# Patient Record
Sex: FEMALE | Race: WHITE | NOT HISPANIC OR LATINO
[De-identification: names, ages, dates, MRNs, and addresses within clinical notes are randomized per-mention and may not be internally consistent; named-entity substitution may affect disease eponyms.]

---

## 2020-07-10 ENCOUNTER — APPOINTMENT (OUTPATIENT)
Dept: GYNECOLOGIC ONCOLOGY | Facility: CLINIC | Age: 75
End: 2020-07-10
Payer: MEDICARE

## 2020-07-10 PROCEDURE — 99204 OFFICE O/P NEW MOD 45 MIN: CPT

## 2020-07-10 NOTE — OB HISTORY
[Total Preg ___] : : [unfilled] [Living ___] : [unfilled] (living) [Abortions ___] : [unfilled] (abortions) [Menarche Age ____] : age at menarche was [unfilled] [ ___] : [unfilled]  section delivery(s)

## 2020-07-18 LAB
CORE LAB BIOPSY: NORMAL
CYTOLOGY CVX/VAG DOC THIN PREP: ABNORMAL

## 2020-07-26 ENCOUNTER — APPOINTMENT (OUTPATIENT)
Dept: MRI IMAGING | Facility: CLINIC | Age: 75
End: 2020-07-26

## 2020-07-27 NOTE — DISCUSSION/SUMMARY
[Reviewed Clinical Lab Test(s)] : Results of clinical tests were reviewed. [Reviewed Radiology Report(s)] : Radiology reports were reviewed. [FreeTextEntry1] : I met with the pt and her . \par -We discussed the clinical findings and differential. \par -Management options were reviewed, including my advise for imaging and given her all question (fish), we plan an MR of A/P. \par -Depending on the samples and the imaging (if they go to [facility], I asked that they have the discs forwarded), we disc poss CRS evaal and I provided contact for 2 groups. \par -Her instructions were reviewed.\par -All Q/A.\par -She will call after the study is completed.

## 2020-07-27 NOTE — HISTORY OF PRESENT ILLNESS
[FreeTextEntry1] : Recurrent IIB Cervical Adenocarcinoma \par TPE, Continent urinary diversion-4/6/99\par PCP-Dr Wilson\par Rheum-Dr Jackson\par \par She returns to the office feeling well. The are no urinary complaints. She reports both the urinary and ostomy are functioning well.   About a month ago she noticed discharge - a dripping from the anal verge and some pink spotting.  \par \par Pap: Sept 2015- neg.\par \par ROS is otherwise is noncontributory. \par \par [facility] is by her PCP; Mgm May 2018- negative per patient\par \par She reports a DEXA with osteoporosis and she is to see Rheum re the result and continued therapy. She is on Prolia.

## 2020-07-27 NOTE — PHYSICAL EXAM
[Absent] : Adnexa(ae): Absent [Abnormal] : Bimanual Exam: Abnormal [Normal] : Anus and perineum: Normal sphincter tone, no masses, no prolapse. [de-identified] : inc cdi; ostomy; diversion opening patent [de-identified] : dense scar with minimal depth [de-identified] : trace edema [de-identified] : anal verge nl [de-identified] : dense scar [de-identified] : dense scar [de-identified] : smooth induration

## 2020-08-05 ENCOUNTER — RESULT REVIEW (OUTPATIENT)
Age: 75
End: 2020-08-05

## 2020-08-05 ENCOUNTER — APPOINTMENT (OUTPATIENT)
Dept: CT IMAGING | Facility: CLINIC | Age: 75
End: 2020-08-05
Payer: MEDICARE

## 2020-08-05 ENCOUNTER — OUTPATIENT (OUTPATIENT)
Dept: OUTPATIENT SERVICES | Facility: HOSPITAL | Age: 75
LOS: 1 days | End: 2020-08-05
Payer: MEDICARE

## 2020-08-05 DIAGNOSIS — C53.9 MALIGNANT NEOPLASM OF CERVIX UTERI, UNSPECIFIED: ICD-10-CM

## 2020-08-05 DIAGNOSIS — R19.00 INTRA-ABDOMINAL AND PELVIC SWELLING, MASS AND LUMP, UNSPECIFIED SITE: ICD-10-CM

## 2020-08-05 PROCEDURE — 72193 CT PELVIS W/DYE: CPT

## 2020-08-05 PROCEDURE — 82565 ASSAY OF CREATININE: CPT

## 2020-08-05 PROCEDURE — 72193 CT PELVIS W/DYE: CPT | Mod: 26

## 2020-08-07 ENCOUNTER — APPOINTMENT (OUTPATIENT)
Dept: GYNECOLOGIC ONCOLOGY | Facility: CLINIC | Age: 75
End: 2020-08-07
Payer: MEDICARE

## 2020-08-07 VITALS
WEIGHT: 135 LBS | HEIGHT: 62 IN | HEART RATE: 74 BPM | DIASTOLIC BLOOD PRESSURE: 80 MMHG | SYSTOLIC BLOOD PRESSURE: 131 MMHG | BODY MASS INDEX: 24.84 KG/M2

## 2020-08-07 PROCEDURE — 99214 OFFICE O/P EST MOD 30 MIN: CPT

## 2020-08-20 ENCOUNTER — TRANSCRIPTION ENCOUNTER (OUTPATIENT)
Age: 75
End: 2020-08-20

## 2020-09-01 ENCOUNTER — APPOINTMENT (OUTPATIENT)
Dept: SURGERY | Facility: CLINIC | Age: 75
End: 2020-09-01
Payer: MEDICARE

## 2020-09-01 VITALS
OXYGEN SATURATION: 99 % | SYSTOLIC BLOOD PRESSURE: 148 MMHG | RESPIRATION RATE: 15 BRPM | HEIGHT: 62 IN | WEIGHT: 135 LBS | DIASTOLIC BLOOD PRESSURE: 82 MMHG | TEMPERATURE: 98.1 F | BODY MASS INDEX: 24.84 KG/M2 | HEART RATE: 77 BPM

## 2020-09-01 PROCEDURE — 99204 OFFICE O/P NEW MOD 45 MIN: CPT

## 2020-09-01 NOTE — PHYSICAL EXAM
[Abdomen Tenderness] : ~T No ~M abdominal tenderness [Wheezing] : no wheezing was heard [No Rash or Lesion] : No rash or lesion [Alert] : alert [Calm] : calm [de-identified] : Well-healed midline incision.  Colostomy is functioning normally.  [place] pouch in the right lower quadrant appears to be functioning normally. [de-identified] : Perianal inspection reveals a noninflamed small anal tag.  The anal canal is strictured and does not admit an index finger.  There is a small amount of serous discharge. [de-identified] : nl [de-identified] : nl

## 2020-09-01 NOTE — HISTORY OF PRESENT ILLNESS
[FreeTextEntry1] : Abi is a 76 y/o female here for consultation visit.  She is 20 years status post pelvic exoneration for cervical cancer.  She has a history of neoadjuvant radiation to the pelvis.  For many years she had mucus discharge from the anus however over the past year this has diminished.  She now has only a small amount of occasional mucus discharge from the anus.  She denies abdominal pain nausea or vomiting.  She denies rectal or pelvic pain.\par \par MRI from 7/18/20 demonstrated large hypovascular rectovaginal mass consistent with a mucinous adenocarcinoma involving both the remaining distal rectum and vagina. Tumor abuts the anal sphincter but does not clearly invade into it. Prominent bilateral inguinal lymph nodes are nonspecific. \par \par CT from 8/6/20 demonstrated residual chronic perineal mass, likely cystic, with gas component new since 2010. Mass likely related to prior radiation and surgery with possible superinfection. Fistulization to bowel considered unlikely due to absence of contrast within the gaseous component.

## 2020-09-01 NOTE — CONSULT LETTER
[Dear  ___] : Dear  [unfilled], [Consult Letter:] : I had the pleasure of evaluating your patient, [unfilled]. [Please see my note below.] : Please see my note below. [Consult Closing:] : Thank you very much for allowing me to participate in the care of this patient.  If you have any questions, please do not hesitate to contact me. [Sincerely,] : Sincerely, [FreeTextEntry3] : Fermin Oropeza M.D., F.A.C.S, F.A.S.C.R.S [DrSunny  ___] : Dr. AGUIRRE

## 2020-09-01 NOTE — ASSESSMENT
[FreeTextEntry1] : In summary the patient has a history of cervical cancer status post pelvic radiation and subsequent pelvic exenteration.  She has an obvious anal stricture likely secondary to radiation.  I suspect that the CT and MRI findings represent a dilated mucus filled rectum as a result of distal obstruction.  I therefore recommended an anal dilation and proctoscopy under sedation in endoscopy.  Any abnormalities will be biopsied.  Risks benefits and alternatives were explained.

## 2020-09-11 ENCOUNTER — APPOINTMENT (OUTPATIENT)
Dept: DISASTER EMERGENCY | Facility: CLINIC | Age: 75
End: 2020-09-11

## 2020-09-11 DIAGNOSIS — Z01.818 ENCOUNTER FOR OTHER PREPROCEDURAL EXAMINATION: ICD-10-CM

## 2020-09-12 LAB — SARS-COV-2 N GENE NPH QL NAA+PROBE: NOT DETECTED

## 2020-09-14 ENCOUNTER — RESULT REVIEW (OUTPATIENT)
Age: 75
End: 2020-09-14

## 2020-09-14 ENCOUNTER — OUTPATIENT (OUTPATIENT)
Dept: OUTPATIENT SERVICES | Facility: HOSPITAL | Age: 75
LOS: 1 days | End: 2020-09-14
Payer: MEDICARE

## 2020-09-14 ENCOUNTER — APPOINTMENT (OUTPATIENT)
Dept: SURGERY | Facility: HOSPITAL | Age: 75
End: 2020-09-14
Payer: MEDICARE

## 2020-09-14 VITALS
TEMPERATURE: 97 F | SYSTOLIC BLOOD PRESSURE: 124 MMHG | OXYGEN SATURATION: 100 % | HEART RATE: 77 BPM | RESPIRATION RATE: 20 BRPM | DIASTOLIC BLOOD PRESSURE: 43 MMHG | WEIGHT: 134.92 LBS | HEIGHT: 63 IN

## 2020-09-14 VITALS
RESPIRATION RATE: 16 BRPM | SYSTOLIC BLOOD PRESSURE: 113 MMHG | OXYGEN SATURATION: 93 % | HEART RATE: 81 BPM | DIASTOLIC BLOOD PRESSURE: 53 MMHG

## 2020-09-14 DIAGNOSIS — C53.9 MALIGNANT NEOPLASM OF CERVIX UTERI, UNSPECIFIED: ICD-10-CM

## 2020-09-14 PROCEDURE — 88342 IMHCHEM/IMCYTCHM 1ST ANTB: CPT | Mod: 26

## 2020-09-14 PROCEDURE — 45331 SIGMOIDOSCOPY AND BIOPSY: CPT

## 2020-09-14 PROCEDURE — 88341 IMHCHEM/IMCYTCHM EA ADD ANTB: CPT

## 2020-09-14 PROCEDURE — 88360 TUMOR IMMUNOHISTOCHEM/MANUAL: CPT

## 2020-09-14 PROCEDURE — 45380 COLONOSCOPY AND BIOPSY: CPT

## 2020-09-14 PROCEDURE — 88112 CYTOPATH CELL ENHANCE TECH: CPT | Mod: 26

## 2020-09-14 PROCEDURE — 88112 CYTOPATH CELL ENHANCE TECH: CPT

## 2020-09-14 PROCEDURE — 88305 TISSUE EXAM BY PATHOLOGIST: CPT | Mod: 26

## 2020-09-14 PROCEDURE — 88305 TISSUE EXAM BY PATHOLOGIST: CPT

## 2020-09-14 PROCEDURE — 88341 IMHCHEM/IMCYTCHM EA ADD ANTB: CPT | Mod: 26

## 2020-09-14 RX ORDER — SODIUM CHLORIDE 9 MG/ML
1000 INJECTION INTRAMUSCULAR; INTRAVENOUS; SUBCUTANEOUS
Refills: 0 | Status: DISCONTINUED | OUTPATIENT
Start: 2020-09-14 | End: 2020-09-29

## 2020-09-14 NOTE — ASU DISCHARGE PLAN (ADULT/PEDIATRIC) - CARE PROVIDER_API CALL
Fermin Oropeza  COLON/RECTAL SURGERY  [address] [facility], [facility] [address]  [place] [address]  Phone: [phone]  Fax: [phone]  Follow Up Time:

## 2020-09-14 NOTE — PRE-OP CHECKLIST - LOOSE TEETH
Brian Bernal  CARDIOLOGY  [address] [place] [address]  Phone: [phone]  Fax: [phone]  Follow Up Time: 1 week no Brian Bernal  CARDIOLOGY  [address] [place] [address], [address] [address]  Phone: [phone]  Fax: [phone]  Follow Up Time: 1 week    Zana Sher  INTERNAL MEDICINE  [address] [place] [address]  Phone: [phone]  Fax: [phone]  Follow Up Time: 1 week

## 2020-09-14 NOTE — PRE PROCEDURE NOTE - PRE PROCEDURE EVALUATION
Attending Physician:                            Procedure:colonoscopy    Indication for Procedure: ?pelvic mass  ________________________________________________________  PAST MEDICAL & SURGICAL HISTORY:    ALLERGIES:  penicillin G benzathine (Rash; Anaphylaxis; Hives; Short breath)    HOME MEDICATIONS:  hydroCHLOROthiazide 12.5 mg oral tablet: 1 tab(s) orally once a day  Prolia 60 mg/mL subcutaneous solution:   simvastatin 10 mg oral tablet: 1 tab(s) orally once a day (at bedtime)    AICD/PPM: [ ] yes   [ ] no    PERTINENT LAB DATA:                      PHYSICAL EXAMINATION:    Height (cm): 160  Weight (kg): 61.2  BMI (kg/m2): 23.9  BSA (m2): 1.64T(C): 36.3  HR: 77  BP: 124/43  RR: 20  SpO2: 100%    Constitutional: NAD  HEENT: PERRLA, EOMI,    Neck:  No JVD  Respiratory: CTAB/L  Cardiovascular: S1 and S2  Gastrointestinal: BS+, soft, NT/ND  Extremities: No peripheral edema  Neurological: A/O x 3, no focal deficits  Psychiatric: Normal mood, normal affect  Skin: No rashes        COMMENTS:    The patient is a suitable candidate for the planned procedure unless box checked [ ]  No, explain:

## 2020-09-14 NOTE — ASU DISCHARGE PLAN (ADULT/PEDIATRIC) - ASU DC SPECIAL INSTRUCTIONSFT
DIET: Return to your usual diet.  NOTIFY YOUR SURGEON IF: You have any bleeding that does not stop,  persistent nausea/vomiting, persistent diarrhea, or if your pain is not controlled on your discharge pain medications.  FOLLOW-UP:  1. Follow-up with Dr. Oropeza within 1 month of discharge.  Please call office for appointment  2. Please follow up with your primary care physician regarding your hospitalization.

## 2020-09-14 NOTE — PRE-ANESTHESIA EVALUATION ADULT - NSANTHOSAYNRD_GEN_A_CORE
No. YUDY screening performed.  STOP BANG Legend: 0-2 = LOW Risk; 3-4 = INTERMEDIATE Risk; 5-8 = HIGH Risk

## 2020-09-15 LAB
NON-GYNECOLOGICAL CYTOLOGY STUDY: SIGNIFICANT CHANGE UP
SURGICAL PATHOLOGY STUDY: SIGNIFICANT CHANGE UP

## 2020-09-17 ENCOUNTER — APPOINTMENT (OUTPATIENT)
Dept: SURGERY | Facility: CLINIC | Age: 75
End: 2020-09-17
Payer: MEDICARE

## 2020-09-17 VITALS
DIASTOLIC BLOOD PRESSURE: 83 MMHG | SYSTOLIC BLOOD PRESSURE: 146 MMHG | OXYGEN SATURATION: 98 % | HEART RATE: 75 BPM | RESPIRATION RATE: 16 BRPM | TEMPERATURE: 97.6 F

## 2020-09-17 PROCEDURE — 99213 OFFICE O/P EST LOW 20 MIN: CPT

## 2020-09-17 NOTE — HISTORY OF PRESENT ILLNESS
[FreeTextEntry1] : Abi is a 74 y/o female with a h/o cervical cancer status post pelvic radiation and subsequent pelvic exenteration. Pt is s/p proctoscopy of her short rectal stump on 9/14/20.  The findings included a malignant appearing distal rectal mass anteriorly with an obvious large rectovaginal fistula.  Pathology: moderately differentiated adenocarcinoma with mucinous features.  In the office today the patient presents for follow-up.  She denies abdominal pain nausea vomiting but has persistent mild discharge from the rectum and vagina.

## 2020-09-17 NOTE — ASSESSMENT
[FreeTextEntry1] : In summary the patient has a history of a pelvic exenteration with chemotherapy and radiation 20 years ago for cervical cancer.  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  I referred her to the [facility] for evaluation.  She will need additional imaging, a full colonoscopy, and likely eventual surgery.  The options for surgical approach would be transabdominal versus perineal.  We did not go into the details of surgery. She will be presented at an upcoming tumor board.

## 2020-09-18 ENCOUNTER — OUTPATIENT (OUTPATIENT)
Dept: OUTPATIENT SERVICES | Facility: HOSPITAL | Age: 75
LOS: 1 days | Discharge: HOME | End: 2020-09-18

## 2020-09-18 DIAGNOSIS — K50.911 CROHN'S DISEASE, UNSPECIFIED, WITH RECTAL BLEEDING: ICD-10-CM

## 2020-09-21 ENCOUNTER — APPOINTMENT (OUTPATIENT)
Age: 75
End: 2020-09-21
Payer: MEDICARE

## 2020-09-21 ENCOUNTER — RESULT REVIEW (OUTPATIENT)
Age: 75
End: 2020-09-21

## 2020-09-21 VITALS
TEMPERATURE: 97.7 F | HEIGHT: 63.39 IN | OXYGEN SATURATION: 99 % | HEART RATE: 73 BPM | RESPIRATION RATE: 16 BRPM | BODY MASS INDEX: 23.53 KG/M2 | SYSTOLIC BLOOD PRESSURE: 130 MMHG | WEIGHT: 134.48 LBS | DIASTOLIC BLOOD PRESSURE: 75 MMHG

## 2020-09-21 LAB
APTT BLD: 28.4 SEC
BASOPHILS # BLD AUTO: 0.03 K/UL — SIGNIFICANT CHANGE UP (ref 0–0.2)
BASOPHILS NFR BLD AUTO: 0.4 % — SIGNIFICANT CHANGE UP (ref 0–2)
EOSINOPHIL # BLD AUTO: 0.05 K/UL — SIGNIFICANT CHANGE UP (ref 0–0.5)
EOSINOPHIL NFR BLD AUTO: 0.7 % — SIGNIFICANT CHANGE UP (ref 0–6)
HCT VFR BLD CALC: 40.3 % — SIGNIFICANT CHANGE UP (ref 34.5–45)
HGB BLD-MCNC: 12.3 G/DL — SIGNIFICANT CHANGE UP (ref 11.5–15.5)
IMM GRANULOCYTES NFR BLD AUTO: 0.4 % — SIGNIFICANT CHANGE UP (ref 0–1.5)
INR PPP: 1.04 RATIO
LYMPHOCYTES # BLD AUTO: 1.43 K/UL — SIGNIFICANT CHANGE UP (ref 1–3.3)
LYMPHOCYTES # BLD AUTO: 20.8 % — SIGNIFICANT CHANGE UP (ref 13–44)
MCHC RBC-ENTMCNC: 27.5 PG — SIGNIFICANT CHANGE UP (ref 27–34)
MCHC RBC-ENTMCNC: 30.5 G/DL — LOW (ref 32–36)
MCV RBC AUTO: 90 FL — SIGNIFICANT CHANGE UP (ref 80–100)
MONOCYTES # BLD AUTO: 0.48 K/UL — SIGNIFICANT CHANGE UP (ref 0–0.9)
MONOCYTES NFR BLD AUTO: 7 % — SIGNIFICANT CHANGE UP (ref 2–14)
NEUTROPHILS # BLD AUTO: 4.84 K/UL — SIGNIFICANT CHANGE UP (ref 1.8–7.4)
NEUTROPHILS NFR BLD AUTO: 70.7 % — SIGNIFICANT CHANGE UP (ref 43–77)
NRBC # BLD: 0 /100 WBCS — SIGNIFICANT CHANGE UP (ref 0–0)
PLATELET # BLD AUTO: 345 K/UL — SIGNIFICANT CHANGE UP (ref 150–400)
PT BLD: 12.2 SEC
RBC # BLD: 4.48 M/UL — SIGNIFICANT CHANGE UP (ref 3.8–5.2)
RBC # FLD: 14.6 % — HIGH (ref 10.3–14.5)
WBC # BLD: 6.86 K/UL — SIGNIFICANT CHANGE UP (ref 3.8–10.5)
WBC # FLD AUTO: 6.86 K/UL — SIGNIFICANT CHANGE UP (ref 3.8–10.5)

## 2020-09-21 PROCEDURE — 99205 OFFICE O/P NEW HI 60 MIN: CPT

## 2020-09-22 LAB
ALBUMIN SERPL ELPH-MCNC: 4.7 G/DL
ALP BLD-CCNC: 72 U/L
ALT SERPL-CCNC: 12 U/L
ANION GAP SERPL CALC-SCNC: 16 MMOL/L
AST SERPL-CCNC: 16 U/L
BILIRUB SERPL-MCNC: 0.3 MG/DL
BUN SERPL-MCNC: 17 MG/DL
CALCIUM SERPL-MCNC: 10 MG/DL
CHLORIDE SERPL-SCNC: 103 MMOL/L
CO2 SERPL-SCNC: 23 MMOL/L
CREAT SERPL-MCNC: 0.84 MG/DL
GLUCOSE SERPL-MCNC: 94 MG/DL
HBV CORE IGG+IGM SER QL: NONREACTIVE
HBV CORE IGM SER QL: NONREACTIVE
HBV SURFACE AB SER QL: NONREACTIVE
HBV SURFACE AG SER QL: NONREACTIVE
HCV AB SER QL: NONREACTIVE
HCV S/CO RATIO: 0.12 S/CO
POTASSIUM SERPL-SCNC: 3.7 MMOL/L
PROT SERPL-MCNC: 7.1 G/DL
SODIUM SERPL-SCNC: 142 MMOL/L

## 2020-09-23 ENCOUNTER — APPOINTMENT (OUTPATIENT)
Dept: CT IMAGING | Facility: CLINIC | Age: 75
End: 2020-09-23
Payer: MEDICARE

## 2020-09-23 ENCOUNTER — OUTPATIENT (OUTPATIENT)
Dept: OUTPATIENT SERVICES | Facility: HOSPITAL | Age: 75
LOS: 1 days | End: 2020-09-23
Payer: MEDICARE

## 2020-09-23 DIAGNOSIS — Z00.8 ENCOUNTER FOR OTHER GENERAL EXAMINATION: ICD-10-CM

## 2020-09-23 LAB — CEA SERPL-MCNC: 8.1 NG/ML

## 2020-09-23 PROCEDURE — 71260 CT THORAX DX C+: CPT | Mod: 26

## 2020-09-23 PROCEDURE — 74177 CT ABD & PELVIS W/CONTRAST: CPT

## 2020-09-23 PROCEDURE — 71260 CT THORAX DX C+: CPT

## 2020-09-23 PROCEDURE — 74177 CT ABD & PELVIS W/CONTRAST: CPT | Mod: 26

## 2020-09-28 ENCOUNTER — APPOINTMENT (OUTPATIENT)
Dept: RADIATION ONCOLOGY | Facility: CLINIC | Age: 75
End: 2020-09-28
Payer: MEDICARE

## 2020-09-28 PROCEDURE — 99205 OFFICE O/P NEW HI 60 MIN: CPT | Mod: GC,25

## 2020-09-28 NOTE — REASON FOR VISIT
[Consideration of Curative Therapy] : consideration of curative therapy for [Rectal Cancer] : cancer

## 2020-09-30 ENCOUNTER — APPOINTMENT (OUTPATIENT)
Age: 75
End: 2020-09-30
Payer: MEDICARE

## 2020-09-30 NOTE — VITALS
[Least Pain Intensity: 0/10] : 0/10 [Maximal Pain Intensity: 3/10] : 3/10 [80: Normal activity with effort; some signs or symptoms of disease.] : 80: Normal activity with effort; some signs or symptoms of disease.

## 2020-10-01 LAB
DPYD GENOTYPE: NORMAL
DPYD PHENOTYPE: NORMAL
DPYD SPECIMEN: NORMAL

## 2020-10-03 ENCOUNTER — APPOINTMENT (OUTPATIENT)
Dept: NUCLEAR MEDICINE | Facility: CLINIC | Age: 75
End: 2020-10-03
Payer: MEDICARE

## 2020-10-03 ENCOUNTER — OUTPATIENT (OUTPATIENT)
Dept: OUTPATIENT SERVICES | Facility: HOSPITAL | Age: 75
LOS: 1 days | End: 2020-10-03
Payer: MEDICARE

## 2020-10-03 DIAGNOSIS — C20 MALIGNANT NEOPLASM OF RECTUM: ICD-10-CM

## 2020-10-03 PROCEDURE — 78815 PET IMAGE W/CT SKULL-THIGH: CPT | Mod: 26,PI

## 2020-10-03 PROCEDURE — 78815 PET IMAGE W/CT SKULL-THIGH: CPT

## 2020-10-03 PROCEDURE — A9552: CPT

## 2020-10-07 ENCOUNTER — APPOINTMENT (OUTPATIENT)
Age: 75
End: 2020-10-07
Payer: MEDICARE

## 2020-10-07 VITALS
DIASTOLIC BLOOD PRESSURE: 85 MMHG | TEMPERATURE: 98.2 F | RESPIRATION RATE: 14 BRPM | OXYGEN SATURATION: 98 % | SYSTOLIC BLOOD PRESSURE: 142 MMHG | HEART RATE: 88 BPM | WEIGHT: 132.28 LBS | BODY MASS INDEX: 23.14 KG/M2

## 2020-10-07 PROCEDURE — 99215 OFFICE O/P EST HI 40 MIN: CPT

## 2020-10-07 NOTE — CONSULT LETTER
[Dear  ___] : Dear  [unfilled], [Courtesy Letter:] : I had the pleasure of seeing your patient, [unfilled], in my office today. [Please see my note below.] : Please see my note below. [Consult Closing:] : Thank you very much for allowing me to participate in the care of this patient.  If you have any questions, please do not hesitate to contact me. [Sincerely,] : Sincerely, [FreeTextEntry3] : Nury Baker D.O. \par Attending Physician \par [facility] Medical Oncology [facility] H[facility]\par  \par [facility] \par Tel: [phone]\par Fax: [phone]\par

## 2020-10-07 NOTE — PHYSICAL EXAM
[Fully active, able to carry on all pre-disease performance without restriction] : Status 0 - Fully active, able to carry on all pre-disease performance without restriction [Normal] : affect appropriate [de-identified] : + ostomy

## 2020-10-07 NOTE — REASON FOR VISIT
[Initial Consultation] : an initial consultation [Spouse] : spouse [FreeTextEntry2] : Rectal adenocarcinoma

## 2020-10-07 NOTE — HISTORY OF PRESENT ILLNESS
[Disease: _____________________] : Disease: [unfilled] [de-identified] : 75 F w/ h/o recurrent endocervical adenocarcinoma Stage IIB s/p radiation then required pelvic exoneration presents for further evaluation of rectal cancer. \par \par In June 2020 Abi noticed a rectal discharge and burning sensation in the rectum. Was evaluated by Dr. Lamar and ultimately saw Dr. Oropeza who ordered CT Pelvis and MRI Pelvis . Found to have a mass in the rectum s/p biopsy confirmed adenocarcinoma. \par \par 7/18/20: MRI Pelvis: large hypovascular mass c/w mucinous adenoca involving remaining distal rectum and vagina, tumor abuts anal sphincter\par 8/5/20: CT pelvis: residual chronic perineal mass, likely cystic with gas component\par  [de-identified] : adenocarcinoma [de-identified] : Appetite and weight are stable. Uncomfortable when sitting but no pain. \par Ostomy and conduit working well. \par No n/v. \par

## 2020-10-08 NOTE — REASON FOR VISIT
[Follow-Up Visit] : a follow-up [Spouse] : spouse [Family Member] : family member [FreeTextEntry2] : Rectal cancer

## 2020-10-08 NOTE — HISTORY OF PRESENT ILLNESS
[Disease: _____________________] : Disease: [unfilled] [T: ___] : T[unfilled] [N: ___] : N[unfilled] [AJCC Stage: ____] : AJCC Stage: [unfilled] [de-identified] : 75 F w/ h/o recurrent endocervical adenocarcinoma Stage IIB s/p radiation then required pelvic exoneration presents for further evaluation of rectal cancer. \par \par In June 2020 Abi noticed a rectal discharge and burning sensation in the rectum. Was evaluated by Dr. Lamar and ultimately saw Dr. Oropeza who ordered CT Pelvis and MRI Pelvis . Found to have a mass in the rectum s/p biopsy confirmed adenocarcinoma. \par \par 7/18/20: MRI Pelvis: large hypovascular mass c/w mucinous adenoca involving remaining distal rectum and vagina, tumor abuts anal sphincter\par 8/5/20: CT pelvis: residual chronic perineal mass, likely cystic with gas component\par 9/23/20: CT CAP: low density, possibly necrotic mass up to 6.2 cm with involvement of the vagina, nonspecific 6 mm LLL nodule is noted\par 10/3/20: PET/CT: hypermetabolic rectal mass, non specific small inguinal LN, nonspecific hypermetabolism within the colostomy insertion site\par 10/7/20: discussed at rectal TB, plan for DILMA with re-evaluation of response and then surgery if residual disease [de-identified] : adenocarcinoma [de-identified] : Met with pt today to discuss PET/CT results and tumor board discussion and recommendation.

## 2020-10-08 NOTE — PHYSICAL EXAM
[Fully active, able to carry on all pre-disease performance without restriction] : Status 0 - Fully active, able to carry on all pre-disease performance without restriction [Normal] : affect appropriate [de-identified] : + ostomy

## 2020-10-11 ENCOUNTER — APPOINTMENT (OUTPATIENT)
Dept: MRI IMAGING | Facility: CLINIC | Age: 75
End: 2020-10-11

## 2020-10-12 ENCOUNTER — APPOINTMENT (OUTPATIENT)
Dept: DISASTER EMERGENCY | Facility: CLINIC | Age: 75
End: 2020-10-12

## 2020-10-12 ENCOUNTER — OUTPATIENT (OUTPATIENT)
Dept: OUTPATIENT SERVICES | Facility: HOSPITAL | Age: 75
LOS: 1 days | End: 2020-10-12
Payer: MEDICARE

## 2020-10-12 DIAGNOSIS — Z11.59 ENCOUNTER FOR SCREENING FOR OTHER VIRAL DISEASES: ICD-10-CM

## 2020-10-12 PROCEDURE — U0003: CPT

## 2020-10-13 LAB — SARS-COV-2 RNA SPEC QL NAA+PROBE: SIGNIFICANT CHANGE UP

## 2020-10-15 ENCOUNTER — APPOINTMENT (OUTPATIENT)
Dept: ENDOVASCULAR SURGERY | Facility: CLINIC | Age: 75
End: 2020-10-15
Payer: MEDICARE

## 2020-10-15 VITALS
HEART RATE: 83 BPM | WEIGHT: 132 LBS | BODY MASS INDEX: 23.39 KG/M2 | SYSTOLIC BLOOD PRESSURE: 133 MMHG | DIASTOLIC BLOOD PRESSURE: 71 MMHG | RESPIRATION RATE: 15 BRPM | TEMPERATURE: 97.6 F | HEIGHT: 63 IN | OXYGEN SATURATION: 97 %

## 2020-10-15 PROCEDURE — 77001 FLUOROGUIDE FOR VEIN DEVICE: CPT

## 2020-10-15 PROCEDURE — 76937 US GUIDE VASCULAR ACCESS: CPT

## 2020-10-15 PROCEDURE — 36561 INSERT TUNNELED CV CATH: CPT

## 2020-10-19 ENCOUNTER — OUTPATIENT (OUTPATIENT)
Dept: OUTPATIENT SERVICES | Facility: HOSPITAL | Age: 75
LOS: 1 days | Discharge: HOME | End: 2020-10-19

## 2020-10-19 DIAGNOSIS — K50.911 CROHN'S DISEASE, UNSPECIFIED, WITH RECTAL BLEEDING: ICD-10-CM

## 2020-10-20 ENCOUNTER — APPOINTMENT (OUTPATIENT)
Dept: MRI IMAGING | Facility: CLINIC | Age: 75
End: 2020-10-20

## 2020-10-20 NOTE — PAST MEDICAL HISTORY
[Increasing age ( >40 years old)] : Increasing age ( >40 years old) [No therapy indicated for cases scheduled for less than one hour] : No therapy indicated for cases scheduled for less than one hour. [FreeTextEntry1] : Malignant Hyperthermia Screening Tool and Risk of Bleeding Assessment \par \par Ms. JACQUELINE SPEARS denies family of unexpected death following Anesthesia or Exercise.\par Denies Family history of Malignant Hyperthermia, Muscle or Neuromuscular disorder and High Temperature  following exercise.\par \par Ms. JACQUELINE SPEARS denies history of Muscle Spasm, Dark or Chocolate- Colored and Unanticipated fever immediately following anaesthesia or serious exercise.\par Ms. JACQUELINE SPEARS also denies bleeding tendencies/Risks of Bleeding.\par

## 2020-10-20 NOTE — PROCEDURE
[D/C IV on discharge] : D/C IV on discharge [Resume diet] : resume diet [Site check for bleeding/hematoma] : Site check for bleeding/hematoma [Vital signs on admission the q 15 mins x2] : Vital signs on admission the q 15 mins x2 [FreeTextEntry1] : right mediport insertion

## 2020-10-20 NOTE — HISTORY OF PRESENT ILLNESS
[FreeTextEntry1] : alert and oriented x 3 \par accompanied by  Vladimir [phone]\par no reported falls \par covid test  [FreeTextEntry5] : yesterday at  8 pm [FreeTextEntry6] : Dr Chang

## 2020-10-26 ENCOUNTER — APPOINTMENT (OUTPATIENT)
Age: 75
End: 2020-10-26

## 2020-10-26 ENCOUNTER — RESULT REVIEW (OUTPATIENT)
Age: 75
End: 2020-10-26

## 2020-10-26 ENCOUNTER — LABORATORY RESULT (OUTPATIENT)
Age: 75
End: 2020-10-26

## 2020-10-26 LAB
BASOPHILS # BLD AUTO: 0.05 K/UL — SIGNIFICANT CHANGE UP (ref 0–0.2)
BASOPHILS NFR BLD AUTO: 0.5 % — SIGNIFICANT CHANGE UP (ref 0–2)
EOSINOPHIL # BLD AUTO: 0.08 K/UL — SIGNIFICANT CHANGE UP (ref 0–0.5)
EOSINOPHIL NFR BLD AUTO: 0.9 % — SIGNIFICANT CHANGE UP (ref 0–6)
HCT VFR BLD CALC: 39.1 % — SIGNIFICANT CHANGE UP (ref 34.5–45)
HGB BLD-MCNC: 12.5 G/DL — SIGNIFICANT CHANGE UP (ref 11.5–15.5)
IMM GRANULOCYTES NFR BLD AUTO: 0.7 % — SIGNIFICANT CHANGE UP (ref 0–1.5)
LYMPHOCYTES # BLD AUTO: 1.47 K/UL — SIGNIFICANT CHANGE UP (ref 1–3.3)
LYMPHOCYTES # BLD AUTO: 15.7 % — SIGNIFICANT CHANGE UP (ref 13–44)
MCHC RBC-ENTMCNC: 27.8 PG — SIGNIFICANT CHANGE UP (ref 27–34)
MCHC RBC-ENTMCNC: 32 G/DL — SIGNIFICANT CHANGE UP (ref 32–36)
MCV RBC AUTO: 87.1 FL — SIGNIFICANT CHANGE UP (ref 80–100)
MONOCYTES # BLD AUTO: 0.78 K/UL — SIGNIFICANT CHANGE UP (ref 0–0.9)
MONOCYTES NFR BLD AUTO: 8.3 % — SIGNIFICANT CHANGE UP (ref 2–14)
NEUTROPHILS # BLD AUTO: 6.9 K/UL — SIGNIFICANT CHANGE UP (ref 1.8–7.4)
NEUTROPHILS NFR BLD AUTO: 73.9 % — SIGNIFICANT CHANGE UP (ref 43–77)
NRBC # BLD: 0 /100 WBCS — SIGNIFICANT CHANGE UP (ref 0–0)
PLATELET # BLD AUTO: 328 K/UL — SIGNIFICANT CHANGE UP (ref 150–400)
RBC # BLD: 4.49 M/UL — SIGNIFICANT CHANGE UP (ref 3.8–5.2)
RBC # FLD: 15 % — HIGH (ref 10.3–14.5)
WBC # BLD: 9.35 K/UL — SIGNIFICANT CHANGE UP (ref 3.8–10.5)
WBC # FLD AUTO: 9.35 K/UL — SIGNIFICANT CHANGE UP (ref 3.8–10.5)

## 2020-10-27 ENCOUNTER — NON-APPOINTMENT (OUTPATIENT)
Age: 75
End: 2020-10-27

## 2020-10-27 DIAGNOSIS — C20 MALIGNANT NEOPLASM OF RECTUM: ICD-10-CM

## 2020-10-27 DIAGNOSIS — Z51.11 ENCOUNTER FOR ANTINEOPLASTIC CHEMOTHERAPY: ICD-10-CM

## 2020-10-27 DIAGNOSIS — R11.2 NAUSEA WITH VOMITING, UNSPECIFIED: ICD-10-CM

## 2020-10-28 ENCOUNTER — APPOINTMENT (OUTPATIENT)
Age: 75
End: 2020-10-28

## 2020-11-04 ENCOUNTER — APPOINTMENT (OUTPATIENT)
Age: 75
End: 2020-11-04
Payer: MEDICARE

## 2020-11-04 PROCEDURE — 99441: CPT

## 2020-11-04 NOTE — HISTORY OF PRESENT ILLNESS
[Home] : at home, [unfilled] , at the time of the visit. [Medical Office: ([facility])___] : at the medical office located in  [Spouse] : spouse [Verbal consent obtained from patient] : the patient, [unfilled] [Disease: _____________________] : Disease: [unfilled] [T: ___] : T[unfilled] [N: ___] : N[unfilled] [AJCC Stage: ____] : AJCC Stage: [unfilled] [Therapy: ___] : Therapy: [unfilled] [Cycle: ___] : Cycle: [unfilled] [Day: ___] : Day: [unfilled] [de-identified] : 75 F w/ h/o recurrent endocervical adenocarcinoma Stage IIB s/p radiation then required pelvic exoneration presents for further evaluation of rectal cancer. \par \par In June 2020 Abi noticed a rectal discharge and burning sensation in the rectum. Was evaluated by Dr. Lamar and ultimately saw Dr. Oropeza who ordered CT Pelvis and MRI Pelvis . Found to have a mass in the rectum s/p biopsy confirmed adenocarcinoma. \par \par 7/18/20: MRI Pelvis: large hypovascular mass c/w mucinous adenoca involving remaining distal rectum and vagina, tumor abuts anal sphincter\par 8/5/20: CT pelvis: residual chronic perineal mass, likely cystic with gas component\par 9/23/20: CT CAP: low density, possibly necrotic mass up to 6.2 cm with involvement of the vagina, nonspecific 6 mm LLL nodule is noted\par 10/3/20: PET/CT: hypermetabolic rectal mass, non specific small inguinal LN, nonspecific hypermetabolism within the colostomy insertion site\par 10/7/20: discussed at rectal TB, plan for DILMA with re-evaluation of response and then surgery if residual disease\par 10/26/20: C1 FOLFOX [de-identified] : adenocarcinoma [de-identified] : Tolerated first cycle

## 2020-11-05 ENCOUNTER — NON-APPOINTMENT (OUTPATIENT)
Age: 75
End: 2020-11-05

## 2020-11-09 ENCOUNTER — LABORATORY RESULT (OUTPATIENT)
Age: 75
End: 2020-11-09

## 2020-11-09 ENCOUNTER — APPOINTMENT (OUTPATIENT)
Age: 75
End: 2020-11-09
Payer: MEDICARE

## 2020-11-09 ENCOUNTER — APPOINTMENT (OUTPATIENT)
Age: 75
End: 2020-11-09

## 2020-11-09 ENCOUNTER — RESULT REVIEW (OUTPATIENT)
Age: 75
End: 2020-11-09

## 2020-11-09 VITALS
DIASTOLIC BLOOD PRESSURE: 79 MMHG | TEMPERATURE: 97.9 F | RESPIRATION RATE: 16 BRPM | SYSTOLIC BLOOD PRESSURE: 131 MMHG | HEART RATE: 80 BPM

## 2020-11-09 LAB
BASOPHILS # BLD AUTO: 0.04 K/UL — SIGNIFICANT CHANGE UP (ref 0–0.2)
BASOPHILS NFR BLD AUTO: 0.6 % — SIGNIFICANT CHANGE UP (ref 0–2)
EOSINOPHIL # BLD AUTO: 0.09 K/UL — SIGNIFICANT CHANGE UP (ref 0–0.5)
EOSINOPHIL NFR BLD AUTO: 1.3 % — SIGNIFICANT CHANGE UP (ref 0–6)
HCT VFR BLD CALC: 38.5 % — SIGNIFICANT CHANGE UP (ref 34.5–45)
HGB BLD-MCNC: 12.6 G/DL — SIGNIFICANT CHANGE UP (ref 11.5–15.5)
IMM GRANULOCYTES NFR BLD AUTO: 0.9 % — SIGNIFICANT CHANGE UP (ref 0–1.5)
LYMPHOCYTES # BLD AUTO: 1.49 K/UL — SIGNIFICANT CHANGE UP (ref 1–3.3)
LYMPHOCYTES # BLD AUTO: 21.3 % — SIGNIFICANT CHANGE UP (ref 13–44)
MCHC RBC-ENTMCNC: 27.8 PG — SIGNIFICANT CHANGE UP (ref 27–34)
MCHC RBC-ENTMCNC: 32.7 G/DL — SIGNIFICANT CHANGE UP (ref 32–36)
MCV RBC AUTO: 85 FL — SIGNIFICANT CHANGE UP (ref 80–100)
MONOCYTES # BLD AUTO: 0.8 K/UL — SIGNIFICANT CHANGE UP (ref 0–0.9)
MONOCYTES NFR BLD AUTO: 11.4 % — SIGNIFICANT CHANGE UP (ref 2–14)
NEUTROPHILS # BLD AUTO: 4.53 K/UL — SIGNIFICANT CHANGE UP (ref 1.8–7.4)
NEUTROPHILS NFR BLD AUTO: 64.5 % — SIGNIFICANT CHANGE UP (ref 43–77)
NRBC # BLD: 0 /100 WBCS — SIGNIFICANT CHANGE UP (ref 0–0)
PLATELET # BLD AUTO: 244 K/UL — SIGNIFICANT CHANGE UP (ref 150–400)
RBC # BLD: 4.53 M/UL — SIGNIFICANT CHANGE UP (ref 3.8–5.2)
RBC # FLD: 15.2 % — HIGH (ref 10.3–14.5)
WBC # BLD: 7.01 K/UL — SIGNIFICANT CHANGE UP (ref 3.8–10.5)
WBC # FLD AUTO: 7.01 K/UL — SIGNIFICANT CHANGE UP (ref 3.8–10.5)

## 2020-11-09 PROCEDURE — 99214 OFFICE O/P EST MOD 30 MIN: CPT

## 2020-11-09 RX ORDER — MIRTAZAPINE 15 MG/1
15 TABLET, FILM COATED ORAL
Qty: 15 | Refills: 0 | Status: DISCONTINUED | COMMUNITY
Start: 2020-11-02 | End: 2020-11-09

## 2020-11-11 ENCOUNTER — APPOINTMENT (OUTPATIENT)
Age: 75
End: 2020-11-11

## 2020-11-18 ENCOUNTER — OUTPATIENT (OUTPATIENT)
Dept: OUTPATIENT SERVICES | Facility: HOSPITAL | Age: 75
LOS: 1 days | Discharge: HOME | End: 2020-11-18

## 2020-11-18 DIAGNOSIS — K50.911 CROHN'S DISEASE, UNSPECIFIED, WITH RECTAL BLEEDING: ICD-10-CM

## 2020-11-23 ENCOUNTER — RESULT REVIEW (OUTPATIENT)
Age: 75
End: 2020-11-23

## 2020-11-23 ENCOUNTER — APPOINTMENT (OUTPATIENT)
Age: 75
End: 2020-11-23
Payer: MEDICARE

## 2020-11-23 ENCOUNTER — APPOINTMENT (OUTPATIENT)
Age: 75
End: 2020-11-23

## 2020-11-23 ENCOUNTER — LABORATORY RESULT (OUTPATIENT)
Age: 75
End: 2020-11-23

## 2020-11-23 LAB
BASOPHILS # BLD AUTO: 0.04 K/UL — SIGNIFICANT CHANGE UP (ref 0–0.2)
BASOPHILS NFR BLD AUTO: 0.7 % — SIGNIFICANT CHANGE UP (ref 0–2)
EOSINOPHIL # BLD AUTO: 0.04 K/UL — SIGNIFICANT CHANGE UP (ref 0–0.5)
EOSINOPHIL NFR BLD AUTO: 0.7 % — SIGNIFICANT CHANGE UP (ref 0–6)
HCT VFR BLD CALC: 36.9 % — SIGNIFICANT CHANGE UP (ref 34.5–45)
HGB BLD-MCNC: 11.8 G/DL — SIGNIFICANT CHANGE UP (ref 11.5–15.5)
IMM GRANULOCYTES NFR BLD AUTO: 0.7 % — SIGNIFICANT CHANGE UP (ref 0–1.5)
LYMPHOCYTES # BLD AUTO: 1.21 K/UL — SIGNIFICANT CHANGE UP (ref 1–3.3)
LYMPHOCYTES # BLD AUTO: 22.4 % — SIGNIFICANT CHANGE UP (ref 13–44)
MCHC RBC-ENTMCNC: 27.8 PG — SIGNIFICANT CHANGE UP (ref 27–34)
MCHC RBC-ENTMCNC: 32 G/DL — SIGNIFICANT CHANGE UP (ref 32–36)
MCV RBC AUTO: 86.8 FL — SIGNIFICANT CHANGE UP (ref 80–100)
MONOCYTES # BLD AUTO: 0.76 K/UL — SIGNIFICANT CHANGE UP (ref 0–0.9)
MONOCYTES NFR BLD AUTO: 14.1 % — HIGH (ref 2–14)
NEUTROPHILS # BLD AUTO: 3.31 K/UL — SIGNIFICANT CHANGE UP (ref 1.8–7.4)
NEUTROPHILS NFR BLD AUTO: 61.4 % — SIGNIFICANT CHANGE UP (ref 43–77)
NRBC # BLD: 0 /100 WBCS — SIGNIFICANT CHANGE UP (ref 0–0)
PLATELET # BLD AUTO: 147 K/UL — LOW (ref 150–400)
RBC # BLD: 4.25 M/UL — SIGNIFICANT CHANGE UP (ref 3.8–5.2)
RBC # FLD: 16 % — HIGH (ref 10.3–14.5)
WBC # BLD: 5.4 K/UL — SIGNIFICANT CHANGE UP (ref 3.8–10.5)
WBC # FLD AUTO: 5.4 K/UL — SIGNIFICANT CHANGE UP (ref 3.8–10.5)

## 2020-11-23 PROCEDURE — 99213 OFFICE O/P EST LOW 20 MIN: CPT

## 2020-11-24 DIAGNOSIS — Z51.11 ENCOUNTER FOR ANTINEOPLASTIC CHEMOTHERAPY: ICD-10-CM

## 2020-11-24 DIAGNOSIS — R11.2 NAUSEA WITH VOMITING, UNSPECIFIED: ICD-10-CM

## 2020-11-24 DIAGNOSIS — C20 MALIGNANT NEOPLASM OF RECTUM: ICD-10-CM

## 2020-11-24 NOTE — PHYSICAL EXAM
[Fully active, able to carry on all pre-disease performance without restriction] : Status 0 - Fully active, able to carry on all pre-disease performance without restriction [Normal] : affect appropriate [de-identified] : ecchymosis around b/l eyes

## 2020-11-24 NOTE — HISTORY OF PRESENT ILLNESS
[Disease: _____________________] : Disease: [unfilled] [T: ___] : T[unfilled] [N: ___] : N[unfilled] [AJCC Stage: ____] : AJCC Stage: [unfilled] [Therapy: ___] : Therapy: [unfilled] [Cycle: ___] : Cycle: [unfilled] [Day: ___] : Day: [unfilled] [de-identified] : 75 F w/ h/o recurrent endocervical adenocarcinoma Stage IIB s/p radiation then required pelvic exoneration presents for further evaluation of rectal cancer. \par \par In June 2020 Abi noticed a rectal discharge and burning sensation in the rectum. Was evaluated by Dr. Lamar and ultimately saw Dr. Oropeza who ordered CT Pelvis and MRI Pelvis . Found to have a mass in the rectum s/p biopsy confirmed adenocarcinoma. \par \par 7/18/20: MRI Pelvis: large hypovascular mass c/w mucinous adenoca involving remaining distal rectum and vagina, tumor abuts anal sphincter\par 8/5/20: CT pelvis: residual chronic perineal mass, likely cystic with gas component\par 9/23/20: CT CAP: low density, possibly necrotic mass up to 6.2 cm with involvement of the vagina, nonspecific 6 mm LLL nodule is noted\par 10/3/20: PET/CT: hypermetabolic rectal mass, non specific small inguinal LN, nonspecific hypermetabolism within the colostomy insertion site\par 10/7/20: discussed at rectal TB, plan for DILMA with re-evaluation of response and then surgery if residual disease\par 10/26/20: C1 FOLFOX\par 11/9/20: C2 (c/b acute oxali neurotoxicity)\par 11/23/20: C3 (Oxali over 4 hrs) [de-identified] : adenocarcinoma [de-identified] : last cycle was c/b by acute oxali neurotoxicity reaction described as SOB, neuropathy in hands. There was no change in vitals, oxygenation, rash, no back pain. Symptoms resolved after a few minutes. No recurrence. \par denies any chemo specific SE. \par sustained a mechanical fall at home a few days ago, tripped over carpet. Fell, hit face/head. Fractured nose. Went to [place] ER, CT scans neg for CNS bleed. Has an apt to f/u with plastics. Bruised and swollen but denies any pain today Denies syncopal event.

## 2020-11-25 ENCOUNTER — APPOINTMENT (OUTPATIENT)
Age: 75
End: 2020-11-25

## 2020-11-30 NOTE — HISTORY OF PRESENT ILLNESS
[Disease: _____________________] : Disease: [unfilled] [T: ___] : T[unfilled] [N: ___] : N[unfilled] [AJCC Stage: ____] : AJCC Stage: [unfilled] [Therapy: ___] : Therapy: [unfilled] [Cycle: ___] : Cycle: [unfilled] [Day: ___] : Day: [unfilled] [de-identified] : 75 F w/ h/o recurrent endocervical adenocarcinoma Stage IIB s/p radiation then required pelvic exoneration presents for further evaluation of rectal cancer. \par \par In June 2020 Abi noticed a rectal discharge and burning sensation in the rectum. Was evaluated by Dr. Lamar and ultimately saw Dr. Oropeza who ordered CT Pelvis and MRI Pelvis . Found to have a mass in the rectum s/p biopsy confirmed adenocarcinoma. \par \par 7/18/20: MRI Pelvis: large hypovascular mass c/w mucinous adenoca involving remaining distal rectum and vagina, tumor abuts anal sphincter\par 8/5/20: CT pelvis: residual chronic perineal mass, likely cystic with gas component\par 9/23/20: CT CAP: low density, possibly necrotic mass up to 6.2 cm with involvement of the vagina, nonspecific 6 mm LLL nodule is noted\par 10/3/20: PET/CT: hypermetabolic rectal mass, non specific small inguinal LN, nonspecific hypermetabolism within the colostomy insertion site\par 10/7/20: discussed at rectal TB, plan for DILMA with re-evaluation of response and then surgery if residual disease\par 10/26/20: C1 FOLFOX\par 11/9/20:   C2 FOLFOX \par \par  [de-identified] : adenocarcinoma [de-identified] : tolerated cycle 1 well except for a few days of fatigue during that week\par No nausea; + 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 At baseline her hands are cold and she wears gloves in the house on colder days. \par \par Today after completing the oxaliplatin infusion she went to the bathroom, and started to feel tingling, numbness in the fingers. She washed her hands in the bathroom under cold water and came out feeling that she could not catch her breath. No wheeze, facial or oropharyngeal edema. No rash or skin redness.  's systolic,  O2 sats % on 3 liters NC and on room air throughout the visit afterwards and at discharge.  \par Given Hydrocortisone Benadryl and Pepcid for a presumed hypersensitivity reaction. \par Symptoms improved after warm drinks and warm packs were applied. \par \par At the time of discharge she was able to speak without breathlessness with stable VS. Mild numbness in the fingers.

## 2020-11-30 NOTE — REVIEW OF SYSTEMS
[Fatigue] : fatigue [Negative] : Allergic/Immunologic [Diarrhea] : no diarrhea [FreeTextEntry4] : sensation of  [FreeTextEntry6] : see interval hx [FreeTextEntry7] : ostomy; anal discharge and  burning  [FreeTextEntry8] : urinary diversion  [de-identified] : sensitivity to cold [de-identified] : numbness and tingling in hands fingertips; see interval history  [de-identified] : h

## 2020-11-30 NOTE — PHYSICAL EXAM
[Fully active, able to carry on all pre-disease performance without restriction] : Status 0 - Fully active, able to carry on all pre-disease performance without restriction [Normal] : affect appropriate [de-identified] : + [de-identified] : + distal pulses bilaterally no skin discoloration [de-identified] : + ostomy  [de-identified] : urinary diversion  s/p pelvic exenteration

## 2020-12-01 ENCOUNTER — APPOINTMENT (OUTPATIENT)
Dept: DISASTER EMERGENCY | Facility: CLINIC | Age: 75
End: 2020-12-01

## 2020-12-01 ENCOUNTER — APPOINTMENT (OUTPATIENT)
Age: 75
End: 2020-12-01

## 2020-12-02 LAB — SARS-COV-2 N GENE NPH QL NAA+PROBE: NOT DETECTED

## 2020-12-07 ENCOUNTER — RESULT REVIEW (OUTPATIENT)
Age: 75
End: 2020-12-07

## 2020-12-07 ENCOUNTER — LABORATORY RESULT (OUTPATIENT)
Age: 75
End: 2020-12-07

## 2020-12-07 ENCOUNTER — APPOINTMENT (OUTPATIENT)
Age: 75
End: 2020-12-07
Payer: MEDICARE

## 2020-12-07 ENCOUNTER — APPOINTMENT (OUTPATIENT)
Age: 75
End: 2020-12-07

## 2020-12-07 VITALS
RESPIRATION RATE: 18 BRPM | HEART RATE: 82 BPM | DIASTOLIC BLOOD PRESSURE: 74 MMHG | TEMPERATURE: 98.8 F | SYSTOLIC BLOOD PRESSURE: 109 MMHG

## 2020-12-07 LAB
BASOPHILS # BLD AUTO: 0.03 K/UL — SIGNIFICANT CHANGE UP (ref 0–0.2)
BASOPHILS NFR BLD AUTO: 0.7 % — SIGNIFICANT CHANGE UP (ref 0–2)
EOSINOPHIL # BLD AUTO: 0.04 K/UL — SIGNIFICANT CHANGE UP (ref 0–0.5)
EOSINOPHIL NFR BLD AUTO: 0.9 % — SIGNIFICANT CHANGE UP (ref 0–6)
HCT VFR BLD CALC: 37.1 % — SIGNIFICANT CHANGE UP (ref 34.5–45)
HGB BLD-MCNC: 11.8 G/DL — SIGNIFICANT CHANGE UP (ref 11.5–15.5)
IMM GRANULOCYTES NFR BLD AUTO: 1.3 % — SIGNIFICANT CHANGE UP (ref 0–1.5)
LYMPHOCYTES # BLD AUTO: 1.06 K/UL — SIGNIFICANT CHANGE UP (ref 1–3.3)
LYMPHOCYTES # BLD AUTO: 23.6 % — SIGNIFICANT CHANGE UP (ref 13–44)
MCHC RBC-ENTMCNC: 27.8 PG — SIGNIFICANT CHANGE UP (ref 27–34)
MCHC RBC-ENTMCNC: 31.8 G/DL — LOW (ref 32–36)
MCV RBC AUTO: 87.5 FL — SIGNIFICANT CHANGE UP (ref 80–100)
MONOCYTES # BLD AUTO: 0.62 K/UL — SIGNIFICANT CHANGE UP (ref 0–0.9)
MONOCYTES NFR BLD AUTO: 13.8 % — SIGNIFICANT CHANGE UP (ref 2–14)
NEUTROPHILS # BLD AUTO: 2.68 K/UL — SIGNIFICANT CHANGE UP (ref 1.8–7.4)
NEUTROPHILS NFR BLD AUTO: 59.7 % — SIGNIFICANT CHANGE UP (ref 43–77)
NRBC # BLD: 0 /100 WBCS — SIGNIFICANT CHANGE UP (ref 0–0)
PLATELET # BLD AUTO: 90 K/UL — LOW (ref 150–400)
RBC # BLD: 4.24 M/UL — SIGNIFICANT CHANGE UP (ref 3.8–5.2)
RBC # FLD: 17.2 % — HIGH (ref 10.3–14.5)
WBC # BLD: 4.49 K/UL — SIGNIFICANT CHANGE UP (ref 3.8–10.5)
WBC # FLD AUTO: 4.49 K/UL — SIGNIFICANT CHANGE UP (ref 3.8–10.5)

## 2020-12-07 PROCEDURE — 99214 OFFICE O/P EST MOD 30 MIN: CPT

## 2020-12-07 PROCEDURE — 99072 ADDL SUPL MATRL&STAF TM PHE: CPT

## 2020-12-08 ENCOUNTER — NON-APPOINTMENT (OUTPATIENT)
Age: 75
End: 2020-12-08

## 2020-12-09 ENCOUNTER — APPOINTMENT (OUTPATIENT)
Age: 75
End: 2020-12-09

## 2020-12-17 ENCOUNTER — OUTPATIENT (OUTPATIENT)
Dept: OUTPATIENT SERVICES | Facility: HOSPITAL | Age: 75
LOS: 1 days | Discharge: HOME | End: 2020-12-17

## 2020-12-17 DIAGNOSIS — K50.911 CROHN'S DISEASE, UNSPECIFIED, WITH RECTAL BLEEDING: ICD-10-CM

## 2020-12-19 ENCOUNTER — APPOINTMENT (OUTPATIENT)
Dept: MRI IMAGING | Facility: IMAGING CENTER | Age: 75
End: 2020-12-19

## 2020-12-21 ENCOUNTER — RESULT REVIEW (OUTPATIENT)
Age: 75
End: 2020-12-21

## 2020-12-21 ENCOUNTER — APPOINTMENT (OUTPATIENT)
Age: 75
End: 2020-12-21

## 2020-12-21 ENCOUNTER — LABORATORY RESULT (OUTPATIENT)
Age: 75
End: 2020-12-21

## 2020-12-21 ENCOUNTER — APPOINTMENT (OUTPATIENT)
Age: 75
End: 2020-12-21
Payer: MEDICARE

## 2020-12-21 LAB
BASOPHILS # BLD AUTO: 0.03 K/UL — SIGNIFICANT CHANGE UP (ref 0–0.2)
BASOPHILS NFR BLD AUTO: 0.6 % — SIGNIFICANT CHANGE UP (ref 0–2)
EOSINOPHIL # BLD AUTO: 0.04 K/UL — SIGNIFICANT CHANGE UP (ref 0–0.5)
EOSINOPHIL NFR BLD AUTO: 0.8 % — SIGNIFICANT CHANGE UP (ref 0–6)
HCT VFR BLD CALC: 37 % — SIGNIFICANT CHANGE UP (ref 34.5–45)
HGB BLD-MCNC: 12 G/DL — SIGNIFICANT CHANGE UP (ref 11.5–15.5)
IMM GRANULOCYTES NFR BLD AUTO: 0.8 % — SIGNIFICANT CHANGE UP (ref 0–1.5)
LYMPHOCYTES # BLD AUTO: 1.37 K/UL — SIGNIFICANT CHANGE UP (ref 1–3.3)
LYMPHOCYTES # BLD AUTO: 27.3 % — SIGNIFICANT CHANGE UP (ref 13–44)
MCHC RBC-ENTMCNC: 28.6 PG — SIGNIFICANT CHANGE UP (ref 27–34)
MCHC RBC-ENTMCNC: 32.4 G/DL — SIGNIFICANT CHANGE UP (ref 32–36)
MCV RBC AUTO: 88.3 FL — SIGNIFICANT CHANGE UP (ref 80–100)
MONOCYTES # BLD AUTO: 0.52 K/UL — SIGNIFICANT CHANGE UP (ref 0–0.9)
MONOCYTES NFR BLD AUTO: 10.4 % — SIGNIFICANT CHANGE UP (ref 2–14)
NEUTROPHILS # BLD AUTO: 3.02 K/UL — SIGNIFICANT CHANGE UP (ref 1.8–7.4)
NEUTROPHILS NFR BLD AUTO: 60.1 % — SIGNIFICANT CHANGE UP (ref 43–77)
NRBC # BLD: 0 /100 WBCS — SIGNIFICANT CHANGE UP (ref 0–0)
PLATELET # BLD AUTO: 174 K/UL — SIGNIFICANT CHANGE UP (ref 150–400)
RBC # BLD: 4.19 M/UL — SIGNIFICANT CHANGE UP (ref 3.8–5.2)
RBC # FLD: 18.6 % — HIGH (ref 10.3–14.5)
WBC # BLD: 5.02 K/UL — SIGNIFICANT CHANGE UP (ref 3.8–10.5)
WBC # FLD AUTO: 5.02 K/UL — SIGNIFICANT CHANGE UP (ref 3.8–10.5)

## 2020-12-21 PROCEDURE — 99072 ADDL SUPL MATRL&STAF TM PHE: CPT

## 2020-12-21 PROCEDURE — 99214 OFFICE O/P EST MOD 30 MIN: CPT

## 2020-12-22 DIAGNOSIS — C20 MALIGNANT NEOPLASM OF RECTUM: ICD-10-CM

## 2020-12-22 DIAGNOSIS — R11.2 NAUSEA WITH VOMITING, UNSPECIFIED: ICD-10-CM

## 2020-12-22 DIAGNOSIS — Z51.11 ENCOUNTER FOR ANTINEOPLASTIC CHEMOTHERAPY: ICD-10-CM

## 2020-12-23 ENCOUNTER — APPOINTMENT (OUTPATIENT)
Age: 75
End: 2020-12-23

## 2020-12-31 ENCOUNTER — APPOINTMENT (OUTPATIENT)
Dept: DISASTER EMERGENCY | Facility: CLINIC | Age: 75
End: 2020-12-31

## 2021-01-02 LAB — SARS-COV-2 N GENE NPH QL NAA+PROBE: NOT DETECTED

## 2021-01-03 NOTE — HISTORY OF PRESENT ILLNESS
[de-identified] : 75 F w/ h/o recurrent endocervical adenocarcinoma Stage IIB s/p radiation then required pelvic exoneration presents for further evaluation of rectal cancer. \par \par In June 2020 Abi noticed a rectal discharge and burning sensation in the rectum. Was evaluated by Dr. Lamar and ultimately saw Dr. Oropeza who ordered CT Pelvis and MRI Pelvis . Found to have a mass in the rectum s/p biopsy confirmed adenocarcinoma. \par \par 7/18/20: MRI Pelvis: large hypovascular mass c/w mucinous adenoca involving remaining distal rectum and vagina, tumor abuts anal sphincter\par 8/5/20: CT pelvis: residual chronic perineal mass, likely cystic with gas component\par 9/23/20: CT CAP: low density, possibly necrotic mass up to 6.2 cm with involvement of the vagina, nonspecific 6 mm LLL nodule is noted\par 10/3/20: PET/CT: hypermetabolic rectal mass, non specific small inguinal LN, nonspecific hypermetabolism within the colostomy insertion site\par 10/7/20: discussed at rectal TB, plan for DILMA with re-evaluation of response and then surgery if residual disease\par 10/26/20: C1 FOLFOX\par 11/9/20: C2 (c/b acute oxali neurotoxicity)\par 11/23/20: C3 (Oxali over 4 hrs due to cold neuropathy)\par 12/7/20: C4 \par  [de-identified] : adenocarcinoma [de-identified] : Seen in the treatment room\par \par Tolerated complete infusion of C3 Oxaliplatin over 4 hour period. At the end of infusion experienced dysesthesias in hands\par Feeling well between cycles. Appetite is good,  transient fatigue post treatment, overall energy is good. \par Facial bruising is improving. No further falls\par Precautions with warm fluids taken \par +cold neuropathy\par \par Today, upon starting Oxaliplatin infusion, she c/o difficulty swallowing and sensation of difficulty breathing. She denies any contact with cold water at this time. No wheezing noted. \par VS- 133/81, HR 72, O2 100% on 3 liters NC. No wheezing . Oxaliplatin infusion stopped. Pepcid 20 mg IVPB, Solumedrol 100 mg IVP, Benadryl 50 mg IVP given.Pt noted improvement in swallowing and breathing discomfort. VSS post stable, O2 sat on room air 97%.

## 2021-01-03 NOTE — HISTORY OF PRESENT ILLNESS
[Disease: _____________________] : Disease: [unfilled] [T: ___] : T[unfilled] [N: ___] : N[unfilled] [AJCC Stage: ____] : AJCC Stage: [unfilled] [Therapy: ___] : Therapy: [unfilled] [Cycle: ___] : Cycle: [unfilled] [Day: ___] : Day: [unfilled] [de-identified] : 75 F w/ h/o recurrent endocervical adenocarcinoma Stage IIB s/p radiation then required pelvic exoneration presents for further evaluation of rectal cancer. \par \par In June 2020 Abi noticed a rectal discharge and burning sensation in the rectum. Was evaluated by Dr. Lamar and ultimately saw Dr. Oropeza who ordered CT Pelvis and MRI Pelvis . Found to have a mass in the rectum s/p biopsy confirmed adenocarcinoma. \par \par 7/18/20: MRI Pelvis: large hypovascular mass c/w mucinous adenoca involving remaining distal rectum and vagina, tumor abuts anal sphincter\par 8/5/20: CT pelvis: residual chronic perineal mass, likely cystic with gas component\par 9/23/20: CT CAP: low density, possibly necrotic mass up to 6.2 cm with involvement of the vagina, nonspecific 6 mm LLL nodule is noted\par 10/3/20: PET/CT: hypermetabolic rectal mass, non specific small inguinal LN, nonspecific hypermetabolism within the colostomy insertion site\par 10/7/20: discussed at rectal TB, plan for DILMA with re-evaluation of response and then surgery if residual disease\par 10/26/20: C1 FOLFOX\par 11/9/20: C2 (c/b acute oxali neurotoxicity)\par 11/23/20: C3 (Oxali over 4 hrs) - tolerated well \par 12/7/20: C4 5FU/LV (developed difficulty swallowing, SOB after receiving Oxali, d/c Oxali for today's tx)\par 12/21/20: C5 FOLFOX (dose reduced Oxali 65 mg/m2 over 4 hrs) [de-identified] : adenocarcinoma [de-identified] : Overall feels well today. Last cycle, developed SOB/difficulty swallowing again during Oxali infusion. Today, Oxali dose reduced. Denies any significant peripheral neuropathy. Eating well. Feels tired a couple of days after treatment, but then improves.

## 2021-01-03 NOTE — REVIEW OF SYSTEMS
[FreeTextEntry2] : transient fatigue post treatment [FreeTextEntry4] : cold dysesthesias [de-identified] : cold neuropathy continues (but less severe) into second week post treatment: see HPI

## 2021-01-03 NOTE — PHYSICAL EXAM
[de-identified] : improving  [de-identified] : ostomies [de-identified] : fading facial  ecchymoses  [de-identified] : see interval hx

## 2021-01-04 ENCOUNTER — APPOINTMENT (OUTPATIENT)
Age: 76
End: 2021-01-04
Payer: MEDICARE

## 2021-01-04 ENCOUNTER — RESULT REVIEW (OUTPATIENT)
Age: 76
End: 2021-01-04

## 2021-01-04 ENCOUNTER — OUTPATIENT (OUTPATIENT)
Dept: OUTPATIENT SERVICES | Facility: HOSPITAL | Age: 76
LOS: 1 days | Discharge: HOME | End: 2021-01-04

## 2021-01-04 ENCOUNTER — LABORATORY RESULT (OUTPATIENT)
Age: 76
End: 2021-01-04

## 2021-01-04 ENCOUNTER — APPOINTMENT (OUTPATIENT)
Age: 76
End: 2021-01-04

## 2021-01-04 VITALS
RESPIRATION RATE: 18 BRPM | SYSTOLIC BLOOD PRESSURE: 143 MMHG | HEART RATE: 77 BPM | OXYGEN SATURATION: 100 % | DIASTOLIC BLOOD PRESSURE: 86 MMHG | TEMPERATURE: 98 F

## 2021-01-04 DIAGNOSIS — K50.911 CROHN'S DISEASE, UNSPECIFIED, WITH RECTAL BLEEDING: ICD-10-CM

## 2021-01-04 LAB
BASOPHILS # BLD AUTO: 0.03 K/UL — SIGNIFICANT CHANGE UP (ref 0–0.2)
BASOPHILS NFR BLD AUTO: 0.6 % — SIGNIFICANT CHANGE UP (ref 0–2)
EOSINOPHIL # BLD AUTO: 0.03 K/UL — SIGNIFICANT CHANGE UP (ref 0–0.5)
EOSINOPHIL NFR BLD AUTO: 0.6 % — SIGNIFICANT CHANGE UP (ref 0–6)
HCT VFR BLD CALC: 37.8 % — SIGNIFICANT CHANGE UP (ref 34.5–45)
HGB BLD-MCNC: 12 G/DL — SIGNIFICANT CHANGE UP (ref 11.5–15.5)
IMM GRANULOCYTES NFR BLD AUTO: 0.8 % — SIGNIFICANT CHANGE UP (ref 0–1.5)
LYMPHOCYTES # BLD AUTO: 1.35 K/UL — SIGNIFICANT CHANGE UP (ref 1–3.3)
LYMPHOCYTES # BLD AUTO: 28.3 % — SIGNIFICANT CHANGE UP (ref 13–44)
MCHC RBC-ENTMCNC: 28.7 PG — SIGNIFICANT CHANGE UP (ref 27–34)
MCHC RBC-ENTMCNC: 31.7 G/DL — LOW (ref 32–36)
MCV RBC AUTO: 90.4 FL — SIGNIFICANT CHANGE UP (ref 80–100)
MONOCYTES # BLD AUTO: 0.67 K/UL — SIGNIFICANT CHANGE UP (ref 0–0.9)
MONOCYTES NFR BLD AUTO: 14 % — SIGNIFICANT CHANGE UP (ref 2–14)
NEUTROPHILS # BLD AUTO: 2.65 K/UL — SIGNIFICANT CHANGE UP (ref 1.8–7.4)
NEUTROPHILS NFR BLD AUTO: 55.7 % — SIGNIFICANT CHANGE UP (ref 43–77)
NRBC # BLD: 0 /100 WBCS — SIGNIFICANT CHANGE UP (ref 0–0)
PLATELET # BLD AUTO: 177 K/UL — SIGNIFICANT CHANGE UP (ref 150–400)
RBC # BLD: 4.18 M/UL — SIGNIFICANT CHANGE UP (ref 3.8–5.2)
RBC # FLD: 19.2 % — HIGH (ref 10.3–14.5)
WBC # BLD: 4.77 K/UL — SIGNIFICANT CHANGE UP (ref 3.8–10.5)
WBC # FLD AUTO: 4.77 K/UL — SIGNIFICANT CHANGE UP (ref 3.8–10.5)

## 2021-01-04 PROCEDURE — 99213 OFFICE O/P EST LOW 20 MIN: CPT

## 2021-01-05 ENCOUNTER — NON-APPOINTMENT (OUTPATIENT)
Age: 76
End: 2021-01-05

## 2021-01-05 DIAGNOSIS — R11.2 NAUSEA WITH VOMITING, UNSPECIFIED: ICD-10-CM

## 2021-01-05 DIAGNOSIS — Z51.11 ENCOUNTER FOR ANTINEOPLASTIC CHEMOTHERAPY: ICD-10-CM

## 2021-01-05 DIAGNOSIS — C20 MALIGNANT NEOPLASM OF RECTUM: ICD-10-CM

## 2021-01-06 ENCOUNTER — APPOINTMENT (OUTPATIENT)
Age: 76
End: 2021-01-06

## 2021-01-15 ENCOUNTER — APPOINTMENT (OUTPATIENT)
Dept: HEMATOLOGY ONCOLOGY | Facility: CLINIC | Age: 76
End: 2021-01-15

## 2021-01-19 ENCOUNTER — RESULT REVIEW (OUTPATIENT)
Age: 76
End: 2021-01-19

## 2021-01-19 ENCOUNTER — APPOINTMENT (OUTPATIENT)
Age: 76
End: 2021-01-19

## 2021-01-19 ENCOUNTER — APPOINTMENT (OUTPATIENT)
Dept: HEMATOLOGY ONCOLOGY | Facility: CLINIC | Age: 76
End: 2021-01-19
Payer: MEDICARE

## 2021-01-19 ENCOUNTER — LABORATORY RESULT (OUTPATIENT)
Age: 76
End: 2021-01-19

## 2021-01-19 VITALS
SYSTOLIC BLOOD PRESSURE: 145 MMHG | WEIGHT: 130.07 LBS | BODY MASS INDEX: 23.04 KG/M2 | RESPIRATION RATE: 18 BRPM | HEART RATE: 82 BPM | TEMPERATURE: 97.8 F | DIASTOLIC BLOOD PRESSURE: 84 MMHG

## 2021-01-19 DIAGNOSIS — G47.00 INSOMNIA, UNSPECIFIED: ICD-10-CM

## 2021-01-19 LAB
BASOPHILS # BLD AUTO: 0.04 K/UL — SIGNIFICANT CHANGE UP (ref 0–0.2)
BASOPHILS NFR BLD AUTO: 1 % — SIGNIFICANT CHANGE UP (ref 0–2)
EOSINOPHIL # BLD AUTO: 0.03 K/UL — SIGNIFICANT CHANGE UP (ref 0–0.5)
EOSINOPHIL NFR BLD AUTO: 0.7 % — SIGNIFICANT CHANGE UP (ref 0–6)
HCT VFR BLD CALC: 37.1 % — SIGNIFICANT CHANGE UP (ref 34.5–45)
HGB BLD-MCNC: 12 G/DL — SIGNIFICANT CHANGE UP (ref 11.5–15.5)
IMM GRANULOCYTES NFR BLD AUTO: 1 % — SIGNIFICANT CHANGE UP (ref 0–1.5)
LYMPHOCYTES # BLD AUTO: 1.34 K/UL — SIGNIFICANT CHANGE UP (ref 1–3.3)
LYMPHOCYTES # BLD AUTO: 32 % — SIGNIFICANT CHANGE UP (ref 13–44)
MCHC RBC-ENTMCNC: 29.3 PG — SIGNIFICANT CHANGE UP (ref 27–34)
MCHC RBC-ENTMCNC: 32.3 G/DL — SIGNIFICANT CHANGE UP (ref 32–36)
MCV RBC AUTO: 90.5 FL — SIGNIFICANT CHANGE UP (ref 80–100)
MONOCYTES # BLD AUTO: 0.64 K/UL — SIGNIFICANT CHANGE UP (ref 0–0.9)
MONOCYTES NFR BLD AUTO: 15.3 % — HIGH (ref 2–14)
NEUTROPHILS # BLD AUTO: 2.1 K/UL — SIGNIFICANT CHANGE UP (ref 1.8–7.4)
NEUTROPHILS NFR BLD AUTO: 50 % — SIGNIFICANT CHANGE UP (ref 43–77)
NRBC # BLD: 0 /100 WBCS — SIGNIFICANT CHANGE UP (ref 0–0)
PLATELET # BLD AUTO: 145 K/UL — LOW (ref 150–400)
RBC # BLD: 4.1 M/UL — SIGNIFICANT CHANGE UP (ref 3.8–5.2)
RBC # FLD: 19.8 % — HIGH (ref 10.3–14.5)
WBC # BLD: 4.19 K/UL — SIGNIFICANT CHANGE UP (ref 3.8–10.5)
WBC # FLD AUTO: 4.19 K/UL — SIGNIFICANT CHANGE UP (ref 3.8–10.5)

## 2021-01-19 PROCEDURE — 99214 OFFICE O/P EST MOD 30 MIN: CPT

## 2021-01-21 ENCOUNTER — APPOINTMENT (OUTPATIENT)
Age: 76
End: 2021-01-21

## 2021-01-27 ENCOUNTER — APPOINTMENT (OUTPATIENT)
Dept: DISASTER EMERGENCY | Facility: CLINIC | Age: 76
End: 2021-01-27

## 2021-01-27 ENCOUNTER — OUTPATIENT (OUTPATIENT)
Dept: OUTPATIENT SERVICES | Facility: HOSPITAL | Age: 76
LOS: 1 days | Discharge: HOME | End: 2021-01-27
Payer: MEDICARE

## 2021-01-27 PROCEDURE — 77263 THER RADIOLOGY TX PLNG CPLX: CPT

## 2021-01-28 ENCOUNTER — APPOINTMENT (OUTPATIENT)
Dept: RADIATION ONCOLOGY | Facility: CLINIC | Age: 76
End: 2021-01-28
Payer: MEDICARE

## 2021-01-28 ENCOUNTER — NON-APPOINTMENT (OUTPATIENT)
Age: 76
End: 2021-01-28

## 2021-01-28 VITALS
HEART RATE: 92 BPM | BODY MASS INDEX: 23.39 KG/M2 | WEIGHT: 132.05 LBS | TEMPERATURE: 95.9 F | OXYGEN SATURATION: 100 % | SYSTOLIC BLOOD PRESSURE: 145 MMHG | RESPIRATION RATE: 15 BRPM | DIASTOLIC BLOOD PRESSURE: 77 MMHG

## 2021-01-28 LAB — SARS-COV-2 N GENE NPH QL NAA+PROBE: NOT DETECTED

## 2021-01-28 PROCEDURE — 77470 SPECIAL RADIATION TREATMENT: CPT | Mod: 26

## 2021-01-28 PROCEDURE — 99214 OFFICE O/P EST MOD 30 MIN: CPT | Mod: 25,GC

## 2021-02-01 ENCOUNTER — APPOINTMENT (OUTPATIENT)
Age: 76
End: 2021-02-01
Payer: MEDICARE

## 2021-02-01 ENCOUNTER — APPOINTMENT (OUTPATIENT)
Age: 76
End: 2021-02-01

## 2021-02-01 PROBLEM — G47.00 INSOMNIA: Status: ACTIVE | Noted: 2020-11-02

## 2021-02-01 PROCEDURE — 99443: CPT | Mod: 95

## 2021-02-01 NOTE — HISTORY OF PRESENT ILLNESS
[Disease: _____________________] : Disease: [unfilled] [T: ___] : T[unfilled] [N: ___] : N[unfilled] [AJCC Stage: ____] : AJCC Stage: [unfilled] [Therapy: ___] : Therapy: [unfilled] [Cycle: ___] : Cycle: [unfilled] [Day: ___] : Day: [unfilled] [de-identified] : 75 F w/ h/o recurrent endocervical adenocarcinoma Stage IIB s/p radiation then required pelvic exoneration presents for further evaluation of rectal cancer. \par \par In June 2020 Abi noticed a rectal discharge and burning sensation in the rectum. Was evaluated by Dr. Lamar and ultimately saw Dr. Oropeza who ordered CT Pelvis and MRI Pelvis . Found to have a mass in the rectum s/p biopsy confirmed adenocarcinoma. \par \par 7/18/20: MRI Pelvis: large hypovascular mass c/w mucinous adenoca involving remaining distal rectum and vagina, tumor abuts anal sphincter\par 8/5/20: CT pelvis: residual chronic perineal mass, likely cystic with gas component\par 9/23/20: CT CAP: low density, possibly necrotic mass up to 6.2 cm with involvement of the vagina, nonspecific 6 mm LLL nodule is noted\par 10/3/20: PET/CT: hypermetabolic rectal mass, non specific small inguinal LN, nonspecific hypermetabolism within the colostomy insertion site\par 10/7/20: discussed at rectal TB, plan for DILMA with re-evaluation of response and then surgery if residual disease\par 10/26/20: C1 FOLFOX\par 11/9/20: C2 (c/b acute oxali neurotoxicity)\par 11/23/20: C3 (Oxali over 4 hrs) - tolerated well \par 12/7/20: C4 5FU/LV (developed difficulty swallowing, SOB after receiving Oxali, d/c Oxali for today's tx)\par 12/21/20: C5 FOLFOX (dose reduced Oxali 65 mg/m2 over 4 hrs)\par CT\par 1/4/21 C6  [de-identified] : adenocarcinoma [de-identified] : She tolerated the last cycle of Oxaliplatin much better. Drinking warm fluids through the infusion minimizes oropharyngeal dysesthesias.  \par She still has mild intermittent paresthesias in her hands only. \par no mucositis \par Ostomy output is consistent\par Appetite and energy level  are about the same\par \par still has insomnia

## 2021-02-01 NOTE — PHYSICAL EXAM
[Restricted in physically strenuous activity but ambulatory and able to carry out work of a light or sedentary nature] : Status 1- Restricted in physically strenuous activity but ambulatory and able to carry out work of a light or sedentary nature, e.g., light house work, office work [Normal] : affect appropriate [de-identified] : ostoimies [de-identified] : t

## 2021-02-01 NOTE — HISTORY OF PRESENT ILLNESS
[de-identified] : 75 F w/ h/o recurrent endocervical adenocarcinoma Stage IIB s/p radiation then required pelvic exoneration presents for further evaluation of rectal cancer. \par \par In June 2020 Abi noticed a rectal discharge and burning sensation in the rectum. Was evaluated by Dr. Lamar and ultimately saw Dr. Oropeza who ordered CT Pelvis and MRI Pelvis . Found to have a mass in the rectum s/p biopsy confirmed adenocarcinoma. \par \par 7/18/20: MRI Pelvis: large hypovascular mass c/w mucinous adenoca involving remaining distal rectum and vagina, tumor abuts anal sphincter\par 8/5/20: CT pelvis: residual chronic perineal mass, likely cystic with gas component\par 9/23/20: CT CAP: low density, possibly necrotic mass up to 6.2 cm with involvement of the vagina, nonspecific 6 mm LLL nodule is noted\par 10/3/20: PET/CT: hypermetabolic rectal mass, non specific small inguinal LN, nonspecific hypermetabolism within the colostomy insertion site\par 10/7/20: discussed at rectal TB, plan for DILMA with re-evaluation of response and then surgery if residual disease\par 10/26/20: C1 FOLFOX\par 11/9/20: C2 (c/b acute oxali neurotoxicity)\par 11/23/20: C3 (Oxali over 4 hrs) - tolerated well \par 12/7/20: C4 5FU/LV (developed difficulty swallowing, SOB after receiving Oxali, d/c Oxali for today's tx)\par 12/21/20: C5 FOLFOX (dose reduced Oxali 65 mg/m2 over 4 hrs)\par CT\par 1/4/21 C6 \par 1/15/21 C7 [de-identified] : Seen in the treatment room \par \par -Cold induced neuropathies: tolerated last cycle with warm fluids during the last hour to help milder dysesthesia symptoms; warm pack for the hands prior to startjing \par -still has mild intermittent paresthesias in hands only; not worse\par -no mucositis \par -output via ostomy is consistent\par -appetite and energy are about the same\par -anxiety about the plan,Covid (unable to get a vaccine appt yet) \par -still has insomnia- unable to nap during the day; feels "ugalde" at times but her sense of humor helps her; frustrated to tears\par She tried Remeron once in November  but the bedtime dose did not make her sleepy until the next day\par \par \par \par  [de-identified] : adenocarcinoma

## 2021-02-01 NOTE — REVIEW OF SYSTEMS
[Fatigue] : fatigue [Insomnia] : insomnia [Anxiety] : anxiety [Negative] : Allergic/Immunologic [FreeTextEntry4] : throat dysesthesias controlled [FreeTextEntry8] : ostomies [de-identified] : hair thinning [de-identified] : paresthesias hands no change

## 2021-02-02 NOTE — REASON FOR VISIT
[Follow-Up Visit] : a follow-up [Family Member] : family member [FreeTextEntry2] : Rectal cancer receving DILMA

## 2021-02-02 NOTE — HISTORY OF PRESENT ILLNESS
[Home] : at home, [unfilled] , at the time of the visit. [Medical Office: ([facility])___] : at the medical office located in  [Family Member] : family member [Verbal consent obtained from patient] : the patient, [unfilled] [Disease: _____________________] : Disease: [unfilled] [N: ___] : N[unfilled] [Therapy: ___] : Therapy: [unfilled] [Cycle: ___] : Cycle: [unfilled] [Day: ___] : Day: [unfilled] [T: ___] : T[unfilled] [M: ___] : M[unfilled] [AJCC Stage: ____] : AJCC Stage: [unfilled] [de-identified] : 75 F w/ h/o recurrent endocervical adenocarcinoma Stage IIB s/p radiation then required pelvic exoneration presents for further evaluation of rectal cancer. \par \par In June 2020 Abi noticed a rectal discharge and burning sensation in the rectum. Was evaluated by Dr. Lamar and ultimately saw Dr. Oropeza who ordered CT Pelvis and MRI Pelvis . Found to have a mass in the rectum s/p biopsy confirmed adenocarcinoma. \par \par 7/18/20: MRI Pelvis: large hypovascular mass c/w mucinous adenoca involving remaining distal rectum and vagina, tumor abuts anal sphincter\par 8/5/20: CT pelvis: residual chronic perineal mass, likely cystic with gas component\par 9/23/20: CT CAP: low density, possibly necrotic mass up to 6.2 cm with involvement of the vagina, nonspecific 6 mm LLL nodule is noted\par 10/3/20: PET/CT: hypermetabolic rectal mass, non specific small inguinal LN, nonspecific hypermetabolism within the colostomy insertion site\par 10/7/20: discussed at rectal TB, plan for DILMA with re-evaluation of response and then surgery if residual disease\par 10/26/20: C1 FOLFOX\par 11/9/20: C2 (c/b acute oxali neurotoxicity)\par 11/23/20: C3 (Oxali over 4 hrs) - tolerated well \par 12/7/20: C4 5FU/LV (developed difficulty swallowing, SOB after receiving Oxali, d/c Oxali for today's tx)\par 12/21/20: C5 FOLFOX (dose reduced Oxali 65 mg/m2 over 4 hrs)\par 1/4/21: C6\par 1/18/21: C7 [de-identified] : adenocarcinoma [de-identified] : overall doing well. Intermittent cold neuropathy sensitivity but does not last. + insomnia. Has not tried melatonin. Takes naps during the day. \par Today's C8 chemo was cancelled due to inclement weather. Energy level and appetite are stable.

## 2021-02-03 ENCOUNTER — APPOINTMENT (OUTPATIENT)
Age: 76
End: 2021-02-03

## 2021-02-04 PROCEDURE — 77301 RADIOTHERAPY DOSE PLAN IMRT: CPT | Mod: 26

## 2021-02-04 PROCEDURE — 77300 RADIATION THERAPY DOSE PLAN: CPT | Mod: 26

## 2021-02-04 PROCEDURE — 77338 DESIGN MLC DEVICE FOR IMRT: CPT | Mod: 26

## 2021-02-04 NOTE — HISTORY OF PRESENT ILLNESS
[FreeTextEntry1] : Ms. 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 \par \par She originally underwent  supracervical hysterectomy in  for postpartum hemorrhage. She then developed cervical stump adenocarcinoma, clinical stage IIB with L>R parametrial involvement, for which she received EBRT with Dr. Helton at [facility], 4140 cGy in 23 fractions 4 field pelvic box, followed by a cone down of 360 cGy in 2 fractions AP/PA with midline block (total parametrial dose of 4500 cGy). She then received 2 fractions of brachytherapy. She completed RT in 1998. \par \par She unfortunately subsequently developed a rectovaginal fistula. In 1999, biopsy showed recurrent cervical adenocarcinoma. She underwent pelvic exenteration with Dr. Lamar, including pelvic and aortic lymph node dissection, total pelvic exentoration, appendectomy, continent urinary diversion, ileotransverse colon reanastamosis, bilateral salpingo-oopthrectomy, sigmoid colostomy, and omental j-flap in 1999.\par \par She continued to follow with GYN ONC Dr. Lamar with annual PAP smears [id number].\par \par She presented to Dr. Lamar again in 2020 with dripping from the anal verge and some pink spotting, who ordered an MRI.\par \par MRI pelvis 2020 showed a large hypovascular rectovaginal mass consistent with a mucinous adenocarcinoma involving both the remaining distal rectum and vagina. Tumor abuts the anal sphincter but does not clearly invade it.\par \par CT pelvis 2020 showed residual chronic perineal mass, likely cystic, with gas component new since . Mass likely related to prior RT and surgery with possible superinfection. Fistulization to bowel considered unlikely due to absence of contrast within the gaseous component. \par \par After this imaging, the patient was referred for possible Cryoreductive surgery evaluation.\par Colorectal surgery was of opinion that she had an anal stricture secondary to radiation, and that CT and MRI findings represent a dilated mucous filled rectum as a result of distal obstruction.\par \par She underwent proctoscopy of rectal stump on 2020, as well as colonoscopy. This showed a malignant appearing distal rectal mass anteriorly, 3 cm in diameter, 0-3cm from the anal verge, with an obvious large rectovaginal fistula. Pathology revealed moderately differentiated adenocarcinoma with mucinous features. \par \par She was referred to medical oncology (Dr. Baker), who she saw on 20. \par \par CT CAP on 2020 showed low density, possibly necrotic rectal mass measuring up to 6.2 cm with involvement of the vagina. \par A nonspecific groundglass 6 mm left lower lobe pulmonary nodule is noted, for which follow-up is recommended. No definite evidence of metastatic disease. \par \par CEA on 2020 was 8.1.\par \par Today she feels well. Notes she is still having discharge from the rectum with associated burning, but denies linda pain. She is otherwise asymptomatic. She self-cath's for urination and has a colostomy for bowel movements.

## 2021-02-04 NOTE — PHYSICAL EXAM
[Sclera] : the sclera and conjunctiva were normal [Outer Ear] : the ears and nose were normal in appearance [External Hemorrhoid] : external hemorrhoids [Normal] : oriented to person, place and time, the affect was normal, the mood was normal and not anxious [Blood on examination glove] : no blood on examination glove [FreeTextEntry1] : Difficult to perform exam completely as patient was uncomfortable with manual exam, but no mass palpated on limited exam

## 2021-02-04 NOTE — REVIEW OF SYSTEMS
[Negative] : Psychiatric [Diarrhea: Grade 0] : Diarrhea: Grade 0 [Fecal Incontinence: Grade 0] : Fecal Incontinence: Grade 0 [Urinary Incontinence: Grade 0] : Urinary Incontinence: Grade 0  [Urinary Urgency: Grade 0] : Urinary Urgency: Grade 0 [Urinary Frequency: Grade 0] : Urinary Frequency: Grade 0 [FreeTextEntry7] : with rectal discharge and rectal burning [FreeTextEntry8] : patient with continent urinary diversion and colostomy

## 2021-02-04 NOTE — ADDENDUM
[FreeTextEntry1] : I saw and examined the patient with the resident/ACP.  In summary, Ms. JACQUELINE SPEARS is a 75 year female with a history of cervical cancer s/p radiation and brachytherapy approximately 20 years ago.  She now has a new rectal cancer [id number].  She is referred to discuss whether she is able to receive additional RT. We discussed the potential acute and chronic side effects of re-irradiation of the pelvis.  If she can tolerate it, we would recommend a few cycles of chemotherapy first and if there is no distant progression we would consider additional RT (see impression for details) and then surgery (if a candidate) in order to maximize the chance of durable local control.  Her case will be discussed in tumor board next week.  Thank you for referring this patient to us.  SHILPA\par

## 2021-02-04 NOTE — PHYSICAL EXAM
[Blood on examination glove] : no blood on examination glove [FreeTextEntry1] : Difficult to perform exam completely as patient was uncomfortable with manual exam, but no mass palpated on limited exam

## 2021-02-04 NOTE — ADDENDUM
[FreeTextEntry1] : I saw and examined the patient with the resident/ACP.  In summary, Ms. JACQUELINE SPEARS is a 75 year female with newly diagnosed rectal cancer, history of cervical cancer s/p RT and surgery.  I reviewed the records, images, and repeated the key components of the physical exam.  We discussed the potential acute and chronic side effects of RT to the rectal tumor. Given the previous RT, we plan to proceed with RT and capecitabine to 36 Gy followed by surgery and consider IORT if needed. The patient understands the increased risk of delayed healing, bleeding, and obstruction The patient gave informed consent to proceed.  We will proceed with simulation. SHILPA\par

## 2021-02-04 NOTE — REVIEW OF SYSTEMS
[FreeTextEntry7] : with rectal discharge and rectal burning [FreeTextEntry8] : patient with continent urinary diversion and colostomy

## 2021-02-05 ENCOUNTER — OUTPATIENT (OUTPATIENT)
Dept: OUTPATIENT SERVICES | Facility: HOSPITAL | Age: 76
LOS: 1 days | Discharge: HOME | End: 2021-02-05

## 2021-02-05 DIAGNOSIS — K50.911 CROHN'S DISEASE, UNSPECIFIED, WITH RECTAL BLEEDING: ICD-10-CM

## 2021-02-10 ENCOUNTER — RESULT REVIEW (OUTPATIENT)
Age: 76
End: 2021-02-10

## 2021-02-10 ENCOUNTER — APPOINTMENT (OUTPATIENT)
Age: 76
End: 2021-02-10

## 2021-02-10 ENCOUNTER — LABORATORY RESULT (OUTPATIENT)
Age: 76
End: 2021-02-10

## 2021-02-10 LAB
BASOPHILS # BLD AUTO: 0 K/UL — SIGNIFICANT CHANGE UP (ref 0–0.2)
BASOPHILS NFR BLD AUTO: 0 % — SIGNIFICANT CHANGE UP (ref 0–2)
EOSINOPHIL # BLD AUTO: 0 K/UL — SIGNIFICANT CHANGE UP (ref 0–0.5)
EOSINOPHIL NFR BLD AUTO: 0 % — SIGNIFICANT CHANGE UP (ref 0–6)
HCT VFR BLD CALC: 35.7 % — SIGNIFICANT CHANGE UP (ref 34.5–45)
HGB BLD-MCNC: 11.5 G/DL — SIGNIFICANT CHANGE UP (ref 11.5–15.5)
LYMPHOCYTES # BLD AUTO: 1.93 K/UL — SIGNIFICANT CHANGE UP (ref 1–3.3)
LYMPHOCYTES # BLD AUTO: 34 % — SIGNIFICANT CHANGE UP (ref 13–44)
MCHC RBC-ENTMCNC: 30 PG — SIGNIFICANT CHANGE UP (ref 27–34)
MCHC RBC-ENTMCNC: 32.2 G/DL — SIGNIFICANT CHANGE UP (ref 32–36)
MCV RBC AUTO: 93.2 FL — SIGNIFICANT CHANGE UP (ref 80–100)
MONOCYTES # BLD AUTO: 0.57 K/UL — SIGNIFICANT CHANGE UP (ref 0–0.9)
MONOCYTES NFR BLD AUTO: 10 % — SIGNIFICANT CHANGE UP (ref 2–14)
NEUTROPHILS # BLD AUTO: 3.18 K/UL — SIGNIFICANT CHANGE UP (ref 1.8–7.4)
NEUTROPHILS NFR BLD AUTO: 56 % — SIGNIFICANT CHANGE UP (ref 43–77)
NRBC # BLD: 0 /100 — SIGNIFICANT CHANGE UP (ref 0–0)
NRBC # BLD: SIGNIFICANT CHANGE UP /100 WBCS (ref 0–0)
PLAT MORPH BLD: NORMAL — SIGNIFICANT CHANGE UP
PLATELET # BLD AUTO: 262 K/UL — SIGNIFICANT CHANGE UP (ref 150–400)
RBC # BLD: 3.83 M/UL — SIGNIFICANT CHANGE UP (ref 3.8–5.2)
RBC # FLD: 18.6 % — HIGH (ref 10.3–14.5)
RBC BLD AUTO: SIGNIFICANT CHANGE UP
WBC # BLD: 5.67 K/UL — SIGNIFICANT CHANGE UP (ref 3.8–10.5)
WBC # FLD AUTO: 5.67 K/UL — SIGNIFICANT CHANGE UP (ref 3.8–10.5)

## 2021-02-11 ENCOUNTER — NON-APPOINTMENT (OUTPATIENT)
Age: 76
End: 2021-02-11

## 2021-02-11 DIAGNOSIS — C20 MALIGNANT NEOPLASM OF RECTUM: ICD-10-CM

## 2021-02-11 DIAGNOSIS — Z51.11 ENCOUNTER FOR ANTINEOPLASTIC CHEMOTHERAPY: ICD-10-CM

## 2021-02-11 DIAGNOSIS — R11.2 NAUSEA WITH VOMITING, UNSPECIFIED: ICD-10-CM

## 2021-02-12 ENCOUNTER — APPOINTMENT (OUTPATIENT)
Age: 76
End: 2021-02-12

## 2021-02-13 ENCOUNTER — APPOINTMENT (OUTPATIENT)
Dept: CT IMAGING | Facility: CLINIC | Age: 76
End: 2021-02-13
Payer: MEDICARE

## 2021-02-13 ENCOUNTER — OUTPATIENT (OUTPATIENT)
Dept: OUTPATIENT SERVICES | Facility: HOSPITAL | Age: 76
LOS: 1 days | End: 2021-02-13
Payer: MEDICARE

## 2021-02-13 DIAGNOSIS — C20 MALIGNANT NEOPLASM OF RECTUM: ICD-10-CM

## 2021-02-13 PROCEDURE — G1004: CPT

## 2021-02-13 PROCEDURE — 71260 CT THORAX DX C+: CPT | Mod: 26,MG

## 2021-02-13 PROCEDURE — 71260 CT THORAX DX C+: CPT

## 2021-02-13 PROCEDURE — 74177 CT ABD & PELVIS W/CONTRAST: CPT

## 2021-02-13 PROCEDURE — 74177 CT ABD & PELVIS W/CONTRAST: CPT | Mod: 26,MG

## 2021-02-18 ENCOUNTER — LABORATORY RESULT (OUTPATIENT)
Age: 76
End: 2021-02-18

## 2021-02-19 ENCOUNTER — APPOINTMENT (OUTPATIENT)
Age: 76
End: 2021-02-19
Payer: MEDICARE

## 2021-02-19 VITALS
RESPIRATION RATE: 17 BRPM | WEIGHT: 132.03 LBS | DIASTOLIC BLOOD PRESSURE: 78 MMHG | TEMPERATURE: 97 F | BODY MASS INDEX: 23.39 KG/M2 | SYSTOLIC BLOOD PRESSURE: 160 MMHG | OXYGEN SATURATION: 95 % | HEART RATE: 112 BPM

## 2021-02-19 DIAGNOSIS — R93.3 ABNORMAL FINDINGS ON DIAGNOSTIC IMAGING OF OTHER PARTS OF DIGESTIVE TRACT: ICD-10-CM

## 2021-02-19 LAB
ALBUMIN SERPL ELPH-MCNC: 4.4 G/DL
ALP BLD-CCNC: 109 U/L
ALT SERPL-CCNC: 24 U/L
ANION GAP SERPL CALC-SCNC: 15 MMOL/L
AST SERPL-CCNC: 34 U/L
BASOPHILS # BLD AUTO: 0.03 K/UL
BASOPHILS NFR BLD AUTO: 0.4 %
BILIRUB SERPL-MCNC: 0.4 MG/DL
BUN SERPL-MCNC: 21 MG/DL
CALCIUM SERPL-MCNC: 10.1 MG/DL
CHLORIDE SERPL-SCNC: 102 MMOL/L
CO2 SERPL-SCNC: 23 MMOL/L
CREAT SERPL-MCNC: 0.96 MG/DL
DEPRECATED D DIMER PPP IA-ACNC: 156 NG/ML DDU
EOSINOPHIL # BLD AUTO: 0.04 K/UL
EOSINOPHIL NFR BLD AUTO: 0.6 %
GLUCOSE SERPL-MCNC: 93 MG/DL
HCT VFR BLD CALC: 37.3 %
HGB BLD-MCNC: 11.3 G/DL
IMM GRANULOCYTES NFR BLD AUTO: 0.7 %
LYMPHOCYTES # BLD AUTO: 1.35 K/UL
LYMPHOCYTES NFR BLD AUTO: 20 %
MAN DIFF?: NORMAL
MCHC RBC-ENTMCNC: 29.3 PG
MCHC RBC-ENTMCNC: 30.3 GM/DL
MCV RBC AUTO: 96.6 FL
MONOCYTES # BLD AUTO: 0.31 K/UL
MONOCYTES NFR BLD AUTO: 4.6 %
NEUTROPHILS # BLD AUTO: 4.98 K/UL
NEUTROPHILS NFR BLD AUTO: 73.7 %
PLATELET # BLD AUTO: 220 K/UL
POTASSIUM SERPL-SCNC: 3.9 MMOL/L
PROT SERPL-MCNC: 7 G/DL
RBC # BLD: 3.86 M/UL
RBC # FLD: 17.7 %
SODIUM SERPL-SCNC: 140 MMOL/L
WBC # FLD AUTO: 6.76 K/UL

## 2021-02-19 PROCEDURE — 99214 OFFICE O/P EST MOD 30 MIN: CPT

## 2021-02-19 RX ORDER — CAPECITABINE 150 MG/1
150 TABLET, FILM COATED ORAL
Qty: 100 | Refills: 0 | Status: DISCONTINUED | COMMUNITY
Start: 2021-02-01 | End: 2021-02-19

## 2021-02-26 PROCEDURE — 77387B: CUSTOM | Mod: 26

## 2021-03-01 ENCOUNTER — NON-APPOINTMENT (OUTPATIENT)
Age: 76
End: 2021-03-01

## 2021-03-01 VITALS
TEMPERATURE: 96 F | BODY MASS INDEX: 23.78 KG/M2 | RESPIRATION RATE: 17 BRPM | DIASTOLIC BLOOD PRESSURE: 83 MMHG | SYSTOLIC BLOOD PRESSURE: 135 MMHG | HEART RATE: 89 BPM | OXYGEN SATURATION: 100 % | WEIGHT: 134.26 LBS

## 2021-03-01 PROCEDURE — 77387B: CUSTOM | Mod: 26

## 2021-03-01 PROCEDURE — 77427 RADIATION TX MANAGEMENT X5: CPT

## 2021-03-01 NOTE — REVIEW OF SYSTEMS
[Constipation: Grade 0] : Constipation: Grade 0 [Diarrhea: Grade 0] : Diarrhea: Grade 0 [Rectal Pain: Grade 0] : Rectal Pain: Grade 0 [Dermatitis Radiation: Grade 0] : Dermatitis Radiation: Grade 0

## 2021-03-02 PROCEDURE — 77387B: CUSTOM | Mod: 26

## 2021-03-03 PROCEDURE — 77387B: CUSTOM | Mod: 26

## 2021-03-04 PROCEDURE — 77387B: CUSTOM | Mod: 26

## 2021-03-05 PROCEDURE — 77427 RADIATION TX MANAGEMENT X5: CPT

## 2021-03-05 PROCEDURE — 77387B: CUSTOM | Mod: 26

## 2021-03-08 ENCOUNTER — NON-APPOINTMENT (OUTPATIENT)
Age: 76
End: 2021-03-08

## 2021-03-08 VITALS
TEMPERATURE: 97.2 F | DIASTOLIC BLOOD PRESSURE: 69 MMHG | OXYGEN SATURATION: 100 % | RESPIRATION RATE: 16 BRPM | SYSTOLIC BLOOD PRESSURE: 137 MMHG | HEART RATE: 86 BPM | WEIGHT: 134.7 LBS | BODY MASS INDEX: 23.86 KG/M2

## 2021-03-08 PROCEDURE — 77387B: CUSTOM | Mod: 26

## 2021-03-08 NOTE — HISTORY OF PRESENT ILLNESS
[FreeTextEntry1] : Ms. 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 Her CEA is 8.1.  She is s/p systemic therapy with FOLFOX and currently on capecitabine with Dr. Baker. Has colostomy and urostomy.\par \par 3/1/21- 2/18 fx- tolerating RT. No complaints

## 2021-03-08 NOTE — DISEASE MANAGEMENT
[Clinical] : TNM Stage: c [IIB] : IIB [TTNM] : 4a [NTNM] : 0 [MTNM] : 0 [de-identified] : Pelvis/rectum

## 2021-03-08 NOTE — HISTORY OF PRESENT ILLNESS
[FreeTextEntry1] : Ms. 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 Her CEA is 8.1.  She is s/p systemic therapy with FOLFOX and currently on capecitabine with Dr. Baker. Has colostomy and urostomy.\par \par 3/8/21-7/18 fx. Feeling well. Appetite good. Weight stable\par \par 3/1/21- 2/18 fx- tolerating RT. No complaints

## 2021-03-09 PROCEDURE — 77387B: CUSTOM | Mod: 26

## 2021-03-10 ENCOUNTER — NON-APPOINTMENT (OUTPATIENT)
Age: 76
End: 2021-03-10

## 2021-03-11 PROCEDURE — 77387B: CUSTOM | Mod: 26

## 2021-03-12 PROCEDURE — 77387B: CUSTOM | Mod: 26

## 2021-03-15 ENCOUNTER — RESULT REVIEW (OUTPATIENT)
Age: 76
End: 2021-03-15

## 2021-03-15 ENCOUNTER — OUTPATIENT (OUTPATIENT)
Dept: OUTPATIENT SERVICES | Facility: HOSPITAL | Age: 76
LOS: 1 days | Discharge: HOME | End: 2021-03-15

## 2021-03-15 ENCOUNTER — NON-APPOINTMENT (OUTPATIENT)
Age: 76
End: 2021-03-15

## 2021-03-15 ENCOUNTER — APPOINTMENT (OUTPATIENT)
Age: 76
End: 2021-03-15
Payer: MEDICARE

## 2021-03-15 VITALS
WEIGHT: 133 LBS | HEART RATE: 72 BPM | TEMPERATURE: 97 F | DIASTOLIC BLOOD PRESSURE: 85 MMHG | RESPIRATION RATE: 17 BRPM | OXYGEN SATURATION: 100 % | SYSTOLIC BLOOD PRESSURE: 120 MMHG | BODY MASS INDEX: 23.56 KG/M2

## 2021-03-15 VITALS
OXYGEN SATURATION: 100 % | HEART RATE: 72 BPM | WEIGHT: 132.28 LBS | RESPIRATION RATE: 17 BRPM | DIASTOLIC BLOOD PRESSURE: 85 MMHG | SYSTOLIC BLOOD PRESSURE: 120 MMHG | BODY MASS INDEX: 23.43 KG/M2 | TEMPERATURE: 98 F

## 2021-03-15 DIAGNOSIS — K50.911 CROHN'S DISEASE, UNSPECIFIED, WITH RECTAL BLEEDING: ICD-10-CM

## 2021-03-15 PROBLEM — R93.3 GASTROINTESTINAL TRACT IMAGING ABNORMALITY: Status: ACTIVE | Noted: 2021-03-15

## 2021-03-15 LAB
BASOPHILS # BLD AUTO: 0.03 K/UL — SIGNIFICANT CHANGE UP (ref 0–0.2)
BASOPHILS NFR BLD AUTO: 0.4 % — SIGNIFICANT CHANGE UP (ref 0–2)
EOSINOPHIL # BLD AUTO: 0.05 K/UL — SIGNIFICANT CHANGE UP (ref 0–0.5)
EOSINOPHIL NFR BLD AUTO: 0.7 % — SIGNIFICANT CHANGE UP (ref 0–6)
HCT VFR BLD CALC: 36.6 % — SIGNIFICANT CHANGE UP (ref 34.5–45)
HGB BLD-MCNC: 11.6 G/DL — SIGNIFICANT CHANGE UP (ref 11.5–15.5)
IMM GRANULOCYTES NFR BLD AUTO: 0.4 % — SIGNIFICANT CHANGE UP (ref 0–1.5)
LYMPHOCYTES # BLD AUTO: 1.48 K/UL — SIGNIFICANT CHANGE UP (ref 1–3.3)
LYMPHOCYTES # BLD AUTO: 22 % — SIGNIFICANT CHANGE UP (ref 13–44)
MCHC RBC-ENTMCNC: 30.8 PG — SIGNIFICANT CHANGE UP (ref 27–34)
MCHC RBC-ENTMCNC: 31.7 G/DL — LOW (ref 32–36)
MCV RBC AUTO: 97.1 FL — SIGNIFICANT CHANGE UP (ref 80–100)
MONOCYTES # BLD AUTO: 0.59 K/UL — SIGNIFICANT CHANGE UP (ref 0–0.9)
MONOCYTES NFR BLD AUTO: 8.8 % — SIGNIFICANT CHANGE UP (ref 2–14)
NEUTROPHILS # BLD AUTO: 4.56 K/UL — SIGNIFICANT CHANGE UP (ref 1.8–7.4)
NEUTROPHILS NFR BLD AUTO: 67.7 % — SIGNIFICANT CHANGE UP (ref 43–77)
NRBC # BLD: 0 /100 WBCS — SIGNIFICANT CHANGE UP (ref 0–0)
PLATELET # BLD AUTO: 276 K/UL — SIGNIFICANT CHANGE UP (ref 150–400)
RBC # BLD: 3.77 M/UL — LOW (ref 3.8–5.2)
RBC # FLD: 17.3 % — HIGH (ref 10.3–14.5)
WBC # BLD: 6.74 K/UL — SIGNIFICANT CHANGE UP (ref 3.8–10.5)
WBC # FLD AUTO: 6.74 K/UL — SIGNIFICANT CHANGE UP (ref 3.8–10.5)

## 2021-03-15 PROCEDURE — 99213 OFFICE O/P EST LOW 20 MIN: CPT

## 2021-03-15 PROCEDURE — 77427 RADIATION TX MANAGEMENT X5: CPT

## 2021-03-15 PROCEDURE — 77387B: CUSTOM | Mod: 26

## 2021-03-15 NOTE — REVIEW OF SYSTEMS
[Constipation: Grade 0] : Constipation: Grade 0 [Diarrhea: Grade 0] : Diarrhea: Grade 0 [Rectal Pain: Grade 0] : Rectal Pain: Grade 0 [Dermatitis Radiation: Grade 0] : Dermatitis Radiation: Grade 0 [Fatigue: Grade 0] : Fatigue: Grade 0

## 2021-03-16 LAB
ALBUMIN SERPL ELPH-MCNC: 4.4 G/DL
ALP BLD-CCNC: 96 U/L
ALT SERPL-CCNC: 10 U/L
ANION GAP SERPL CALC-SCNC: 13 MMOL/L
AST SERPL-CCNC: 23 U/L
BILIRUB SERPL-MCNC: 0.4 MG/DL
BUN SERPL-MCNC: 18 MG/DL
CALCIUM SERPL-MCNC: 9.8 MG/DL
CEA SERPL-MCNC: 4 NG/ML
CHLORIDE SERPL-SCNC: 103 MMOL/L
CO2 SERPL-SCNC: 26 MMOL/L
CREAT SERPL-MCNC: 0.89 MG/DL
GLUCOSE SERPL-MCNC: 91 MG/DL
POTASSIUM SERPL-SCNC: 3.3 MMOL/L
PROT SERPL-MCNC: 6.9 G/DL
SODIUM SERPL-SCNC: 141 MMOL/L

## 2021-03-16 PROCEDURE — 77387B: CUSTOM | Mod: 26

## 2021-03-17 ENCOUNTER — NON-APPOINTMENT (OUTPATIENT)
Age: 76
End: 2021-03-17

## 2021-03-17 PROCEDURE — 77387B: CUSTOM | Mod: 26

## 2021-03-18 PROCEDURE — 77387B: CUSTOM | Mod: 26

## 2021-03-19 PROCEDURE — 77387B: CUSTOM | Mod: 26

## 2021-03-20 NOTE — REASON FOR VISIT
[Follow-Up Visit] : a follow-up [Family Member] : family member [FreeTextEntry2] : Rectal cancer receiving DILMA

## 2021-03-20 NOTE — REVIEW OF SYSTEMS
[Vaginal Discharge] : vaginal discharge [Negative] : Cardiovascular [Fatigue] : no fatigue [de-identified] : cold neuropathy none since treatment has stopped

## 2021-03-20 NOTE — PHYSICAL EXAM
[Restricted in physically strenuous activity but ambulatory and able to carry out work of a light or sedentary nature] : Status 1- Restricted in physically strenuous activity but ambulatory and able to carry out work of a light or sedentary nature, e.g., light house work, office work [Normal] : no JVD, no calf tenderness, venous stasis changes, varices [de-identified] : non distended, BS + x4 non tender, no rebound; soft;  ostomy patent   +conduit

## 2021-03-20 NOTE — HISTORY OF PRESENT ILLNESS
[Disease: _____________________] : Disease: [unfilled] [T: ___] : T[unfilled] [N: ___] : N[unfilled] [M: ___] : M[unfilled] [AJCC Stage: ____] : AJCC Stage: [unfilled] [Therapy: ___] : Therapy: [unfilled] [Day: ___] : Day: [unfilled] [Cycle: ___] : Cycle: [unfilled] [de-identified] : 75 F w/ h/o recurrent endocervical adenocarcinoma Stage IIB s/p radiation then required pelvic exoneration presents for further evaluation of rectal cancer. \par \par In June 2020 Abi noticed a rectal discharge and burning sensation in the rectum. Was evaluated by Dr. Lamar and ultimately saw Dr. Oropeza who ordered CT Pelvis and MRI Pelvis . Found to have a mass in the rectum s/p biopsy confirmed adenocarcinoma. \par \par 7/18/20: MRI Pelvis: large hypovascular mass c/w mucinous adenoca involving remaining distal rectum and vagina, tumor abuts anal sphincter\par 8/5/20: CT pelvis: residual chronic perineal mass, likely cystic with gas component\par 9/23/20: CT CAP: low density, possibly necrotic mass up to 6.2 cm with involvement of the vagina, nonspecific 6 mm LLL nodule is noted\par 10/3/20: PET/CT: hypermetabolic rectal mass, non specific small inguinal LN, nonspecific hypermetabolism within the colostomy insertion site\par 10/7/20: discussed at rectal TB, plan for DILMA with re-evaluation of response and then surgery if residual disease\par 10/26/20: C1 FOLFOX\par 11/9/20: C2 (c/b acute oxali neurotoxicity)\par 11/23/20: C3 (Oxali over 4 hrs) - tolerated well \par 12/7/20: C4 5FU/LV (developed difficulty swallowing, SOB after receiving Oxali, d/c Oxali for today's tx)\par 12/21/20: C5 FOLFOX (dose reduced Oxali 65 mg/m2 over 4 hrs)\par 1/4/21: C6\par 1/18/21: C7\par 2/10/21: C8 \par 2/17/21: CT abdomen and pelvis:  Pneumatosis is noted involving several small bowel loops in the pelvis which are mildly thickened. Correlation with the physical exam and labs is recommended to assess for possible ischemia.\par \par  [de-identified] : adenocarcinoma [de-identified] : Completed cycle 8 FOLFOX 1 week ago\par \par Called by radiologist with findings on restaging CT that indicate pneumatosis. Dr. Baker reviewed these with the radiologist, Dr. Oropeza and Dr. Rosa. She had labs done at a [facility] yesterday in the event she could not come for an exam due to inclement weather. WBC is 6.7 with normal differential. \par \par She is here with her daughter in law  and has had no chest/back or  abdominal pain or fever since the scan was done.\par Has had intermittent small amt vaginal discharge since prior to chemotherapy.\par Output from ostomy has not changed. Appetite is good, energy is starting to improve. \par She is starting to get more sleep

## 2021-03-22 ENCOUNTER — NON-APPOINTMENT (OUTPATIENT)
Age: 76
End: 2021-03-22

## 2021-03-22 VITALS
TEMPERATURE: 96.5 F | BODY MASS INDEX: 22.9 KG/M2 | OXYGEN SATURATION: 100 % | DIASTOLIC BLOOD PRESSURE: 84 MMHG | SYSTOLIC BLOOD PRESSURE: 141 MMHG | RESPIRATION RATE: 16 BRPM | HEART RATE: 98 BPM | WEIGHT: 129.3 LBS

## 2021-03-22 PROCEDURE — 77427 RADIATION TX MANAGEMENT X5: CPT

## 2021-03-22 PROCEDURE — 77387B: CUSTOM | Mod: 26

## 2021-03-22 NOTE — HISTORY OF PRESENT ILLNESS
[FreeTextEntry1] : Ms. 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 Her CEA is 8.1.  She is s/p systemic therapy with FOLFOX and currently on capecitabine with Dr. Baker. Has colostomy and urostomy.\par \par 3/22/21-16/18 fx-  Fatigue. Diarrhea over weekend and 3 x today. Lost approx. 3 lbs. Did not take Imodium. \par \par 3/15/21-11/18 fx- stable brownish discharge per rectum. No new complaints. Skin care reviewed\par \par 3/8/21-7/18 fx. Feeling well. Appetite good. Weight stable\par \par 3/1/21- 2/18 fx- tolerating RT. No complaints

## 2021-03-22 NOTE — DISEASE MANAGEMENT
[Clinical] : TNM Stage: c [IIB] : IIB [TTNM] : 4a [NTNM] : 0 [MTNM] : 0 [de-identified] : Pelvis/rectum

## 2021-03-23 PROCEDURE — 77387B: CUSTOM | Mod: 26

## 2021-03-24 ENCOUNTER — NON-APPOINTMENT (OUTPATIENT)
Age: 76
End: 2021-03-24

## 2021-03-24 PROCEDURE — 77387B: CUSTOM | Mod: 26

## 2021-03-24 NOTE — ADDENDUM
[FreeTextEntry1] : I reviewed above and agree.  Patient doing well.  Continue with RT.\par ADELA\par

## 2021-03-24 NOTE — DISEASE MANAGEMENT
[Clinical] : TNM Stage: c [IIB] : IIB [TTNM] : 4a [NTNM] : 0 [MTNM] : 0 [de-identified] : Pelvis/rectum

## 2021-03-24 NOTE — HISTORY OF PRESENT ILLNESS
[FreeTextEntry1] : Ms. 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 Her CEA is 8.1.  She is s/p systemic therapy with FOLFOX and currently on capecitabine with Dr. Baker. Has colostomy and urostomy.\par \par 3/15/21-11/18 fx- stable brownish discharge per rectum. No new complaints. Skin care reviewed\par \par 3/8/21-7/18 fx. Feeling well. Appetite good. Weight stable\par \par 3/1/21- 2/18 fx- tolerating RT. No complaints

## 2021-04-04 NOTE — HISTORY OF PRESENT ILLNESS
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 \par \par In June 2020 Abi noticed a rectal discharge and burning sensation in the rectum. Was evaluated by Dr. Lamar and ultimately saw Dr. Oropeza who ordered CT Pelvis and MRI Pelvis . Found to have a mass in the rectum s/p biopsy confirmed adenocarcinoma. \par \par 7/18/20: MRI Pelvis: large hypovascular mass c/w mucinous adenoca involving remaining distal rectum and vagina, tumor abuts anal sphincter\par 8/5/20: CT pelvis: residual chronic perineal mass, likely cystic with gas component\par 9/23/20: CT CAP: low density, possibly necrotic mass up to 6.2 cm with involvement of the vagina, nonspecific 6 mm LLL nodule is noted\par 10/3/20: PET/CT: hypermetabolic rectal mass, non specific small inguinal LN, nonspecific hypermetabolism within the colostomy insertion site\par 10/7/20: discussed at rectal TB, plan for DILMA with re-evaluation of response and then surgery if residual disease\par 10/26/20: C1 FOLFOX\par 11/9/20: C2 (c/b acute oxali neurotoxicity)\par 11/23/20: C3 (Oxali over 4 hrs) - tolerated well \par 12/7/20: C4 5FU/LV (developed difficulty swallowing, SOB after receiving Oxali, d/c Oxali for today's tx)\par 12/21-2/10/21: C5-8 FOLFOX (dose reduced Oxali 65 mg/m2 over 4 hrs)\par 2/13/21: CT CAP: 0.6 cm LLL nodule, rectal mass slightly decreased in size, small b/l inguinal LN minimally decr in size, pneumatosis is noted involving SB possible fistula \par 2/25-3/23/21: Xeloda/RT  [de-identified] : adenocarcinoma [de-identified] : tolerating RT well. Denies any c/o. Denies any pain. Ostomy working well. No diarrhea. Energy level is stable. Good appetite. Weight is stable. No pain.

## 2021-04-09 ENCOUNTER — NON-APPOINTMENT (OUTPATIENT)
Age: 76
End: 2021-04-09

## 2021-04-14 ENCOUNTER — APPOINTMENT (OUTPATIENT)
Dept: HEMATOLOGY ONCOLOGY | Facility: CLINIC | Age: 76
End: 2021-04-14
Payer: MEDICARE

## 2021-04-14 VITALS
BODY MASS INDEX: 24.21 KG/M2 | HEART RATE: 62 BPM | OXYGEN SATURATION: 100 % | WEIGHT: 136.69 LBS | SYSTOLIC BLOOD PRESSURE: 115 MMHG | RESPIRATION RATE: 14 BRPM | DIASTOLIC BLOOD PRESSURE: 68 MMHG | TEMPERATURE: 98 F

## 2021-04-14 PROCEDURE — 99213 OFFICE O/P EST LOW 20 MIN: CPT

## 2021-04-15 ENCOUNTER — OUTPATIENT (OUTPATIENT)
Dept: OUTPATIENT SERVICES | Facility: HOSPITAL | Age: 76
LOS: 1 days | Discharge: HOME | End: 2021-04-15

## 2021-04-15 DIAGNOSIS — K50.911 CROHN'S DISEASE, UNSPECIFIED, WITH RECTAL BLEEDING: ICD-10-CM

## 2021-04-17 ENCOUNTER — RESULT REVIEW (OUTPATIENT)
Age: 76
End: 2021-04-17

## 2021-04-19 ENCOUNTER — RESULT REVIEW (OUTPATIENT)
Age: 76
End: 2021-04-19

## 2021-04-19 ENCOUNTER — APPOINTMENT (OUTPATIENT)
Age: 76
End: 2021-04-19

## 2021-04-19 ENCOUNTER — LABORATORY RESULT (OUTPATIENT)
Age: 76
End: 2021-04-19

## 2021-04-19 LAB
BASOPHILS # BLD AUTO: 0.01 K/UL — SIGNIFICANT CHANGE UP (ref 0–0.2)
BASOPHILS NFR BLD AUTO: 0.2 % — SIGNIFICANT CHANGE UP (ref 0–2)
EOSINOPHIL # BLD AUTO: 0.05 K/UL — SIGNIFICANT CHANGE UP (ref 0–0.5)
EOSINOPHIL NFR BLD AUTO: 1.1 % — SIGNIFICANT CHANGE UP (ref 0–6)
HCT VFR BLD CALC: 35.5 % — SIGNIFICANT CHANGE UP (ref 34.5–45)
HGB BLD-MCNC: 11.4 G/DL — LOW (ref 11.5–15.5)
IMM GRANULOCYTES NFR BLD AUTO: 0.4 % — SIGNIFICANT CHANGE UP (ref 0–1.5)
LYMPHOCYTES # BLD AUTO: 1.05 K/UL — SIGNIFICANT CHANGE UP (ref 1–3.3)
LYMPHOCYTES # BLD AUTO: 23.4 % — SIGNIFICANT CHANGE UP (ref 13–44)
MCHC RBC-ENTMCNC: 31.4 PG — SIGNIFICANT CHANGE UP (ref 27–34)
MCHC RBC-ENTMCNC: 32.1 G/DL — SIGNIFICANT CHANGE UP (ref 32–36)
MCV RBC AUTO: 97.8 FL — SIGNIFICANT CHANGE UP (ref 80–100)
MONOCYTES # BLD AUTO: 0.51 K/UL — SIGNIFICANT CHANGE UP (ref 0–0.9)
MONOCYTES NFR BLD AUTO: 11.4 % — SIGNIFICANT CHANGE UP (ref 2–14)
NEUTROPHILS # BLD AUTO: 2.85 K/UL — SIGNIFICANT CHANGE UP (ref 1.8–7.4)
NEUTROPHILS NFR BLD AUTO: 63.5 % — SIGNIFICANT CHANGE UP (ref 43–77)
NRBC # BLD: 0 /100 WBCS — SIGNIFICANT CHANGE UP (ref 0–0)
PLATELET # BLD AUTO: 216 K/UL — SIGNIFICANT CHANGE UP (ref 150–400)
RBC # BLD: 3.63 M/UL — LOW (ref 3.8–5.2)
RBC # FLD: 16.1 % — HIGH (ref 10.3–14.5)
WBC # BLD: 4.49 K/UL — SIGNIFICANT CHANGE UP (ref 3.8–10.5)
WBC # FLD AUTO: 4.49 K/UL — SIGNIFICANT CHANGE UP (ref 3.8–10.5)

## 2021-04-20 DIAGNOSIS — C20 MALIGNANT NEOPLASM OF RECTUM: ICD-10-CM

## 2021-04-21 ENCOUNTER — APPOINTMENT (OUTPATIENT)
Dept: CT IMAGING | Facility: CLINIC | Age: 76
End: 2021-04-21
Payer: MEDICARE

## 2021-04-21 ENCOUNTER — OUTPATIENT (OUTPATIENT)
Dept: OUTPATIENT SERVICES | Facility: HOSPITAL | Age: 76
LOS: 1 days | End: 2021-04-21
Payer: MEDICARE

## 2021-04-21 DIAGNOSIS — C20 MALIGNANT NEOPLASM OF RECTUM: ICD-10-CM

## 2021-04-21 PROCEDURE — 74177 CT ABD & PELVIS W/CONTRAST: CPT

## 2021-04-21 PROCEDURE — 71260 CT THORAX DX C+: CPT | Mod: 26,MG

## 2021-04-21 PROCEDURE — 71260 CT THORAX DX C+: CPT

## 2021-04-21 PROCEDURE — 74177 CT ABD & PELVIS W/CONTRAST: CPT | Mod: 26,MG

## 2021-04-21 PROCEDURE — G1004: CPT

## 2021-04-27 ENCOUNTER — APPOINTMENT (OUTPATIENT)
Dept: SURGERY | Facility: CLINIC | Age: 76
End: 2021-04-27
Payer: MEDICARE

## 2021-04-27 VITALS
BODY MASS INDEX: 23.21 KG/M2 | TEMPERATURE: 97.8 F | DIASTOLIC BLOOD PRESSURE: 65 MMHG | HEART RATE: 85 BPM | OXYGEN SATURATION: 98 % | RESPIRATION RATE: 18 BRPM | WEIGHT: 131 LBS | SYSTOLIC BLOOD PRESSURE: 133 MMHG | HEIGHT: 63 IN

## 2021-04-27 DIAGNOSIS — Z12.11 ENCOUNTER FOR SCREENING FOR MALIGNANT NEOPLASM OF COLON: ICD-10-CM

## 2021-04-27 PROCEDURE — 99214 OFFICE O/P EST MOD 30 MIN: CPT

## 2021-04-27 RX ORDER — METOCLOPRAMIDE 10 MG/1
10 TABLET ORAL EVERY 6 HOURS
Qty: 60 | Refills: 3 | Status: DISCONTINUED | COMMUNITY
Start: 2020-10-26 | End: 2021-04-27

## 2021-04-27 RX ORDER — CAPECITABINE 500 MG/1
500 TABLET ORAL
Qty: 120 | Refills: 0 | Status: DISCONTINUED | COMMUNITY
Start: 2021-02-01 | End: 2021-04-27

## 2021-04-27 RX ORDER — ONDANSETRON 4 MG/1
4 TABLET, ORALLY DISINTEGRATING ORAL
Qty: 20 | Refills: 1 | Status: DISCONTINUED | COMMUNITY
Start: 2021-02-19 | End: 2021-04-27

## 2021-04-27 RX ORDER — GLUCOSAMINE HCL/CHONDROITIN SU 500-400 MG
3 CAPSULE ORAL
Refills: 0 | Status: DISCONTINUED | COMMUNITY
Start: 2020-11-09 | End: 2021-04-27

## 2021-04-27 NOTE — PHYSICAL EXAM
[Wheezing] : no wheezing was heard [Normal Rate and Rhythm] : normal rate and rhythm [No Rash or Lesion] : No rash or lesion [Alert] : alert [Calm] : calm [de-identified] : Soft, nontender, normal functioning left lower quadrant colostomy.  Normal functioning right lower quadrant urostomy.  No incisional hernias. [de-identified] : Perianal inspection is normal.  There is mucus discharge from the vagina.  Digital rectal examination was precluded due to the  patient's level of discomfort [de-identified] : nad [de-identified] : nl

## 2021-04-27 NOTE — PHYSICAL EXAM
[Wheezing] : no wheezing was heard [Normal Rate and Rhythm] : normal rate and rhythm [No Rash or Lesion] : No rash or lesion [Alert] : alert [Calm] : calm [de-identified] : Soft, nontender, normal functioning left lower quadrant colostomy.  Normal functioning right lower quadrant urostomy.  No incisional hernias. [de-identified] : Perianal inspection is normal.  There is mucus discharge from the vagina.  Digital rectal examination was precluded due to the  patient's level of discomfort [de-identified] : nad [de-identified] : nl

## 2021-04-27 NOTE — HISTORY OF PRESENT ILLNESS
[FreeTextEntry1] : Abi is a 76 y/o female with a h/o cervical cancer status post pelvic radiation and subsequent pelvic exenteration. Pt is s/p proctoscopy of her short rectal stump on 9/14/20. The findings included a malignant appearing distal rectal mass anteriorly with an obvious large rectovaginal fistula. Pathology: moderately differentiated adenocarcinoma with mucinous features.  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 \par Colonoscopy from 09/14/20 Rectal mass 0 to 3 cm from the anal verge.Likely malignant tumor in the distal rectum.\par CT abd/pelvis from 04/21/21 Mild interval decrease in the size of a rectal mass.No developing adenopathy nor hepatic mass.Stable pulmonary nodules.\par She is not currently on chemo.  Has colostomy and urostomy. Today she reports slight mucus like discharge from the rectum. No bleeding, nausea, vomiting, abdominal cramping  or pain .\par

## 2021-04-27 NOTE — CONSULT LETTER
[Dear  ___] : Dear  [unfilled], [Consult Letter:] : I had the pleasure of evaluating your patient, [unfilled]. [Please see my note below.] : Please see my note below. [Consult Closing:] : Thank you very much for allowing me to participate in the care of this patient.  If you have any questions, please do not hesitate to contact me. [Sincerely,] : Sincerely, [FreeTextEntry3] : Fermin Oropeza M.D., F.A.C.S, F.A.S.C.R.S [DrSunny  ___] : Dr. AGUIRRE [DrSunny ___] : Dr. AGUIRRE

## 2021-04-27 NOTE — ASSESSMENT
[FreeTextEntry1] : In summary the patient has locally advanced distal rectal carcinoma of her rectal stump in the setting of prior pelvic radiation and anterior pelvic exenteration.  She has completed a course of chemoradiation and CT suggests no sign of progression of disease.  I will be performing a colonoscopy and proctoscopy on her later this week to evaluate for tumor response.  She will also be presented at the next upcoming rectal cancer tumor board.  \par \par I explained to the patient and her  as well as her daughter that from my standpoint the options will include major abdominal surgery/proctectomy.  This would carry the risk of dealing with dense adhesions in her pelvis and radiated bowel.  There would be a risk for the need for a bowel resection as well as wound healing problems especially with a large perineal wound.  She would likely require plastic surgery involvement for perineal wound closure.  The other option would be a perineal approach however her small bowel drapes across her rectal stump and there would be a high risk of small bowel injury during a local procedure such as this.  There would also be less likelihood of an oncologically curative operation with a local approach.  The final option would be palliative chemotherapy.  We will be discussing this further next week after her proctoscopy and tumor board presentation

## 2021-04-27 NOTE — PHYSICAL EXAM
[Wheezing] : no wheezing was heard [Normal Rate and Rhythm] : normal rate and rhythm [No Rash or Lesion] : No rash or lesion [Alert] : alert [Calm] : calm [de-identified] : Soft, nontender, normal functioning left lower quadrant colostomy.  Normal functioning right lower quadrant urostomy.  No incisional hernias. [de-identified] : Perianal inspection is normal.  There is mucus discharge from the vagina.  Digital rectal examination was precluded due to the  patient's level of discomfort [de-identified] : nad [de-identified] : nl

## 2021-04-27 NOTE — HISTORY OF PRESENT ILLNESS
[FreeTextEntry1] : Abi is a 74 y/o female with a h/o cervical cancer status post pelvic radiation and subsequent pelvic exenteration. Pt is s/p proctoscopy of her short rectal stump on 9/14/20. The findings included a malignant appearing distal rectal mass anteriorly with an obvious large rectovaginal fistula. Pathology: moderately differentiated adenocarcinoma with mucinous features.  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 \par Colonoscopy from 09/14/20 Rectal mass 0 to 3 cm from the anal verge.Likely malignant tumor in the distal rectum.\par CT abd/pelvis from 04/21/21 Mild interval decrease in the size of a rectal mass.No developing adenopathy nor hepatic mass.Stable pulmonary nodules.\par She is not currently on chemo.  Has colostomy and urostomy. Today she reports slight mucus like discharge from the rectum. No bleeding, nausea, vomiting, abdominal cramping  or pain .\par

## 2021-04-27 NOTE — HISTORY OF PRESENT ILLNESS
[FreeTextEntry1] : Abi is a 76 y/o female with a h/o cervical cancer status post pelvic radiation and subsequent pelvic exenteration. Pt is s/p proctoscopy of her short rectal stump on 9/14/20. The findings included a malignant appearing distal rectal mass anteriorly with an obvious large rectovaginal fistula. Pathology: moderately differentiated adenocarcinoma with mucinous features.  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 \par Colonoscopy from 09/14/20 Rectal mass 0 to 3 cm from the anal verge.Likely malignant tumor in the distal rectum.\par CT abd/pelvis from 04/21/21 Mild interval decrease in the size of a rectal mass.No developing adenopathy nor hepatic mass.Stable pulmonary nodules.\par She is not currently on chemo.  Has colostomy and urostomy. Today she reports slight mucus like discharge from the rectum. No bleeding, nausea, vomiting, abdominal cramping  or pain .\par

## 2021-04-28 ENCOUNTER — APPOINTMENT (OUTPATIENT)
Dept: SURGERY | Facility: HOSPITAL | Age: 76
End: 2021-04-28
Payer: MEDICARE

## 2021-04-28 ENCOUNTER — RESULT REVIEW (OUTPATIENT)
Age: 76
End: 2021-04-28

## 2021-04-28 ENCOUNTER — OUTPATIENT (OUTPATIENT)
Dept: OUTPATIENT SERVICES | Facility: HOSPITAL | Age: 76
LOS: 1 days | End: 2021-04-28
Payer: MEDICARE

## 2021-04-28 VITALS
RESPIRATION RATE: 22 BRPM | OXYGEN SATURATION: 100 % | DIASTOLIC BLOOD PRESSURE: 52 MMHG | SYSTOLIC BLOOD PRESSURE: 114 MMHG | HEART RATE: 78 BPM

## 2021-04-28 VITALS
RESPIRATION RATE: 20 BRPM | DIASTOLIC BLOOD PRESSURE: 55 MMHG | OXYGEN SATURATION: 99 % | SYSTOLIC BLOOD PRESSURE: 123 MMHG | HEART RATE: 85 BPM | TEMPERATURE: 97 F

## 2021-04-28 DIAGNOSIS — Z93.2 ILEOSTOMY STATUS: Chronic | ICD-10-CM

## 2021-04-28 DIAGNOSIS — C53.9 MALIGNANT NEOPLASM OF CERVIX UTERI, UNSPECIFIED: ICD-10-CM

## 2021-04-28 DIAGNOSIS — R10.9 UNSPECIFIED ABDOMINAL PAIN: ICD-10-CM

## 2021-04-28 DIAGNOSIS — Z90.710 ACQUIRED ABSENCE OF BOTH CERVIX AND UTERUS: Chronic | ICD-10-CM

## 2021-04-28 PROCEDURE — 45385 COLONOSCOPY W/LESION REMOVAL: CPT | Mod: PT

## 2021-04-28 PROCEDURE — 45385 COLONOSCOPY W/LESION REMOVAL: CPT

## 2021-04-28 PROCEDURE — 45380 COLONOSCOPY AND BIOPSY: CPT | Mod: XS,PT

## 2021-04-28 PROCEDURE — 88305 TISSUE EXAM BY PATHOLOGIST: CPT | Mod: 26

## 2021-04-28 PROCEDURE — 88305 TISSUE EXAM BY PATHOLOGIST: CPT

## 2021-04-28 NOTE — PRE PROCEDURE NOTE - PRE PROCEDURE EVALUATION
Attending Physician:                        Johnna    Procedure: Colonoscopy    Indication for Procedure: Mass  ________________________________________________________  PAST MEDICAL & SURGICAL HISTORY:  HTN (hypertension)    HLD (hyperlipidemia)    Hypothyroid    Rectal cancer    H/O total hysterectomy with bilateral salpingo-oophorectomy (BSO)    Ileostomy present      ALLERGIES:  penicillin G benzathine (Rash; Anaphylaxis; Hives; Short breath)    HOME MEDICATIONS:  hydroCHLOROthiazide 12.5 mg oral tablet: 1 tab(s) orally once a day  Prolia 60 mg/mL subcutaneous solution:   simvastatin 10 mg oral tablet: 1 tab(s) orally once a day (at bedtime)  Synthroid: orally once a day    AICD/PPM: [ ] yes   [ ] no    PERTINENT LAB DATA:                      PHYSICAL EXAMINATION:    T(C): --  HR: --  BP: --  RR: --  SpO2: --    Constitutional: NAD  HEENT: PERRLA, EOMI,    Neck:  No JVD  Respiratory: CTAB/L  Cardiovascular: S1 and S2  Gastrointestinal: BS+, soft, NT/ND  Extremities: No peripheral edema  Neurological: A/O x 3, no focal deficits  Psychiatric: Normal mood, normal affect  Skin: No rashes    ASA Class: I [ ]  II [ ]  III [ ]  IV [ ]    COMMENTS:    The patient is a suitable candidate for the planned procedure unless box checked [ ]  No, explain:

## 2021-04-28 NOTE — ASU DISCHARGE PLAN (ADULT/PEDIATRIC) - CARE PROVIDER_API CALL
Fermin Oropeza)  ColonRectal Surgery; Surgery  [address], [address]  [place] [address]  Phone: [phone]  Fax: [phone]  Follow Up Time:

## 2021-04-29 LAB — SARS-COV-2 N GENE NPH QL NAA+PROBE: NOT DETECTED

## 2021-05-03 ENCOUNTER — APPOINTMENT (OUTPATIENT)
Dept: RADIATION ONCOLOGY | Facility: CLINIC | Age: 76
End: 2021-05-03
Payer: MEDICARE

## 2021-05-03 VITALS
DIASTOLIC BLOOD PRESSURE: 81 MMHG | TEMPERATURE: 96.8 F | OXYGEN SATURATION: 100 % | BODY MASS INDEX: 23.71 KG/M2 | HEART RATE: 79 BPM | RESPIRATION RATE: 16 BRPM | WEIGHT: 133.82 LBS | SYSTOLIC BLOOD PRESSURE: 137 MMHG

## 2021-05-03 LAB — SURGICAL PATHOLOGY STUDY: SIGNIFICANT CHANGE UP

## 2021-05-03 PROCEDURE — 99024 POSTOP FOLLOW-UP VISIT: CPT | Mod: GC

## 2021-05-03 RX ORDER — SODIUM PICOSULFATE, MAGNESIUM OXIDE, AND ANHYDROUS CITRIC ACID 10; 3.5; 12 MG/160ML; G/160ML; G/160ML
10-3.5-12 MG-GM LIQUID ORAL
Qty: 1 | Refills: 0 | Status: DISCONTINUED | COMMUNITY
Start: 2021-04-27 | End: 2021-05-03

## 2021-05-03 NOTE — REVIEW OF SYSTEMS
[Constipation: Grade 0] : Constipation: Grade 0 [Diarrhea: Grade 0] : Diarrhea: Grade 0 [Rectal Pain: Grade 0] : Rectal Pain: Grade 0 [Fatigue: Grade 0] : Fatigue: Grade 0 [Dermatitis Radiation: Grade 0] : Dermatitis Radiation: Grade 0 [Vaginal Discharge] : vaginal discharge [Negative] : Psychiatric

## 2021-05-03 NOTE — PHYSICAL EXAM
[General Appearance - Alert] : alert [General Appearance - In No Acute Distress] : in no acute distress [Nondistended] : nondistended [Abdomen Soft] : soft [Abdomen Tenderness] : non-tender [] : no hepato-splenomegaly [Normal] : oriented to person, place and time, the affect was normal, the mood was normal and not anxious

## 2021-05-07 ENCOUNTER — APPOINTMENT (OUTPATIENT)
Dept: SURGERY | Facility: CLINIC | Age: 76
End: 2021-05-07
Payer: MEDICARE

## 2021-05-07 VITALS
HEIGHT: 63 IN | TEMPERATURE: 97.4 F | WEIGHT: 130 LBS | OXYGEN SATURATION: 94 % | BODY MASS INDEX: 23.04 KG/M2 | RESPIRATION RATE: 16 BRPM | SYSTOLIC BLOOD PRESSURE: 146 MMHG | HEART RATE: 90 BPM | DIASTOLIC BLOOD PRESSURE: 86 MMHG

## 2021-05-07 DIAGNOSIS — Z87.891 PERSONAL HISTORY OF NICOTINE DEPENDENCE: ICD-10-CM

## 2021-05-07 DIAGNOSIS — E78.00 PURE HYPERCHOLESTEROLEMIA, UNSPECIFIED: ICD-10-CM

## 2021-05-07 DIAGNOSIS — I10 ESSENTIAL (PRIMARY) HYPERTENSION: ICD-10-CM

## 2021-05-07 DIAGNOSIS — Z84.89 FAMILY HISTORY OF OTHER SPECIFIED CONDITIONS: ICD-10-CM

## 2021-05-07 DIAGNOSIS — E03.9 HYPOTHYROIDISM, UNSPECIFIED: ICD-10-CM

## 2021-05-07 DIAGNOSIS — M85.80 OTHER SPECIFIED DISORDERS OF BONE DENSITY AND STRUCTURE, UNSPECIFIED SITE: ICD-10-CM

## 2021-05-07 PROCEDURE — 99213 OFFICE O/P EST LOW 20 MIN: CPT

## 2021-05-07 RX ORDER — MULTIVIT-MIN/FOLIC/VIT K/LYCOP 400-300MCG
25 MCG TABLET ORAL
Refills: 0 | Status: ACTIVE | COMMUNITY

## 2021-05-07 NOTE — ASSESSMENT
[FreeTextEntry1] : In summary the patient has locally advanced distal rectal carcinoma with a rectovaginal fistula.  There is no evidence of distant metastases.  She has undergone chemoradiation.  She has a history of a pelvic exenteration for cervical cancer with subsequent external beam radiation 20 years ago.  She has a colostomy and ileal conduit.  On most recent colonoscopy her tumor has become smaller and now is a large ulcer in the anterior rectum with an obvious fistula to the vagina.\par \par Her case was presented at this week's tumor board.  The consensus was that an attempt at curative surgery would involve a major operation with a high risk of morbidity and potential mortality.  She would be left with a large perineal defect and would likely require a major bowel resection as her radiated small bowel is draped into the pelvis and overlies her rectal stump and vaginal cuff.  She would undoubtedly be left with a large perineal defect with limited options for closure as rectus flaps could not be used to close the defect.  Despite the fact that surgery offers her the best chance for cure the consensus was that the risks of surgery outweigh the benefits.  At the present time her quality of life is excellent and I feel that she would be best served with continuing chemotherapy without the plan for surgical resection.  The above discussion was had with the patient as well as her  and daughter who are in agreement.  She will follow-up with Dr. Baker as well as Dr. Rosa to help guide further treatment.\par

## 2021-05-07 NOTE — HISTORY OF PRESENT ILLNESS
[FreeTextEntry1] : Abi is a 74 y/o female with a h/o cervical cancer status post pelvic radiation and subsequent pelvic exenteration. Pt is s/p proctoscopy of her short rectal stump on 9/14/20. The findings included a malignant appearing distal rectal mass anteriorly with an obvious large rectovaginal fistula. Pathology: moderately differentiated adenocarcinoma with mucinous features. 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 Colonoscopy from 04/28/21 demonstrated malignant tumor in the distal rectum. One 2 mm polyp at the 45 cm proximal to the anus, removed with a cold . Patent side to side ileo colonic anastomosis.  \par \par Colonoscopy from 09/14/20 Rectal mass 0 to 3 cm from the anal verge.Likely malignant tumor in the distal rectum.\par CT abd/pelvis from 04/21/21 Mild interval decrease in the size of a rectal mass.No developing adenopathy nor hepatic mass.Stable pulmonary nodules.\par She is not currently on chemo. Has colostomy and urostomy. Today she reports slight mucus like discharge from the rectum. No bleeding, nausea, vomiting, abdominal cramping or pain .\par Last seen on 04/27/21\par

## 2021-05-08 NOTE — HISTORY OF PRESENT ILLNESS
[FreeTextEntry1] : Ms. 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 Her CEA was 8.1. She is s/p systemic therapy with FOLFOX and currently on capecitabine with Dr. Baker. Has colostomy and urostomy.\par \par She completed a course of external beam radiation therapy to the pelvis and rectum to 36Gy/18Fx on 3/24/21. She presents for PTE.\par \par CT c/a/p 4/21/21 showed a decreased rectal mass and no disease elsewhere.\par \par Biopsy from colonoscopy on 4/28/21 was negative for malignancy.\par \par \par Today: feels well. Appetite good. Denies abdominal pain, constipation, diarrhea. Stable vaginal discharge.\par

## 2021-05-08 NOTE — ADDENDUM
[FreeTextEntry1] : I saw and examined the patient with the resident/ACP.  In summary, Ms. JACQUELINE SPEARS is a 76 year female with a h/o cervical cancer s/p EBRT and brachy followed by surgery. Developed rectal cancer s/p chemotherapy and CRT here for follow-up.  Patient discussed in tumor board.  Plan is to follow in a watchful waiting approach possibly with capecitabine maintenance. Additional RT could be considered if there is residual or recurrent disease but there is an increased risk of toxicity given her history of prior radiation.  We will follow along.\parris MASSEY

## 2021-05-13 NOTE — HISTORY OF PRESENT ILLNESS
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 \par \par In June 2020 Abi noticed a rectal discharge and burning sensation in the rectum. Was evaluated by Dr. Lamar and ultimately saw Dr. Oropeza who ordered CT Pelvis and MRI Pelvis . Found to have a mass in the rectum s/p biopsy confirmed adenocarcinoma. \par \par 7/18/20: MRI Pelvis: large hypovascular mass c/w mucinous adenoca involving remaining distal rectum and vagina, tumor abuts anal sphincter\par 8/5/20: CT pelvis: residual chronic perineal mass, likely cystic with gas component\par 9/23/20: CT CAP: low density, possibly necrotic mass up to 6.2 cm with involvement of the vagina, nonspecific 6 mm LLL nodule is noted\par 10/3/20: PET/CT: hypermetabolic rectal mass, non specific small inguinal LN, nonspecific hypermetabolism within the colostomy insertion site\par 10/7/20: discussed at rectal TB, plan for DILMA with re-evaluation of response and then surgery if residual disease\par 10/26/20: C1 FOLFOX\par 11/9/20: C2 (c/b acute oxali neurotoxicity)\par 11/23/20: C3 (Oxali over 4 hrs) - tolerated well \par 12/7/20: C4 5FU/LV (developed difficulty swallowing, SOB after receiving Oxali, d/c Oxali for today's tx)\par 12/21-2/10/21: C5-8 FOLFOX (dose reduced Oxali 65 mg/m2 over 4 hrs)\par 2/13/21: CT CAP: 0.6 cm LLL nodule, rectal mass slightly decreased in size, small b/l inguinal LN minimally decr in size, pneumatosis is noted involving SB possible fistula \par 2/25-3/23/21: Xeloda/RT  EBRT to the rectum and pelvis to 36Gy/18Fx.  [de-identified] : adenocarcinoma [FreeTextEntry1] : surveillance [de-identified] : Completed RT 3 weeks ago \par Feeling well No diarrhea.  Some rectal discharge-getting less.\par Lost 3 lbs during chemoRT. Gained back 4 kg since completion.\par \par No paresthesias \par Appetite is good\par Endurance is slowly improving and getting back to her usual activities -cooked for Easter (not usual volume) \par No nausea or abdominal pain.

## 2021-05-13 NOTE — PHYSICAL EXAM
[Fully active, able to carry on all pre-disease performance without restriction] : Status 0 - Fully active, able to carry on all pre-disease performance without restriction [Normal] : affect appropriate [de-identified] : LLQ colostomy

## 2021-05-13 NOTE — END OF VISIT
[Time Spent: ___ minutes] : I have spent [unfilled] minutes of time on the encounter. [>50% of the face to face encounter time was spent on counseling and/or coordination of care for ___] : Greater than 50% of the face to face encounter time was spent on counseling and/or coordination of care for [unfilled] [FreeTextEntry3] : Patient being seen as per physician's primary plan of care.\par

## 2021-05-17 ENCOUNTER — OUTPATIENT (OUTPATIENT)
Dept: OUTPATIENT SERVICES | Facility: HOSPITAL | Age: 76
LOS: 1 days | Discharge: HOME | End: 2021-05-17

## 2021-05-17 DIAGNOSIS — Z90.710 ACQUIRED ABSENCE OF BOTH CERVIX AND UTERUS: Chronic | ICD-10-CM

## 2021-05-17 DIAGNOSIS — K50.911 CROHN'S DISEASE, UNSPECIFIED, WITH RECTAL BLEEDING: ICD-10-CM

## 2021-05-17 DIAGNOSIS — Z93.2 ILEOSTOMY STATUS: Chronic | ICD-10-CM

## 2021-05-17 PROBLEM — E78.5 HYPERLIPIDEMIA, UNSPECIFIED: Chronic | Status: ACTIVE | Noted: 2021-04-28

## 2021-05-17 PROBLEM — I10 ESSENTIAL (PRIMARY) HYPERTENSION: Chronic | Status: ACTIVE | Noted: 2021-04-28

## 2021-05-17 PROBLEM — E03.9 HYPOTHYROIDISM, UNSPECIFIED: Chronic | Status: ACTIVE | Noted: 2021-04-28

## 2021-05-17 PROBLEM — C20 MALIGNANT NEOPLASM OF RECTUM: Chronic | Status: ACTIVE | Noted: 2021-04-28

## 2021-05-19 ENCOUNTER — APPOINTMENT (OUTPATIENT)
Dept: HEMATOLOGY ONCOLOGY | Facility: CLINIC | Age: 76
End: 2021-05-19
Payer: MEDICARE

## 2021-05-19 ENCOUNTER — RESULT REVIEW (OUTPATIENT)
Age: 76
End: 2021-05-19

## 2021-05-19 VITALS
SYSTOLIC BLOOD PRESSURE: 119 MMHG | DIASTOLIC BLOOD PRESSURE: 73 MMHG | WEIGHT: 132.28 LBS | TEMPERATURE: 97.2 F | BODY MASS INDEX: 23.44 KG/M2 | RESPIRATION RATE: 17 BRPM | HEIGHT: 63.03 IN | HEART RATE: 82 BPM | OXYGEN SATURATION: 98 %

## 2021-05-19 LAB
ALBUMIN SERPL ELPH-MCNC: 4.6 G/DL
ALP BLD-CCNC: 83 U/L
ALT SERPL-CCNC: 14 U/L
ANION GAP SERPL CALC-SCNC: 14 MMOL/L
AST SERPL-CCNC: 22 U/L
BASOPHILS # BLD AUTO: 0.03 K/UL — SIGNIFICANT CHANGE UP (ref 0–0.2)
BASOPHILS NFR BLD AUTO: 0.5 % — SIGNIFICANT CHANGE UP (ref 0–2)
BILIRUB SERPL-MCNC: 0.4 MG/DL
BUN SERPL-MCNC: 16 MG/DL
CALCIUM SERPL-MCNC: 9.9 MG/DL
CEA SERPL-MCNC: 1.5 NG/ML
CHLORIDE SERPL-SCNC: 104 MMOL/L
CO2 SERPL-SCNC: 22 MMOL/L
CREAT SERPL-MCNC: 0.82 MG/DL
EOSINOPHIL # BLD AUTO: 0.1 K/UL — SIGNIFICANT CHANGE UP (ref 0–0.5)
EOSINOPHIL NFR BLD AUTO: 1.7 % — SIGNIFICANT CHANGE UP (ref 0–6)
GLUCOSE SERPL-MCNC: 90 MG/DL
HCT VFR BLD CALC: 40.4 % — SIGNIFICANT CHANGE UP (ref 34.5–45)
HGB BLD-MCNC: 12.5 G/DL — SIGNIFICANT CHANGE UP (ref 11.5–15.5)
IMM GRANULOCYTES NFR BLD AUTO: 0.5 % — SIGNIFICANT CHANGE UP (ref 0–1.5)
LYMPHOCYTES # BLD AUTO: 1.1 K/UL — SIGNIFICANT CHANGE UP (ref 1–3.3)
LYMPHOCYTES # BLD AUTO: 18.7 % — SIGNIFICANT CHANGE UP (ref 13–44)
MCHC RBC-ENTMCNC: 29.6 PG — SIGNIFICANT CHANGE UP (ref 27–34)
MCHC RBC-ENTMCNC: 30.9 G/DL — LOW (ref 32–36)
MCV RBC AUTO: 95.5 FL — SIGNIFICANT CHANGE UP (ref 80–100)
MONOCYTES # BLD AUTO: 0.47 K/UL — SIGNIFICANT CHANGE UP (ref 0–0.9)
MONOCYTES NFR BLD AUTO: 8 % — SIGNIFICANT CHANGE UP (ref 2–14)
NEUTROPHILS # BLD AUTO: 4.14 K/UL — SIGNIFICANT CHANGE UP (ref 1.8–7.4)
NEUTROPHILS NFR BLD AUTO: 70.6 % — SIGNIFICANT CHANGE UP (ref 43–77)
NRBC # BLD: 0 /100 WBCS — SIGNIFICANT CHANGE UP (ref 0–0)
PLATELET # BLD AUTO: 266 K/UL — SIGNIFICANT CHANGE UP (ref 150–400)
POTASSIUM SERPL-SCNC: 4 MMOL/L
PROT SERPL-MCNC: 6.7 G/DL
RBC # BLD: 4.23 M/UL — SIGNIFICANT CHANGE UP (ref 3.8–5.2)
RBC # FLD: 14.7 % — HIGH (ref 10.3–14.5)
SODIUM SERPL-SCNC: 140 MMOL/L
WBC # BLD: 5.87 K/UL — SIGNIFICANT CHANGE UP (ref 3.8–10.5)
WBC # FLD AUTO: 5.87 K/UL — SIGNIFICANT CHANGE UP (ref 3.8–10.5)

## 2021-05-19 PROCEDURE — 99214 OFFICE O/P EST MOD 30 MIN: CPT

## 2021-05-19 NOTE — HISTORY OF PRESENT ILLNESS
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 \par \par In June 2020 Abi noticed a rectal discharge and burning sensation in the rectum. Was evaluated by Dr. Lamar and ultimately saw Dr. Oropeza who ordered CT Pelvis and MRI Pelvis . Found to have a mass in the rectum s/p biopsy confirmed adenocarcinoma. \par \par 7/18/20: MRI Pelvis: large hypovascular mass c/w mucinous adenoca involving remaining distal rectum and vagina, tumor abuts anal sphincter\par 8/5/20: CT pelvis: residual chronic perineal mass, likely cystic with gas component\par 9/23/20: CT CAP: low density, possibly necrotic mass up to 6.2 cm with involvement of the vagina, nonspecific 6 mm LLL nodule is noted\par 10/3/20: PET/CT: hypermetabolic rectal mass, non specific small inguinal LN, nonspecific hypermetabolism within the colostomy insertion site\par 10/7/20: discussed at rectal TB, plan for DILMA with re-evaluation of response and then surgery if residual disease\par 10/26/20: C1 FOLFOX\par 11/9/20: C2 (c/b acute oxali neurotoxicity)\par 11/23/20: C3 (Oxali over 4 hrs) - tolerated well \par 12/7/20: C4 5FU/LV (developed difficulty swallowing, SOB after receiving Oxali, d/c Oxali for today's tx)\par 12/21-2/10/21: C5-8 FOLFOX (dose reduced Oxali 65 mg/m2 over 4 hrs)\par 2/13/21: CT CAP: 0.6 cm LLL nodule, rectal mass slightly decreased in size, small b/l inguinal LN minimally decr in size, pneumatosis is noted involving SB possible fistula \par 2/25-3/23/21: Xeloda/RT \par 4/21/21: CT CAP: mild interval decrease in size of rectal mass, stable pulm nodule  [de-identified] : adenocarcinoma [FreeTextEntry1] : s/p DILMA [de-identified] : overall feels well. denies any c/o. saw Dr. Oropeza and after review at rectal TB and discussion, consensus is not to operate due to the extent of surgery and concern for inability to obtain R0 resection.

## 2021-05-26 ENCOUNTER — APPOINTMENT (OUTPATIENT)
Dept: INFUSION THERAPY | Facility: HOSPITAL | Age: 76
End: 2021-05-26

## 2021-05-26 ENCOUNTER — RESULT REVIEW (OUTPATIENT)
Age: 76
End: 2021-05-26

## 2021-05-26 ENCOUNTER — LABORATORY RESULT (OUTPATIENT)
Age: 76
End: 2021-05-26

## 2021-05-26 ENCOUNTER — APPOINTMENT (OUTPATIENT)
Dept: HEMATOLOGY ONCOLOGY | Facility: CLINIC | Age: 76
End: 2021-05-26
Payer: MEDICARE

## 2021-05-26 LAB
BASOPHILS # BLD AUTO: 0.03 K/UL — SIGNIFICANT CHANGE UP (ref 0–0.2)
BASOPHILS NFR BLD AUTO: 0.5 % — SIGNIFICANT CHANGE UP (ref 0–2)
EOSINOPHIL # BLD AUTO: 0.13 K/UL — SIGNIFICANT CHANGE UP (ref 0–0.5)
EOSINOPHIL NFR BLD AUTO: 2.1 % — SIGNIFICANT CHANGE UP (ref 0–6)
HCT VFR BLD CALC: 36.6 % — SIGNIFICANT CHANGE UP (ref 34.5–45)
HGB BLD-MCNC: 11.9 G/DL — SIGNIFICANT CHANGE UP (ref 11.5–15.5)
IMM GRANULOCYTES NFR BLD AUTO: 0.8 % — SIGNIFICANT CHANGE UP (ref 0–1.5)
LYMPHOCYTES # BLD AUTO: 1.34 K/UL — SIGNIFICANT CHANGE UP (ref 1–3.3)
LYMPHOCYTES # BLD AUTO: 21.5 % — SIGNIFICANT CHANGE UP (ref 13–44)
MCHC RBC-ENTMCNC: 29.8 PG — SIGNIFICANT CHANGE UP (ref 27–34)
MCHC RBC-ENTMCNC: 32.5 G/DL — SIGNIFICANT CHANGE UP (ref 32–36)
MCV RBC AUTO: 91.5 FL — SIGNIFICANT CHANGE UP (ref 80–100)
MONOCYTES # BLD AUTO: 0.7 K/UL — SIGNIFICANT CHANGE UP (ref 0–0.9)
MONOCYTES NFR BLD AUTO: 11.2 % — SIGNIFICANT CHANGE UP (ref 2–14)
NEUTROPHILS # BLD AUTO: 3.98 K/UL — SIGNIFICANT CHANGE UP (ref 1.8–7.4)
NEUTROPHILS NFR BLD AUTO: 63.9 % — SIGNIFICANT CHANGE UP (ref 43–77)
NRBC # BLD: 0 /100 WBCS — SIGNIFICANT CHANGE UP (ref 0–0)
PLATELET # BLD AUTO: 285 K/UL — SIGNIFICANT CHANGE UP (ref 150–400)
RBC # BLD: 4 M/UL — SIGNIFICANT CHANGE UP (ref 3.8–5.2)
RBC # FLD: 14.9 % — HIGH (ref 10.3–14.5)
WBC # BLD: 6.23 K/UL — SIGNIFICANT CHANGE UP (ref 3.8–10.5)
WBC # FLD AUTO: 6.23 K/UL — SIGNIFICANT CHANGE UP (ref 3.8–10.5)

## 2021-05-26 PROCEDURE — 99213 OFFICE O/P EST LOW 20 MIN: CPT

## 2021-05-27 ENCOUNTER — NON-APPOINTMENT (OUTPATIENT)
Age: 76
End: 2021-05-27

## 2021-05-27 DIAGNOSIS — Z51.11 ENCOUNTER FOR ANTINEOPLASTIC CHEMOTHERAPY: ICD-10-CM

## 2021-05-27 DIAGNOSIS — R11.2 NAUSEA WITH VOMITING, UNSPECIFIED: ICD-10-CM

## 2021-05-27 DIAGNOSIS — C53.9 MALIGNANT NEOPLASM OF CERVIX UTERI, UNSPECIFIED: ICD-10-CM

## 2021-05-27 NOTE — HISTORY OF PRESENT ILLNESS
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 \par \par In June 2020 Abi noticed a rectal discharge and burning sensation in the rectum. Was evaluated by Dr. Lamar and ultimately saw Dr. Oropeza who ordered CT Pelvis and MRI Pelvis . Found to have a mass in the rectum s/p biopsy confirmed adenocarcinoma. \par \par 7/18/20: MRI Pelvis: large hypovascular mass c/w mucinous adenoca involving remaining distal rectum and vagina, tumor abuts anal sphincter\par 8/5/20: CT pelvis: residual chronic perineal mass, likely cystic with gas component\par 9/23/20: CT CAP: low density, possibly necrotic mass up to 6.2 cm with involvement of the vagina, nonspecific 6 mm LLL nodule is noted\par 10/3/20: PET/CT: hypermetabolic rectal mass, non specific small inguinal LN, nonspecific hypermetabolism within the colostomy insertion site\par 10/7/20: discussed at rectal TB, plan for DILMA with re-evaluation of response and then surgery if residual disease\par 10/26/20: C1 FOLFOX\par 11/9/20: C2 (c/b acute oxali neurotoxicity)\par 11/23/20: C3 (Oxali over 4 hrs) - tolerated well \par 12/7/20: C4 5FU/LV (developed difficulty swallowing, SOB after receiving Oxali, d/c Oxali for today's tx)\par 12/21-2/10/21: C5-8 FOLFOX (dose reduced Oxali 65 mg/m2 over 4 hrs)\par 2/13/21: CT CAP: 0.6 cm LLL nodule, rectal mass slightly decreased in size, small b/l inguinal LN minimally decr in size, pneumatosis is noted involving SB possible fistula \par 2/25-3/23/21: Xeloda/RT \par 4/21/21: CT CAP: mild interval decrease in size of rectal mass, stable pulm nodule \par [id number]: 5FU/LV maintenance C1 [de-identified] : adenocarcinoma [de-identified] : Patient was seen earlier today at the infusion room while receiving treatment. She reported itchiness behind her neck but no visible skin changes noted; temporarily relieved with OTC topical hydrocortisone. She admitted to stable weight and appetite; no issues with colostomy reported.

## 2021-05-27 NOTE — PHYSICAL EXAM
[Fully active, able to carry on all pre-disease performance without restriction] : Status 0 - Fully active, able to carry on all pre-disease performance without restriction [Normal] : affect appropriate [de-identified] : left lower colostomy

## 2021-05-28 ENCOUNTER — APPOINTMENT (OUTPATIENT)
Dept: INFUSION THERAPY | Facility: HOSPITAL | Age: 76
End: 2021-05-28

## 2021-05-28 PROCEDURE — 88360 TUMOR IMMUNOHISTOCHEM/MANUAL: CPT | Mod: 26

## 2021-06-09 ENCOUNTER — APPOINTMENT (OUTPATIENT)
Dept: HEMATOLOGY ONCOLOGY | Facility: CLINIC | Age: 76
End: 2021-06-09
Payer: MEDICARE

## 2021-06-09 ENCOUNTER — RESULT REVIEW (OUTPATIENT)
Age: 76
End: 2021-06-09

## 2021-06-09 ENCOUNTER — APPOINTMENT (OUTPATIENT)
Dept: INFUSION THERAPY | Facility: HOSPITAL | Age: 76
End: 2021-06-09

## 2021-06-09 ENCOUNTER — LABORATORY RESULT (OUTPATIENT)
Age: 76
End: 2021-06-09

## 2021-06-09 VITALS
OXYGEN SATURATION: 99 % | WEIGHT: 130.07 LBS | BODY MASS INDEX: 23.02 KG/M2 | HEART RATE: 79 BPM | DIASTOLIC BLOOD PRESSURE: 75 MMHG | RESPIRATION RATE: 16 BRPM | TEMPERATURE: 98 F | SYSTOLIC BLOOD PRESSURE: 136 MMHG

## 2021-06-09 LAB
BASOPHILS # BLD AUTO: 0.02 K/UL — SIGNIFICANT CHANGE UP (ref 0–0.2)
BASOPHILS NFR BLD AUTO: 0.3 % — SIGNIFICANT CHANGE UP (ref 0–2)
EOSINOPHIL # BLD AUTO: 0.11 K/UL — SIGNIFICANT CHANGE UP (ref 0–0.5)
EOSINOPHIL NFR BLD AUTO: 1.7 % — SIGNIFICANT CHANGE UP (ref 0–6)
HCT VFR BLD CALC: 38.7 % — SIGNIFICANT CHANGE UP (ref 34.5–45)
HGB BLD-MCNC: 12.5 G/DL — SIGNIFICANT CHANGE UP (ref 11.5–15.5)
IMM GRANULOCYTES NFR BLD AUTO: 1.4 % — SIGNIFICANT CHANGE UP (ref 0–1.5)
LYMPHOCYTES # BLD AUTO: 1.98 K/UL — SIGNIFICANT CHANGE UP (ref 1–3.3)
LYMPHOCYTES # BLD AUTO: 30.9 % — SIGNIFICANT CHANGE UP (ref 13–44)
MCHC RBC-ENTMCNC: 29.1 PG — SIGNIFICANT CHANGE UP (ref 27–34)
MCHC RBC-ENTMCNC: 32.3 G/DL — SIGNIFICANT CHANGE UP (ref 32–36)
MCV RBC AUTO: 90 FL — SIGNIFICANT CHANGE UP (ref 80–100)
MONOCYTES # BLD AUTO: 0.1 K/UL — SIGNIFICANT CHANGE UP (ref 0–0.9)
MONOCYTES NFR BLD AUTO: 1.6 % — LOW (ref 2–14)
NEUTROPHILS # BLD AUTO: 4.1 K/UL — SIGNIFICANT CHANGE UP (ref 1.8–7.4)
NEUTROPHILS NFR BLD AUTO: 64.1 % — SIGNIFICANT CHANGE UP (ref 43–77)
NRBC # BLD: 0 /100 WBCS — SIGNIFICANT CHANGE UP (ref 0–0)
PLATELET # BLD AUTO: 234 K/UL — SIGNIFICANT CHANGE UP (ref 150–400)
RBC # BLD: 4.3 M/UL — SIGNIFICANT CHANGE UP (ref 3.8–5.2)
RBC # FLD: 15.3 % — HIGH (ref 10.3–14.5)
WBC # BLD: 6.4 K/UL — SIGNIFICANT CHANGE UP (ref 3.8–10.5)
WBC # FLD AUTO: 6.4 K/UL — SIGNIFICANT CHANGE UP (ref 3.8–10.5)

## 2021-06-09 PROCEDURE — 99213 OFFICE O/P EST LOW 20 MIN: CPT

## 2021-06-09 RX ORDER — LEVOTHYROXINE SODIUM 75 UG/1
75 TABLET ORAL
Refills: 0 | Status: ACTIVE | COMMUNITY
Start: 2021-06-09

## 2021-06-11 ENCOUNTER — APPOINTMENT (OUTPATIENT)
Dept: INFUSION THERAPY | Facility: HOSPITAL | Age: 76
End: 2021-06-11

## 2021-06-18 ENCOUNTER — OUTPATIENT (OUTPATIENT)
Dept: OUTPATIENT SERVICES | Facility: HOSPITAL | Age: 76
LOS: 1 days | Discharge: HOME | End: 2021-06-18

## 2021-06-18 DIAGNOSIS — Z90.710 ACQUIRED ABSENCE OF BOTH CERVIX AND UTERUS: Chronic | ICD-10-CM

## 2021-06-18 DIAGNOSIS — Z93.2 ILEOSTOMY STATUS: Chronic | ICD-10-CM

## 2021-06-23 ENCOUNTER — APPOINTMENT (OUTPATIENT)
Dept: INFUSION THERAPY | Facility: HOSPITAL | Age: 76
End: 2021-06-23

## 2021-06-23 ENCOUNTER — APPOINTMENT (OUTPATIENT)
Dept: HEMATOLOGY ONCOLOGY | Facility: CLINIC | Age: 76
End: 2021-06-23
Payer: MEDICARE

## 2021-06-23 ENCOUNTER — RESULT REVIEW (OUTPATIENT)
Age: 76
End: 2021-06-23

## 2021-06-23 ENCOUNTER — LABORATORY RESULT (OUTPATIENT)
Age: 76
End: 2021-06-23

## 2021-06-23 LAB
BASOPHILS # BLD AUTO: 0.03 K/UL — SIGNIFICANT CHANGE UP (ref 0–0.2)
BASOPHILS NFR BLD AUTO: 0.6 % — SIGNIFICANT CHANGE UP (ref 0–2)
EOSINOPHIL # BLD AUTO: 0.1 K/UL — SIGNIFICANT CHANGE UP (ref 0–0.5)
EOSINOPHIL NFR BLD AUTO: 1.9 % — SIGNIFICANT CHANGE UP (ref 0–6)
HCT VFR BLD CALC: 37.4 % — SIGNIFICANT CHANGE UP (ref 34.5–45)
HGB BLD-MCNC: 12.2 G/DL — SIGNIFICANT CHANGE UP (ref 11.5–15.5)
IMM GRANULOCYTES NFR BLD AUTO: 1.1 % — SIGNIFICANT CHANGE UP (ref 0–1.5)
LYMPHOCYTES # BLD AUTO: 1.23 K/UL — SIGNIFICANT CHANGE UP (ref 1–3.3)
LYMPHOCYTES # BLD AUTO: 23.1 % — SIGNIFICANT CHANGE UP (ref 13–44)
MCHC RBC-ENTMCNC: 29.3 PG — SIGNIFICANT CHANGE UP (ref 27–34)
MCHC RBC-ENTMCNC: 32.6 G/DL — SIGNIFICANT CHANGE UP (ref 32–36)
MCV RBC AUTO: 89.9 FL — SIGNIFICANT CHANGE UP (ref 80–100)
MONOCYTES # BLD AUTO: 0.64 K/UL — SIGNIFICANT CHANGE UP (ref 0–0.9)
MONOCYTES NFR BLD AUTO: 12 % — SIGNIFICANT CHANGE UP (ref 2–14)
NEUTROPHILS # BLD AUTO: 3.27 K/UL — SIGNIFICANT CHANGE UP (ref 1.8–7.4)
NEUTROPHILS NFR BLD AUTO: 61.3 % — SIGNIFICANT CHANGE UP (ref 43–77)
NRBC # BLD: 0 /100 WBCS — SIGNIFICANT CHANGE UP (ref 0–0)
PLATELET # BLD AUTO: 213 K/UL — SIGNIFICANT CHANGE UP (ref 150–400)
RBC # BLD: 4.16 M/UL — SIGNIFICANT CHANGE UP (ref 3.8–5.2)
RBC # FLD: 15.7 % — HIGH (ref 10.3–14.5)
WBC # BLD: 5.33 K/UL — SIGNIFICANT CHANGE UP (ref 3.8–10.5)
WBC # FLD AUTO: 5.33 K/UL — SIGNIFICANT CHANGE UP (ref 3.8–10.5)

## 2021-06-23 PROCEDURE — 99213 OFFICE O/P EST LOW 20 MIN: CPT

## 2021-06-23 NOTE — HISTORY OF PRESENT ILLNESS
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 \par \par In June 2020 Abi noticed a rectal discharge and burning sensation in the rectum. Was evaluated by Dr. Lamar and ultimately saw Dr. Oropeza who ordered CT Pelvis and MRI Pelvis . Found to have a mass in the rectum s/p biopsy confirmed adenocarcinoma. \par \par 7/18/20: MRI Pelvis: large hypovascular mass c/w mucinous adenoca involving remaining distal rectum and vagina, tumor abuts anal sphincter\par 8/5/20: CT pelvis: residual chronic perineal mass, likely cystic with gas component\par 9/23/20: CT CAP: low density, possibly necrotic mass up to 6.2 cm with involvement of the vagina, nonspecific 6 mm LLL nodule is noted\par 10/3/20: PET/CT: hypermetabolic rectal mass, non specific small inguinal LN, nonspecific hypermetabolism within the colostomy insertion site\par 10/7/20: discussed at rectal TB, plan for DILMA with re-evaluation of response and then surgery if residual disease\par 10/26/20: C1 FOLFOX\par 11/9/20: C2 (c/b acute oxali neurotoxicity)\par 11/23/20: C3 (Oxali over 4 hrs) - tolerated well \par 12/7/20: C4 5FU/LV (developed difficulty swallowing, SOB after receiving Oxali, d/c Oxali for today's tx)\par 12/21-2/10/21: C5-8 FOLFOX (dose reduced Oxali 65 mg/m2 over 4 hrs)\par 2/13/21: CT CAP: 0.6 cm LLL nodule, rectal mass slightly decreased in size, small b/l inguinal LN minimally decr in size, pneumatosis is noted involving SB possible fistula \par 2/25-3/23/21: Xeloda/RT \par 4/21/21: CT CAP: mild interval decrease in size of rectal mass, stable pulm nodule \par 5/26/21: 5FU/LV maintenance C1\par 6/9/21: 5FU/LV C2\par 6/23/21: C3 [de-identified] : adenocarcinoma [de-identified] : Patient was seen earlier today at the infusion room while receiving treatment. She continued to report good appetite, stable weight and normal bowel movements (no issues with ostomy). The intermittent numbness/tingling within the fingertips of both and toes of both feet remains unchanged.

## 2021-06-23 NOTE — PHYSICAL EXAM
[Fully active, able to carry on all pre-disease performance without restriction] : Status 0 - Fully active, able to carry on all pre-disease performance without restriction [Normal] : affect appropriate [de-identified] : left lower colostomy

## 2021-06-24 DIAGNOSIS — R11.2 NAUSEA WITH VOMITING, UNSPECIFIED: ICD-10-CM

## 2021-06-24 DIAGNOSIS — Z51.11 ENCOUNTER FOR ANTINEOPLASTIC CHEMOTHERAPY: ICD-10-CM

## 2021-06-24 DIAGNOSIS — C20 MALIGNANT NEOPLASM OF RECTUM: ICD-10-CM

## 2021-06-25 ENCOUNTER — APPOINTMENT (OUTPATIENT)
Dept: INFUSION THERAPY | Facility: HOSPITAL | Age: 76
End: 2021-06-25

## 2021-06-26 NOTE — PHYSICAL EXAM
[Fully active, able to carry on all pre-disease performance without restriction] : Status 0 - Fully active, able to carry on all pre-disease performance without restriction [Normal] : affect appropriate [de-identified] : left lower colostomy

## 2021-06-26 NOTE — HISTORY OF PRESENT ILLNESS
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 \par \par In June 2020 Abi noticed a rectal discharge and burning sensation in the rectum. Was evaluated by Dr. Lamar and ultimately saw Dr. Oropeza who ordered CT Pelvis and MRI Pelvis . Found to have a mass in the rectum s/p biopsy confirmed adenocarcinoma. \par \par 7/18/20: MRI Pelvis: large hypovascular mass c/w mucinous adenoca involving remaining distal rectum and vagina, tumor abuts anal sphincter\par 8/5/20: CT pelvis: residual chronic perineal mass, likely cystic with gas component\par 9/23/20: CT CAP: low density, possibly necrotic mass up to 6.2 cm with involvement of the vagina, nonspecific 6 mm LLL nodule is noted\par 10/3/20: PET/CT: hypermetabolic rectal mass, non specific small inguinal LN, nonspecific hypermetabolism within the colostomy insertion site\par 10/7/20: discussed at rectal TB, plan for DILMA with re-evaluation of response and then surgery if residual disease\par 10/26/20: C1 FOLFOX\par 11/9/20: C2 (c/b acute oxali neurotoxicity)\par 11/23/20: C3 (Oxali over 4 hrs) - tolerated well \par 12/7/20: C4 5FU/LV (developed difficulty swallowing, SOB after receiving Oxali, d/c Oxali for today's tx)\par 12/21-2/10/21: C5-8 FOLFOX (dose reduced Oxali 65 mg/m2 over 4 hrs)\par 2/13/21: CT CAP: 0.6 cm LLL nodule, rectal mass slightly decreased in size, small b/l inguinal LN minimally decr in size, pneumatosis is noted involving SB possible fistula \par 2/25-3/23/21: Xeloda/RT \par 4/21/21: CT CAP: mild interval decrease in size of rectal mass, stable pulm nodule \par 5/26/21: 5FU/LV maintenance C1\par 6/9/21: 5FU/LV C2 [de-identified] : adenocarcinoma [de-identified] : She admitted to stable weight and appetite; no issues with colostomy reported.

## 2021-06-26 NOTE — REASON FOR VISIT
[Follow-Up Visit] : a follow-up [Spouse] : spouse [FreeTextEntry2] : Stage III rectal cancer on maintenance therapy

## 2021-07-07 ENCOUNTER — APPOINTMENT (OUTPATIENT)
Dept: INFUSION THERAPY | Facility: HOSPITAL | Age: 76
End: 2021-07-07

## 2021-07-07 ENCOUNTER — RESULT REVIEW (OUTPATIENT)
Age: 76
End: 2021-07-07

## 2021-07-07 ENCOUNTER — LABORATORY RESULT (OUTPATIENT)
Age: 76
End: 2021-07-07

## 2021-07-07 ENCOUNTER — APPOINTMENT (OUTPATIENT)
Dept: HEMATOLOGY ONCOLOGY | Facility: CLINIC | Age: 76
End: 2021-07-07
Payer: MEDICARE

## 2021-07-07 LAB
BASOPHILS # BLD AUTO: 0.04 K/UL — SIGNIFICANT CHANGE UP (ref 0–0.2)
BASOPHILS NFR BLD AUTO: 0.7 % — SIGNIFICANT CHANGE UP (ref 0–2)
EOSINOPHIL # BLD AUTO: 0.08 K/UL — SIGNIFICANT CHANGE UP (ref 0–0.5)
EOSINOPHIL NFR BLD AUTO: 1.4 % — SIGNIFICANT CHANGE UP (ref 0–6)
HCT VFR BLD CALC: 37.9 % — SIGNIFICANT CHANGE UP (ref 34.5–45)
HGB BLD-MCNC: 12.1 G/DL — SIGNIFICANT CHANGE UP (ref 11.5–15.5)
IMM GRANULOCYTES NFR BLD AUTO: 0.9 % — SIGNIFICANT CHANGE UP (ref 0–1.5)
LYMPHOCYTES # BLD AUTO: 1.27 K/UL — SIGNIFICANT CHANGE UP (ref 1–3.3)
LYMPHOCYTES # BLD AUTO: 22.2 % — SIGNIFICANT CHANGE UP (ref 13–44)
MCHC RBC-ENTMCNC: 28.9 PG — SIGNIFICANT CHANGE UP (ref 27–34)
MCHC RBC-ENTMCNC: 31.9 G/DL — LOW (ref 32–36)
MCV RBC AUTO: 90.5 FL — SIGNIFICANT CHANGE UP (ref 80–100)
MONOCYTES # BLD AUTO: 0.7 K/UL — SIGNIFICANT CHANGE UP (ref 0–0.9)
MONOCYTES NFR BLD AUTO: 12.2 % — SIGNIFICANT CHANGE UP (ref 2–14)
NEUTROPHILS # BLD AUTO: 3.58 K/UL — SIGNIFICANT CHANGE UP (ref 1.8–7.4)
NEUTROPHILS NFR BLD AUTO: 62.6 % — SIGNIFICANT CHANGE UP (ref 43–77)
NRBC # BLD: 0 /100 WBCS — SIGNIFICANT CHANGE UP (ref 0–0)
PLATELET # BLD AUTO: 209 K/UL — SIGNIFICANT CHANGE UP (ref 150–400)
RBC # BLD: 4.19 M/UL — SIGNIFICANT CHANGE UP (ref 3.8–5.2)
RBC # FLD: 16.2 % — HIGH (ref 10.3–14.5)
WBC # BLD: 5.72 K/UL — SIGNIFICANT CHANGE UP (ref 3.8–10.5)
WBC # FLD AUTO: 5.72 K/UL — SIGNIFICANT CHANGE UP (ref 3.8–10.5)

## 2021-07-07 PROCEDURE — 99213 OFFICE O/P EST LOW 20 MIN: CPT

## 2021-07-09 ENCOUNTER — APPOINTMENT (OUTPATIENT)
Dept: INFUSION THERAPY | Facility: HOSPITAL | Age: 76
End: 2021-07-09

## 2021-07-11 NOTE — REASON FOR VISIT
[Follow-Up Visit] : a follow-up [Spouse] : spouse [FreeTextEntry2] : Unresectable locally advanced rectal cancer

## 2021-07-11 NOTE — HISTORY OF PRESENT ILLNESS
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 \par \par In June 2020 Abi noticed a rectal discharge and burning sensation in the rectum. Was evaluated by Dr. Lamar and ultimately saw Dr. Oropeza who ordered CT Pelvis and MRI Pelvis . Found to have a mass in the rectum s/p biopsy confirmed adenocarcinoma. \par \par 7/18/20: MRI Pelvis: large hypovascular mass c/w mucinous adenoca involving remaining distal rectum and vagina, tumor abuts anal sphincter\par 8/5/20: CT pelvis: residual chronic perineal mass, likely cystic with gas component\par 9/23/20: CT CAP: low density, possibly necrotic mass up to 6.2 cm with involvement of the vagina, nonspecific 6 mm LLL nodule is noted\par 10/3/20: PET/CT: hypermetabolic rectal mass, non specific small inguinal LN, nonspecific hypermetabolism within the colostomy insertion site\par 10/7/20: discussed at rectal TB, plan for DILMA with re-evaluation of response and then surgery if residual disease\par 10/26/20: C1 FOLFOX\par 11/9/20: C2 (c/b acute oxali neurotoxicity)\par 11/23/20: C3 (Oxali over 4 hrs) - tolerated well \par 12/7/20: C4 5FU/LV (developed difficulty swallowing, SOB after receiving Oxali, d/c Oxali for today's tx)\par 12/21-2/10/21: C5-8 FOLFOX (dose reduced Oxali 65 mg/m2 over 4 hrs)\par 2/13/21: CT CAP: 0.6 cm LLL nodule, rectal mass slightly decreased in size, small b/l inguinal LN minimally decr in size, pneumatosis is noted involving SB possible fistula \par 2/25-3/23/21: Xeloda/RT \par 4/21/21: CT CAP: mild interval decrease in size of rectal mass, stable pulm nodule \par 5/26/21: maintenance 5FU/LV [de-identified] : adenocarcinoma [de-identified] : overall feels well. denies any c/o. good appetite. weight is stable. no pain. ostomy output is stable.

## 2021-07-13 ENCOUNTER — RESULT REVIEW (OUTPATIENT)
Age: 76
End: 2021-07-13

## 2021-07-14 ENCOUNTER — OUTPATIENT (OUTPATIENT)
Dept: OUTPATIENT SERVICES | Facility: HOSPITAL | Age: 76
LOS: 1 days | End: 2021-07-14
Payer: MEDICARE

## 2021-07-14 ENCOUNTER — APPOINTMENT (OUTPATIENT)
Dept: CT IMAGING | Facility: CLINIC | Age: 76
End: 2021-07-14
Payer: MEDICARE

## 2021-07-14 DIAGNOSIS — Z93.2 ILEOSTOMY STATUS: Chronic | ICD-10-CM

## 2021-07-14 DIAGNOSIS — C20 MALIGNANT NEOPLASM OF RECTUM: ICD-10-CM

## 2021-07-14 DIAGNOSIS — Z90.710 ACQUIRED ABSENCE OF BOTH CERVIX AND UTERUS: Chronic | ICD-10-CM

## 2021-07-14 PROCEDURE — 71260 CT THORAX DX C+: CPT

## 2021-07-14 PROCEDURE — 74177 CT ABD & PELVIS W/CONTRAST: CPT | Mod: 26,MG

## 2021-07-14 PROCEDURE — 82565 ASSAY OF CREATININE: CPT

## 2021-07-14 PROCEDURE — 74177 CT ABD & PELVIS W/CONTRAST: CPT

## 2021-07-14 PROCEDURE — G1004: CPT

## 2021-07-14 PROCEDURE — 71260 CT THORAX DX C+: CPT | Mod: 26,MG

## 2021-07-19 ENCOUNTER — OUTPATIENT (OUTPATIENT)
Dept: OUTPATIENT SERVICES | Facility: HOSPITAL | Age: 76
LOS: 1 days | Discharge: HOME | End: 2021-07-19

## 2021-07-19 DIAGNOSIS — Z93.2 ILEOSTOMY STATUS: Chronic | ICD-10-CM

## 2021-07-19 DIAGNOSIS — Z90.710 ACQUIRED ABSENCE OF BOTH CERVIX AND UTERUS: Chronic | ICD-10-CM

## 2021-07-19 DIAGNOSIS — F43.20 ADJUSTMENT DISORDER, UNSPECIFIED: ICD-10-CM

## 2021-07-21 ENCOUNTER — RESULT REVIEW (OUTPATIENT)
Age: 76
End: 2021-07-21

## 2021-07-21 ENCOUNTER — APPOINTMENT (OUTPATIENT)
Dept: HEMATOLOGY ONCOLOGY | Facility: CLINIC | Age: 76
End: 2021-07-21
Payer: MEDICARE

## 2021-07-21 ENCOUNTER — APPOINTMENT (OUTPATIENT)
Dept: INFUSION THERAPY | Facility: HOSPITAL | Age: 76
End: 2021-07-21

## 2021-07-21 ENCOUNTER — LABORATORY RESULT (OUTPATIENT)
Age: 76
End: 2021-07-21

## 2021-07-21 DIAGNOSIS — Z51.11 ENCOUNTER FOR ANTINEOPLASTIC CHEMOTHERAPY: ICD-10-CM

## 2021-07-21 DIAGNOSIS — R11.2 NAUSEA WITH VOMITING, UNSPECIFIED: ICD-10-CM

## 2021-07-21 DIAGNOSIS — C20 MALIGNANT NEOPLASM OF RECTUM: ICD-10-CM

## 2021-07-21 LAB
BASOPHILS # BLD AUTO: 0.04 K/UL — SIGNIFICANT CHANGE UP (ref 0–0.2)
BASOPHILS NFR BLD AUTO: 0.7 % — SIGNIFICANT CHANGE UP (ref 0–2)
EOSINOPHIL # BLD AUTO: 0.09 K/UL — SIGNIFICANT CHANGE UP (ref 0–0.5)
EOSINOPHIL NFR BLD AUTO: 1.5 % — SIGNIFICANT CHANGE UP (ref 0–6)
HCT VFR BLD CALC: 36.9 % — SIGNIFICANT CHANGE UP (ref 34.5–45)
HGB BLD-MCNC: 12.1 G/DL — SIGNIFICANT CHANGE UP (ref 11.5–15.5)
IMM GRANULOCYTES NFR BLD AUTO: 0.5 % — SIGNIFICANT CHANGE UP (ref 0–1.5)
LYMPHOCYTES # BLD AUTO: 1.45 K/UL — SIGNIFICANT CHANGE UP (ref 1–3.3)
LYMPHOCYTES # BLD AUTO: 24.5 % — SIGNIFICANT CHANGE UP (ref 13–44)
MCHC RBC-ENTMCNC: 29.7 PG — SIGNIFICANT CHANGE UP (ref 27–34)
MCHC RBC-ENTMCNC: 32.8 G/DL — SIGNIFICANT CHANGE UP (ref 32–36)
MCV RBC AUTO: 90.7 FL — SIGNIFICANT CHANGE UP (ref 80–100)
MONOCYTES # BLD AUTO: 0.75 K/UL — SIGNIFICANT CHANGE UP (ref 0–0.9)
MONOCYTES NFR BLD AUTO: 12.6 % — SIGNIFICANT CHANGE UP (ref 2–14)
NEUTROPHILS # BLD AUTO: 3.57 K/UL — SIGNIFICANT CHANGE UP (ref 1.8–7.4)
NEUTROPHILS NFR BLD AUTO: 60.2 % — SIGNIFICANT CHANGE UP (ref 43–77)
NRBC # BLD: 0 /100 WBCS — SIGNIFICANT CHANGE UP (ref 0–0)
PLATELET # BLD AUTO: 203 K/UL — SIGNIFICANT CHANGE UP (ref 150–400)
RBC # BLD: 4.07 M/UL — SIGNIFICANT CHANGE UP (ref 3.8–5.2)
RBC # FLD: 16.8 % — HIGH (ref 10.3–14.5)
WBC # BLD: 5.93 K/UL — SIGNIFICANT CHANGE UP (ref 3.8–10.5)
WBC # FLD AUTO: 5.93 K/UL — SIGNIFICANT CHANGE UP (ref 3.8–10.5)

## 2021-07-21 PROCEDURE — 99214 OFFICE O/P EST MOD 30 MIN: CPT

## 2021-07-23 ENCOUNTER — APPOINTMENT (OUTPATIENT)
Dept: INFUSION THERAPY | Facility: HOSPITAL | Age: 76
End: 2021-07-23

## 2021-07-23 RX ORDER — POTASSIUM CHLORIDE 750 MG/1
10 TABLET, EXTENDED RELEASE ORAL
Qty: 30 | Refills: 0 | Status: COMPLETED | COMMUNITY
Start: 2021-06-23

## 2021-07-23 NOTE — HISTORY OF PRESENT ILLNESS
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 \par \par In June 2020 Abi noticed a rectal discharge and burning sensation in the rectum. Was evaluated by Dr. Lamar and ultimately saw Dr. Oropeza who ordered CT Pelvis and MRI Pelvis . Found to have a mass in the rectum s/p biopsy confirmed adenocarcinoma. \par \par 7/18/20: MRI Pelvis: large hypovascular mass c/w mucinous adenoca involving remaining distal rectum and vagina, tumor abuts anal sphincter\par 8/5/20: CT pelvis: residual chronic perineal mass, likely cystic with gas component\par 9/23/20: CT CAP: low density, possibly necrotic mass up to 6.2 cm with involvement of the vagina, nonspecific 6 mm LLL nodule is noted\par 10/3/20: PET/CT: hypermetabolic rectal mass, non specific small inguinal LN, nonspecific hypermetabolism within the colostomy insertion site\par 10/7/20: discussed at rectal TB, plan for DILMA with re-evaluation of response and then surgery if residual disease\par 10/26/20: C1 FOLFOX\par 11/9/20: C2 (c/b acute oxali neurotoxicity)\par 11/23/20: C3 (Oxali over 4 hrs) - tolerated well \par 12/7/20: C4 5FU/LV (developed difficulty swallowing, SOB after receiving Oxali, d/c Oxali for today's tx)\par 12/21-2/10/21: C5-8 FOLFOX (dose reduced Oxali 65 mg/m2 over 4 hrs)\par 2/13/21: CT CAP: 0.6 cm LLL nodule, rectal mass slightly decreased in size, small b/l inguinal LN minimally decr in size, pneumatosis is noted involving SB possible fistula \par 2/25-3/23/21: Xeloda/RT \par 4/21/21: CT CAP: mild interval decrease in size of rectal mass, stable pulm nodule \par 5/26/21: 5FU/LV maintenance C1\par 6/9/21: 5FU/LV C2\par 6/23/21: C3\par 7/7/21: C4\par 7/14/21: CTCAP: stable lung nodules + further decrease in size of rectal mass/fluid collection\par 7/21/21: C5 [de-identified] : adenocarcinoma [Therapy: ___] : Therapy: [unfilled] [Cycle: ___] : Cycle: [unfilled] [de-identified] : Patient was seen earlier today at the infusion room while receiving treatment. She continued to report good appetite, stable weight and normal bowel movements (no issues with ostomy). The intermittent numbness/tingling within the fingertips of both and toes of both feet remains unchanged, she is able to complete her ADLs independently with minimal to no restrictions.

## 2021-07-23 NOTE — PHYSICAL EXAM
[Fully active, able to carry on all pre-disease performance without restriction] : Status 0 - Fully active, able to carry on all pre-disease performance without restriction [Normal] : affect appropriate [de-identified] : left lower colostomy

## 2021-08-04 ENCOUNTER — APPOINTMENT (OUTPATIENT)
Dept: INFUSION THERAPY | Facility: HOSPITAL | Age: 76
End: 2021-08-04

## 2021-08-04 ENCOUNTER — RESULT REVIEW (OUTPATIENT)
Age: 76
End: 2021-08-04

## 2021-08-04 ENCOUNTER — LABORATORY RESULT (OUTPATIENT)
Age: 76
End: 2021-08-04

## 2021-08-04 ENCOUNTER — APPOINTMENT (OUTPATIENT)
Dept: HEMATOLOGY ONCOLOGY | Facility: CLINIC | Age: 76
End: 2021-08-04
Payer: MEDICARE

## 2021-08-04 LAB
BASOPHILS # BLD AUTO: 0.04 K/UL — SIGNIFICANT CHANGE UP (ref 0–0.2)
BASOPHILS NFR BLD AUTO: 0.7 % — SIGNIFICANT CHANGE UP (ref 0–2)
EOSINOPHIL # BLD AUTO: 0.09 K/UL — SIGNIFICANT CHANGE UP (ref 0–0.5)
EOSINOPHIL NFR BLD AUTO: 1.5 % — SIGNIFICANT CHANGE UP (ref 0–6)
HCT VFR BLD CALC: 36.9 % — SIGNIFICANT CHANGE UP (ref 34.5–45)
HGB BLD-MCNC: 11.9 G/DL — SIGNIFICANT CHANGE UP (ref 11.5–15.5)
IMM GRANULOCYTES NFR BLD AUTO: 0.8 % — SIGNIFICANT CHANGE UP (ref 0–1.5)
LYMPHOCYTES # BLD AUTO: 1.24 K/UL — SIGNIFICANT CHANGE UP (ref 1–3.3)
LYMPHOCYTES # BLD AUTO: 20.4 % — SIGNIFICANT CHANGE UP (ref 13–44)
MCHC RBC-ENTMCNC: 29.1 PG — SIGNIFICANT CHANGE UP (ref 27–34)
MCHC RBC-ENTMCNC: 32.2 G/DL — SIGNIFICANT CHANGE UP (ref 32–36)
MCV RBC AUTO: 90.2 FL — SIGNIFICANT CHANGE UP (ref 80–100)
MONOCYTES # BLD AUTO: 0.67 K/UL — SIGNIFICANT CHANGE UP (ref 0–0.9)
MONOCYTES NFR BLD AUTO: 11 % — SIGNIFICANT CHANGE UP (ref 2–14)
NEUTROPHILS # BLD AUTO: 3.98 K/UL — SIGNIFICANT CHANGE UP (ref 1.8–7.4)
NEUTROPHILS NFR BLD AUTO: 65.6 % — SIGNIFICANT CHANGE UP (ref 43–77)
NRBC # BLD: 0 /100 WBCS — SIGNIFICANT CHANGE UP (ref 0–0)
PLATELET # BLD AUTO: 199 K/UL — SIGNIFICANT CHANGE UP (ref 150–400)
RBC # BLD: 4.09 M/UL — SIGNIFICANT CHANGE UP (ref 3.8–5.2)
RBC # FLD: 17.2 % — HIGH (ref 10.3–14.5)
WBC # BLD: 6.07 K/UL — SIGNIFICANT CHANGE UP (ref 3.8–10.5)
WBC # FLD AUTO: 6.07 K/UL — SIGNIFICANT CHANGE UP (ref 3.8–10.5)

## 2021-08-04 PROCEDURE — 99213 OFFICE O/P EST LOW 20 MIN: CPT

## 2021-08-06 ENCOUNTER — APPOINTMENT (OUTPATIENT)
Dept: INFUSION THERAPY | Facility: HOSPITAL | Age: 76
End: 2021-08-06

## 2021-08-12 ENCOUNTER — NON-APPOINTMENT (OUTPATIENT)
Age: 76
End: 2021-08-12

## 2021-08-18 ENCOUNTER — LABORATORY RESULT (OUTPATIENT)
Age: 76
End: 2021-08-18

## 2021-08-18 ENCOUNTER — APPOINTMENT (OUTPATIENT)
Dept: INFUSION THERAPY | Facility: HOSPITAL | Age: 76
End: 2021-08-18

## 2021-08-18 ENCOUNTER — RESULT REVIEW (OUTPATIENT)
Age: 76
End: 2021-08-18

## 2021-08-18 ENCOUNTER — APPOINTMENT (OUTPATIENT)
Dept: HEMATOLOGY ONCOLOGY | Facility: CLINIC | Age: 76
End: 2021-08-18
Payer: MEDICARE

## 2021-08-18 LAB
BASOPHILS # BLD AUTO: 0.03 K/UL — SIGNIFICANT CHANGE UP (ref 0–0.2)
BASOPHILS NFR BLD AUTO: 0.5 % — SIGNIFICANT CHANGE UP (ref 0–2)
EOSINOPHIL # BLD AUTO: 0.07 K/UL — SIGNIFICANT CHANGE UP (ref 0–0.5)
EOSINOPHIL NFR BLD AUTO: 1.2 % — SIGNIFICANT CHANGE UP (ref 0–6)
HCT VFR BLD CALC: 38.3 % — SIGNIFICANT CHANGE UP (ref 34.5–45)
HGB BLD-MCNC: 12.3 G/DL — SIGNIFICANT CHANGE UP (ref 11.5–15.5)
IMM GRANULOCYTES NFR BLD AUTO: 0.5 % — SIGNIFICANT CHANGE UP (ref 0–1.5)
LYMPHOCYTES # BLD AUTO: 1.06 K/UL — SIGNIFICANT CHANGE UP (ref 1–3.3)
LYMPHOCYTES # BLD AUTO: 17.9 % — SIGNIFICANT CHANGE UP (ref 13–44)
MCHC RBC-ENTMCNC: 28.9 PG — SIGNIFICANT CHANGE UP (ref 27–34)
MCHC RBC-ENTMCNC: 32.1 G/DL — SIGNIFICANT CHANGE UP (ref 32–36)
MCV RBC AUTO: 90.1 FL — SIGNIFICANT CHANGE UP (ref 80–100)
MONOCYTES # BLD AUTO: 0.65 K/UL — SIGNIFICANT CHANGE UP (ref 0–0.9)
MONOCYTES NFR BLD AUTO: 11 % — SIGNIFICANT CHANGE UP (ref 2–14)
NEUTROPHILS # BLD AUTO: 4.08 K/UL — SIGNIFICANT CHANGE UP (ref 1.8–7.4)
NEUTROPHILS NFR BLD AUTO: 68.9 % — SIGNIFICANT CHANGE UP (ref 43–77)
NRBC # BLD: 0 /100 WBCS — SIGNIFICANT CHANGE UP (ref 0–0)
PLATELET # BLD AUTO: 216 K/UL — SIGNIFICANT CHANGE UP (ref 150–400)
RBC # BLD: 4.25 M/UL — SIGNIFICANT CHANGE UP (ref 3.8–5.2)
RBC # FLD: 17.6 % — HIGH (ref 10.3–14.5)
WBC # BLD: 5.92 K/UL — SIGNIFICANT CHANGE UP (ref 3.8–10.5)
WBC # FLD AUTO: 5.92 K/UL — SIGNIFICANT CHANGE UP (ref 3.8–10.5)

## 2021-08-18 PROCEDURE — 99214 OFFICE O/P EST MOD 30 MIN: CPT

## 2021-08-18 NOTE — HISTORY OF PRESENT ILLNESS
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 \par \par In June 2020 Abi noticed a rectal discharge and burning sensation in the rectum. Was evaluated by Dr. Lamar and ultimately saw Dr. Oropeza who ordered CT Pelvis and MRI Pelvis . Found to have a mass in the rectum s/p biopsy confirmed adenocarcinoma. \par \par 7/18/20: MRI Pelvis: large hypovascular mass c/w mucinous adenoca involving remaining distal rectum and vagina, tumor abuts anal sphincter\par 8/5/20: CT pelvis: residual chronic perineal mass, likely cystic with gas component\par 9/23/20: CT CAP: low density, possibly necrotic mass up to 6.2 cm with involvement of the vagina, nonspecific 6 mm LLL nodule is noted\par 10/3/20: PET/CT: hypermetabolic rectal mass, non specific small inguinal LN, nonspecific hypermetabolism within the colostomy insertion site\par 10/7/20: discussed at rectal TB, plan for DILMA with re-evaluation of response and then surgery if residual disease\par 10/26/20: C1 FOLFOX\par 11/9/20: C2 (c/b acute oxali neurotoxicity)\par 11/23/20: C3 (Oxali over 4 hrs) - tolerated well \par 12/7/20: C4 5FU/LV (developed difficulty swallowing, SOB after receiving Oxali, d/c Oxali for today's tx)\par 12/21-2/10/21: C5-8 FOLFOX (dose reduced Oxali 65 mg/m2 over 4 hrs)\par 2/13/21: CT CAP: 0.6 cm LLL nodule, rectal mass slightly decreased in size, small b/l inguinal LN minimally decr in size, pneumatosis is noted involving SB possible fistula \par 2/25-3/23/21: Xeloda/RT \par 4/21/21: CT CAP: mild interval decrease in size of rectal mass, stable pulm nodule \par 5/26/21: 5FU/LV maintenance C1\par 6/9/21: 5FU/LV C2\par 6/23/21: C3\par 7/7/21: C4\par 7/14/21: CTCAP: stable lung nodules + further decrease in size of rectal mass/fluid collection\par 7/21/21: C5\par 8/4/21: C6\par 8/18/21: C7 [de-identified] : adenocarcinoma [de-identified] : Patient was seen earlier today at the infusion room while receiving treatment. She continued to report good appetite, stable weight and normal bowel movements (no issues with ostomy). The intermittent numbness/tingling within the fingertips of both and toes of both feet remains unchanged, she is able to complete her ADLs independently with minimal to no restrictions. She reported the recent development of mild seepage along the side of her ostomy, began approximately 2 weeks ago; she changes the dressing more frequently as a result.

## 2021-08-18 NOTE — PHYSICAL EXAM
[Fully active, able to carry on all pre-disease performance without restriction] : Status 0 - Fully active, able to carry on all pre-disease performance without restriction [Normal] : affect appropriate [de-identified] : left lower colostomy

## 2021-08-18 NOTE — END OF VISIT
Patient informed. CYDNEY [Time Spent: ___ minutes] : I have spent [unfilled] minutes of time on the encounter.

## 2021-08-19 ENCOUNTER — OUTPATIENT (OUTPATIENT)
Dept: OUTPATIENT SERVICES | Facility: HOSPITAL | Age: 76
LOS: 1 days | Discharge: HOME | End: 2021-08-19

## 2021-08-19 DIAGNOSIS — Z93.2 ILEOSTOMY STATUS: Chronic | ICD-10-CM

## 2021-08-19 DIAGNOSIS — F43.20 ADJUSTMENT DISORDER, UNSPECIFIED: ICD-10-CM

## 2021-08-19 DIAGNOSIS — Z90.710 ACQUIRED ABSENCE OF BOTH CERVIX AND UTERUS: Chronic | ICD-10-CM

## 2021-08-20 ENCOUNTER — APPOINTMENT (OUTPATIENT)
Dept: INFUSION THERAPY | Facility: HOSPITAL | Age: 76
End: 2021-08-20

## 2021-08-23 DIAGNOSIS — D64.9 ANEMIA, UNSPECIFIED: ICD-10-CM

## 2021-09-01 ENCOUNTER — RESULT REVIEW (OUTPATIENT)
Age: 76
End: 2021-09-01

## 2021-09-01 ENCOUNTER — LABORATORY RESULT (OUTPATIENT)
Age: 76
End: 2021-09-01

## 2021-09-01 ENCOUNTER — APPOINTMENT (OUTPATIENT)
Dept: INFUSION THERAPY | Facility: HOSPITAL | Age: 76
End: 2021-09-01

## 2021-09-01 LAB
BASOPHILS # BLD AUTO: 0.03 K/UL — SIGNIFICANT CHANGE UP (ref 0–0.2)
BASOPHILS NFR BLD AUTO: 0.5 % — SIGNIFICANT CHANGE UP (ref 0–2)
EOSINOPHIL # BLD AUTO: 0.08 K/UL — SIGNIFICANT CHANGE UP (ref 0–0.5)
EOSINOPHIL NFR BLD AUTO: 1.3 % — SIGNIFICANT CHANGE UP (ref 0–6)
HCT VFR BLD CALC: 38 % — SIGNIFICANT CHANGE UP (ref 34.5–45)
HGB BLD-MCNC: 12.4 G/DL — SIGNIFICANT CHANGE UP (ref 11.5–15.5)
IMM GRANULOCYTES NFR BLD AUTO: 0.8 % — SIGNIFICANT CHANGE UP (ref 0–1.5)
LYMPHOCYTES # BLD AUTO: 1.43 K/UL — SIGNIFICANT CHANGE UP (ref 1–3.3)
LYMPHOCYTES # BLD AUTO: 23.8 % — SIGNIFICANT CHANGE UP (ref 13–44)
MCHC RBC-ENTMCNC: 29.5 PG — SIGNIFICANT CHANGE UP (ref 27–34)
MCHC RBC-ENTMCNC: 32.6 G/DL — SIGNIFICANT CHANGE UP (ref 32–36)
MCV RBC AUTO: 90.3 FL — SIGNIFICANT CHANGE UP (ref 80–100)
MONOCYTES # BLD AUTO: 0.69 K/UL — SIGNIFICANT CHANGE UP (ref 0–0.9)
MONOCYTES NFR BLD AUTO: 11.5 % — SIGNIFICANT CHANGE UP (ref 2–14)
NEUTROPHILS # BLD AUTO: 3.72 K/UL — SIGNIFICANT CHANGE UP (ref 1.8–7.4)
NEUTROPHILS NFR BLD AUTO: 62.1 % — SIGNIFICANT CHANGE UP (ref 43–77)
NRBC # BLD: 0 /100 WBCS — SIGNIFICANT CHANGE UP (ref 0–0)
PLATELET # BLD AUTO: 209 K/UL — SIGNIFICANT CHANGE UP (ref 150–400)
RBC # BLD: 4.21 M/UL — SIGNIFICANT CHANGE UP (ref 3.8–5.2)
RBC # FLD: 17.2 % — HIGH (ref 10.3–14.5)
WBC # BLD: 6 K/UL — SIGNIFICANT CHANGE UP (ref 3.8–10.5)
WBC # FLD AUTO: 6 K/UL — SIGNIFICANT CHANGE UP (ref 3.8–10.5)

## 2021-09-02 ENCOUNTER — NON-APPOINTMENT (OUTPATIENT)
Age: 76
End: 2021-09-02

## 2021-09-02 DIAGNOSIS — Z51.11 ENCOUNTER FOR ANTINEOPLASTIC CHEMOTHERAPY: ICD-10-CM

## 2021-09-02 DIAGNOSIS — R11.2 NAUSEA WITH VOMITING, UNSPECIFIED: ICD-10-CM

## 2021-09-02 DIAGNOSIS — C20 MALIGNANT NEOPLASM OF RECTUM: ICD-10-CM

## 2021-09-03 ENCOUNTER — APPOINTMENT (OUTPATIENT)
Dept: INFUSION THERAPY | Facility: HOSPITAL | Age: 76
End: 2021-09-03

## 2021-09-03 NOTE — HISTORY OF PRESENT ILLNESS
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 \par \par In June 2020 Abi noticed a rectal discharge and burning sensation in the rectum. Was evaluated by Dr. Lamar and ultimately saw Dr. Oropeza who ordered CT Pelvis and MRI Pelvis . Found to have a mass in the rectum s/p biopsy confirmed adenocarcinoma. \par \par 7/18/20: MRI Pelvis: large hypovascular mass c/w mucinous adenoca involving remaining distal rectum and vagina, tumor abuts anal sphincter\par 8/5/20: CT pelvis: residual chronic perineal mass, likely cystic with gas component\par 9/23/20: CT CAP: low density, possibly necrotic mass up to 6.2 cm with involvement of the vagina, nonspecific 6 mm LLL nodule is noted\par 10/3/20: PET/CT: hypermetabolic rectal mass, non specific small inguinal LN, nonspecific hypermetabolism within the colostomy insertion site\par 10/7/20: discussed at rectal TB, plan for DILMA with re-evaluation of response and then surgery if residual disease\par 10/26/20: C1 FOLFOX\par 11/9/20: C2 (c/b acute oxali neurotoxicity)\par 11/23/20: C3 (Oxali over 4 hrs) - tolerated well \par 12/7/20: C4 5FU/LV (developed difficulty swallowing, SOB after receiving Oxali, d/c Oxali for today's tx)\par 12/21-2/10/21: C5-8 FOLFOX (dose reduced Oxali 65 mg/m2 over 4 hrs)\par 2/13/21: CT CAP: 0.6 cm LLL nodule, rectal mass slightly decreased in size, small b/l inguinal LN minimally decr in size, pneumatosis is noted involving SB possible fistula \par 2/25-3/23/21: Xeloda/RT \par 4/21/21: CT CAP: mild interval decrease in size of rectal mass, stable pulm nodule \par 5/1/21: case reviewed at TB, due to disease involving SB and need for skin flap to repear large defect, surgery is not feasible. Recommendation to cont with palliative chemo. \par 5/26/21-: maintenance 5FU/LV\par 7/14/21: CT CAP: improved/stable disease [de-identified] : adenocarcinoma [de-identified] : overall feels well. denies any c/o. good appetite. weight is stable. no pain. ostomy output is stable. \par PN seemed to have improved in hands, stable in feet.

## 2021-09-15 ENCOUNTER — LABORATORY RESULT (OUTPATIENT)
Age: 76
End: 2021-09-15

## 2021-09-15 ENCOUNTER — RESULT REVIEW (OUTPATIENT)
Age: 76
End: 2021-09-15

## 2021-09-15 ENCOUNTER — APPOINTMENT (OUTPATIENT)
Dept: INFUSION THERAPY | Facility: HOSPITAL | Age: 76
End: 2021-09-15

## 2021-09-15 ENCOUNTER — APPOINTMENT (OUTPATIENT)
Dept: HEMATOLOGY ONCOLOGY | Facility: CLINIC | Age: 76
End: 2021-09-15
Payer: MEDICARE

## 2021-09-15 LAB
BASOPHILS # BLD AUTO: 0.03 K/UL — SIGNIFICANT CHANGE UP (ref 0–0.2)
BASOPHILS NFR BLD AUTO: 0.5 % — SIGNIFICANT CHANGE UP (ref 0–2)
EOSINOPHIL # BLD AUTO: 0.07 K/UL — SIGNIFICANT CHANGE UP (ref 0–0.5)
EOSINOPHIL NFR BLD AUTO: 1.1 % — SIGNIFICANT CHANGE UP (ref 0–6)
HCT VFR BLD CALC: 38.9 % — SIGNIFICANT CHANGE UP (ref 34.5–45)
HGB BLD-MCNC: 12.5 G/DL — SIGNIFICANT CHANGE UP (ref 11.5–15.5)
IMM GRANULOCYTES NFR BLD AUTO: 0.6 % — SIGNIFICANT CHANGE UP (ref 0–1.5)
LYMPHOCYTES # BLD AUTO: 1.11 K/UL — SIGNIFICANT CHANGE UP (ref 1–3.3)
LYMPHOCYTES # BLD AUTO: 18 % — SIGNIFICANT CHANGE UP (ref 13–44)
MCHC RBC-ENTMCNC: 29.6 PG — SIGNIFICANT CHANGE UP (ref 27–34)
MCHC RBC-ENTMCNC: 32.1 G/DL — SIGNIFICANT CHANGE UP (ref 32–36)
MCV RBC AUTO: 92 FL — SIGNIFICANT CHANGE UP (ref 80–100)
MONOCYTES # BLD AUTO: 0.66 K/UL — SIGNIFICANT CHANGE UP (ref 0–0.9)
MONOCYTES NFR BLD AUTO: 10.7 % — SIGNIFICANT CHANGE UP (ref 2–14)
NEUTROPHILS # BLD AUTO: 4.26 K/UL — SIGNIFICANT CHANGE UP (ref 1.8–7.4)
NEUTROPHILS NFR BLD AUTO: 69.1 % — SIGNIFICANT CHANGE UP (ref 43–77)
NRBC # BLD: 0 /100 WBCS — SIGNIFICANT CHANGE UP (ref 0–0)
PLATELET # BLD AUTO: 201 K/UL — SIGNIFICANT CHANGE UP (ref 150–400)
RBC # BLD: 4.23 M/UL — SIGNIFICANT CHANGE UP (ref 3.8–5.2)
RBC # FLD: 17.2 % — HIGH (ref 10.3–14.5)
WBC # BLD: 6.17 K/UL — SIGNIFICANT CHANGE UP (ref 3.8–10.5)
WBC # FLD AUTO: 6.17 K/UL — SIGNIFICANT CHANGE UP (ref 3.8–10.5)

## 2021-09-15 PROCEDURE — 99213 OFFICE O/P EST LOW 20 MIN: CPT

## 2021-09-15 NOTE — PHYSICAL EXAM
[Fully active, able to carry on all pre-disease performance without restriction] : Status 0 - Fully active, able to carry on all pre-disease performance without restriction [Normal] : affect appropriate [de-identified] : left lower colostomy

## 2021-09-15 NOTE — HISTORY OF PRESENT ILLNESS
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 \par \par In June 2020 Abi noticed a rectal discharge and burning sensation in the rectum. Was evaluated by Dr. Lamar and ultimately saw Dr. Oropeza who ordered CT Pelvis and MRI Pelvis . Found to have a mass in the rectum s/p biopsy confirmed adenocarcinoma. \par \par 7/18/20: MRI Pelvis: large hypovascular mass c/w mucinous adenoca involving remaining distal rectum and vagina, tumor abuts anal sphincter\par 8/5/20: CT pelvis: residual chronic perineal mass, likely cystic with gas component\par 9/23/20: CT CAP: low density, possibly necrotic mass up to 6.2 cm with involvement of the vagina, nonspecific 6 mm LLL nodule is noted\par 10/3/20: PET/CT: hypermetabolic rectal mass, non specific small inguinal LN, nonspecific hypermetabolism within the colostomy insertion site\par 10/7/20: discussed at rectal TB, plan for DILMA with re-evaluation of response and then surgery if residual disease\par 10/26/20: C1 FOLFOX\par 11/9/20: C2 (c/b acute oxali neurotoxicity)\par 11/23/20: C3 (Oxali over 4 hrs) - tolerated well \par 12/7/20: C4 5FU/LV (developed difficulty swallowing, SOB after receiving Oxali, d/c Oxali for today's tx)\par 12/21-2/10/21: C5-8 FOLFOX (dose reduced Oxali 65 mg/m2 over 4 hrs)\par 2/13/21: CT CAP: 0.6 cm LLL nodule, rectal mass slightly decreased in size, small b/l inguinal LN minimally decr in size, pneumatosis is noted involving SB possible fistula \par 2/25-3/23/21: Xeloda/RT \par 4/21/21: CT CAP: mild interval decrease in size of rectal mass, stable pulm nodule \par 5/26-7/7/21:C1-C4 5FU/LV maintenance \par 7/14/21: CTCAP: stable lung nodules + further decrease in size of rectal mass/fluid collection\par 7/21-9/15/21: C5-C9 [de-identified] : adenocarcinoma [de-identified] : Patient was seen earlier today at the infusion room while receiving treatment. She continued to report good appetite, stable weight and normal bowel movements (no issues with ostomy). The intermittent numbness/tingling within the fingertips of both and toes of both feet remains unchanged, she is able to complete her ADLs independently with minimal to no restrictions. The mild seepage she reported along the side of her ostomy last month improved spontaneously, will continue to monitor.

## 2021-09-17 ENCOUNTER — APPOINTMENT (OUTPATIENT)
Dept: INFUSION THERAPY | Facility: HOSPITAL | Age: 76
End: 2021-09-17

## 2021-09-24 ENCOUNTER — OUTPATIENT (OUTPATIENT)
Dept: OUTPATIENT SERVICES | Facility: HOSPITAL | Age: 76
LOS: 1 days | Discharge: HOME | End: 2021-09-24

## 2021-09-24 DIAGNOSIS — C20 MALIGNANT NEOPLASM OF RECTUM: ICD-10-CM

## 2021-09-24 DIAGNOSIS — Z90.710 ACQUIRED ABSENCE OF BOTH CERVIX AND UTERUS: Chronic | ICD-10-CM

## 2021-09-24 DIAGNOSIS — Z93.2 ILEOSTOMY STATUS: Chronic | ICD-10-CM

## 2021-09-29 ENCOUNTER — RESULT REVIEW (OUTPATIENT)
Age: 76
End: 2021-09-29

## 2021-09-29 ENCOUNTER — APPOINTMENT (OUTPATIENT)
Dept: HEMATOLOGY ONCOLOGY | Facility: CLINIC | Age: 76
End: 2021-09-29
Payer: MEDICARE

## 2021-09-29 ENCOUNTER — APPOINTMENT (OUTPATIENT)
Dept: INFUSION THERAPY | Facility: HOSPITAL | Age: 76
End: 2021-09-29

## 2021-09-29 ENCOUNTER — LABORATORY RESULT (OUTPATIENT)
Age: 76
End: 2021-09-29

## 2021-09-29 LAB
BASOPHILS # BLD AUTO: 0.04 K/UL — SIGNIFICANT CHANGE UP (ref 0–0.2)
BASOPHILS NFR BLD AUTO: 0.6 % — SIGNIFICANT CHANGE UP (ref 0–2)
EOSINOPHIL # BLD AUTO: 0.09 K/UL — SIGNIFICANT CHANGE UP (ref 0–0.5)
EOSINOPHIL NFR BLD AUTO: 1.3 % — SIGNIFICANT CHANGE UP (ref 0–6)
HCT VFR BLD CALC: 38.2 % — SIGNIFICANT CHANGE UP (ref 34.5–45)
HGB BLD-MCNC: 12.6 G/DL — SIGNIFICANT CHANGE UP (ref 11.5–15.5)
IMM GRANULOCYTES NFR BLD AUTO: 0.6 % — SIGNIFICANT CHANGE UP (ref 0–1.5)
LYMPHOCYTES # BLD AUTO: 1.32 K/UL — SIGNIFICANT CHANGE UP (ref 1–3.3)
LYMPHOCYTES # BLD AUTO: 19.4 % — SIGNIFICANT CHANGE UP (ref 13–44)
MCHC RBC-ENTMCNC: 29.9 PG — SIGNIFICANT CHANGE UP (ref 27–34)
MCHC RBC-ENTMCNC: 33 G/DL — SIGNIFICANT CHANGE UP (ref 32–36)
MCV RBC AUTO: 90.7 FL — SIGNIFICANT CHANGE UP (ref 80–100)
MONOCYTES # BLD AUTO: 0.8 K/UL — SIGNIFICANT CHANGE UP (ref 0–0.9)
MONOCYTES NFR BLD AUTO: 11.8 % — SIGNIFICANT CHANGE UP (ref 2–14)
NEUTROPHILS # BLD AUTO: 4.51 K/UL — SIGNIFICANT CHANGE UP (ref 1.8–7.4)
NEUTROPHILS NFR BLD AUTO: 66.3 % — SIGNIFICANT CHANGE UP (ref 43–77)
NRBC # BLD: 0 /100 WBCS — SIGNIFICANT CHANGE UP (ref 0–0)
PLATELET # BLD AUTO: 206 K/UL — SIGNIFICANT CHANGE UP (ref 150–400)
RBC # BLD: 4.21 M/UL — SIGNIFICANT CHANGE UP (ref 3.8–5.2)
RBC # FLD: 16.4 % — HIGH (ref 10.3–14.5)
WBC # BLD: 6.8 K/UL — SIGNIFICANT CHANGE UP (ref 3.8–10.5)
WBC # FLD AUTO: 6.8 K/UL — SIGNIFICANT CHANGE UP (ref 3.8–10.5)

## 2021-09-29 PROCEDURE — 99213 OFFICE O/P EST LOW 20 MIN: CPT

## 2021-09-29 NOTE — HISTORY OF PRESENT ILLNESS
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 \par \par In June 2020 Abi noticed a rectal discharge and burning sensation in the rectum. Was evaluated by Dr. Lamar and ultimately saw Dr. Oropeza who ordered CT Pelvis and MRI Pelvis . Found to have a mass in the rectum s/p biopsy confirmed adenocarcinoma. \par \par 7/18/20: MRI Pelvis: large hypovascular mass c/w mucinous adenoca involving remaining distal rectum and vagina, tumor abuts anal sphincter\par 8/5/20: CT pelvis: residual chronic perineal mass, likely cystic with gas component\par 9/23/20: CT CAP: low density, possibly necrotic mass up to 6.2 cm with involvement of the vagina, nonspecific 6 mm LLL nodule is noted\par 10/3/20: PET/CT: hypermetabolic rectal mass, non specific small inguinal LN, nonspecific hypermetabolism within the colostomy insertion site\par 10/7/20: discussed at rectal TB, plan for DILMA with re-evaluation of response and then surgery if residual disease\par 10/26/20: C1 FOLFOX\par 11/9/20: C2 (c/b acute oxali neurotoxicity)\par 11/23/20: C3 (Oxali over 4 hrs) - tolerated well \par 12/7/20: C4 5FU/LV (developed difficulty swallowing, SOB after receiving Oxali, d/c Oxali for today's tx)\par 12/21-2/10/21: C5-8 FOLFOX (dose reduced Oxali 65 mg/m2 over 4 hrs)\par 2/13/21: CT CAP: 0.6 cm LLL nodule, rectal mass slightly decreased in size, small b/l inguinal LN minimally decr in size, pneumatosis is noted involving SB possible fistula \par 2/25-3/23/21: Xeloda/RT \par 4/21/21: CT CAP: mild interval decrease in size of rectal mass, stable pulm nodule \par 5/1/21: case reviewed at TB, due to disease involving SB and need for skin flap to repear large defect, surgery is not feasible. Recommendation to cont with palliative chemo. \par 5/26/21-: maintenance 5FU/LV\par 7/14/21: CT CAP: improved/stable disease [de-identified] : adenocarcinoma [de-identified] : overall feeling well. + persistent mild neuropathy in feet. does not interfere with walking. ostomy leaks occasionally. appetite is good. weight is stable. active.

## 2021-09-29 NOTE — REASON FOR VISIT
[Follow-Up Visit] : a follow-up [Spouse] : spouse [FreeTextEntry2] : Locally advanced rectal cancer

## 2021-09-30 ENCOUNTER — RESULT REVIEW (OUTPATIENT)
Age: 76
End: 2021-09-30

## 2021-10-01 ENCOUNTER — APPOINTMENT (OUTPATIENT)
Dept: INFUSION THERAPY | Facility: HOSPITAL | Age: 76
End: 2021-10-01

## 2021-10-07 ENCOUNTER — OUTPATIENT (OUTPATIENT)
Dept: OUTPATIENT SERVICES | Facility: HOSPITAL | Age: 76
LOS: 1 days | End: 2021-10-07
Payer: MEDICARE

## 2021-10-07 ENCOUNTER — APPOINTMENT (OUTPATIENT)
Dept: CT IMAGING | Facility: CLINIC | Age: 76
End: 2021-10-07
Payer: MEDICARE

## 2021-10-07 DIAGNOSIS — Z93.2 ILEOSTOMY STATUS: Chronic | ICD-10-CM

## 2021-10-07 DIAGNOSIS — C20 MALIGNANT NEOPLASM OF RECTUM: ICD-10-CM

## 2021-10-07 DIAGNOSIS — Z90.710 ACQUIRED ABSENCE OF BOTH CERVIX AND UTERUS: Chronic | ICD-10-CM

## 2021-10-07 PROCEDURE — 71260 CT THORAX DX C+: CPT | Mod: 26,MH

## 2021-10-07 PROCEDURE — 71260 CT THORAX DX C+: CPT

## 2021-10-07 PROCEDURE — 74177 CT ABD & PELVIS W/CONTRAST: CPT

## 2021-10-07 PROCEDURE — 74177 CT ABD & PELVIS W/CONTRAST: CPT | Mod: 26,MH

## 2021-10-13 ENCOUNTER — LABORATORY RESULT (OUTPATIENT)
Age: 76
End: 2021-10-13

## 2021-10-13 ENCOUNTER — RESULT REVIEW (OUTPATIENT)
Age: 76
End: 2021-10-13

## 2021-10-13 ENCOUNTER — APPOINTMENT (OUTPATIENT)
Dept: INFUSION THERAPY | Facility: HOSPITAL | Age: 76
End: 2021-10-13

## 2021-10-13 ENCOUNTER — APPOINTMENT (OUTPATIENT)
Dept: HEMATOLOGY ONCOLOGY | Facility: CLINIC | Age: 76
End: 2021-10-13
Payer: MEDICARE

## 2021-10-13 LAB
BASOPHILS # BLD AUTO: 0.04 K/UL — SIGNIFICANT CHANGE UP (ref 0–0.2)
BASOPHILS NFR BLD AUTO: 0.7 % — SIGNIFICANT CHANGE UP (ref 0–2)
EOSINOPHIL # BLD AUTO: 0.07 K/UL — SIGNIFICANT CHANGE UP (ref 0–0.5)
EOSINOPHIL NFR BLD AUTO: 1.2 % — SIGNIFICANT CHANGE UP (ref 0–6)
HCT VFR BLD CALC: 37 % — SIGNIFICANT CHANGE UP (ref 34.5–45)
HGB BLD-MCNC: 12.2 G/DL — SIGNIFICANT CHANGE UP (ref 11.5–15.5)
IMM GRANULOCYTES NFR BLD AUTO: 0.7 % — SIGNIFICANT CHANGE UP (ref 0–1.5)
LYMPHOCYTES # BLD AUTO: 1.16 K/UL — SIGNIFICANT CHANGE UP (ref 1–3.3)
LYMPHOCYTES # BLD AUTO: 19.1 % — SIGNIFICANT CHANGE UP (ref 13–44)
MCHC RBC-ENTMCNC: 30.3 PG — SIGNIFICANT CHANGE UP (ref 27–34)
MCHC RBC-ENTMCNC: 33 G/DL — SIGNIFICANT CHANGE UP (ref 32–36)
MCV RBC AUTO: 92 FL — SIGNIFICANT CHANGE UP (ref 80–100)
MONOCYTES # BLD AUTO: 0.6 K/UL — SIGNIFICANT CHANGE UP (ref 0–0.9)
MONOCYTES NFR BLD AUTO: 9.9 % — SIGNIFICANT CHANGE UP (ref 2–14)
NEUTROPHILS # BLD AUTO: 4.15 K/UL — SIGNIFICANT CHANGE UP (ref 1.8–7.4)
NEUTROPHILS NFR BLD AUTO: 68.4 % — SIGNIFICANT CHANGE UP (ref 43–77)
NRBC # BLD: 0 /100 WBCS — SIGNIFICANT CHANGE UP (ref 0–0)
PLATELET # BLD AUTO: 205 K/UL — SIGNIFICANT CHANGE UP (ref 150–400)
RBC # BLD: 4.02 M/UL — SIGNIFICANT CHANGE UP (ref 3.8–5.2)
RBC # FLD: 15.9 % — HIGH (ref 10.3–14.5)
WBC # BLD: 6.06 K/UL — SIGNIFICANT CHANGE UP (ref 3.8–10.5)
WBC # FLD AUTO: 6.06 K/UL — SIGNIFICANT CHANGE UP (ref 3.8–10.5)

## 2021-10-13 PROCEDURE — 99214 OFFICE O/P EST MOD 30 MIN: CPT

## 2021-10-13 NOTE — HISTORY OF PRESENT ILLNESS
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 \par \par In June 2020 Abi noticed a rectal discharge and burning sensation in the rectum. Was evaluated by Dr. Lamar and ultimately saw Dr. Oropeza who ordered CT Pelvis and MRI Pelvis . Found to have a mass in the rectum s/p biopsy confirmed adenocarcinoma. \par \par 7/18/20: MRI Pelvis: large hypovascular mass c/w mucinous adenoca involving remaining distal rectum and vagina, tumor abuts anal sphincter\par 8/5/20: CT pelvis: residual chronic perineal mass, likely cystic with gas component\par 9/23/20: CT CAP: low density, possibly necrotic mass up to 6.2 cm with involvement of the vagina, nonspecific 6 mm LLL nodule is noted\par 10/3/20: PET/CT: hypermetabolic rectal mass, non specific small inguinal LN, nonspecific hypermetabolism within the colostomy insertion site\par 10/7/20: discussed at rectal TB, plan for DILMA with re-evaluation of response and then surgery if residual disease\par 10/26/20: C1 FOLFOX\par 11/9/20: C2 (c/b acute oxali neurotoxicity)\par 11/23/20: C3 (Oxali over 4 hrs) - tolerated well \par 12/7/20: C4 5FU/LV (developed difficulty swallowing, SOB after receiving Oxali, d/c Oxali for today's tx)\par 12/21-2/10/21: C5-8 FOLFOX (dose reduced Oxali 65 mg/m2 over 4 hrs)\par 2/13/21: CT CAP: 0.6 cm LLL nodule, rectal mass slightly decreased in size, small b/l inguinal LN minimally decr in size, pneumatosis is noted involving SB possible fistula \par 2/25-3/23/21: Xeloda/RT \par 4/21/21: CT CAP: mild interval decrease in size of rectal mass, stable pulm nodule \par 5/26-7/7/21:C1-C4 5FU/LV maintenance \par 7/14/21: CTCAP: stable lung nodules + further decrease in size of rectal mass/fluid collection\par 7/21-10/13/21: C5-C11\par 10/7/21: CT CAP revealed stable disease.  [de-identified] : adenocarcinoma [de-identified] : Patient was seen earlier today at the infusion room while receiving treatment. She continued to report good appetite, stable weight and normal bowel movements (no issues with colostomy). The intermittent numbness/tingling within the fingertips of both and toes of both feet remains unchanged, she is able to complete her ADLs independently with minimal to no restrictions.

## 2021-10-13 NOTE — PHYSICAL EXAM
[Fully active, able to carry on all pre-disease performance without restriction] : Status 0 - Fully active, able to carry on all pre-disease performance without restriction [Normal] : affect appropriate [de-identified] : left lower colostomy

## 2021-10-14 DIAGNOSIS — R11.2 NAUSEA WITH VOMITING, UNSPECIFIED: ICD-10-CM

## 2021-10-14 DIAGNOSIS — Z51.11 ENCOUNTER FOR ANTINEOPLASTIC CHEMOTHERAPY: ICD-10-CM

## 2021-10-15 ENCOUNTER — APPOINTMENT (OUTPATIENT)
Dept: INFUSION THERAPY | Facility: HOSPITAL | Age: 76
End: 2021-10-15

## 2021-10-25 ENCOUNTER — OUTPATIENT (OUTPATIENT)
Dept: OUTPATIENT SERVICES | Facility: HOSPITAL | Age: 76
LOS: 1 days | Discharge: HOME | End: 2021-10-25

## 2021-10-25 DIAGNOSIS — Z93.2 ILEOSTOMY STATUS: Chronic | ICD-10-CM

## 2021-10-25 DIAGNOSIS — Z90.710 ACQUIRED ABSENCE OF BOTH CERVIX AND UTERUS: Chronic | ICD-10-CM

## 2021-10-25 DIAGNOSIS — C20 MALIGNANT NEOPLASM OF RECTUM: ICD-10-CM

## 2021-10-27 ENCOUNTER — RESULT REVIEW (OUTPATIENT)
Age: 76
End: 2021-10-27

## 2021-10-27 ENCOUNTER — LABORATORY RESULT (OUTPATIENT)
Age: 76
End: 2021-10-27

## 2021-10-27 ENCOUNTER — APPOINTMENT (OUTPATIENT)
Dept: INFUSION THERAPY | Facility: HOSPITAL | Age: 76
End: 2021-10-27

## 2021-10-27 ENCOUNTER — APPOINTMENT (OUTPATIENT)
Dept: HEMATOLOGY ONCOLOGY | Facility: CLINIC | Age: 76
End: 2021-10-27
Payer: MEDICARE

## 2021-10-27 LAB
BASOPHILS # BLD AUTO: 0.02 K/UL — SIGNIFICANT CHANGE UP (ref 0–0.2)
BASOPHILS NFR BLD AUTO: 0.3 % — SIGNIFICANT CHANGE UP (ref 0–2)
EOSINOPHIL # BLD AUTO: 0.08 K/UL — SIGNIFICANT CHANGE UP (ref 0–0.5)
EOSINOPHIL NFR BLD AUTO: 1.3 % — SIGNIFICANT CHANGE UP (ref 0–6)
HCT VFR BLD CALC: 37.4 % — SIGNIFICANT CHANGE UP (ref 34.5–45)
HGB BLD-MCNC: 12.1 G/DL — SIGNIFICANT CHANGE UP (ref 11.5–15.5)
IMM GRANULOCYTES NFR BLD AUTO: 0.8 % — SIGNIFICANT CHANGE UP (ref 0–1.5)
LYMPHOCYTES # BLD AUTO: 1.09 K/UL — SIGNIFICANT CHANGE UP (ref 1–3.3)
LYMPHOCYTES # BLD AUTO: 18 % — SIGNIFICANT CHANGE UP (ref 13–44)
MCHC RBC-ENTMCNC: 30.3 PG — SIGNIFICANT CHANGE UP (ref 27–34)
MCHC RBC-ENTMCNC: 32.4 G/DL — SIGNIFICANT CHANGE UP (ref 32–36)
MCV RBC AUTO: 93.7 FL — SIGNIFICANT CHANGE UP (ref 80–100)
MONOCYTES # BLD AUTO: 0.95 K/UL — HIGH (ref 0–0.9)
MONOCYTES NFR BLD AUTO: 15.7 % — HIGH (ref 2–14)
NEUTROPHILS # BLD AUTO: 3.86 K/UL — SIGNIFICANT CHANGE UP (ref 1.8–7.4)
NEUTROPHILS NFR BLD AUTO: 63.9 % — SIGNIFICANT CHANGE UP (ref 43–77)
NRBC # BLD: 0 /100 WBCS — SIGNIFICANT CHANGE UP (ref 0–0)
PLATELET # BLD AUTO: 188 K/UL — SIGNIFICANT CHANGE UP (ref 150–400)
RBC # BLD: 3.99 M/UL — SIGNIFICANT CHANGE UP (ref 3.8–5.2)
RBC # FLD: 15.9 % — HIGH (ref 10.3–14.5)
WBC # BLD: 6.05 K/UL — SIGNIFICANT CHANGE UP (ref 3.8–10.5)
WBC # FLD AUTO: 6.05 K/UL — SIGNIFICANT CHANGE UP (ref 3.8–10.5)

## 2021-10-27 PROCEDURE — 99213 OFFICE O/P EST LOW 20 MIN: CPT

## 2021-10-27 NOTE — PHYSICAL EXAM
[Fully active, able to carry on all pre-disease performance without restriction] : Status 0 - Fully active, able to carry on all pre-disease performance without restriction [Normal] : affect appropriate [de-identified] : left lower colostomy

## 2021-10-27 NOTE — HISTORY OF PRESENT ILLNESS
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 \par \par In June 2020 Abi noticed a rectal discharge and burning sensation in the rectum. Was evaluated by Dr. Lamar and ultimately saw Dr. Oropeza who ordered CT Pelvis and MRI Pelvis . Found to have a mass in the rectum s/p biopsy confirmed adenocarcinoma. \par \par 7/18/20: MRI Pelvis: large hypovascular mass c/w mucinous adenoca involving remaining distal rectum and vagina, tumor abuts anal sphincter\par 8/5/20: CT pelvis: residual chronic perineal mass, likely cystic with gas component\par 9/23/20: CT CAP: low density, possibly necrotic mass up to 6.2 cm with involvement of the vagina, nonspecific 6 mm LLL nodule is noted\par 10/3/20: PET/CT: hypermetabolic rectal mass, non specific small inguinal LN, nonspecific hypermetabolism within the colostomy insertion site\par 10/7/20: discussed at rectal TB, plan for DILMA with re-evaluation of response and then surgery if residual disease\par 10/26/20: C1 FOLFOX\par 11/9/20: C2 (c/b acute oxali neurotoxicity)\par 11/23/20: C3 (Oxali over 4 hrs) - tolerated well \par 12/7/20: C4 5FU/LV (developed difficulty swallowing, SOB after receiving Oxali, d/c Oxali for today's tx)\par 12/21-2/10/21: C5-8 FOLFOX (dose reduced Oxali 65 mg/m2 over 4 hrs)\par 2/13/21: CT CAP: 0.6 cm LLL nodule, rectal mass slightly decreased in size, small b/l inguinal LN minimally decr in size, pneumatosis is noted involving SB possible fistula \par 2/25-3/23/21: Xeloda/RT \par 4/21/21: CT CAP: mild interval decrease in size of rectal mass, stable pulm nodule \par 5/26-7/7/21:C1-C4 5FU/LV maintenance \par 7/14/21: CTCAP: stable lung nodules + further decrease in size of rectal mass/fluid collection\par 7/21-10/13/21: C5-C11\par 10/7/21: CT CAP revealed stable disease. \par 10/27/21: C12  [de-identified] : adenocarcinoma [Therapy: ___] : Therapy: [unfilled] [Cycle: ___] : Cycle: [unfilled] [de-identified] : Patient was seen at the infusion room while receiving treatment. She continued to report good appetite, stable weight and normal bowel movements (no issues with colostomy). The intermittent numbness/tingling within the fingertips of both and toes of both feet remains unchanged, she is able to complete her ADLs independently with minimal to no restrictions.

## 2021-10-28 DIAGNOSIS — Z51.11 ENCOUNTER FOR ANTINEOPLASTIC CHEMOTHERAPY: ICD-10-CM

## 2021-10-28 DIAGNOSIS — R11.2 NAUSEA WITH VOMITING, UNSPECIFIED: ICD-10-CM

## 2021-10-29 ENCOUNTER — APPOINTMENT (OUTPATIENT)
Dept: INFUSION THERAPY | Facility: HOSPITAL | Age: 76
End: 2021-10-29

## 2021-11-10 ENCOUNTER — APPOINTMENT (OUTPATIENT)
Dept: HEMATOLOGY ONCOLOGY | Facility: CLINIC | Age: 76
End: 2021-11-10
Payer: MEDICARE

## 2021-11-10 ENCOUNTER — RESULT REVIEW (OUTPATIENT)
Age: 76
End: 2021-11-10

## 2021-11-10 ENCOUNTER — LABORATORY RESULT (OUTPATIENT)
Age: 76
End: 2021-11-10

## 2021-11-10 ENCOUNTER — APPOINTMENT (OUTPATIENT)
Dept: INFUSION THERAPY | Facility: HOSPITAL | Age: 76
End: 2021-11-10

## 2021-11-10 LAB
BASOPHILS # BLD AUTO: 0.04 K/UL — SIGNIFICANT CHANGE UP (ref 0–0.2)
BASOPHILS NFR BLD AUTO: 0.7 % — SIGNIFICANT CHANGE UP (ref 0–2)
EOSINOPHIL # BLD AUTO: 0.05 K/UL — SIGNIFICANT CHANGE UP (ref 0–0.5)
EOSINOPHIL NFR BLD AUTO: 0.8 % — SIGNIFICANT CHANGE UP (ref 0–6)
HCT VFR BLD CALC: 36.1 % — SIGNIFICANT CHANGE UP (ref 34.5–45)
HGB BLD-MCNC: 11.6 G/DL — SIGNIFICANT CHANGE UP (ref 11.5–15.5)
IMM GRANULOCYTES NFR BLD AUTO: 1 % — SIGNIFICANT CHANGE UP (ref 0–1.5)
LYMPHOCYTES # BLD AUTO: 1.08 K/UL — SIGNIFICANT CHANGE UP (ref 1–3.3)
LYMPHOCYTES # BLD AUTO: 17.6 % — SIGNIFICANT CHANGE UP (ref 13–44)
MCHC RBC-ENTMCNC: 30 PG — SIGNIFICANT CHANGE UP (ref 27–34)
MCHC RBC-ENTMCNC: 32.1 G/DL — SIGNIFICANT CHANGE UP (ref 32–36)
MCV RBC AUTO: 93.3 FL — SIGNIFICANT CHANGE UP (ref 80–100)
MONOCYTES # BLD AUTO: 0.76 K/UL — SIGNIFICANT CHANGE UP (ref 0–0.9)
MONOCYTES NFR BLD AUTO: 12.4 % — SIGNIFICANT CHANGE UP (ref 2–14)
NEUTROPHILS # BLD AUTO: 4.14 K/UL — SIGNIFICANT CHANGE UP (ref 1.8–7.4)
NEUTROPHILS NFR BLD AUTO: 67.5 % — SIGNIFICANT CHANGE UP (ref 43–77)
NRBC # BLD: 0 /100 WBCS — SIGNIFICANT CHANGE UP (ref 0–0)
PLATELET # BLD AUTO: 195 K/UL — SIGNIFICANT CHANGE UP (ref 150–400)
RBC # BLD: 3.87 M/UL — SIGNIFICANT CHANGE UP (ref 3.8–5.2)
RBC # FLD: 15.9 % — HIGH (ref 10.3–14.5)
WBC # BLD: 6.13 K/UL — SIGNIFICANT CHANGE UP (ref 3.8–10.5)
WBC # FLD AUTO: 6.13 K/UL — SIGNIFICANT CHANGE UP (ref 3.8–10.5)

## 2021-11-10 PROCEDURE — 99213 OFFICE O/P EST LOW 20 MIN: CPT

## 2021-11-11 ENCOUNTER — NON-APPOINTMENT (OUTPATIENT)
Age: 76
End: 2021-11-11

## 2021-11-11 NOTE — PHYSICAL EXAM
[Fully active, able to carry on all pre-disease performance without restriction] : Status 0 - Fully active, able to carry on all pre-disease performance without restriction [Normal] : affect appropriate [de-identified] : left lower colostomy

## 2021-11-11 NOTE — HISTORY OF PRESENT ILLNESS
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 \par \par In June 2020 Abi noticed a rectal discharge and burning sensation in the rectum. Was evaluated by Dr. Lamar and ultimately saw Dr. Oropeza who ordered CT Pelvis and MRI Pelvis . Found to have a mass in the rectum s/p biopsy confirmed adenocarcinoma. \par \par 7/18/20: MRI Pelvis: large hypovascular mass c/w mucinous adenoca involving remaining distal rectum and vagina, tumor abuts anal sphincter\par 8/5/20: CT pelvis: residual chronic perineal mass, likely cystic with gas component\par 9/23/20: CT CAP: low density, possibly necrotic mass up to 6.2 cm with involvement of the vagina, nonspecific 6 mm LLL nodule is noted\par 10/3/20: PET/CT: hypermetabolic rectal mass, non specific small inguinal LN, nonspecific hypermetabolism within the colostomy insertion site\par 10/7/20: discussed at rectal TB, plan for DILMA with re-evaluation of response and then surgery if residual disease\par 10/26/20: C1 FOLFOX\par 11/9/20: C2 (c/b acute oxali neurotoxicity)\par 11/23/20: C3 (Oxali over 4 hrs) - tolerated well \par 12/7/20: C4 5FU/LV (developed difficulty swallowing, SOB after receiving Oxali, d/c Oxali for today's tx)\par 12/21-2/10/21: C5-8 FOLFOX (dose reduced Oxali 65 mg/m2 over 4 hrs)\par 2/13/21: CT CAP: 0.6 cm LLL nodule, rectal mass slightly decreased in size, small b/l inguinal LN minimally decr in size, pneumatosis is noted involving SB possible fistula \par 2/25-3/23/21: Xeloda/RT \par 4/21/21: CT CAP: mild interval decrease in size of rectal mass, stable pulm nodule \par 5/26-7/7/21:C1-C4 5FU/LV maintenance \par 7/14/21: CTCAP: stable lung nodules + further decrease in size of rectal mass/fluid collection\par 7/21-10/13/21: C5-C11\par 10/7/21: CT CAP revealed stable disease. \par 10/27-11/10/21: C12-C13   [de-identified] : adenocarcinoma [de-identified] : Patient was seen at the infusion room while receiving treatment. She continued to report adequate appetite, stable weight and normal bowel movements (no issues with colostomy). The intermittent numbness/tingling within the fingertips of both and toes of both feet remains unchanged, she is able to complete her ADLs independently with minimal to no restrictions.

## 2021-11-12 ENCOUNTER — APPOINTMENT (OUTPATIENT)
Dept: INFUSION THERAPY | Facility: HOSPITAL | Age: 76
End: 2021-11-12

## 2021-11-24 ENCOUNTER — APPOINTMENT (OUTPATIENT)
Dept: INFUSION THERAPY | Facility: HOSPITAL | Age: 76
End: 2021-11-24

## 2021-11-24 ENCOUNTER — RESULT REVIEW (OUTPATIENT)
Age: 76
End: 2021-11-24

## 2021-11-24 ENCOUNTER — LABORATORY RESULT (OUTPATIENT)
Age: 76
End: 2021-11-24

## 2021-11-24 ENCOUNTER — OUTPATIENT (OUTPATIENT)
Dept: OUTPATIENT SERVICES | Facility: HOSPITAL | Age: 76
LOS: 1 days | Discharge: HOME | End: 2021-11-24

## 2021-11-24 DIAGNOSIS — R11.2 NAUSEA WITH VOMITING, UNSPECIFIED: ICD-10-CM

## 2021-11-24 DIAGNOSIS — C20 MALIGNANT NEOPLASM OF RECTUM: ICD-10-CM

## 2021-11-24 DIAGNOSIS — Z93.2 ILEOSTOMY STATUS: Chronic | ICD-10-CM

## 2021-11-24 DIAGNOSIS — Z51.11 ENCOUNTER FOR ANTINEOPLASTIC CHEMOTHERAPY: ICD-10-CM

## 2021-11-24 DIAGNOSIS — Z90.710 ACQUIRED ABSENCE OF BOTH CERVIX AND UTERUS: Chronic | ICD-10-CM

## 2021-11-24 LAB
BASOPHILS # BLD AUTO: 0.05 K/UL — SIGNIFICANT CHANGE UP (ref 0–0.2)
BASOPHILS NFR BLD AUTO: 0.7 % — SIGNIFICANT CHANGE UP (ref 0–2)
EOSINOPHIL # BLD AUTO: 0.08 K/UL — SIGNIFICANT CHANGE UP (ref 0–0.5)
EOSINOPHIL NFR BLD AUTO: 1.2 % — SIGNIFICANT CHANGE UP (ref 0–6)
HCT VFR BLD CALC: 37 % — SIGNIFICANT CHANGE UP (ref 34.5–45)
HGB BLD-MCNC: 11.9 G/DL — SIGNIFICANT CHANGE UP (ref 11.5–15.5)
IMM GRANULOCYTES NFR BLD AUTO: 0.6 % — SIGNIFICANT CHANGE UP (ref 0–1.5)
LYMPHOCYTES # BLD AUTO: 1.41 K/UL — SIGNIFICANT CHANGE UP (ref 1–3.3)
LYMPHOCYTES # BLD AUTO: 20.9 % — SIGNIFICANT CHANGE UP (ref 13–44)
MCHC RBC-ENTMCNC: 30.4 PG — SIGNIFICANT CHANGE UP (ref 27–34)
MCHC RBC-ENTMCNC: 32.2 G/DL — SIGNIFICANT CHANGE UP (ref 32–36)
MCV RBC AUTO: 94.6 FL — SIGNIFICANT CHANGE UP (ref 80–100)
MONOCYTES # BLD AUTO: 0.95 K/UL — HIGH (ref 0–0.9)
MONOCYTES NFR BLD AUTO: 14.1 % — HIGH (ref 2–14)
NEUTROPHILS # BLD AUTO: 4.21 K/UL — SIGNIFICANT CHANGE UP (ref 1.8–7.4)
NEUTROPHILS NFR BLD AUTO: 62.5 % — SIGNIFICANT CHANGE UP (ref 43–77)
NRBC # BLD: 0 /100 WBCS — SIGNIFICANT CHANGE UP (ref 0–0)
PLATELET # BLD AUTO: 218 K/UL — SIGNIFICANT CHANGE UP (ref 150–400)
RBC # BLD: 3.91 M/UL — SIGNIFICANT CHANGE UP (ref 3.8–5.2)
RBC # FLD: 16.2 % — HIGH (ref 10.3–14.5)
WBC # BLD: 6.74 K/UL — SIGNIFICANT CHANGE UP (ref 3.8–10.5)
WBC # FLD AUTO: 6.74 K/UL — SIGNIFICANT CHANGE UP (ref 3.8–10.5)

## 2021-11-26 ENCOUNTER — APPOINTMENT (OUTPATIENT)
Dept: INFUSION THERAPY | Facility: HOSPITAL | Age: 76
End: 2021-11-26

## 2021-12-08 ENCOUNTER — LABORATORY RESULT (OUTPATIENT)
Age: 76
End: 2021-12-08

## 2021-12-08 ENCOUNTER — RESULT REVIEW (OUTPATIENT)
Age: 76
End: 2021-12-08

## 2021-12-08 ENCOUNTER — APPOINTMENT (OUTPATIENT)
Dept: INFUSION THERAPY | Facility: HOSPITAL | Age: 76
End: 2021-12-08

## 2021-12-08 LAB
BASOPHILS # BLD AUTO: 0.03 K/UL — SIGNIFICANT CHANGE UP (ref 0–0.2)
BASOPHILS NFR BLD AUTO: 0.5 % — SIGNIFICANT CHANGE UP (ref 0–2)
EOSINOPHIL # BLD AUTO: 0.06 K/UL — SIGNIFICANT CHANGE UP (ref 0–0.5)
EOSINOPHIL NFR BLD AUTO: 1 % — SIGNIFICANT CHANGE UP (ref 0–6)
HCT VFR BLD CALC: 34.2 % — LOW (ref 34.5–45)
HGB BLD-MCNC: 11 G/DL — LOW (ref 11.5–15.5)
IMM GRANULOCYTES NFR BLD AUTO: 0.5 % — SIGNIFICANT CHANGE UP (ref 0–1.5)
LYMPHOCYTES # BLD AUTO: 1.03 K/UL — SIGNIFICANT CHANGE UP (ref 1–3.3)
LYMPHOCYTES # BLD AUTO: 17.9 % — SIGNIFICANT CHANGE UP (ref 13–44)
MCHC RBC-ENTMCNC: 30.1 PG — SIGNIFICANT CHANGE UP (ref 27–34)
MCHC RBC-ENTMCNC: 32.2 G/DL — SIGNIFICANT CHANGE UP (ref 32–36)
MCV RBC AUTO: 93.7 FL — SIGNIFICANT CHANGE UP (ref 80–100)
MONOCYTES # BLD AUTO: 0.67 K/UL — SIGNIFICANT CHANGE UP (ref 0–0.9)
MONOCYTES NFR BLD AUTO: 11.6 % — SIGNIFICANT CHANGE UP (ref 2–14)
NEUTROPHILS # BLD AUTO: 3.95 K/UL — SIGNIFICANT CHANGE UP (ref 1.8–7.4)
NEUTROPHILS NFR BLD AUTO: 68.5 % — SIGNIFICANT CHANGE UP (ref 43–77)
NRBC # BLD: 0 /100 WBCS — SIGNIFICANT CHANGE UP (ref 0–0)
PLATELET # BLD AUTO: 201 K/UL — SIGNIFICANT CHANGE UP (ref 150–400)
RBC # BLD: 3.65 M/UL — LOW (ref 3.8–5.2)
RBC # FLD: 16 % — HIGH (ref 10.3–14.5)
WBC # BLD: 5.77 K/UL — SIGNIFICANT CHANGE UP (ref 3.8–10.5)
WBC # FLD AUTO: 5.77 K/UL — SIGNIFICANT CHANGE UP (ref 3.8–10.5)

## 2021-12-10 ENCOUNTER — APPOINTMENT (OUTPATIENT)
Dept: INFUSION THERAPY | Facility: HOSPITAL | Age: 76
End: 2021-12-10

## 2021-12-13 ENCOUNTER — APPOINTMENT (OUTPATIENT)
Dept: HEMATOLOGY ONCOLOGY | Facility: CLINIC | Age: 76
End: 2021-12-13
Payer: MEDICARE

## 2021-12-13 VITALS
OXYGEN SATURATION: 99 % | DIASTOLIC BLOOD PRESSURE: 78 MMHG | TEMPERATURE: 96.6 F | WEIGHT: 130.51 LBS | BODY MASS INDEX: 23.1 KG/M2 | SYSTOLIC BLOOD PRESSURE: 122 MMHG | RESPIRATION RATE: 17 BRPM | HEART RATE: 63 BPM

## 2021-12-13 PROCEDURE — 99213 OFFICE O/P EST LOW 20 MIN: CPT

## 2021-12-14 ENCOUNTER — RESULT REVIEW (OUTPATIENT)
Age: 76
End: 2021-12-14

## 2021-12-21 ENCOUNTER — RESULT REVIEW (OUTPATIENT)
Age: 76
End: 2021-12-21

## 2021-12-21 ENCOUNTER — APPOINTMENT (OUTPATIENT)
Dept: INFUSION THERAPY | Facility: HOSPITAL | Age: 76
End: 2021-12-21

## 2021-12-21 LAB
ALBUMIN SERPL ELPH-MCNC: 4.2 G/DL — SIGNIFICANT CHANGE UP (ref 3.3–5)
ALP SERPL-CCNC: 59 U/L — SIGNIFICANT CHANGE UP (ref 40–120)
ALT FLD-CCNC: 10 U/L — SIGNIFICANT CHANGE UP (ref 10–45)
ANION GAP SERPL CALC-SCNC: 11 MMOL/L — SIGNIFICANT CHANGE UP (ref 5–17)
AST SERPL-CCNC: 14 U/L — SIGNIFICANT CHANGE UP (ref 10–40)
BASOPHILS # BLD AUTO: 0.03 K/UL — SIGNIFICANT CHANGE UP (ref 0–0.2)
BASOPHILS NFR BLD AUTO: 0.5 % — SIGNIFICANT CHANGE UP (ref 0–2)
BILIRUB SERPL-MCNC: 0.4 MG/DL — SIGNIFICANT CHANGE UP (ref 0.2–1.2)
BUN SERPL-MCNC: 18 MG/DL — SIGNIFICANT CHANGE UP (ref 7–23)
CALCIUM SERPL-MCNC: 9.3 MG/DL — SIGNIFICANT CHANGE UP (ref 8.4–10.5)
CHLORIDE SERPL-SCNC: 106 MMOL/L — SIGNIFICANT CHANGE UP (ref 96–108)
CO2 SERPL-SCNC: 24 MMOL/L — SIGNIFICANT CHANGE UP (ref 22–31)
CREAT SERPL-MCNC: 0.87 MG/DL — SIGNIFICANT CHANGE UP (ref 0.5–1.3)
EOSINOPHIL # BLD AUTO: 0.08 K/UL — SIGNIFICANT CHANGE UP (ref 0–0.5)
EOSINOPHIL NFR BLD AUTO: 1.4 % — SIGNIFICANT CHANGE UP (ref 0–6)
GLUCOSE SERPL-MCNC: 88 MG/DL — SIGNIFICANT CHANGE UP (ref 70–99)
HCT VFR BLD CALC: 35.4 % — SIGNIFICANT CHANGE UP (ref 34.5–45)
HGB BLD-MCNC: 11.3 G/DL — LOW (ref 11.5–15.5)
IMM GRANULOCYTES NFR BLD AUTO: 0.5 % — SIGNIFICANT CHANGE UP (ref 0–1.5)
LYMPHOCYTES # BLD AUTO: 1.02 K/UL — SIGNIFICANT CHANGE UP (ref 1–3.3)
LYMPHOCYTES # BLD AUTO: 18.1 % — SIGNIFICANT CHANGE UP (ref 13–44)
MCHC RBC-ENTMCNC: 30.1 PG — SIGNIFICANT CHANGE UP (ref 27–34)
MCHC RBC-ENTMCNC: 31.9 G/DL — LOW (ref 32–36)
MCV RBC AUTO: 94.4 FL — SIGNIFICANT CHANGE UP (ref 80–100)
MONOCYTES # BLD AUTO: 0.65 K/UL — SIGNIFICANT CHANGE UP (ref 0–0.9)
MONOCYTES NFR BLD AUTO: 11.5 % — SIGNIFICANT CHANGE UP (ref 2–14)
NEUTROPHILS # BLD AUTO: 3.84 K/UL — SIGNIFICANT CHANGE UP (ref 1.8–7.4)
NEUTROPHILS NFR BLD AUTO: 68 % — SIGNIFICANT CHANGE UP (ref 43–77)
NRBC # BLD: 0 /100 WBCS — SIGNIFICANT CHANGE UP (ref 0–0)
PLATELET # BLD AUTO: 219 K/UL — SIGNIFICANT CHANGE UP (ref 150–400)
POTASSIUM SERPL-MCNC: 4 MMOL/L — SIGNIFICANT CHANGE UP (ref 3.5–5.3)
POTASSIUM SERPL-SCNC: 4 MMOL/L — SIGNIFICANT CHANGE UP (ref 3.5–5.3)
PROT SERPL-MCNC: 5.9 G/DL — LOW (ref 6–8.3)
RBC # BLD: 3.75 M/UL — LOW (ref 3.8–5.2)
RBC # FLD: 15.9 % — HIGH (ref 10.3–14.5)
SODIUM SERPL-SCNC: 140 MMOL/L — SIGNIFICANT CHANGE UP (ref 135–145)
WBC # BLD: 5.65 K/UL — SIGNIFICANT CHANGE UP (ref 3.8–10.5)
WBC # FLD AUTO: 5.65 K/UL — SIGNIFICANT CHANGE UP (ref 3.8–10.5)

## 2021-12-22 ENCOUNTER — NON-APPOINTMENT (OUTPATIENT)
Age: 76
End: 2021-12-22

## 2021-12-23 ENCOUNTER — APPOINTMENT (OUTPATIENT)
Dept: INFUSION THERAPY | Facility: HOSPITAL | Age: 76
End: 2021-12-23

## 2021-12-24 NOTE — REASON FOR VISIT
[Follow-Up Visit] : a follow-up [Spouse] : spouse [FreeTextEntry2] : Locally advanced rectal cancer, unresectable

## 2021-12-24 NOTE — HISTORY OF PRESENT ILLNESS
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 \par \par In June 2020 Abi noticed a rectal discharge and burning sensation in the rectum. Was evaluated by Dr. Lamar and ultimately saw Dr. Oropeza who ordered CT Pelvis and MRI Pelvis . Found to have a mass in the rectum s/p biopsy confirmed adenocarcinoma. \par \par 7/18/20: MRI Pelvis: large hypovascular mass c/w mucinous adenoca involving remaining distal rectum and vagina, tumor abuts anal sphincter\par 8/5/20: CT pelvis: residual chronic perineal mass, likely cystic with gas component\par 9/23/20: CT CAP: low density, possibly necrotic mass up to 6.2 cm with involvement of the vagina, nonspecific 6 mm LLL nodule is noted\par 10/3/20: PET/CT: hypermetabolic rectal mass, non specific small inguinal LN, nonspecific hypermetabolism within the colostomy insertion site\par 10/7/20: discussed at rectal TB, plan for DILMA with re-evaluation of response and then surgery if residual disease\par 10/26/20: C1 FOLFOX\par 11/9/20: C2 (c/b acute oxali neurotoxicity)\par 11/23/20: C3 (Oxali over 4 hrs) - tolerated well \par 12/7/20: C4 5FU/LV (developed difficulty swallowing, SOB after receiving Oxali, d/c Oxali for today's tx)\par 12/21-2/10/21: C5-8 FOLFOX (dose reduced Oxali 65 mg/m2 over 4 hrs)\par 2/13/21: CT CAP: 0.6 cm LLL nodule, rectal mass slightly decreased in size, small b/l inguinal LN minimally decr in size, pneumatosis is noted involving SB possible fistula \par 2/25-3/23/21: Xeloda/RT \par 4/21/21: CT CAP: mild interval decrease in size of rectal mass, stable pulm nodule \par 5/1/21: case reviewed at TB, due to disease involving SB and need for skin flap to repear large defect, surgery is not feasible. Recommendation to cont with palliative chemo. \par 5/26/21-: maintenance 5FU/LV\par 7/14/21: CT CAP: improved/stable disease [de-identified] : adenocarcinoma [de-identified] : overall feels well. denies any c/o. good appetite. weight is stable. denies any diarrhea/constipation.

## 2022-01-01 ENCOUNTER — APPOINTMENT (OUTPATIENT)
Dept: INFUSION THERAPY | Facility: HOSPITAL | Age: 77
End: 2022-01-01

## 2022-01-01 ENCOUNTER — RESULT REVIEW (OUTPATIENT)
Age: 77
End: 2022-01-01

## 2022-01-01 ENCOUNTER — APPOINTMENT (OUTPATIENT)
Dept: HEMATOLOGY ONCOLOGY | Facility: CLINIC | Age: 77
End: 2022-01-01

## 2022-01-01 ENCOUNTER — APPOINTMENT (OUTPATIENT)
Dept: HEMATOLOGY ONCOLOGY | Facility: CLINIC | Age: 77
End: 2022-01-01
Payer: MEDICARE

## 2022-01-01 ENCOUNTER — OUTPATIENT (OUTPATIENT)
Dept: OUTPATIENT SERVICES | Facility: HOSPITAL | Age: 77
LOS: 1 days | Discharge: HOME | End: 2022-01-01

## 2022-01-01 ENCOUNTER — NON-APPOINTMENT (OUTPATIENT)
Age: 77
End: 2022-01-01

## 2022-01-01 ENCOUNTER — APPOINTMENT (OUTPATIENT)
Dept: CARDIOLOGY | Facility: CLINIC | Age: 77
End: 2022-01-01
Payer: MEDICARE

## 2022-01-01 ENCOUNTER — APPOINTMENT (OUTPATIENT)
Dept: CT IMAGING | Facility: CLINIC | Age: 77
End: 2022-01-01

## 2022-01-01 ENCOUNTER — APPOINTMENT (OUTPATIENT)
Dept: UROLOGY | Facility: CLINIC | Age: 77
End: 2022-01-01

## 2022-01-01 VITALS
OXYGEN SATURATION: 100 % | HEART RATE: 82 BPM | SYSTOLIC BLOOD PRESSURE: 112 MMHG | TEMPERATURE: 97.1 F | WEIGHT: 129.41 LBS | DIASTOLIC BLOOD PRESSURE: 66 MMHG | BODY MASS INDEX: 22.93 KG/M2 | RESPIRATION RATE: 16 BRPM | HEIGHT: 63 IN

## 2022-01-01 VITALS
RESPIRATION RATE: 16 BRPM | DIASTOLIC BLOOD PRESSURE: 66 MMHG | OXYGEN SATURATION: 99 % | HEIGHT: 63.03 IN | BODY MASS INDEX: 22.73 KG/M2 | HEART RATE: 75 BPM | WEIGHT: 128.31 LBS | TEMPERATURE: 97.2 F | SYSTOLIC BLOOD PRESSURE: 113 MMHG

## 2022-01-01 VITALS
RESPIRATION RATE: 16 BRPM | SYSTOLIC BLOOD PRESSURE: 109 MMHG | BODY MASS INDEX: 21.09 KG/M2 | WEIGHT: 119.05 LBS | HEIGHT: 63 IN | TEMPERATURE: 97 F | OXYGEN SATURATION: 100 % | HEART RATE: 89 BPM | DIASTOLIC BLOOD PRESSURE: 63 MMHG

## 2022-01-01 DIAGNOSIS — Z93.2 ILEOSTOMY STATUS: Chronic | ICD-10-CM

## 2022-01-01 DIAGNOSIS — Z90.710 ACQUIRED ABSENCE OF BOTH CERVIX AND UTERUS: Chronic | ICD-10-CM

## 2022-01-01 DIAGNOSIS — R11.2 NAUSEA WITH VOMITING, UNSPECIFIED: ICD-10-CM

## 2022-01-01 DIAGNOSIS — Z51.11 ENCOUNTER FOR ANTINEOPLASTIC CHEMOTHERAPY: ICD-10-CM

## 2022-01-01 DIAGNOSIS — C20 MALIGNANT NEOPLASM OF RECTUM: ICD-10-CM

## 2022-01-01 DIAGNOSIS — E86.0 DEHYDRATION: ICD-10-CM

## 2022-01-01 DIAGNOSIS — G60.8 OTHER HEREDITARY AND IDIOPATHIC NEUROPATHIES: ICD-10-CM

## 2022-01-01 DIAGNOSIS — E83.42 HYPOMAGNESEMIA: ICD-10-CM

## 2022-01-01 DIAGNOSIS — E53.8 DEFICIENCY OF OTHER SPECIFIED B GROUP VITAMINS: ICD-10-CM

## 2022-01-01 LAB
24R-OH-CALCIDIOL SERPL-MCNC: 49.4 NG/ML — SIGNIFICANT CHANGE UP (ref 30–80)
A1C WITH ESTIMATED AVERAGE GLUCOSE RESULT: 5.5 % — SIGNIFICANT CHANGE UP (ref 4–5.6)
ALBUMIN SERPL ELPH-MCNC: 3.6 G/DL — SIGNIFICANT CHANGE UP (ref 3.3–5)
ALBUMIN SERPL ELPH-MCNC: 3.7 G/DL — SIGNIFICANT CHANGE UP (ref 3.3–5)
ALBUMIN SERPL ELPH-MCNC: 3.7 G/DL — SIGNIFICANT CHANGE UP (ref 3.3–5)
ALBUMIN SERPL ELPH-MCNC: 3.8 G/DL — SIGNIFICANT CHANGE UP (ref 3.3–5)
ALBUMIN SERPL ELPH-MCNC: 3.9 G/DL — SIGNIFICANT CHANGE UP (ref 3.3–5)
ALBUMIN SERPL ELPH-MCNC: 4 G/DL — SIGNIFICANT CHANGE UP (ref 3.3–5)
ALBUMIN SERPL ELPH-MCNC: 4.1 G/DL — SIGNIFICANT CHANGE UP (ref 3.3–5)
ALBUMIN SERPL ELPH-MCNC: 4.1 G/DL — SIGNIFICANT CHANGE UP (ref 3.3–5)
ALBUMIN SERPL ELPH-MCNC: 4.2 G/DL — SIGNIFICANT CHANGE UP (ref 3.3–5)
ALBUMIN SERPL ELPH-MCNC: 4.3 G/DL — SIGNIFICANT CHANGE UP (ref 3.3–5)
ALBUMIN SERPL ELPH-MCNC: 4.5 G/DL — SIGNIFICANT CHANGE UP (ref 3.3–5)
ALP SERPL-CCNC: 55 U/L — SIGNIFICANT CHANGE UP (ref 40–120)
ALP SERPL-CCNC: 55 U/L — SIGNIFICANT CHANGE UP (ref 40–120)
ALP SERPL-CCNC: 57 U/L — SIGNIFICANT CHANGE UP (ref 40–120)
ALP SERPL-CCNC: 58 U/L — SIGNIFICANT CHANGE UP (ref 40–120)
ALP SERPL-CCNC: 60 U/L — SIGNIFICANT CHANGE UP (ref 40–120)
ALP SERPL-CCNC: 61 U/L — SIGNIFICANT CHANGE UP (ref 40–120)
ALP SERPL-CCNC: 62 U/L — SIGNIFICANT CHANGE UP (ref 40–120)
ALP SERPL-CCNC: 66 U/L — SIGNIFICANT CHANGE UP (ref 40–120)
ALP SERPL-CCNC: 66 U/L — SIGNIFICANT CHANGE UP (ref 40–120)
ALP SERPL-CCNC: 67 U/L — SIGNIFICANT CHANGE UP (ref 40–120)
ALP SERPL-CCNC: 68 U/L — SIGNIFICANT CHANGE UP (ref 40–120)
ALT FLD-CCNC: 10 U/L — SIGNIFICANT CHANGE UP (ref 10–45)
ALT FLD-CCNC: 5 U/L — LOW (ref 10–45)
ALT FLD-CCNC: 5 U/L — LOW (ref 10–45)
ALT FLD-CCNC: 6 U/L — LOW (ref 10–45)
ALT FLD-CCNC: 8 U/L — LOW (ref 10–45)
ALT FLD-CCNC: 9 U/L — LOW (ref 10–45)
ALT FLD-CCNC: <5 U/L — LOW (ref 10–45)
ANION GAP SERPL CALC-SCNC: 10 MMOL/L — SIGNIFICANT CHANGE UP (ref 5–17)
ANION GAP SERPL CALC-SCNC: 11 MMOL/L — SIGNIFICANT CHANGE UP (ref 5–17)
ANION GAP SERPL CALC-SCNC: 12 MMOL/L — SIGNIFICANT CHANGE UP (ref 5–17)
ANION GAP SERPL CALC-SCNC: 13 MMOL/L — SIGNIFICANT CHANGE UP (ref 5–17)
ANION GAP SERPL CALC-SCNC: 14 MMOL/L
ANION GAP SERPL CALC-SCNC: 14 MMOL/L — SIGNIFICANT CHANGE UP (ref 5–17)
ANION GAP SERPL CALC-SCNC: 15 MMOL/L — SIGNIFICANT CHANGE UP (ref 5–17)
ANION GAP SERPL CALC-SCNC: 15 MMOL/L — SIGNIFICANT CHANGE UP (ref 5–17)
APPEARANCE UR: CLEAR — SIGNIFICANT CHANGE UP
AST SERPL-CCNC: 11 U/L — SIGNIFICANT CHANGE UP (ref 10–40)
AST SERPL-CCNC: 12 U/L — SIGNIFICANT CHANGE UP (ref 10–40)
AST SERPL-CCNC: 13 U/L — SIGNIFICANT CHANGE UP (ref 10–40)
AST SERPL-CCNC: 14 U/L — SIGNIFICANT CHANGE UP (ref 10–40)
AST SERPL-CCNC: 15 U/L — SIGNIFICANT CHANGE UP (ref 10–40)
AST SERPL-CCNC: 16 U/L — SIGNIFICANT CHANGE UP (ref 10–40)
AST SERPL-CCNC: 16 U/L — SIGNIFICANT CHANGE UP (ref 10–40)
AST SERPL-CCNC: 9 U/L — LOW (ref 10–40)
BACTERIA # UR AUTO: ABNORMAL
BASOPHILS # BLD AUTO: 0.02 K/UL — SIGNIFICANT CHANGE UP (ref 0–0.2)
BASOPHILS # BLD AUTO: 0.03 K/UL — SIGNIFICANT CHANGE UP (ref 0–0.2)
BASOPHILS # BLD AUTO: 0.04 K/UL — SIGNIFICANT CHANGE UP (ref 0–0.2)
BASOPHILS # BLD AUTO: 0.05 K/UL — SIGNIFICANT CHANGE UP (ref 0–0.2)
BASOPHILS # BLD AUTO: 0.06 K/UL — SIGNIFICANT CHANGE UP (ref 0–0.2)
BASOPHILS NFR BLD AUTO: 0.3 % — SIGNIFICANT CHANGE UP (ref 0–2)
BASOPHILS NFR BLD AUTO: 0.4 % — SIGNIFICANT CHANGE UP (ref 0–2)
BASOPHILS NFR BLD AUTO: 0.5 % — SIGNIFICANT CHANGE UP (ref 0–2)
BASOPHILS NFR BLD AUTO: 0.6 % — SIGNIFICANT CHANGE UP (ref 0–2)
BASOPHILS NFR BLD AUTO: 0.6 % — SIGNIFICANT CHANGE UP (ref 0–2)
BASOPHILS NFR BLD AUTO: 0.7 % — SIGNIFICANT CHANGE UP (ref 0–2)
BASOPHILS NFR BLD AUTO: 0.7 % — SIGNIFICANT CHANGE UP (ref 0–2)
BASOPHILS NFR BLD AUTO: 0.9 % — SIGNIFICANT CHANGE UP (ref 0–2)
BILIRUB SERPL-MCNC: 0.2 MG/DL — SIGNIFICANT CHANGE UP (ref 0.2–1.2)
BILIRUB SERPL-MCNC: 0.3 MG/DL — SIGNIFICANT CHANGE UP (ref 0.2–1.2)
BILIRUB SERPL-MCNC: 0.4 MG/DL — SIGNIFICANT CHANGE UP (ref 0.2–1.2)
BILIRUB UR-MCNC: NEGATIVE — SIGNIFICANT CHANGE UP
BUN SERPL-MCNC: 15 MG/DL — SIGNIFICANT CHANGE UP (ref 7–23)
BUN SERPL-MCNC: 16 MG/DL — SIGNIFICANT CHANGE UP (ref 7–23)
BUN SERPL-MCNC: 17 MG/DL — SIGNIFICANT CHANGE UP (ref 7–23)
BUN SERPL-MCNC: 17 MG/DL — SIGNIFICANT CHANGE UP (ref 7–23)
BUN SERPL-MCNC: 18 MG/DL — SIGNIFICANT CHANGE UP (ref 7–23)
BUN SERPL-MCNC: 19 MG/DL — SIGNIFICANT CHANGE UP (ref 7–23)
BUN SERPL-MCNC: 19 MG/DL — SIGNIFICANT CHANGE UP (ref 7–23)
BUN SERPL-MCNC: 20 MG/DL — SIGNIFICANT CHANGE UP (ref 7–23)
BUN SERPL-MCNC: 21 MG/DL
BUN SERPL-MCNC: 21 MG/DL — SIGNIFICANT CHANGE UP (ref 7–23)
BUN SERPL-MCNC: 21 MG/DL — SIGNIFICANT CHANGE UP (ref 7–23)
BUN SERPL-MCNC: 26 MG/DL — HIGH (ref 7–23)
BUN SERPL-MCNC: 26 MG/DL — HIGH (ref 7–23)
CALCIUM SERPL-MCNC: 9 MG/DL — SIGNIFICANT CHANGE UP (ref 8.4–10.5)
CALCIUM SERPL-MCNC: 9.1 MG/DL — SIGNIFICANT CHANGE UP (ref 8.4–10.5)
CALCIUM SERPL-MCNC: 9.2 MG/DL — SIGNIFICANT CHANGE UP (ref 8.4–10.5)
CALCIUM SERPL-MCNC: 9.3 MG/DL — SIGNIFICANT CHANGE UP (ref 8.4–10.5)
CALCIUM SERPL-MCNC: 9.4 MG/DL — SIGNIFICANT CHANGE UP (ref 8.4–10.5)
CALCIUM SERPL-MCNC: 9.5 MG/DL — SIGNIFICANT CHANGE UP (ref 8.4–10.5)
CALCIUM SERPL-MCNC: 9.6 MG/DL
CALCIUM SERPL-MCNC: 9.6 MG/DL — SIGNIFICANT CHANGE UP (ref 8.4–10.5)
CALCIUM SERPL-MCNC: 9.7 MG/DL — SIGNIFICANT CHANGE UP (ref 8.4–10.5)
CALCIUM SERPL-MCNC: 9.8 MG/DL — SIGNIFICANT CHANGE UP (ref 8.4–10.5)
CEA SERPL-MCNC: 11.8 NG/ML
CEA SERPL-MCNC: 15.1 NG/ML — HIGH (ref 0–3.8)
CEA SERPL-MCNC: 9.2 NG/ML — HIGH (ref 0–3.8)
CHLORIDE SERPL-SCNC: 101 MMOL/L — SIGNIFICANT CHANGE UP (ref 96–108)
CHLORIDE SERPL-SCNC: 102 MMOL/L — SIGNIFICANT CHANGE UP (ref 96–108)
CHLORIDE SERPL-SCNC: 103 MMOL/L — SIGNIFICANT CHANGE UP (ref 96–108)
CHLORIDE SERPL-SCNC: 104 MMOL/L
CHLORIDE SERPL-SCNC: 104 MMOL/L — SIGNIFICANT CHANGE UP (ref 96–108)
CHLORIDE SERPL-SCNC: 105 MMOL/L — SIGNIFICANT CHANGE UP (ref 96–108)
CHLORIDE SERPL-SCNC: 106 MMOL/L — SIGNIFICANT CHANGE UP (ref 96–108)
CHLORIDE SERPL-SCNC: 107 MMOL/L — SIGNIFICANT CHANGE UP (ref 96–108)
CO2 SERPL-SCNC: 21 MMOL/L
CO2 SERPL-SCNC: 21 MMOL/L — LOW (ref 22–31)
CO2 SERPL-SCNC: 21 MMOL/L — LOW (ref 22–31)
CO2 SERPL-SCNC: 22 MMOL/L — SIGNIFICANT CHANGE UP (ref 22–31)
CO2 SERPL-SCNC: 23 MMOL/L — SIGNIFICANT CHANGE UP (ref 22–31)
CO2 SERPL-SCNC: 24 MMOL/L — SIGNIFICANT CHANGE UP (ref 22–31)
CO2 SERPL-SCNC: 25 MMOL/L — SIGNIFICANT CHANGE UP (ref 22–31)
CO2 SERPL-SCNC: 26 MMOL/L — SIGNIFICANT CHANGE UP (ref 22–31)
COLOR SPEC: YELLOW — SIGNIFICANT CHANGE UP
CREAT SERPL-MCNC: 0.81 MG/DL — SIGNIFICANT CHANGE UP (ref 0.5–1.3)
CREAT SERPL-MCNC: 0.84 MG/DL — SIGNIFICANT CHANGE UP (ref 0.5–1.3)
CREAT SERPL-MCNC: 0.85 MG/DL — SIGNIFICANT CHANGE UP (ref 0.5–1.3)
CREAT SERPL-MCNC: 0.86 MG/DL — SIGNIFICANT CHANGE UP (ref 0.5–1.3)
CREAT SERPL-MCNC: 0.86 MG/DL — SIGNIFICANT CHANGE UP (ref 0.5–1.3)
CREAT SERPL-MCNC: 0.87 MG/DL — SIGNIFICANT CHANGE UP (ref 0.5–1.3)
CREAT SERPL-MCNC: 0.88 MG/DL — SIGNIFICANT CHANGE UP (ref 0.5–1.3)
CREAT SERPL-MCNC: 0.89 MG/DL — SIGNIFICANT CHANGE UP (ref 0.5–1.3)
CREAT SERPL-MCNC: 0.89 MG/DL — SIGNIFICANT CHANGE UP (ref 0.5–1.3)
CREAT SERPL-MCNC: 0.9 MG/DL — SIGNIFICANT CHANGE UP (ref 0.5–1.3)
CREAT SERPL-MCNC: 0.92 MG/DL — SIGNIFICANT CHANGE UP (ref 0.5–1.3)
CREAT SERPL-MCNC: 0.92 MG/DL — SIGNIFICANT CHANGE UP (ref 0.5–1.3)
CREAT SERPL-MCNC: 0.93 MG/DL — SIGNIFICANT CHANGE UP (ref 0.5–1.3)
CREAT SERPL-MCNC: 0.93 MG/DL — SIGNIFICANT CHANGE UP (ref 0.5–1.3)
CREAT SERPL-MCNC: 0.94 MG/DL — SIGNIFICANT CHANGE UP (ref 0.5–1.3)
CREAT SERPL-MCNC: 0.94 MG/DL — SIGNIFICANT CHANGE UP (ref 0.5–1.3)
CREAT SERPL-MCNC: 0.96 MG/DL — SIGNIFICANT CHANGE UP (ref 0.5–1.3)
CREAT SERPL-MCNC: 0.99 MG/DL — SIGNIFICANT CHANGE UP (ref 0.5–1.3)
CREAT SERPL-MCNC: 1.01 MG/DL
DIFF PNL FLD: NEGATIVE — SIGNIFICANT CHANGE UP
EGFR: 57 ML/MIN/1.73M2
EGFR: 58 ML/MIN/1.73M2 — LOW
EGFR: 60 ML/MIN/1.73M2 — SIGNIFICANT CHANGE UP
EGFR: 62 ML/MIN/1.73M2 — SIGNIFICANT CHANGE UP
EGFR: 62 ML/MIN/1.73M2 — SIGNIFICANT CHANGE UP
EGFR: 63 ML/MIN/1.73M2 — SIGNIFICANT CHANGE UP
EGFR: 65 ML/MIN/1.73M2 — SIGNIFICANT CHANGE UP
EGFR: 66 ML/MIN/1.73M2 — SIGNIFICANT CHANGE UP
EGFR: 66 ML/MIN/1.73M2 — SIGNIFICANT CHANGE UP
EGFR: 67 ML/MIN/1.73M2 — SIGNIFICANT CHANGE UP
EGFR: 68 ML/MIN/1.73M2 — SIGNIFICANT CHANGE UP
EGFR: 69 ML/MIN/1.73M2 — SIGNIFICANT CHANGE UP
EGFR: 69 ML/MIN/1.73M2 — SIGNIFICANT CHANGE UP
EGFR: 70 ML/MIN/1.73M2 — SIGNIFICANT CHANGE UP
EGFR: 71 ML/MIN/1.73M2 — SIGNIFICANT CHANGE UP
EGFR: 74 ML/MIN/1.73M2 — SIGNIFICANT CHANGE UP
EOSINOPHIL # BLD AUTO: 0.03 K/UL — SIGNIFICANT CHANGE UP (ref 0–0.5)
EOSINOPHIL # BLD AUTO: 0.04 K/UL — SIGNIFICANT CHANGE UP (ref 0–0.5)
EOSINOPHIL # BLD AUTO: 0.04 K/UL — SIGNIFICANT CHANGE UP (ref 0–0.5)
EOSINOPHIL # BLD AUTO: 0.05 K/UL — SIGNIFICANT CHANGE UP (ref 0–0.5)
EOSINOPHIL # BLD AUTO: 0.06 K/UL — SIGNIFICANT CHANGE UP (ref 0–0.5)
EOSINOPHIL # BLD AUTO: 0.08 K/UL — SIGNIFICANT CHANGE UP (ref 0–0.5)
EOSINOPHIL # BLD AUTO: 0.08 K/UL — SIGNIFICANT CHANGE UP (ref 0–0.5)
EOSINOPHIL # BLD AUTO: 0.09 K/UL — SIGNIFICANT CHANGE UP (ref 0–0.5)
EOSINOPHIL # BLD AUTO: 0.11 K/UL — SIGNIFICANT CHANGE UP (ref 0–0.5)
EOSINOPHIL # BLD AUTO: 0.12 K/UL — SIGNIFICANT CHANGE UP (ref 0–0.5)
EOSINOPHIL # BLD AUTO: 0.15 K/UL — SIGNIFICANT CHANGE UP (ref 0–0.5)
EOSINOPHIL NFR BLD AUTO: 0.3 % — SIGNIFICANT CHANGE UP (ref 0–6)
EOSINOPHIL NFR BLD AUTO: 0.3 % — SIGNIFICANT CHANGE UP (ref 0–6)
EOSINOPHIL NFR BLD AUTO: 0.4 % — SIGNIFICANT CHANGE UP (ref 0–6)
EOSINOPHIL NFR BLD AUTO: 0.5 % — SIGNIFICANT CHANGE UP (ref 0–6)
EOSINOPHIL NFR BLD AUTO: 0.7 % — SIGNIFICANT CHANGE UP (ref 0–6)
EOSINOPHIL NFR BLD AUTO: 0.7 % — SIGNIFICANT CHANGE UP (ref 0–6)
EOSINOPHIL NFR BLD AUTO: 0.8 % — SIGNIFICANT CHANGE UP (ref 0–6)
EOSINOPHIL NFR BLD AUTO: 0.9 % — SIGNIFICANT CHANGE UP (ref 0–6)
EOSINOPHIL NFR BLD AUTO: 0.9 % — SIGNIFICANT CHANGE UP (ref 0–6)
EOSINOPHIL NFR BLD AUTO: 1 % — SIGNIFICANT CHANGE UP (ref 0–6)
EOSINOPHIL NFR BLD AUTO: 1.1 % — SIGNIFICANT CHANGE UP (ref 0–6)
EOSINOPHIL NFR BLD AUTO: 1.6 % — SIGNIFICANT CHANGE UP (ref 0–6)
EOSINOPHIL NFR BLD AUTO: 1.8 % — SIGNIFICANT CHANGE UP (ref 0–6)
EOSINOPHIL NFR BLD AUTO: 2.2 % — SIGNIFICANT CHANGE UP (ref 0–6)
EPI CELLS # UR: 0 /HPF — SIGNIFICANT CHANGE UP (ref 0–5)
ESTIMATED AVERAGE GLUCOSE: 111 MG/DL — SIGNIFICANT CHANGE UP (ref 68–114)
FERRITIN SERPL-MCNC: 48 NG/ML — SIGNIFICANT CHANGE UP (ref 15–150)
FULL GENE SEQUENCE RESULT: SIGNIFICANT CHANGE UP
GLUCOSE SERPL-MCNC: 112 MG/DL
GLUCOSE SERPL-MCNC: 115 MG/DL — HIGH (ref 70–99)
GLUCOSE SERPL-MCNC: 85 MG/DL — SIGNIFICANT CHANGE UP (ref 70–99)
GLUCOSE SERPL-MCNC: 87 MG/DL — SIGNIFICANT CHANGE UP (ref 70–99)
GLUCOSE SERPL-MCNC: 88 MG/DL — SIGNIFICANT CHANGE UP (ref 70–99)
GLUCOSE SERPL-MCNC: 88 MG/DL — SIGNIFICANT CHANGE UP (ref 70–99)
GLUCOSE SERPL-MCNC: 90 MG/DL — SIGNIFICANT CHANGE UP (ref 70–99)
GLUCOSE SERPL-MCNC: 90 MG/DL — SIGNIFICANT CHANGE UP (ref 70–99)
GLUCOSE SERPL-MCNC: 91 MG/DL — SIGNIFICANT CHANGE UP (ref 70–99)
GLUCOSE SERPL-MCNC: 91 MG/DL — SIGNIFICANT CHANGE UP (ref 70–99)
GLUCOSE SERPL-MCNC: 93 MG/DL — SIGNIFICANT CHANGE UP (ref 70–99)
GLUCOSE SERPL-MCNC: 94 MG/DL — SIGNIFICANT CHANGE UP (ref 70–99)
GLUCOSE SERPL-MCNC: 94 MG/DL — SIGNIFICANT CHANGE UP (ref 70–99)
GLUCOSE SERPL-MCNC: 95 MG/DL — SIGNIFICANT CHANGE UP (ref 70–99)
GLUCOSE SERPL-MCNC: 96 MG/DL — SIGNIFICANT CHANGE UP (ref 70–99)
GLUCOSE SERPL-MCNC: 96 MG/DL — SIGNIFICANT CHANGE UP (ref 70–99)
GLUCOSE SERPL-MCNC: 98 MG/DL — SIGNIFICANT CHANGE UP (ref 70–99)
GLUCOSE UR QL: NEGATIVE — SIGNIFICANT CHANGE UP
HCT VFR BLD CALC: 30.9 % — LOW (ref 34.5–45)
HCT VFR BLD CALC: 31.2 % — LOW (ref 34.5–45)
HCT VFR BLD CALC: 31.3 % — LOW (ref 34.5–45)
HCT VFR BLD CALC: 31.3 % — LOW (ref 34.5–45)
HCT VFR BLD CALC: 31.4 % — LOW (ref 34.5–45)
HCT VFR BLD CALC: 31.5 % — LOW (ref 34.5–45)
HCT VFR BLD CALC: 31.9 % — LOW (ref 34.5–45)
HCT VFR BLD CALC: 32.5 % — LOW (ref 34.5–45)
HCT VFR BLD CALC: 32.9 % — LOW (ref 34.5–45)
HCT VFR BLD CALC: 33.6 % — LOW (ref 34.5–45)
HCT VFR BLD CALC: 34 % — LOW (ref 34.5–45)
HCT VFR BLD CALC: 34.1 % — LOW (ref 34.5–45)
HCT VFR BLD CALC: 34.2 % — LOW (ref 34.5–45)
HCT VFR BLD CALC: 34.7 % — SIGNIFICANT CHANGE UP (ref 34.5–45)
HCT VFR BLD CALC: 34.9 % — SIGNIFICANT CHANGE UP (ref 34.5–45)
HCT VFR BLD CALC: 35 % — SIGNIFICANT CHANGE UP (ref 34.5–45)
HCT VFR BLD CALC: 37.2 % — SIGNIFICANT CHANGE UP (ref 34.5–45)
HCT VFR BLD CALC: 37.6 % — SIGNIFICANT CHANGE UP (ref 34.5–45)
HGB BLD-MCNC: 10 G/DL — LOW (ref 11.5–15.5)
HGB BLD-MCNC: 10.1 G/DL — LOW (ref 11.5–15.5)
HGB BLD-MCNC: 10.1 G/DL — LOW (ref 11.5–15.5)
HGB BLD-MCNC: 10.3 G/DL — LOW (ref 11.5–15.5)
HGB BLD-MCNC: 10.7 G/DL — LOW (ref 11.5–15.5)
HGB BLD-MCNC: 10.8 G/DL — LOW (ref 11.5–15.5)
HGB BLD-MCNC: 10.8 G/DL — LOW (ref 11.5–15.5)
HGB BLD-MCNC: 10.9 G/DL — LOW (ref 11.5–15.5)
HGB BLD-MCNC: 10.9 G/DL — LOW (ref 11.5–15.5)
HGB BLD-MCNC: 11 G/DL — LOW (ref 11.5–15.5)
HGB BLD-MCNC: 11.3 G/DL — LOW (ref 11.5–15.5)
HGB BLD-MCNC: 11.9 G/DL — SIGNIFICANT CHANGE UP (ref 11.5–15.5)
HGB BLD-MCNC: 12.1 G/DL — SIGNIFICANT CHANGE UP (ref 11.5–15.5)
HGB BLD-MCNC: 9.3 G/DL — LOW (ref 11.5–15.5)
HGB BLD-MCNC: 9.5 G/DL — LOW (ref 11.5–15.5)
HGB BLD-MCNC: 9.8 G/DL — LOW (ref 11.5–15.5)
HGB BLD-MCNC: 9.8 G/DL — LOW (ref 11.5–15.5)
HGB BLD-MCNC: 9.9 G/DL — LOW (ref 11.5–15.5)
HOMOCYSTEINE LEVEL: 23.6 UMOL/L
HYALINE CASTS # UR AUTO: 6 /LPF — SIGNIFICANT CHANGE UP (ref 0–7)
IMM GRANULOCYTES NFR BLD AUTO: 0.4 % — SIGNIFICANT CHANGE UP (ref 0–0.9)
IMM GRANULOCYTES NFR BLD AUTO: 0.4 % — SIGNIFICANT CHANGE UP (ref 0–1.5)
IMM GRANULOCYTES NFR BLD AUTO: 0.4 % — SIGNIFICANT CHANGE UP (ref 0–1.5)
IMM GRANULOCYTES NFR BLD AUTO: 0.5 % — SIGNIFICANT CHANGE UP (ref 0–1.5)
IMM GRANULOCYTES NFR BLD AUTO: 0.6 % — SIGNIFICANT CHANGE UP (ref 0–0.9)
IMM GRANULOCYTES NFR BLD AUTO: 0.6 % — SIGNIFICANT CHANGE UP (ref 0–1.5)
IMM GRANULOCYTES NFR BLD AUTO: 0.6 % — SIGNIFICANT CHANGE UP (ref 0–1.5)
IMM GRANULOCYTES NFR BLD AUTO: 0.7 % — SIGNIFICANT CHANGE UP (ref 0–0.9)
IMM GRANULOCYTES NFR BLD AUTO: 0.7 % — SIGNIFICANT CHANGE UP (ref 0–1.5)
IMM GRANULOCYTES NFR BLD AUTO: 0.8 % — SIGNIFICANT CHANGE UP (ref 0–1.5)
IMM GRANULOCYTES NFR BLD AUTO: 0.9 % — SIGNIFICANT CHANGE UP (ref 0–0.9)
IMM GRANULOCYTES NFR BLD AUTO: 1 % — HIGH (ref 0–0.9)
IRON SATN MFR SERPL: 11 % — LOW (ref 14–50)
IRON SATN MFR SERPL: 36 UG/DL — SIGNIFICANT CHANGE UP (ref 30–160)
KETONES UR-MCNC: NEGATIVE — SIGNIFICANT CHANGE UP
LDH SERPL-CCNC: 162 U/L
LEUKOCYTE ESTERASE UR-ACNC: ABNORMAL
LYMPHOCYTES # BLD AUTO: 0.87 K/UL — LOW (ref 1–3.3)
LYMPHOCYTES # BLD AUTO: 0.88 K/UL — LOW (ref 1–3.3)
LYMPHOCYTES # BLD AUTO: 0.93 K/UL — LOW (ref 1–3.3)
LYMPHOCYTES # BLD AUTO: 0.95 K/UL — LOW (ref 1–3.3)
LYMPHOCYTES # BLD AUTO: 0.97 K/UL — LOW (ref 1–3.3)
LYMPHOCYTES # BLD AUTO: 0.98 K/UL — LOW (ref 1–3.3)
LYMPHOCYTES # BLD AUTO: 0.99 K/UL — LOW (ref 1–3.3)
LYMPHOCYTES # BLD AUTO: 1.03 K/UL — SIGNIFICANT CHANGE UP (ref 1–3.3)
LYMPHOCYTES # BLD AUTO: 1.04 K/UL — SIGNIFICANT CHANGE UP (ref 1–3.3)
LYMPHOCYTES # BLD AUTO: 1.1 K/UL — SIGNIFICANT CHANGE UP (ref 1–3.3)
LYMPHOCYTES # BLD AUTO: 1.1 K/UL — SIGNIFICANT CHANGE UP (ref 1–3.3)
LYMPHOCYTES # BLD AUTO: 1.15 K/UL — SIGNIFICANT CHANGE UP (ref 1–3.3)
LYMPHOCYTES # BLD AUTO: 1.16 K/UL — SIGNIFICANT CHANGE UP (ref 1–3.3)
LYMPHOCYTES # BLD AUTO: 1.2 K/UL — SIGNIFICANT CHANGE UP (ref 1–3.3)
LYMPHOCYTES # BLD AUTO: 1.3 K/UL — SIGNIFICANT CHANGE UP (ref 1–3.3)
LYMPHOCYTES # BLD AUTO: 1.31 K/UL — SIGNIFICANT CHANGE UP (ref 1–3.3)
LYMPHOCYTES # BLD AUTO: 1.32 K/UL — SIGNIFICANT CHANGE UP (ref 1–3.3)
LYMPHOCYTES # BLD AUTO: 1.32 K/UL — SIGNIFICANT CHANGE UP (ref 1–3.3)
LYMPHOCYTES # BLD AUTO: 10.9 % — LOW (ref 13–44)
LYMPHOCYTES # BLD AUTO: 11.1 % — LOW (ref 13–44)
LYMPHOCYTES # BLD AUTO: 11.5 % — LOW (ref 13–44)
LYMPHOCYTES # BLD AUTO: 11.8 % — LOW (ref 13–44)
LYMPHOCYTES # BLD AUTO: 11.9 % — LOW (ref 13–44)
LYMPHOCYTES # BLD AUTO: 12 % — LOW (ref 13–44)
LYMPHOCYTES # BLD AUTO: 12 % — LOW (ref 13–44)
LYMPHOCYTES # BLD AUTO: 12.2 % — LOW (ref 13–44)
LYMPHOCYTES # BLD AUTO: 12.6 % — LOW (ref 13–44)
LYMPHOCYTES # BLD AUTO: 12.7 % — LOW (ref 13–44)
LYMPHOCYTES # BLD AUTO: 13.4 % — SIGNIFICANT CHANGE UP (ref 13–44)
LYMPHOCYTES # BLD AUTO: 14.1 % — SIGNIFICANT CHANGE UP (ref 13–44)
LYMPHOCYTES # BLD AUTO: 15.1 % — SIGNIFICANT CHANGE UP (ref 13–44)
LYMPHOCYTES # BLD AUTO: 16 % — SIGNIFICANT CHANGE UP (ref 13–44)
LYMPHOCYTES # BLD AUTO: 16.3 % — SIGNIFICANT CHANGE UP (ref 13–44)
LYMPHOCYTES # BLD AUTO: 17.8 % — SIGNIFICANT CHANGE UP (ref 13–44)
LYMPHOCYTES # BLD AUTO: 23 % — SIGNIFICANT CHANGE UP (ref 13–44)
LYMPHOCYTES # BLD AUTO: 8.4 % — LOW (ref 13–44)
MAGNESIUM SERPL-MCNC: 1.2 MG/DL
MAGNESIUM SERPL-MCNC: 1.4 MG/DL — LOW (ref 1.6–2.6)
MCHC RBC-ENTMCNC: 26.7 PG — LOW (ref 27–34)
MCHC RBC-ENTMCNC: 27.2 PG — SIGNIFICANT CHANGE UP (ref 27–34)
MCHC RBC-ENTMCNC: 27.2 PG — SIGNIFICANT CHANGE UP (ref 27–34)
MCHC RBC-ENTMCNC: 27.5 PG — SIGNIFICANT CHANGE UP (ref 27–34)
MCHC RBC-ENTMCNC: 27.7 PG — SIGNIFICANT CHANGE UP (ref 27–34)
MCHC RBC-ENTMCNC: 28.2 PG — SIGNIFICANT CHANGE UP (ref 27–34)
MCHC RBC-ENTMCNC: 28.4 PG — SIGNIFICANT CHANGE UP (ref 27–34)
MCHC RBC-ENTMCNC: 28.5 PG — SIGNIFICANT CHANGE UP (ref 27–34)
MCHC RBC-ENTMCNC: 28.9 PG — SIGNIFICANT CHANGE UP (ref 27–34)
MCHC RBC-ENTMCNC: 29 PG — SIGNIFICANT CHANGE UP (ref 27–34)
MCHC RBC-ENTMCNC: 29 PG — SIGNIFICANT CHANGE UP (ref 27–34)
MCHC RBC-ENTMCNC: 29.1 PG — SIGNIFICANT CHANGE UP (ref 27–34)
MCHC RBC-ENTMCNC: 29.2 GM/DL — LOW (ref 32–36)
MCHC RBC-ENTMCNC: 29.5 PG — SIGNIFICANT CHANGE UP (ref 27–34)
MCHC RBC-ENTMCNC: 29.9 PG — SIGNIFICANT CHANGE UP (ref 27–34)
MCHC RBC-ENTMCNC: 30.4 G/DL — LOW (ref 32–36)
MCHC RBC-ENTMCNC: 31.1 G/DL — LOW (ref 32–36)
MCHC RBC-ENTMCNC: 31.1 G/DL — LOW (ref 32–36)
MCHC RBC-ENTMCNC: 31.3 G/DL — LOW (ref 32–36)
MCHC RBC-ENTMCNC: 31.5 G/DL — LOW (ref 32–36)
MCHC RBC-ENTMCNC: 31.6 G/DL — LOW (ref 32–36)
MCHC RBC-ENTMCNC: 31.6 G/DL — LOW (ref 32–36)
MCHC RBC-ENTMCNC: 31.7 G/DL — LOW (ref 32–36)
MCHC RBC-ENTMCNC: 31.7 G/DL — LOW (ref 32–36)
MCHC RBC-ENTMCNC: 31.8 G/DL — LOW (ref 32–36)
MCHC RBC-ENTMCNC: 32 G/DL — SIGNIFICANT CHANGE UP (ref 32–36)
MCHC RBC-ENTMCNC: 32.1 G/DL — SIGNIFICANT CHANGE UP (ref 32–36)
MCHC RBC-ENTMCNC: 32.2 G/DL — SIGNIFICANT CHANGE UP (ref 32–36)
MCHC RBC-ENTMCNC: 32.5 G/DL — SIGNIFICANT CHANGE UP (ref 32–36)
MCHC RBC-ENTMCNC: 32.6 G/DL — SIGNIFICANT CHANGE UP (ref 32–36)
MCV RBC AUTO: 86.8 FL — SIGNIFICANT CHANGE UP (ref 80–100)
MCV RBC AUTO: 86.8 FL — SIGNIFICANT CHANGE UP (ref 80–100)
MCV RBC AUTO: 86.9 FL — SIGNIFICANT CHANGE UP (ref 80–100)
MCV RBC AUTO: 87.6 FL — SIGNIFICANT CHANGE UP (ref 80–100)
MCV RBC AUTO: 87.7 FL — SIGNIFICANT CHANGE UP (ref 80–100)
MCV RBC AUTO: 88 FL — SIGNIFICANT CHANGE UP (ref 80–100)
MCV RBC AUTO: 88.5 FL — SIGNIFICANT CHANGE UP (ref 80–100)
MCV RBC AUTO: 89.2 FL — SIGNIFICANT CHANGE UP (ref 80–100)
MCV RBC AUTO: 89.7 FL — SIGNIFICANT CHANGE UP (ref 80–100)
MCV RBC AUTO: 89.8 FL — SIGNIFICANT CHANGE UP (ref 80–100)
MCV RBC AUTO: 90.7 FL — SIGNIFICANT CHANGE UP (ref 80–100)
MCV RBC AUTO: 90.8 FL — SIGNIFICANT CHANGE UP (ref 80–100)
MCV RBC AUTO: 91.2 FL — SIGNIFICANT CHANGE UP (ref 80–100)
MCV RBC AUTO: 91.6 FL — SIGNIFICANT CHANGE UP (ref 80–100)
MCV RBC AUTO: 91.9 FL — SIGNIFICANT CHANGE UP (ref 80–100)
MCV RBC AUTO: 92.1 FL — SIGNIFICANT CHANGE UP (ref 80–100)
MCV RBC AUTO: 93.3 FL — SIGNIFICANT CHANGE UP (ref 80–100)
MCV RBC AUTO: 93.3 FL — SIGNIFICANT CHANGE UP (ref 80–100)
METHOD:: SIGNIFICANT CHANGE UP
METHYLMALONIC ACID LEVEL: 511 NMOL/L
MOL DX INTERP BLD/T QL: SIGNIFICANT CHANGE UP
MONOCYTES # BLD AUTO: 0.64 K/UL — SIGNIFICANT CHANGE UP (ref 0–0.9)
MONOCYTES # BLD AUTO: 0.69 K/UL — SIGNIFICANT CHANGE UP (ref 0–0.9)
MONOCYTES # BLD AUTO: 0.75 K/UL — SIGNIFICANT CHANGE UP (ref 0–0.9)
MONOCYTES # BLD AUTO: 0.76 K/UL — SIGNIFICANT CHANGE UP (ref 0–0.9)
MONOCYTES # BLD AUTO: 0.77 K/UL — SIGNIFICANT CHANGE UP (ref 0–0.9)
MONOCYTES # BLD AUTO: 0.78 K/UL — SIGNIFICANT CHANGE UP (ref 0–0.9)
MONOCYTES # BLD AUTO: 0.79 K/UL — SIGNIFICANT CHANGE UP (ref 0–0.9)
MONOCYTES # BLD AUTO: 0.82 K/UL — SIGNIFICANT CHANGE UP (ref 0–0.9)
MONOCYTES # BLD AUTO: 0.82 K/UL — SIGNIFICANT CHANGE UP (ref 0–0.9)
MONOCYTES # BLD AUTO: 0.83 K/UL — SIGNIFICANT CHANGE UP (ref 0–0.9)
MONOCYTES # BLD AUTO: 0.88 K/UL — SIGNIFICANT CHANGE UP (ref 0–0.9)
MONOCYTES # BLD AUTO: 0.9 K/UL — SIGNIFICANT CHANGE UP (ref 0–0.9)
MONOCYTES # BLD AUTO: 0.92 K/UL — HIGH (ref 0–0.9)
MONOCYTES # BLD AUTO: 0.95 K/UL — HIGH (ref 0–0.9)
MONOCYTES # BLD AUTO: 0.97 K/UL — HIGH (ref 0–0.9)
MONOCYTES # BLD AUTO: 0.98 K/UL — HIGH (ref 0–0.9)
MONOCYTES # BLD AUTO: 1.1 K/UL — HIGH (ref 0–0.9)
MONOCYTES # BLD AUTO: 1.11 K/UL — HIGH (ref 0–0.9)
MONOCYTES NFR BLD AUTO: 10.7 % — SIGNIFICANT CHANGE UP (ref 2–14)
MONOCYTES NFR BLD AUTO: 11.2 % — SIGNIFICANT CHANGE UP (ref 2–14)
MONOCYTES NFR BLD AUTO: 11.2 % — SIGNIFICANT CHANGE UP (ref 2–14)
MONOCYTES NFR BLD AUTO: 11.8 % — SIGNIFICANT CHANGE UP (ref 2–14)
MONOCYTES NFR BLD AUTO: 12.2 % — SIGNIFICANT CHANGE UP (ref 2–14)
MONOCYTES NFR BLD AUTO: 14.5 % — HIGH (ref 2–14)
MONOCYTES NFR BLD AUTO: 8.3 % — SIGNIFICANT CHANGE UP (ref 2–14)
MONOCYTES NFR BLD AUTO: 8.6 % — SIGNIFICANT CHANGE UP (ref 2–14)
MONOCYTES NFR BLD AUTO: 9 % — SIGNIFICANT CHANGE UP (ref 2–14)
MONOCYTES NFR BLD AUTO: 9 % — SIGNIFICANT CHANGE UP (ref 2–14)
MONOCYTES NFR BLD AUTO: 9.2 % — SIGNIFICANT CHANGE UP (ref 2–14)
MONOCYTES NFR BLD AUTO: 9.4 % — SIGNIFICANT CHANGE UP (ref 2–14)
MONOCYTES NFR BLD AUTO: 9.7 % — SIGNIFICANT CHANGE UP (ref 2–14)
MONOCYTES NFR BLD AUTO: 9.7 % — SIGNIFICANT CHANGE UP (ref 2–14)
MONOCYTES NFR BLD AUTO: 9.8 % — SIGNIFICANT CHANGE UP (ref 2–14)
MONOCYTES NFR BLD AUTO: 9.9 % — SIGNIFICANT CHANGE UP (ref 2–14)
NEUTROPHILS # BLD AUTO: 3.52 K/UL — SIGNIFICANT CHANGE UP (ref 1.8–7.4)
NEUTROPHILS # BLD AUTO: 3.59 K/UL — SIGNIFICANT CHANGE UP (ref 1.8–7.4)
NEUTROPHILS # BLD AUTO: 4.46 K/UL — SIGNIFICANT CHANGE UP (ref 1.8–7.4)
NEUTROPHILS # BLD AUTO: 5.09 K/UL — SIGNIFICANT CHANGE UP (ref 1.8–7.4)
NEUTROPHILS # BLD AUTO: 5.33 K/UL — SIGNIFICANT CHANGE UP (ref 1.8–7.4)
NEUTROPHILS # BLD AUTO: 5.55 K/UL — SIGNIFICANT CHANGE UP (ref 1.8–7.4)
NEUTROPHILS # BLD AUTO: 5.67 K/UL — SIGNIFICANT CHANGE UP (ref 1.8–7.4)
NEUTROPHILS # BLD AUTO: 5.89 K/UL — SIGNIFICANT CHANGE UP (ref 1.8–7.4)
NEUTROPHILS # BLD AUTO: 6.09 K/UL — SIGNIFICANT CHANGE UP (ref 1.8–7.4)
NEUTROPHILS # BLD AUTO: 6.19 K/UL — SIGNIFICANT CHANGE UP (ref 1.8–7.4)
NEUTROPHILS # BLD AUTO: 6.95 K/UL — SIGNIFICANT CHANGE UP (ref 1.8–7.4)
NEUTROPHILS # BLD AUTO: 7.07 K/UL — SIGNIFICANT CHANGE UP (ref 1.8–7.4)
NEUTROPHILS # BLD AUTO: 7.14 K/UL — SIGNIFICANT CHANGE UP (ref 1.8–7.4)
NEUTROPHILS # BLD AUTO: 7.19 K/UL — SIGNIFICANT CHANGE UP (ref 1.8–7.4)
NEUTROPHILS # BLD AUTO: 7.2 K/UL — SIGNIFICANT CHANGE UP (ref 1.8–7.4)
NEUTROPHILS # BLD AUTO: 7.52 K/UL — HIGH (ref 1.8–7.4)
NEUTROPHILS # BLD AUTO: 8.85 K/UL — HIGH (ref 1.8–7.4)
NEUTROPHILS # BLD AUTO: 9.42 K/UL — HIGH (ref 1.8–7.4)
NEUTROPHILS NFR BLD AUTO: 62.3 % — SIGNIFICANT CHANGE UP (ref 43–77)
NEUTROPHILS NFR BLD AUTO: 66.1 % — SIGNIFICANT CHANGE UP (ref 43–77)
NEUTROPHILS NFR BLD AUTO: 69.3 % — SIGNIFICANT CHANGE UP (ref 43–77)
NEUTROPHILS NFR BLD AUTO: 70.8 % — SIGNIFICANT CHANGE UP (ref 43–77)
NEUTROPHILS NFR BLD AUTO: 71.4 % — SIGNIFICANT CHANGE UP (ref 43–77)
NEUTROPHILS NFR BLD AUTO: 71.8 % — SIGNIFICANT CHANGE UP (ref 43–77)
NEUTROPHILS NFR BLD AUTO: 72.8 % — SIGNIFICANT CHANGE UP (ref 43–77)
NEUTROPHILS NFR BLD AUTO: 73.4 % — SIGNIFICANT CHANGE UP (ref 43–77)
NEUTROPHILS NFR BLD AUTO: 75.2 % — SIGNIFICANT CHANGE UP (ref 43–77)
NEUTROPHILS NFR BLD AUTO: 76.9 % — SIGNIFICANT CHANGE UP (ref 43–77)
NEUTROPHILS NFR BLD AUTO: 77.1 % — HIGH (ref 43–77)
NEUTROPHILS NFR BLD AUTO: 77.7 % — HIGH (ref 43–77)
NEUTROPHILS NFR BLD AUTO: 77.8 % — HIGH (ref 43–77)
NEUTROPHILS NFR BLD AUTO: 78 % — HIGH (ref 43–77)
NEUTROPHILS NFR BLD AUTO: 78.4 % — HIGH (ref 43–77)
NEUTROPHILS NFR BLD AUTO: 81 % — HIGH (ref 43–77)
NITRITE UR-MCNC: POSITIVE
NRBC # BLD: 0 /100 WBCS — SIGNIFICANT CHANGE UP (ref 0–0)
PH UR: 6.5 — SIGNIFICANT CHANGE UP (ref 5–8)
PLATELET # BLD AUTO: 200 K/UL — SIGNIFICANT CHANGE UP (ref 150–400)
PLATELET # BLD AUTO: 232 K/UL — SIGNIFICANT CHANGE UP (ref 150–400)
PLATELET # BLD AUTO: 241 K/UL — SIGNIFICANT CHANGE UP (ref 150–400)
PLATELET # BLD AUTO: 242 K/UL — SIGNIFICANT CHANGE UP (ref 150–400)
PLATELET # BLD AUTO: 242 K/UL — SIGNIFICANT CHANGE UP (ref 150–400)
PLATELET # BLD AUTO: 243 K/UL — SIGNIFICANT CHANGE UP (ref 150–400)
PLATELET # BLD AUTO: 261 K/UL — SIGNIFICANT CHANGE UP (ref 150–400)
PLATELET # BLD AUTO: 261 K/UL — SIGNIFICANT CHANGE UP (ref 150–400)
PLATELET # BLD AUTO: 269 K/UL — SIGNIFICANT CHANGE UP (ref 150–400)
PLATELET # BLD AUTO: 275 K/UL — SIGNIFICANT CHANGE UP (ref 150–400)
PLATELET # BLD AUTO: 281 K/UL — SIGNIFICANT CHANGE UP (ref 150–400)
PLATELET # BLD AUTO: 284 K/UL — SIGNIFICANT CHANGE UP (ref 150–400)
PLATELET # BLD AUTO: 293 K/UL — SIGNIFICANT CHANGE UP (ref 150–400)
PLATELET # BLD AUTO: 309 K/UL — SIGNIFICANT CHANGE UP (ref 150–400)
PLATELET # BLD AUTO: 324 K/UL — SIGNIFICANT CHANGE UP (ref 150–400)
PLATELET # BLD AUTO: 373 K/UL — SIGNIFICANT CHANGE UP (ref 150–400)
PLATELET # BLD AUTO: 375 K/UL — SIGNIFICANT CHANGE UP (ref 150–400)
PLATELET # BLD AUTO: 402 K/UL — HIGH (ref 150–400)
POTASSIUM SERPL-MCNC: 2.9 MMOL/L — CRITICAL LOW (ref 3.5–5.3)
POTASSIUM SERPL-MCNC: 2.9 MMOL/L — CRITICAL LOW (ref 3.5–5.3)
POTASSIUM SERPL-MCNC: 3.1 MMOL/L — LOW (ref 3.5–5.3)
POTASSIUM SERPL-MCNC: 3.1 MMOL/L — LOW (ref 3.5–5.3)
POTASSIUM SERPL-MCNC: 3.3 MMOL/L — LOW (ref 3.5–5.3)
POTASSIUM SERPL-MCNC: 3.4 MMOL/L — LOW (ref 3.5–5.3)
POTASSIUM SERPL-MCNC: 3.5 MMOL/L — SIGNIFICANT CHANGE UP (ref 3.5–5.3)
POTASSIUM SERPL-MCNC: 3.5 MMOL/L — SIGNIFICANT CHANGE UP (ref 3.5–5.3)
POTASSIUM SERPL-MCNC: 3.6 MMOL/L — SIGNIFICANT CHANGE UP (ref 3.5–5.3)
POTASSIUM SERPL-MCNC: 3.7 MMOL/L — SIGNIFICANT CHANGE UP (ref 3.5–5.3)
POTASSIUM SERPL-MCNC: 3.8 MMOL/L — SIGNIFICANT CHANGE UP (ref 3.5–5.3)
POTASSIUM SERPL-MCNC: 3.8 MMOL/L — SIGNIFICANT CHANGE UP (ref 3.5–5.3)
POTASSIUM SERPL-MCNC: 5.4 MMOL/L — HIGH (ref 3.5–5.3)
POTASSIUM SERPL-SCNC: 2.9 MMOL/L — CRITICAL LOW (ref 3.5–5.3)
POTASSIUM SERPL-SCNC: 2.9 MMOL/L — CRITICAL LOW (ref 3.5–5.3)
POTASSIUM SERPL-SCNC: 3.1 MMOL/L — LOW (ref 3.5–5.3)
POTASSIUM SERPL-SCNC: 3.1 MMOL/L — LOW (ref 3.5–5.3)
POTASSIUM SERPL-SCNC: 3.3 MMOL/L — LOW (ref 3.5–5.3)
POTASSIUM SERPL-SCNC: 3.4 MMOL/L — LOW (ref 3.5–5.3)
POTASSIUM SERPL-SCNC: 3.5 MMOL/L — SIGNIFICANT CHANGE UP (ref 3.5–5.3)
POTASSIUM SERPL-SCNC: 3.5 MMOL/L — SIGNIFICANT CHANGE UP (ref 3.5–5.3)
POTASSIUM SERPL-SCNC: 3.6 MMOL/L — SIGNIFICANT CHANGE UP (ref 3.5–5.3)
POTASSIUM SERPL-SCNC: 3.7 MMOL/L — SIGNIFICANT CHANGE UP (ref 3.5–5.3)
POTASSIUM SERPL-SCNC: 3.8 MMOL/L — SIGNIFICANT CHANGE UP (ref 3.5–5.3)
POTASSIUM SERPL-SCNC: 3.8 MMOL/L — SIGNIFICANT CHANGE UP (ref 3.5–5.3)
POTASSIUM SERPL-SCNC: 5.2 MMOL/L
POTASSIUM SERPL-SCNC: 5.4 MMOL/L — HIGH (ref 3.5–5.3)
PROT SERPL-MCNC: 6 G/DL — SIGNIFICANT CHANGE UP (ref 6–8.3)
PROT SERPL-MCNC: 6.1 G/DL — SIGNIFICANT CHANGE UP (ref 6–8.3)
PROT SERPL-MCNC: 6.2 G/DL — SIGNIFICANT CHANGE UP (ref 6–8.3)
PROT SERPL-MCNC: 6.3 G/DL — SIGNIFICANT CHANGE UP (ref 6–8.3)
PROT SERPL-MCNC: 6.4 G/DL — SIGNIFICANT CHANGE UP (ref 6–8.3)
PROT SERPL-MCNC: 6.5 G/DL — SIGNIFICANT CHANGE UP (ref 6–8.3)
PROT UR-MCNC: ABNORMAL
RBC # BLD: 3.42 M/UL — LOW (ref 3.8–5.2)
RBC # BLD: 3.55 M/UL — LOW (ref 3.8–5.2)
RBC # BLD: 3.56 M/UL — LOW (ref 3.8–5.2)
RBC # BLD: 3.56 M/UL — LOW (ref 3.8–5.2)
RBC # BLD: 3.57 M/UL — LOW (ref 3.8–5.2)
RBC # BLD: 3.58 M/UL — LOW (ref 3.8–5.2)
RBC # BLD: 3.6 M/UL — LOW (ref 3.8–5.2)
RBC # BLD: 3.63 M/UL — LOW (ref 3.8–5.2)
RBC # BLD: 3.74 M/UL — LOW (ref 3.8–5.2)
RBC # BLD: 3.77 M/UL — LOW (ref 3.8–5.2)
RBC # BLD: 3.79 M/UL — LOW (ref 3.8–5.2)
RBC # BLD: 3.79 M/UL — LOW (ref 3.8–5.2)
RBC # BLD: 3.82 M/UL — SIGNIFICANT CHANGE UP (ref 3.8–5.2)
RBC # BLD: 3.89 M/UL — SIGNIFICANT CHANGE UP (ref 3.8–5.2)
RBC # BLD: 4.03 M/UL — SIGNIFICANT CHANGE UP (ref 3.8–5.2)
RBC # BLD: 4.05 M/UL — SIGNIFICANT CHANGE UP (ref 3.8–5.2)
RBC # FLD: 15.9 % — HIGH (ref 10.3–14.5)
RBC # FLD: 16.1 % — HIGH (ref 10.3–14.5)
RBC # FLD: 16.2 % — HIGH (ref 10.3–14.5)
RBC # FLD: 16.3 % — HIGH (ref 10.3–14.5)
RBC # FLD: 16.4 % — HIGH (ref 10.3–14.5)
RBC # FLD: 16.5 % — HIGH (ref 10.3–14.5)
RBC # FLD: 16.5 % — HIGH (ref 10.3–14.5)
RBC # FLD: 16.6 % — HIGH (ref 10.3–14.5)
RBC # FLD: 16.7 % — HIGH (ref 10.3–14.5)
RBC # FLD: 16.8 % — HIGH (ref 10.3–14.5)
RBC # FLD: 17.2 % — HIGH (ref 10.3–14.5)
RBC # FLD: 17.2 % — HIGH (ref 10.3–14.5)
RBC # FLD: 17.3 % — HIGH (ref 10.3–14.5)
RBC # FLD: 18 % — HIGH (ref 10.3–14.5)
RBC CASTS # UR COMP ASSIST: 1 /HPF — SIGNIFICANT CHANGE UP (ref 0–4)
REVIEWED BY: SIGNIFICANT CHANGE UP
SODIUM SERPL-SCNC: 136 MMOL/L — SIGNIFICANT CHANGE UP (ref 135–145)
SODIUM SERPL-SCNC: 138 MMOL/L — SIGNIFICANT CHANGE UP (ref 135–145)
SODIUM SERPL-SCNC: 138 MMOL/L — SIGNIFICANT CHANGE UP (ref 135–145)
SODIUM SERPL-SCNC: 139 MMOL/L
SODIUM SERPL-SCNC: 139 MMOL/L — SIGNIFICANT CHANGE UP (ref 135–145)
SODIUM SERPL-SCNC: 140 MMOL/L — SIGNIFICANT CHANGE UP (ref 135–145)
SODIUM SERPL-SCNC: 140 MMOL/L — SIGNIFICANT CHANGE UP (ref 135–145)
SODIUM SERPL-SCNC: 141 MMOL/L — SIGNIFICANT CHANGE UP (ref 135–145)
SODIUM SERPL-SCNC: 142 MMOL/L — SIGNIFICANT CHANGE UP (ref 135–145)
SODIUM SERPL-SCNC: 142 MMOL/L — SIGNIFICANT CHANGE UP (ref 135–145)
SP GR SPEC: 1.02 — SIGNIFICANT CHANGE UP (ref 1.01–1.02)
T3 SERPL-MCNC: 121 NG/DL — SIGNIFICANT CHANGE UP (ref 80–200)
T4 FREE+ TSH PNL SERPL: 0.49 UIU/ML — SIGNIFICANT CHANGE UP (ref 0.27–4.2)
TA REPEAT RESULT: SIGNIFICANT CHANGE UP
TEST PERFORMANCE INFO SPEC: SIGNIFICANT CHANGE UP
TIBC SERPL-MCNC: 312 UG/DL — SIGNIFICANT CHANGE UP (ref 220–430)
UGT1A1 GENE MUT ANL BLD/T: SIGNIFICANT CHANGE UP
UIBC SERPL-MCNC: 277 UG/DL — SIGNIFICANT CHANGE UP (ref 110–370)
UROBILINOGEN FLD QL: SIGNIFICANT CHANGE UP
VIT B12 SERPL-MCNC: 1008 PG/ML — SIGNIFICANT CHANGE UP (ref 232–1245)
VIT B12 SERPL-MCNC: <150 PG/ML — LOW (ref 232–1245)
WBC # BLD: 11.5 K/UL — HIGH (ref 3.8–10.5)
WBC # BLD: 11.65 K/UL — HIGH (ref 3.8–10.5)
WBC # BLD: 5.43 K/UL — SIGNIFICANT CHANGE UP (ref 3.8–10.5)
WBC # BLD: 5.65 K/UL — SIGNIFICANT CHANGE UP (ref 3.8–10.5)
WBC # BLD: 6.3 K/UL — SIGNIFICANT CHANGE UP (ref 3.8–10.5)
WBC # BLD: 6.94 K/UL — SIGNIFICANT CHANGE UP (ref 3.8–10.5)
WBC # BLD: 7.42 K/UL — SIGNIFICANT CHANGE UP (ref 3.8–10.5)
WBC # BLD: 7.65 K/UL — SIGNIFICANT CHANGE UP (ref 3.8–10.5)
WBC # BLD: 7.79 K/UL — SIGNIFICANT CHANGE UP (ref 3.8–10.5)
WBC # BLD: 8.02 K/UL — SIGNIFICANT CHANGE UP (ref 3.8–10.5)
WBC # BLD: 8.23 K/UL — SIGNIFICANT CHANGE UP (ref 3.8–10.5)
WBC # BLD: 8.52 K/UL — SIGNIFICANT CHANGE UP (ref 3.8–10.5)
WBC # BLD: 8.87 K/UL — SIGNIFICANT CHANGE UP (ref 3.8–10.5)
WBC # BLD: 9.16 K/UL — SIGNIFICANT CHANGE UP (ref 3.8–10.5)
WBC # BLD: 9.16 K/UL — SIGNIFICANT CHANGE UP (ref 3.8–10.5)
WBC # BLD: 9.26 K/UL — SIGNIFICANT CHANGE UP (ref 3.8–10.5)
WBC # BLD: 9.27 K/UL — SIGNIFICANT CHANGE UP (ref 3.8–10.5)
WBC # BLD: 9.79 K/UL — SIGNIFICANT CHANGE UP (ref 3.8–10.5)
WBC # FLD AUTO: 11.5 K/UL — HIGH (ref 3.8–10.5)
WBC # FLD AUTO: 11.65 K/UL — HIGH (ref 3.8–10.5)
WBC # FLD AUTO: 5.43 K/UL — SIGNIFICANT CHANGE UP (ref 3.8–10.5)
WBC # FLD AUTO: 5.65 K/UL — SIGNIFICANT CHANGE UP (ref 3.8–10.5)
WBC # FLD AUTO: 6.3 K/UL — SIGNIFICANT CHANGE UP (ref 3.8–10.5)
WBC # FLD AUTO: 6.94 K/UL — SIGNIFICANT CHANGE UP (ref 3.8–10.5)
WBC # FLD AUTO: 7.42 K/UL — SIGNIFICANT CHANGE UP (ref 3.8–10.5)
WBC # FLD AUTO: 7.65 K/UL — SIGNIFICANT CHANGE UP (ref 3.8–10.5)
WBC # FLD AUTO: 7.79 K/UL — SIGNIFICANT CHANGE UP (ref 3.8–10.5)
WBC # FLD AUTO: 8.02 K/UL — SIGNIFICANT CHANGE UP (ref 3.8–10.5)
WBC # FLD AUTO: 8.23 K/UL — SIGNIFICANT CHANGE UP (ref 3.8–10.5)
WBC # FLD AUTO: 8.52 K/UL — SIGNIFICANT CHANGE UP (ref 3.8–10.5)
WBC # FLD AUTO: 8.87 K/UL — SIGNIFICANT CHANGE UP (ref 3.8–10.5)
WBC # FLD AUTO: 9.16 K/UL — SIGNIFICANT CHANGE UP (ref 3.8–10.5)
WBC # FLD AUTO: 9.16 K/UL — SIGNIFICANT CHANGE UP (ref 3.8–10.5)
WBC # FLD AUTO: 9.26 K/UL — SIGNIFICANT CHANGE UP (ref 3.8–10.5)
WBC # FLD AUTO: 9.27 K/UL — SIGNIFICANT CHANGE UP (ref 3.8–10.5)
WBC # FLD AUTO: 9.79 K/UL — SIGNIFICANT CHANGE UP (ref 3.8–10.5)
WBC UR QL: 42 /HPF — HIGH (ref 0–5)

## 2022-01-01 PROCEDURE — 99213 OFFICE O/P EST LOW 20 MIN: CPT

## 2022-01-01 PROCEDURE — 71260 CT THORAX DX C+: CPT

## 2022-01-01 PROCEDURE — 93356 MYOCRD STRAIN IMG SPCKL TRCK: CPT

## 2022-01-01 PROCEDURE — 99214 OFFICE O/P EST MOD 30 MIN: CPT

## 2022-01-01 PROCEDURE — 74177 CT ABD & PELVIS W/CONTRAST: CPT

## 2022-01-01 PROCEDURE — 93306 TTE W/DOPPLER COMPLETE: CPT

## 2022-01-01 PROCEDURE — 71275 CT ANGIOGRAPHY CHEST: CPT

## 2022-01-01 RX ORDER — MAGNESIUM OXIDE 241.3 MG/1000MG
400 TABLET ORAL TWICE DAILY
Qty: 6 | Refills: 0 | Status: ACTIVE | COMMUNITY
Start: 2022-01-01 | End: 1900-01-01

## 2022-01-01 RX ORDER — CYANOCOBALAMIN 1000 UG/ML
1000 INJECTION INTRAMUSCULAR; SUBCUTANEOUS DAILY
Qty: 1 | Refills: 0 | Status: COMPLETED | COMMUNITY
Start: 2022-01-01 | End: 2022-01-01

## 2022-01-01 RX ORDER — KETOCONAZOLE 20 MG/G
2 CREAM TOPICAL
Qty: 30 | Refills: 0 | Status: COMPLETED | COMMUNITY
Start: 2022-01-01

## 2022-01-01 RX ORDER — POTASSIUM CHLORIDE 1500 MG/1
20 TABLET, EXTENDED RELEASE ORAL TWICE DAILY
Qty: 4 | Refills: 0 | Status: DISCONTINUED | COMMUNITY
Start: 2022-01-01 | End: 2022-01-01

## 2022-01-01 RX ORDER — DENOSUMAB 60 MG/ML
0 INJECTION SUBCUTANEOUS
Qty: 0 | Refills: 0 | DISCHARGE

## 2022-01-01 RX ORDER — POTASSIUM CHLORIDE 1500 MG/1
20 TABLET, FILM COATED, EXTENDED RELEASE ORAL
Qty: 40 | Refills: 0 | Status: DISCONTINUED | COMMUNITY
Start: 2022-05-11 | End: 2022-01-01

## 2022-01-01 RX ORDER — POTASSIUM CHLORIDE 1500 MG/1
20 TABLET, EXTENDED RELEASE ORAL
Qty: 3 | Refills: 0 | Status: COMPLETED | COMMUNITY
Start: 2022-05-12

## 2022-01-03 ENCOUNTER — APPOINTMENT (OUTPATIENT)
Dept: CT IMAGING | Facility: CLINIC | Age: 77
End: 2022-01-03
Payer: MEDICARE

## 2022-01-03 ENCOUNTER — OUTPATIENT (OUTPATIENT)
Dept: OUTPATIENT SERVICES | Facility: HOSPITAL | Age: 77
LOS: 1 days | Discharge: HOME | End: 2022-01-03

## 2022-01-03 DIAGNOSIS — Z90.710 ACQUIRED ABSENCE OF BOTH CERVIX AND UTERUS: Chronic | ICD-10-CM

## 2022-01-03 DIAGNOSIS — Z93.2 ILEOSTOMY STATUS: Chronic | ICD-10-CM

## 2022-01-03 DIAGNOSIS — C20 MALIGNANT NEOPLASM OF RECTUM: ICD-10-CM

## 2022-01-03 PROCEDURE — 71260 CT THORAX DX C+: CPT | Mod: MG

## 2022-01-03 PROCEDURE — G1004: CPT

## 2022-01-03 PROCEDURE — 74177 CT ABD & PELVIS W/CONTRAST: CPT | Mod: MG

## 2022-01-05 ENCOUNTER — APPOINTMENT (OUTPATIENT)
Dept: INFUSION THERAPY | Facility: HOSPITAL | Age: 77
End: 2022-01-05

## 2022-01-05 ENCOUNTER — APPOINTMENT (OUTPATIENT)
Dept: HEMATOLOGY ONCOLOGY | Facility: CLINIC | Age: 77
End: 2022-01-05
Payer: MEDICARE

## 2022-01-05 ENCOUNTER — RESULT REVIEW (OUTPATIENT)
Age: 77
End: 2022-01-05

## 2022-01-05 VITALS
RESPIRATION RATE: 16 BRPM | HEART RATE: 70 BPM | OXYGEN SATURATION: 99 % | TEMPERATURE: 98 F | BODY MASS INDEX: 23.41 KG/M2 | SYSTOLIC BLOOD PRESSURE: 139 MMHG | WEIGHT: 132.28 LBS | DIASTOLIC BLOOD PRESSURE: 72 MMHG

## 2022-01-05 LAB
ALBUMIN SERPL ELPH-MCNC: 4.4 G/DL — SIGNIFICANT CHANGE UP (ref 3.3–5)
ALP SERPL-CCNC: 60 U/L — SIGNIFICANT CHANGE UP (ref 40–120)
ALT FLD-CCNC: 10 U/L — SIGNIFICANT CHANGE UP (ref 10–45)
ANION GAP SERPL CALC-SCNC: 15 MMOL/L — SIGNIFICANT CHANGE UP (ref 5–17)
AST SERPL-CCNC: 18 U/L — SIGNIFICANT CHANGE UP (ref 10–40)
BASOPHILS # BLD AUTO: 0.04 K/UL — SIGNIFICANT CHANGE UP (ref 0–0.2)
BASOPHILS NFR BLD AUTO: 0.7 % — SIGNIFICANT CHANGE UP (ref 0–2)
BILIRUB SERPL-MCNC: 0.4 MG/DL — SIGNIFICANT CHANGE UP (ref 0.2–1.2)
BUN SERPL-MCNC: 16 MG/DL — SIGNIFICANT CHANGE UP (ref 7–23)
CALCIUM SERPL-MCNC: 9.5 MG/DL — SIGNIFICANT CHANGE UP (ref 8.4–10.5)
CHLORIDE SERPL-SCNC: 106 MMOL/L — SIGNIFICANT CHANGE UP (ref 96–108)
CO2 SERPL-SCNC: 20 MMOL/L — LOW (ref 22–31)
CREAT SERPL-MCNC: 0.96 MG/DL — SIGNIFICANT CHANGE UP (ref 0.5–1.3)
EOSINOPHIL # BLD AUTO: 0.05 K/UL — SIGNIFICANT CHANGE UP (ref 0–0.5)
EOSINOPHIL NFR BLD AUTO: 0.9 % — SIGNIFICANT CHANGE UP (ref 0–6)
GLUCOSE SERPL-MCNC: 88 MG/DL — SIGNIFICANT CHANGE UP (ref 70–99)
HCT VFR BLD CALC: 37.5 % — SIGNIFICANT CHANGE UP (ref 34.5–45)
HGB BLD-MCNC: 12 G/DL — SIGNIFICANT CHANGE UP (ref 11.5–15.5)
IMM GRANULOCYTES NFR BLD AUTO: 0.5 % — SIGNIFICANT CHANGE UP (ref 0–1.5)
LYMPHOCYTES # BLD AUTO: 0.94 K/UL — LOW (ref 1–3.3)
LYMPHOCYTES # BLD AUTO: 16.5 % — SIGNIFICANT CHANGE UP (ref 13–44)
MCHC RBC-ENTMCNC: 30.1 PG — SIGNIFICANT CHANGE UP (ref 27–34)
MCHC RBC-ENTMCNC: 32 G/DL — SIGNIFICANT CHANGE UP (ref 32–36)
MCV RBC AUTO: 94 FL — SIGNIFICANT CHANGE UP (ref 80–100)
MONOCYTES # BLD AUTO: 0.67 K/UL — SIGNIFICANT CHANGE UP (ref 0–0.9)
MONOCYTES NFR BLD AUTO: 11.8 % — SIGNIFICANT CHANGE UP (ref 2–14)
NEUTROPHILS # BLD AUTO: 3.96 K/UL — SIGNIFICANT CHANGE UP (ref 1.8–7.4)
NEUTROPHILS NFR BLD AUTO: 69.6 % — SIGNIFICANT CHANGE UP (ref 43–77)
NRBC # BLD: 0 /100 WBCS — SIGNIFICANT CHANGE UP (ref 0–0)
PLATELET # BLD AUTO: 220 K/UL — SIGNIFICANT CHANGE UP (ref 150–400)
POTASSIUM SERPL-MCNC: 4.1 MMOL/L — SIGNIFICANT CHANGE UP (ref 3.5–5.3)
POTASSIUM SERPL-SCNC: 4.1 MMOL/L — SIGNIFICANT CHANGE UP (ref 3.5–5.3)
PROT SERPL-MCNC: 6.2 G/DL — SIGNIFICANT CHANGE UP (ref 6–8.3)
RBC # BLD: 3.99 M/UL — SIGNIFICANT CHANGE UP (ref 3.8–5.2)
RBC # FLD: 15.9 % — HIGH (ref 10.3–14.5)
SODIUM SERPL-SCNC: 140 MMOL/L — SIGNIFICANT CHANGE UP (ref 135–145)
WBC # BLD: 5.69 K/UL — SIGNIFICANT CHANGE UP (ref 3.8–10.5)
WBC # FLD AUTO: 5.69 K/UL — SIGNIFICANT CHANGE UP (ref 3.8–10.5)

## 2022-01-05 PROCEDURE — 99214 OFFICE O/P EST MOD 30 MIN: CPT

## 2022-01-05 NOTE — HISTORY OF PRESENT ILLNESS
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 \par \par In June 2020 Abi noticed a rectal discharge and burning sensation in the rectum. Was evaluated by Dr. Lamar and ultimately saw Dr. Oropeza who ordered CT Pelvis and MRI Pelvis . Found to have a mass in the rectum s/p biopsy confirmed adenocarcinoma. \par \par 7/18/20: MRI Pelvis: large hypovascular mass c/w mucinous adenoca involving remaining distal rectum and vagina, tumor abuts anal sphincter\par 8/5/20: CT pelvis: residual chronic perineal mass, likely cystic with gas component\par 9/23/20: CT CAP: low density, possibly necrotic mass up to 6.2 cm with involvement of the vagina, nonspecific 6 mm LLL nodule is noted\par 10/3/20: PET/CT: hypermetabolic rectal mass, non specific small inguinal LN, nonspecific hypermetabolism within the colostomy insertion site\par 10/7/20: discussed at rectal TB, plan for DILMA with re-evaluation of response and then surgery if residual disease\par 10/26/20: C1 FOLFOX\par 11/9/20: C2 (c/b acute oxali neurotoxicity)\par 11/23/20: C3 (Oxali over 4 hrs) - tolerated well \par 12/7/20: C4 5FU/LV (developed difficulty swallowing, SOB after receiving Oxali, d/c Oxali for today's tx)\par 12/21-2/10/21: C5-8 FOLFOX (dose reduced Oxali 65 mg/m2 over 4 hrs)\par 2/13/21: CT CAP: 0.6 cm LLL nodule, rectal mass slightly decreased in size, small b/l inguinal LN minimally decr in size, pneumatosis is noted involving SB possible fistula \par 2/25-3/23/21: Xeloda/RT \par 4/21/21: CT CAP: mild interval decrease in size of rectal mass, stable pulm nodule \par 5/1/21: case reviewed at TB, due to disease involving SB and need for skin flap to repear large defect, surgery is not feasible. Recommendation to cont with palliative chemo. \par 5/26/21-: maintenance 5FU/LV\par 7/14/21: CT CAP: improved/stable disease\par 1/3/22: CT CAP: stable disease [de-identified] : adenocarcinoma [de-identified] : overall feels well. we reviewed results of latest scan. discussed addition of Zirabev. denies any c/o.

## 2022-01-06 DIAGNOSIS — R11.2 NAUSEA WITH VOMITING, UNSPECIFIED: ICD-10-CM

## 2022-01-06 DIAGNOSIS — Z51.11 ENCOUNTER FOR ANTINEOPLASTIC CHEMOTHERAPY: ICD-10-CM

## 2022-01-07 ENCOUNTER — APPOINTMENT (OUTPATIENT)
Dept: INFUSION THERAPY | Facility: HOSPITAL | Age: 77
End: 2022-01-07

## 2022-01-19 ENCOUNTER — RESULT REVIEW (OUTPATIENT)
Age: 77
End: 2022-01-19

## 2022-01-19 ENCOUNTER — APPOINTMENT (OUTPATIENT)
Dept: INFUSION THERAPY | Facility: HOSPITAL | Age: 77
End: 2022-01-19

## 2022-01-19 LAB
ALBUMIN SERPL ELPH-MCNC: 4.2 G/DL — SIGNIFICANT CHANGE UP (ref 3.3–5)
ALP SERPL-CCNC: 64 U/L — SIGNIFICANT CHANGE UP (ref 40–120)
ALT FLD-CCNC: 10 U/L — SIGNIFICANT CHANGE UP (ref 10–45)
ANION GAP SERPL CALC-SCNC: 14 MMOL/L — SIGNIFICANT CHANGE UP (ref 5–17)
AST SERPL-CCNC: 16 U/L — SIGNIFICANT CHANGE UP (ref 10–40)
BASOPHILS # BLD AUTO: 0.04 K/UL — SIGNIFICANT CHANGE UP (ref 0–0.2)
BASOPHILS NFR BLD AUTO: 0.6 % — SIGNIFICANT CHANGE UP (ref 0–2)
BILIRUB SERPL-MCNC: 0.4 MG/DL — SIGNIFICANT CHANGE UP (ref 0.2–1.2)
BUN SERPL-MCNC: 21 MG/DL — SIGNIFICANT CHANGE UP (ref 7–23)
CALCIUM SERPL-MCNC: 9.5 MG/DL — SIGNIFICANT CHANGE UP (ref 8.4–10.5)
CHLORIDE SERPL-SCNC: 105 MMOL/L — SIGNIFICANT CHANGE UP (ref 96–108)
CO2 SERPL-SCNC: 21 MMOL/L — LOW (ref 22–31)
CREAT SERPL-MCNC: 0.93 MG/DL — SIGNIFICANT CHANGE UP (ref 0.5–1.3)
EOSINOPHIL # BLD AUTO: 0.07 K/UL — SIGNIFICANT CHANGE UP (ref 0–0.5)
EOSINOPHIL NFR BLD AUTO: 1.1 % — SIGNIFICANT CHANGE UP (ref 0–6)
GLUCOSE SERPL-MCNC: 92 MG/DL — SIGNIFICANT CHANGE UP (ref 70–99)
HCT VFR BLD CALC: 38.5 % — SIGNIFICANT CHANGE UP (ref 34.5–45)
HGB BLD-MCNC: 12.3 G/DL — SIGNIFICANT CHANGE UP (ref 11.5–15.5)
IMM GRANULOCYTES NFR BLD AUTO: 0.5 % — SIGNIFICANT CHANGE UP (ref 0–1.5)
LYMPHOCYTES # BLD AUTO: 1.25 K/UL — SIGNIFICANT CHANGE UP (ref 1–3.3)
LYMPHOCYTES # BLD AUTO: 19.8 % — SIGNIFICANT CHANGE UP (ref 13–44)
MCHC RBC-ENTMCNC: 29.6 PG — SIGNIFICANT CHANGE UP (ref 27–34)
MCHC RBC-ENTMCNC: 31.9 G/DL — LOW (ref 32–36)
MCV RBC AUTO: 92.5 FL — SIGNIFICANT CHANGE UP (ref 80–100)
MONOCYTES # BLD AUTO: 0.73 K/UL — SIGNIFICANT CHANGE UP (ref 0–0.9)
MONOCYTES NFR BLD AUTO: 11.6 % — SIGNIFICANT CHANGE UP (ref 2–14)
NEUTROPHILS # BLD AUTO: 4.18 K/UL — SIGNIFICANT CHANGE UP (ref 1.8–7.4)
NEUTROPHILS NFR BLD AUTO: 66.4 % — SIGNIFICANT CHANGE UP (ref 43–77)
NRBC # BLD: 0 /100 WBCS — SIGNIFICANT CHANGE UP (ref 0–0)
PLATELET # BLD AUTO: 235 K/UL — SIGNIFICANT CHANGE UP (ref 150–400)
POTASSIUM SERPL-MCNC: 4.3 MMOL/L — SIGNIFICANT CHANGE UP (ref 3.5–5.3)
POTASSIUM SERPL-SCNC: 4.3 MMOL/L — SIGNIFICANT CHANGE UP (ref 3.5–5.3)
PROT SERPL-MCNC: 6.4 G/DL — SIGNIFICANT CHANGE UP (ref 6–8.3)
RBC # BLD: 4.16 M/UL — SIGNIFICANT CHANGE UP (ref 3.8–5.2)
RBC # FLD: 16 % — HIGH (ref 10.3–14.5)
SODIUM SERPL-SCNC: 140 MMOL/L — SIGNIFICANT CHANGE UP (ref 135–145)
WBC # BLD: 6.3 K/UL — SIGNIFICANT CHANGE UP (ref 3.8–10.5)
WBC # FLD AUTO: 6.3 K/UL — SIGNIFICANT CHANGE UP (ref 3.8–10.5)

## 2022-01-21 ENCOUNTER — APPOINTMENT (OUTPATIENT)
Dept: INFUSION THERAPY | Facility: HOSPITAL | Age: 77
End: 2022-01-21

## 2022-02-02 ENCOUNTER — RESULT REVIEW (OUTPATIENT)
Age: 77
End: 2022-02-02

## 2022-02-02 ENCOUNTER — APPOINTMENT (OUTPATIENT)
Dept: INFUSION THERAPY | Facility: HOSPITAL | Age: 77
End: 2022-02-02

## 2022-02-02 ENCOUNTER — APPOINTMENT (OUTPATIENT)
Dept: HEMATOLOGY ONCOLOGY | Facility: CLINIC | Age: 77
End: 2022-02-02
Payer: MEDICARE

## 2022-02-02 VITALS
TEMPERATURE: 97.1 F | WEIGHT: 134.48 LBS | SYSTOLIC BLOOD PRESSURE: 122 MMHG | DIASTOLIC BLOOD PRESSURE: 66 MMHG | BODY MASS INDEX: 23.8 KG/M2 | OXYGEN SATURATION: 100 % | RESPIRATION RATE: 16 BRPM | HEART RATE: 80 BPM

## 2022-02-02 LAB
ALBUMIN SERPL ELPH-MCNC: 4.4 G/DL — SIGNIFICANT CHANGE UP (ref 3.3–5)
ALP SERPL-CCNC: 65 U/L — SIGNIFICANT CHANGE UP (ref 40–120)
ALT FLD-CCNC: 11 U/L — SIGNIFICANT CHANGE UP (ref 10–45)
ANION GAP SERPL CALC-SCNC: 12 MMOL/L — SIGNIFICANT CHANGE UP (ref 5–17)
AST SERPL-CCNC: 20 U/L — SIGNIFICANT CHANGE UP (ref 10–40)
BASOPHILS # BLD AUTO: 0.04 K/UL — SIGNIFICANT CHANGE UP (ref 0–0.2)
BASOPHILS NFR BLD AUTO: 0.6 % — SIGNIFICANT CHANGE UP (ref 0–2)
BILIRUB SERPL-MCNC: 0.4 MG/DL — SIGNIFICANT CHANGE UP (ref 0.2–1.2)
BUN SERPL-MCNC: 20 MG/DL — SIGNIFICANT CHANGE UP (ref 7–23)
CALCIUM SERPL-MCNC: 9.7 MG/DL — SIGNIFICANT CHANGE UP (ref 8.4–10.5)
CEA SERPL-MCNC: 2.6 NG/ML — SIGNIFICANT CHANGE UP (ref 0–3.8)
CHLORIDE SERPL-SCNC: 107 MMOL/L — SIGNIFICANT CHANGE UP (ref 96–108)
CO2 SERPL-SCNC: 23 MMOL/L — SIGNIFICANT CHANGE UP (ref 22–31)
CREAT SERPL-MCNC: 0.9 MG/DL — SIGNIFICANT CHANGE UP (ref 0.5–1.3)
EOSINOPHIL # BLD AUTO: 0.1 K/UL — SIGNIFICANT CHANGE UP (ref 0–0.5)
EOSINOPHIL NFR BLD AUTO: 1.6 % — SIGNIFICANT CHANGE UP (ref 0–6)
GLUCOSE SERPL-MCNC: 89 MG/DL — SIGNIFICANT CHANGE UP (ref 70–99)
HCT VFR BLD CALC: 39.4 % — SIGNIFICANT CHANGE UP (ref 34.5–45)
HGB BLD-MCNC: 12.4 G/DL — SIGNIFICANT CHANGE UP (ref 11.5–15.5)
IMM GRANULOCYTES NFR BLD AUTO: 0.5 % — SIGNIFICANT CHANGE UP (ref 0–1.5)
LYMPHOCYTES # BLD AUTO: 1.23 K/UL — SIGNIFICANT CHANGE UP (ref 1–3.3)
LYMPHOCYTES # BLD AUTO: 19.2 % — SIGNIFICANT CHANGE UP (ref 13–44)
MCHC RBC-ENTMCNC: 29.5 PG — SIGNIFICANT CHANGE UP (ref 27–34)
MCHC RBC-ENTMCNC: 31.5 G/DL — LOW (ref 32–36)
MCV RBC AUTO: 93.8 FL — SIGNIFICANT CHANGE UP (ref 80–100)
MONOCYTES # BLD AUTO: 0.77 K/UL — SIGNIFICANT CHANGE UP (ref 0–0.9)
MONOCYTES NFR BLD AUTO: 12.1 % — SIGNIFICANT CHANGE UP (ref 2–14)
NEUTROPHILS # BLD AUTO: 4.22 K/UL — SIGNIFICANT CHANGE UP (ref 1.8–7.4)
NEUTROPHILS NFR BLD AUTO: 66 % — SIGNIFICANT CHANGE UP (ref 43–77)
NRBC # BLD: 0 /100 WBCS — SIGNIFICANT CHANGE UP (ref 0–0)
PLATELET # BLD AUTO: 224 K/UL — SIGNIFICANT CHANGE UP (ref 150–400)
POTASSIUM SERPL-MCNC: 4.3 MMOL/L — SIGNIFICANT CHANGE UP (ref 3.5–5.3)
POTASSIUM SERPL-SCNC: 4.3 MMOL/L — SIGNIFICANT CHANGE UP (ref 3.5–5.3)
PROT SERPL-MCNC: 6.4 G/DL — SIGNIFICANT CHANGE UP (ref 6–8.3)
RBC # BLD: 4.2 M/UL — SIGNIFICANT CHANGE UP (ref 3.8–5.2)
RBC # FLD: 16.2 % — HIGH (ref 10.3–14.5)
SODIUM SERPL-SCNC: 142 MMOL/L — SIGNIFICANT CHANGE UP (ref 135–145)
WBC # BLD: 6.39 K/UL — SIGNIFICANT CHANGE UP (ref 3.8–10.5)
WBC # FLD AUTO: 6.39 K/UL — SIGNIFICANT CHANGE UP (ref 3.8–10.5)

## 2022-02-02 PROCEDURE — 99214 OFFICE O/P EST MOD 30 MIN: CPT

## 2022-02-02 NOTE — HISTORY OF PRESENT ILLNESS
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 \par \par In June 2020 Abi noticed a rectal discharge and burning sensation in the rectum. Was evaluated by Dr. Lamar and ultimately saw Dr. Oropeza who ordered CT Pelvis and MRI Pelvis . Found to have a mass in the rectum s/p biopsy confirmed adenocarcinoma. \par \par 7/18/20: MRI Pelvis: large hypovascular mass c/w mucinous adenoca involving remaining distal rectum and vagina, tumor abuts anal sphincter\par 8/5/20: CT pelvis: residual chronic perineal mass, likely cystic with gas component\par 9/23/20: CT CAP: low density, possibly necrotic mass up to 6.2 cm with involvement of the vagina, nonspecific 6 mm LLL nodule is noted\par 10/3/20: PET/CT: hypermetabolic rectal mass, non specific small inguinal LN, nonspecific hypermetabolism within the colostomy insertion site\par 10/7/20: discussed at rectal TB, plan for DILMA with re-evaluation of response and then surgery if residual disease\par 10/26/20: C1 FOLFOX\par 11/9/20: C2 (c/b acute oxali neurotoxicity)\par 11/23/20: C3 (Oxali over 4 hrs) - tolerated well \par 12/7/20: C4 5FU/LV (developed difficulty swallowing, SOB after receiving Oxali, d/c Oxali for today's tx)\par 12/21-2/10/21: C5-8 FOLFOX (dose reduced Oxali 65 mg/m2 over 4 hrs)\par 2/13/21: CT CAP: 0.6 cm LLL nodule, rectal mass slightly decreased in size, small b/l inguinal LN minimally decr in size, pneumatosis is noted involving SB possible fistula \par 2/25-3/23/21: Xeloda/RT \par 4/21/21: CT CAP: mild interval decrease in size of rectal mass, stable pulm nodule \par 5/26-7/7/21:C1-C4 5FU/LV maintenance \par 7/14/21: CTCAP: stable lung nodules + further decrease in size of rectal mass/fluid collection\par 7/21-10/13/21: C5-C11\par 10/7/21: CT CAP revealed stable disease. \par 10/27-12/21/21: C12-C16  \par 1/3/22: CT CAP: stable disease\par 1/5-2/2/22: C17-C19 [de-identified] : adenocarcinoma [Therapy: ___] : Therapy: [unfilled] [Cycle: ___] : Cycle: [unfilled] [de-identified] : Patient was seen at the practice side prior to her scheduled treatment apt for later today. She currently feels fine; continued to report adequate appetite, stable weight and normal bowel movements (no issues with colostomy). The intermittent numbness/tingling within the fingertips of both and toes of both feet remains unchanged, she is able to complete her ADLs independently with minimal to no restrictions.

## 2022-02-03 ENCOUNTER — OUTPATIENT (OUTPATIENT)
Dept: OUTPATIENT SERVICES | Facility: HOSPITAL | Age: 77
LOS: 1 days | Discharge: HOME | End: 2022-02-03

## 2022-02-03 DIAGNOSIS — Z90.710 ACQUIRED ABSENCE OF BOTH CERVIX AND UTERUS: Chronic | ICD-10-CM

## 2022-02-03 DIAGNOSIS — C20 MALIGNANT NEOPLASM OF RECTUM: ICD-10-CM

## 2022-02-03 DIAGNOSIS — Z93.2 ILEOSTOMY STATUS: Chronic | ICD-10-CM

## 2022-02-04 ENCOUNTER — APPOINTMENT (OUTPATIENT)
Dept: INFUSION THERAPY | Facility: HOSPITAL | Age: 77
End: 2022-02-04

## 2022-02-07 ENCOUNTER — NON-APPOINTMENT (OUTPATIENT)
Age: 77
End: 2022-02-07

## 2022-02-16 ENCOUNTER — RESULT REVIEW (OUTPATIENT)
Age: 77
End: 2022-02-16

## 2022-02-16 ENCOUNTER — APPOINTMENT (OUTPATIENT)
Dept: INFUSION THERAPY | Facility: HOSPITAL | Age: 77
End: 2022-02-16

## 2022-02-16 LAB
ALBUMIN SERPL ELPH-MCNC: 4.2 G/DL — SIGNIFICANT CHANGE UP (ref 3.3–5)
ALP SERPL-CCNC: 58 U/L — SIGNIFICANT CHANGE UP (ref 40–120)
ALT FLD-CCNC: 10 U/L — SIGNIFICANT CHANGE UP (ref 10–45)
ANION GAP SERPL CALC-SCNC: 15 MMOL/L — SIGNIFICANT CHANGE UP (ref 5–17)
AST SERPL-CCNC: 19 U/L — SIGNIFICANT CHANGE UP (ref 10–40)
BASOPHILS # BLD AUTO: 0.04 K/UL — SIGNIFICANT CHANGE UP (ref 0–0.2)
BASOPHILS NFR BLD AUTO: 0.8 % — SIGNIFICANT CHANGE UP (ref 0–2)
BILIRUB SERPL-MCNC: 0.3 MG/DL — SIGNIFICANT CHANGE UP (ref 0.2–1.2)
BUN SERPL-MCNC: 18 MG/DL — SIGNIFICANT CHANGE UP (ref 7–23)
CALCIUM SERPL-MCNC: 9.4 MG/DL — SIGNIFICANT CHANGE UP (ref 8.4–10.5)
CHLORIDE SERPL-SCNC: 106 MMOL/L — SIGNIFICANT CHANGE UP (ref 96–108)
CO2 SERPL-SCNC: 20 MMOL/L — LOW (ref 22–31)
CREAT SERPL-MCNC: 0.94 MG/DL — SIGNIFICANT CHANGE UP (ref 0.5–1.3)
EOSINOPHIL # BLD AUTO: 0.08 K/UL — SIGNIFICANT CHANGE UP (ref 0–0.5)
EOSINOPHIL NFR BLD AUTO: 1.6 % — SIGNIFICANT CHANGE UP (ref 0–6)
GLUCOSE SERPL-MCNC: 92 MG/DL — SIGNIFICANT CHANGE UP (ref 70–99)
HCT VFR BLD CALC: 36.1 % — SIGNIFICANT CHANGE UP (ref 34.5–45)
HGB BLD-MCNC: 11.7 G/DL — SIGNIFICANT CHANGE UP (ref 11.5–15.5)
IMM GRANULOCYTES NFR BLD AUTO: 1.6 % — HIGH (ref 0–1.5)
LYMPHOCYTES # BLD AUTO: 0.85 K/UL — LOW (ref 1–3.3)
LYMPHOCYTES # BLD AUTO: 16.6 % — SIGNIFICANT CHANGE UP (ref 13–44)
MCHC RBC-ENTMCNC: 30.2 PG — SIGNIFICANT CHANGE UP (ref 27–34)
MCHC RBC-ENTMCNC: 32.4 G/DL — SIGNIFICANT CHANGE UP (ref 32–36)
MCV RBC AUTO: 93 FL — SIGNIFICANT CHANGE UP (ref 80–100)
MONOCYTES # BLD AUTO: 0.77 K/UL — SIGNIFICANT CHANGE UP (ref 0–0.9)
MONOCYTES NFR BLD AUTO: 15 % — HIGH (ref 2–14)
NEUTROPHILS # BLD AUTO: 3.3 K/UL — SIGNIFICANT CHANGE UP (ref 1.8–7.4)
NEUTROPHILS NFR BLD AUTO: 64.4 % — SIGNIFICANT CHANGE UP (ref 43–77)
NRBC # BLD: 0 /100 WBCS — SIGNIFICANT CHANGE UP (ref 0–0)
PLATELET # BLD AUTO: 184 K/UL — SIGNIFICANT CHANGE UP (ref 150–400)
POTASSIUM SERPL-MCNC: 3.8 MMOL/L — SIGNIFICANT CHANGE UP (ref 3.5–5.3)
POTASSIUM SERPL-SCNC: 3.8 MMOL/L — SIGNIFICANT CHANGE UP (ref 3.5–5.3)
PROT SERPL-MCNC: 6.1 G/DL — SIGNIFICANT CHANGE UP (ref 6–8.3)
RBC # BLD: 3.88 M/UL — SIGNIFICANT CHANGE UP (ref 3.8–5.2)
RBC # FLD: 16.3 % — HIGH (ref 10.3–14.5)
SODIUM SERPL-SCNC: 141 MMOL/L — SIGNIFICANT CHANGE UP (ref 135–145)
WBC # BLD: 5.12 K/UL — SIGNIFICANT CHANGE UP (ref 3.8–10.5)
WBC # FLD AUTO: 5.12 K/UL — SIGNIFICANT CHANGE UP (ref 3.8–10.5)

## 2022-02-17 DIAGNOSIS — R11.2 NAUSEA WITH VOMITING, UNSPECIFIED: ICD-10-CM

## 2022-02-17 DIAGNOSIS — Z51.11 ENCOUNTER FOR ANTINEOPLASTIC CHEMOTHERAPY: ICD-10-CM

## 2022-02-18 ENCOUNTER — APPOINTMENT (OUTPATIENT)
Dept: HEMATOLOGY ONCOLOGY | Facility: CLINIC | Age: 77
End: 2022-02-18

## 2022-02-18 ENCOUNTER — APPOINTMENT (OUTPATIENT)
Dept: INFUSION THERAPY | Facility: HOSPITAL | Age: 77
End: 2022-02-18

## 2022-02-18 ENCOUNTER — NON-APPOINTMENT (OUTPATIENT)
Age: 77
End: 2022-02-18

## 2022-02-25 ENCOUNTER — APPOINTMENT (OUTPATIENT)
Dept: DERMATOLOGY | Facility: CLINIC | Age: 77
End: 2022-02-25

## 2022-03-01 ENCOUNTER — TRANSCRIPTION ENCOUNTER (OUTPATIENT)
Age: 77
End: 2022-03-01

## 2022-03-02 ENCOUNTER — RESULT REVIEW (OUTPATIENT)
Age: 77
End: 2022-03-02

## 2022-03-02 ENCOUNTER — APPOINTMENT (OUTPATIENT)
Dept: INFUSION THERAPY | Facility: HOSPITAL | Age: 77
End: 2022-03-02

## 2022-03-02 ENCOUNTER — APPOINTMENT (OUTPATIENT)
Dept: HEMATOLOGY ONCOLOGY | Facility: CLINIC | Age: 77
End: 2022-03-02
Payer: MEDICARE

## 2022-03-02 LAB
ALBUMIN SERPL ELPH-MCNC: 4.2 G/DL — SIGNIFICANT CHANGE UP (ref 3.3–5)
ALP SERPL-CCNC: 61 U/L — SIGNIFICANT CHANGE UP (ref 40–120)
ALT FLD-CCNC: 9 U/L — LOW (ref 10–45)
ANION GAP SERPL CALC-SCNC: 12 MMOL/L — SIGNIFICANT CHANGE UP (ref 5–17)
AST SERPL-CCNC: 16 U/L — SIGNIFICANT CHANGE UP (ref 10–40)
BASOPHILS # BLD AUTO: 0.04 K/UL — SIGNIFICANT CHANGE UP (ref 0–0.2)
BASOPHILS NFR BLD AUTO: 0.7 % — SIGNIFICANT CHANGE UP (ref 0–2)
BILIRUB SERPL-MCNC: 0.3 MG/DL — SIGNIFICANT CHANGE UP (ref 0.2–1.2)
BUN SERPL-MCNC: 20 MG/DL — SIGNIFICANT CHANGE UP (ref 7–23)
CALCIUM SERPL-MCNC: 9.3 MG/DL — SIGNIFICANT CHANGE UP (ref 8.4–10.5)
CHLORIDE SERPL-SCNC: 107 MMOL/L — SIGNIFICANT CHANGE UP (ref 96–108)
CO2 SERPL-SCNC: 21 MMOL/L — LOW (ref 22–31)
CREAT SERPL-MCNC: 0.85 MG/DL — SIGNIFICANT CHANGE UP (ref 0.5–1.3)
EGFR: 70 ML/MIN/1.73M2 — SIGNIFICANT CHANGE UP
EOSINOPHIL # BLD AUTO: 0.08 K/UL — SIGNIFICANT CHANGE UP (ref 0–0.5)
EOSINOPHIL NFR BLD AUTO: 1.4 % — SIGNIFICANT CHANGE UP (ref 0–6)
GLUCOSE SERPL-MCNC: 86 MG/DL — SIGNIFICANT CHANGE UP (ref 70–99)
HCT VFR BLD CALC: 35.9 % — SIGNIFICANT CHANGE UP (ref 34.5–45)
HGB BLD-MCNC: 11.7 G/DL — SIGNIFICANT CHANGE UP (ref 11.5–15.5)
IMM GRANULOCYTES NFR BLD AUTO: 0.9 % — SIGNIFICANT CHANGE UP (ref 0–1.5)
LYMPHOCYTES # BLD AUTO: 1.13 K/UL — SIGNIFICANT CHANGE UP (ref 1–3.3)
LYMPHOCYTES # BLD AUTO: 19.9 % — SIGNIFICANT CHANGE UP (ref 13–44)
MCHC RBC-ENTMCNC: 30.4 PG — SIGNIFICANT CHANGE UP (ref 27–34)
MCHC RBC-ENTMCNC: 32.6 G/DL — SIGNIFICANT CHANGE UP (ref 32–36)
MCV RBC AUTO: 93.2 FL — SIGNIFICANT CHANGE UP (ref 80–100)
MONOCYTES # BLD AUTO: 0.68 K/UL — SIGNIFICANT CHANGE UP (ref 0–0.9)
MONOCYTES NFR BLD AUTO: 12 % — SIGNIFICANT CHANGE UP (ref 2–14)
NEUTROPHILS # BLD AUTO: 3.69 K/UL — SIGNIFICANT CHANGE UP (ref 1.8–7.4)
NEUTROPHILS NFR BLD AUTO: 65.1 % — SIGNIFICANT CHANGE UP (ref 43–77)
NRBC # BLD: 0 /100 WBCS — SIGNIFICANT CHANGE UP (ref 0–0)
PLATELET # BLD AUTO: 211 K/UL — SIGNIFICANT CHANGE UP (ref 150–400)
POTASSIUM SERPL-MCNC: 3.9 MMOL/L — SIGNIFICANT CHANGE UP (ref 3.5–5.3)
POTASSIUM SERPL-SCNC: 3.9 MMOL/L — SIGNIFICANT CHANGE UP (ref 3.5–5.3)
PROT SERPL-MCNC: 5.9 G/DL — LOW (ref 6–8.3)
RBC # BLD: 3.85 M/UL — SIGNIFICANT CHANGE UP (ref 3.8–5.2)
RBC # FLD: 16.4 % — HIGH (ref 10.3–14.5)
SODIUM SERPL-SCNC: 140 MMOL/L — SIGNIFICANT CHANGE UP (ref 135–145)
WBC # BLD: 5.67 K/UL — SIGNIFICANT CHANGE UP (ref 3.8–10.5)
WBC # FLD AUTO: 5.67 K/UL — SIGNIFICANT CHANGE UP (ref 3.8–10.5)

## 2022-03-02 PROCEDURE — 99214 OFFICE O/P EST MOD 30 MIN: CPT

## 2022-03-02 NOTE — HISTORY OF PRESENT ILLNESS
[Disease: _____________________] : Disease: [unfilled] [T: ___] : T[unfilled] [N: ___] : N[unfilled] [M: ___] : M[unfilled] [AJCC Stage: ____] : AJCC Stage: [unfilled] [Therapy: ___] : Therapy: [unfilled] [de-identified] : 76 F w/ h/o recurrent endocervical adenocarcinoma Stage IIB s/p radiation then required pelvic exoneration presents for further evaluation of rectal cancer. \par \par In June 2020 Abi noticed a rectal discharge and burning sensation in the rectum. Was evaluated by Dr. Lamar and ultimately saw Dr. Oropeza who ordered CT Pelvis and MRI Pelvis . Found to have a mass in the rectum s/p biopsy confirmed adenocarcinoma. \par \par 7/18/20: MRI Pelvis: large hypovascular mass c/w mucinous adenoca involving remaining distal rectum and vagina, tumor abuts anal sphincter\par 8/5/20: CT pelvis: residual chronic perineal mass, likely cystic with gas component\par 9/23/20: CT CAP: low density, possibly necrotic mass up to 6.2 cm with involvement of the vagina, nonspecific 6 mm LLL nodule is noted\par 10/3/20: PET/CT: hypermetabolic rectal mass, non specific small inguinal LN, nonspecific hypermetabolism within the colostomy insertion site\par 10/7/20: discussed at rectal TB, plan for DILMA with re-evaluation of response and then surgery if residual disease\par 10/26/20: C1 FOLFOX\par 11/9/20: C2 (c/b acute oxali neurotoxicity)\par 11/23/20: C3 (Oxali over 4 hrs) - tolerated well \par 12/7/20: C4 5FU/LV (developed difficulty swallowing, SOB after receiving Oxali, d/c Oxali for today's tx)\par 12/21-2/10/21: C5-8 FOLFOX (dose reduced Oxali 65 mg/m2 over 4 hrs)\par 2/13/21: CT CAP: 0.6 cm LLL nodule, rectal mass slightly decreased in size, small b/l inguinal LN minimally decr in size, pneumatosis is noted involving SB possible fistula \par 2/25-3/23/21: Xeloda/RT \par 4/21/21: CT CAP: mild interval decrease in size of rectal mass, stable pulm nodule \par 5/26-7/7/21:C1-C4 5FU/LV maintenance \par 7/14/21: CTCAP: stable lung nodules + further decrease in size of rectal mass/fluid collection\par 7/21-10/13/21: C5-C11\par 10/7/21: CT CAP revealed stable disease. \par 10/27-12/21/21: C12-C16  \par 1/3/22: CT CAP: stable disease\par 1/5-3/2/22: C17-C21 [de-identified] : adenocarcinoma [de-identified] : Patient was seen at the infusion room while receiving treatment. She currently feels fine; continued to report adequate appetite, stable weight and normal bowel movements (no issues with colostomy). The intermittent numbness/tingling within the fingertips of both and toes of both feet remains unchanged, she is able to complete her ADLs independently with minimal to no restrictions. She also noted recent development of an itchy red circular shaped skin rash along the lower back part of her left leg for the past 1-2 weeks.

## 2022-03-02 NOTE — PHYSICAL EXAM
[Fully active, able to carry on all pre-disease performance without restriction] : Status 0 - Fully active, able to carry on all pre-disease performance without restriction [Normal] : affect appropriate [de-identified] : faint erythematous ringworm skin rash appearance noted along posterior aspect of left calf/achilles region

## 2022-03-04 ENCOUNTER — APPOINTMENT (OUTPATIENT)
Dept: INFUSION THERAPY | Facility: HOSPITAL | Age: 77
End: 2022-03-04

## 2022-03-13 ENCOUNTER — OUTPATIENT (OUTPATIENT)
Dept: OUTPATIENT SERVICES | Facility: HOSPITAL | Age: 77
LOS: 1 days | Discharge: HOME | End: 2022-03-13

## 2022-03-13 DIAGNOSIS — Z93.2 ILEOSTOMY STATUS: Chronic | ICD-10-CM

## 2022-03-13 DIAGNOSIS — Z90.710 ACQUIRED ABSENCE OF BOTH CERVIX AND UTERUS: Chronic | ICD-10-CM

## 2022-03-13 DIAGNOSIS — C20 MALIGNANT NEOPLASM OF RECTUM: ICD-10-CM

## 2022-03-16 ENCOUNTER — RESULT REVIEW (OUTPATIENT)
Age: 77
End: 2022-03-16

## 2022-03-16 ENCOUNTER — APPOINTMENT (OUTPATIENT)
Dept: INFUSION THERAPY | Facility: HOSPITAL | Age: 77
End: 2022-03-16

## 2022-03-16 ENCOUNTER — APPOINTMENT (OUTPATIENT)
Dept: HEMATOLOGY ONCOLOGY | Facility: CLINIC | Age: 77
End: 2022-03-16
Payer: MEDICARE

## 2022-03-16 DIAGNOSIS — R11.2 NAUSEA WITH VOMITING, UNSPECIFIED: ICD-10-CM

## 2022-03-16 DIAGNOSIS — Z51.11 ENCOUNTER FOR ANTINEOPLASTIC CHEMOTHERAPY: ICD-10-CM

## 2022-03-16 LAB
ALBUMIN SERPL ELPH-MCNC: 4.4 G/DL — SIGNIFICANT CHANGE UP (ref 3.3–5)
ALP SERPL-CCNC: 63 U/L — SIGNIFICANT CHANGE UP (ref 40–120)
ALT FLD-CCNC: 9 U/L — LOW (ref 10–45)
ANION GAP SERPL CALC-SCNC: 12 MMOL/L — SIGNIFICANT CHANGE UP (ref 5–17)
AST SERPL-CCNC: 15 U/L — SIGNIFICANT CHANGE UP (ref 10–40)
BASOPHILS # BLD AUTO: 0.03 K/UL — SIGNIFICANT CHANGE UP (ref 0–0.2)
BASOPHILS NFR BLD AUTO: 0.5 % — SIGNIFICANT CHANGE UP (ref 0–2)
BILIRUB SERPL-MCNC: 0.4 MG/DL — SIGNIFICANT CHANGE UP (ref 0.2–1.2)
BUN SERPL-MCNC: 19 MG/DL — SIGNIFICANT CHANGE UP (ref 7–23)
CALCIUM SERPL-MCNC: 9.4 MG/DL — SIGNIFICANT CHANGE UP (ref 8.4–10.5)
CEA SERPL-MCNC: 3.2 NG/ML — SIGNIFICANT CHANGE UP (ref 0–3.8)
CHLORIDE SERPL-SCNC: 107 MMOL/L — SIGNIFICANT CHANGE UP (ref 96–108)
CO2 SERPL-SCNC: 22 MMOL/L — SIGNIFICANT CHANGE UP (ref 22–31)
CREAT SERPL-MCNC: 0.89 MG/DL — SIGNIFICANT CHANGE UP (ref 0.5–1.3)
EGFR: 66 ML/MIN/1.73M2 — SIGNIFICANT CHANGE UP
EOSINOPHIL # BLD AUTO: 0.08 K/UL — SIGNIFICANT CHANGE UP (ref 0–0.5)
EOSINOPHIL NFR BLD AUTO: 1.3 % — SIGNIFICANT CHANGE UP (ref 0–6)
GLUCOSE SERPL-MCNC: 89 MG/DL — SIGNIFICANT CHANGE UP (ref 70–99)
HCT VFR BLD CALC: 37 % — SIGNIFICANT CHANGE UP (ref 34.5–45)
HGB BLD-MCNC: 12 G/DL — SIGNIFICANT CHANGE UP (ref 11.5–15.5)
IMM GRANULOCYTES NFR BLD AUTO: 1.1 % — SIGNIFICANT CHANGE UP (ref 0–1.5)
LYMPHOCYTES # BLD AUTO: 1.06 K/UL — SIGNIFICANT CHANGE UP (ref 1–3.3)
LYMPHOCYTES # BLD AUTO: 16.8 % — SIGNIFICANT CHANGE UP (ref 13–44)
MCHC RBC-ENTMCNC: 30.3 PG — SIGNIFICANT CHANGE UP (ref 27–34)
MCHC RBC-ENTMCNC: 32.4 G/DL — SIGNIFICANT CHANGE UP (ref 32–36)
MCV RBC AUTO: 93.4 FL — SIGNIFICANT CHANGE UP (ref 80–100)
MONOCYTES # BLD AUTO: 0.73 K/UL — SIGNIFICANT CHANGE UP (ref 0–0.9)
MONOCYTES NFR BLD AUTO: 11.6 % — SIGNIFICANT CHANGE UP (ref 2–14)
NEUTROPHILS # BLD AUTO: 4.33 K/UL — SIGNIFICANT CHANGE UP (ref 1.8–7.4)
NEUTROPHILS NFR BLD AUTO: 68.7 % — SIGNIFICANT CHANGE UP (ref 43–77)
NRBC # BLD: 0 /100 WBCS — SIGNIFICANT CHANGE UP (ref 0–0)
PLATELET # BLD AUTO: 195 K/UL — SIGNIFICANT CHANGE UP (ref 150–400)
POTASSIUM SERPL-MCNC: 4 MMOL/L — SIGNIFICANT CHANGE UP (ref 3.5–5.3)
POTASSIUM SERPL-SCNC: 4 MMOL/L — SIGNIFICANT CHANGE UP (ref 3.5–5.3)
PROT SERPL-MCNC: 6.3 G/DL — SIGNIFICANT CHANGE UP (ref 6–8.3)
RBC # BLD: 3.96 M/UL — SIGNIFICANT CHANGE UP (ref 3.8–5.2)
RBC # FLD: 16.3 % — HIGH (ref 10.3–14.5)
SODIUM SERPL-SCNC: 141 MMOL/L — SIGNIFICANT CHANGE UP (ref 135–145)
WBC # BLD: 6.3 K/UL — SIGNIFICANT CHANGE UP (ref 3.8–10.5)
WBC # FLD AUTO: 6.3 K/UL — SIGNIFICANT CHANGE UP (ref 3.8–10.5)

## 2022-03-16 PROCEDURE — 99214 OFFICE O/P EST MOD 30 MIN: CPT

## 2022-03-16 NOTE — HISTORY OF PRESENT ILLNESS
[Disease: _____________________] : Disease: [unfilled] [T: ___] : T[unfilled] [N: ___] : N[unfilled] [M: ___] : M[unfilled] [AJCC Stage: ____] : AJCC Stage: [unfilled] [Therapy: ___] : Therapy: [unfilled] [de-identified] : 76 F w/ h/o recurrent endocervical adenocarcinoma Stage IIB s/p radiation then required pelvic exoneration presents for further evaluation of rectal cancer. \par \par In June 2020 Abi noticed a rectal discharge and burning sensation in the rectum. Was evaluated by Dr. Lamar and ultimately saw Dr. Oropeza who ordered CT Pelvis and MRI Pelvis . Found to have a mass in the rectum s/p biopsy confirmed adenocarcinoma. \par \par 7/18/20: MRI Pelvis: large hypovascular mass c/w mucinous adenoca involving remaining distal rectum and vagina, tumor abuts anal sphincter\par 8/5/20: CT pelvis: residual chronic perineal mass, likely cystic with gas component\par 9/23/20: CT CAP: low density, possibly necrotic mass up to 6.2 cm with involvement of the vagina, nonspecific 6 mm LLL nodule is noted\par 10/3/20: PET/CT: hypermetabolic rectal mass, non specific small inguinal LN, nonspecific hypermetabolism within the colostomy insertion site\par 10/7/20: discussed at rectal TB, plan for DILMA with re-evaluation of response and then surgery if residual disease\par 10/26/20: C1 FOLFOX\par 11/9/20: C2 (c/b acute oxali neurotoxicity)\par 11/23/20: C3 (Oxali over 4 hrs) - tolerated well \par 12/7/20: C4 5FU/LV (developed difficulty swallowing, SOB after receiving Oxali, d/c Oxali for today's tx)\par 12/21-2/10/21: C5-8 FOLFOX (dose reduced Oxali 65 mg/m2 over 4 hrs)\par 2/13/21: CT CAP: 0.6 cm LLL nodule, rectal mass slightly decreased in size, small b/l inguinal LN minimally decr in size, pneumatosis is noted involving SB possible fistula \par 2/25-3/23/21: Xeloda/RT \par 4/21/21: CT CAP: mild interval decrease in size of rectal mass, stable pulm nodule \par 5/26-7/7/21:C1-C4 5FU/LV maintenance \par 7/14/21: CTCAP: stable lung nodules + further decrease in size of rectal mass/fluid collection\par 7/21-10/13/21: C5-C11\par 10/7/21: CT CAP revealed stable disease. \par 10/27-12/21/21: C12-C16  \par 1/3/22: CT CAP: stable disease\par 1/5-3/16/22: C17-C22 [de-identified] : adenocarcinoma [de-identified] : Patient was seen at the infusion room while receiving treatment. She currently feels fine; continued to report adequate appetite, stable weight and normal bowel movements (no issues with colostomy). The intermittent numbness/tingling within the fingertips of both and toes of both feet remains unchanged, she is able to complete her ADLs independently with minimal to no restrictions. There is mild improvement of the itchy red circular shaped skin rash along the lower back part of her left leg since she used OTC antifungal cream but it remains present.

## 2022-03-16 NOTE — PHYSICAL EXAM
[Fully active, able to carry on all pre-disease performance without restriction] : Status 0 - Fully active, able to carry on all pre-disease performance without restriction [Normal] : affect appropriate [de-identified] : faint erythematous skin rash appearance noted along posterior aspect of left calf/Achilles region

## 2022-03-17 ENCOUNTER — RESULT REVIEW (OUTPATIENT)
Age: 77
End: 2022-03-17

## 2022-03-18 ENCOUNTER — APPOINTMENT (OUTPATIENT)
Dept: INFUSION THERAPY | Facility: HOSPITAL | Age: 77
End: 2022-03-18

## 2022-03-21 NOTE — HISTORY OF PRESENT ILLNESS
[FreeTextEntry1] : Ms. 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 Her CEA is 8.1.\par \par It has been over 20 years since her previous course of radiation. She presents to sign consent for CT simulation.  \par \par Today she denies pain, diarrhea/ constipation, N/V, pain.
0 = swallows foods/liquids without difficulty

## 2022-03-30 ENCOUNTER — APPOINTMENT (OUTPATIENT)
Dept: INFUSION THERAPY | Facility: HOSPITAL | Age: 77
End: 2022-03-30

## 2022-03-30 ENCOUNTER — RESULT REVIEW (OUTPATIENT)
Age: 77
End: 2022-03-30

## 2022-03-30 ENCOUNTER — APPOINTMENT (OUTPATIENT)
Dept: HEMATOLOGY ONCOLOGY | Facility: CLINIC | Age: 77
End: 2022-03-30
Payer: MEDICARE

## 2022-03-30 DIAGNOSIS — B49 UNSPECIFIED MYCOSIS: ICD-10-CM

## 2022-03-30 LAB
ALBUMIN SERPL ELPH-MCNC: 4.3 G/DL — SIGNIFICANT CHANGE UP (ref 3.3–5)
ALP SERPL-CCNC: 61 U/L — SIGNIFICANT CHANGE UP (ref 40–120)
ALT FLD-CCNC: 8 U/L — LOW (ref 10–45)
ANION GAP SERPL CALC-SCNC: 11 MMOL/L — SIGNIFICANT CHANGE UP (ref 5–17)
AST SERPL-CCNC: 13 U/L — SIGNIFICANT CHANGE UP (ref 10–40)
BASOPHILS # BLD AUTO: 0.05 K/UL — SIGNIFICANT CHANGE UP (ref 0–0.2)
BASOPHILS NFR BLD AUTO: 0.7 % — SIGNIFICANT CHANGE UP (ref 0–2)
BILIRUB SERPL-MCNC: 0.4 MG/DL — SIGNIFICANT CHANGE UP (ref 0.2–1.2)
BUN SERPL-MCNC: 22 MG/DL — SIGNIFICANT CHANGE UP (ref 7–23)
CALCIUM SERPL-MCNC: 9.4 MG/DL — SIGNIFICANT CHANGE UP (ref 8.4–10.5)
CEA SERPL-MCNC: 3.5 NG/ML — SIGNIFICANT CHANGE UP (ref 0–3.8)
CHLORIDE SERPL-SCNC: 105 MMOL/L — SIGNIFICANT CHANGE UP (ref 96–108)
CO2 SERPL-SCNC: 22 MMOL/L — SIGNIFICANT CHANGE UP (ref 22–31)
CREAT SERPL-MCNC: 0.92 MG/DL — SIGNIFICANT CHANGE UP (ref 0.5–1.3)
EGFR: 64 ML/MIN/1.73M2 — SIGNIFICANT CHANGE UP
EOSINOPHIL # BLD AUTO: 0.09 K/UL — SIGNIFICANT CHANGE UP (ref 0–0.5)
EOSINOPHIL NFR BLD AUTO: 1.2 % — SIGNIFICANT CHANGE UP (ref 0–6)
GLUCOSE SERPL-MCNC: 90 MG/DL — SIGNIFICANT CHANGE UP (ref 70–99)
HCT VFR BLD CALC: 37.3 % — SIGNIFICANT CHANGE UP (ref 34.5–45)
HGB BLD-MCNC: 12.2 G/DL — SIGNIFICANT CHANGE UP (ref 11.5–15.5)
IMM GRANULOCYTES NFR BLD AUTO: 0.7 % — SIGNIFICANT CHANGE UP (ref 0–1.5)
LYMPHOCYTES # BLD AUTO: 1.32 K/UL — SIGNIFICANT CHANGE UP (ref 1–3.3)
LYMPHOCYTES # BLD AUTO: 17.3 % — SIGNIFICANT CHANGE UP (ref 13–44)
MCHC RBC-ENTMCNC: 30.4 PG — SIGNIFICANT CHANGE UP (ref 27–34)
MCHC RBC-ENTMCNC: 32.7 G/DL — SIGNIFICANT CHANGE UP (ref 32–36)
MCV RBC AUTO: 93 FL — SIGNIFICANT CHANGE UP (ref 80–100)
MONOCYTES # BLD AUTO: 1.04 K/UL — HIGH (ref 0–0.9)
MONOCYTES NFR BLD AUTO: 13.6 % — SIGNIFICANT CHANGE UP (ref 2–14)
NEUTROPHILS # BLD AUTO: 5.08 K/UL — SIGNIFICANT CHANGE UP (ref 1.8–7.4)
NEUTROPHILS NFR BLD AUTO: 66.5 % — SIGNIFICANT CHANGE UP (ref 43–77)
NRBC # BLD: 0 /100 WBCS — SIGNIFICANT CHANGE UP (ref 0–0)
PLATELET # BLD AUTO: 235 K/UL — SIGNIFICANT CHANGE UP (ref 150–400)
POTASSIUM SERPL-MCNC: 3.9 MMOL/L — SIGNIFICANT CHANGE UP (ref 3.5–5.3)
POTASSIUM SERPL-SCNC: 3.9 MMOL/L — SIGNIFICANT CHANGE UP (ref 3.5–5.3)
PROT SERPL-MCNC: 6.2 G/DL — SIGNIFICANT CHANGE UP (ref 6–8.3)
RBC # BLD: 4.01 M/UL — SIGNIFICANT CHANGE UP (ref 3.8–5.2)
RBC # FLD: 16.5 % — HIGH (ref 10.3–14.5)
SODIUM SERPL-SCNC: 138 MMOL/L — SIGNIFICANT CHANGE UP (ref 135–145)
WBC # BLD: 7.63 K/UL — SIGNIFICANT CHANGE UP (ref 3.8–10.5)
WBC # FLD AUTO: 7.63 K/UL — SIGNIFICANT CHANGE UP (ref 3.8–10.5)

## 2022-03-30 PROCEDURE — 99214 OFFICE O/P EST MOD 30 MIN: CPT

## 2022-03-30 NOTE — PHYSICAL EXAM
[Fully active, able to carry on all pre-disease performance without restriction] : Status 0 - Fully active, able to carry on all pre-disease performance without restriction [Normal] : affect appropriate [de-identified] : resolution of erythematous skin rash appearance noted along posterior aspect of left calf/Achilles region

## 2022-04-01 ENCOUNTER — APPOINTMENT (OUTPATIENT)
Dept: INFUSION THERAPY | Facility: HOSPITAL | Age: 77
End: 2022-04-01

## 2022-04-04 ENCOUNTER — NON-APPOINTMENT (OUTPATIENT)
Age: 77
End: 2022-04-04

## 2022-04-07 ENCOUNTER — OUTPATIENT (OUTPATIENT)
Dept: OUTPATIENT SERVICES | Facility: HOSPITAL | Age: 77
LOS: 1 days | Discharge: HOME | End: 2022-04-07

## 2022-04-07 DIAGNOSIS — C20 MALIGNANT NEOPLASM OF RECTUM: ICD-10-CM

## 2022-04-07 DIAGNOSIS — Z93.2 ILEOSTOMY STATUS: Chronic | ICD-10-CM

## 2022-04-07 DIAGNOSIS — Z90.710 ACQUIRED ABSENCE OF BOTH CERVIX AND UTERUS: Chronic | ICD-10-CM

## 2022-04-08 ENCOUNTER — APPOINTMENT (OUTPATIENT)
Dept: CT IMAGING | Facility: CLINIC | Age: 77
End: 2022-04-08
Payer: MEDICARE

## 2022-04-08 PROCEDURE — 71260 CT THORAX DX C+: CPT | Mod: MG

## 2022-04-08 PROCEDURE — G1004: CPT

## 2022-04-08 PROCEDURE — 74177 CT ABD & PELVIS W/CONTRAST: CPT | Mod: MG

## 2022-04-13 ENCOUNTER — RESULT REVIEW (OUTPATIENT)
Age: 77
End: 2022-04-13

## 2022-04-13 ENCOUNTER — APPOINTMENT (OUTPATIENT)
Dept: HEMATOLOGY ONCOLOGY | Facility: CLINIC | Age: 77
End: 2022-04-13
Payer: MEDICARE

## 2022-04-13 ENCOUNTER — APPOINTMENT (OUTPATIENT)
Dept: INFUSION THERAPY | Facility: HOSPITAL | Age: 77
End: 2022-04-13

## 2022-04-13 LAB
ALBUMIN SERPL ELPH-MCNC: 3.6 G/DL — SIGNIFICANT CHANGE UP (ref 3.3–5)
ALP SERPL-CCNC: 48 U/L — SIGNIFICANT CHANGE UP (ref 40–120)
ALT FLD-CCNC: 7 U/L — LOW (ref 10–45)
ANION GAP SERPL CALC-SCNC: 11 MMOL/L — SIGNIFICANT CHANGE UP (ref 5–17)
AST SERPL-CCNC: 13 U/L — SIGNIFICANT CHANGE UP (ref 10–40)
BASOPHILS # BLD AUTO: 0.04 K/UL — SIGNIFICANT CHANGE UP (ref 0–0.2)
BASOPHILS NFR BLD AUTO: 0.6 % — SIGNIFICANT CHANGE UP (ref 0–2)
BILIRUB SERPL-MCNC: 0.4 MG/DL — SIGNIFICANT CHANGE UP (ref 0.2–1.2)
BUN SERPL-MCNC: 17 MG/DL — SIGNIFICANT CHANGE UP (ref 7–23)
CALCIUM SERPL-MCNC: 7.8 MG/DL — LOW (ref 8.4–10.5)
CHLORIDE SERPL-SCNC: 114 MMOL/L — HIGH (ref 96–108)
CO2 SERPL-SCNC: 19 MMOL/L — LOW (ref 22–31)
CREAT SERPL-MCNC: 0.74 MG/DL — SIGNIFICANT CHANGE UP (ref 0.5–1.3)
EGFR: 83 ML/MIN/1.73M2 — SIGNIFICANT CHANGE UP
EOSINOPHIL # BLD AUTO: 0.06 K/UL — SIGNIFICANT CHANGE UP (ref 0–0.5)
EOSINOPHIL NFR BLD AUTO: 0.9 % — SIGNIFICANT CHANGE UP (ref 0–6)
GLUCOSE SERPL-MCNC: 73 MG/DL — SIGNIFICANT CHANGE UP (ref 70–99)
HCT VFR BLD CALC: 36.6 % — SIGNIFICANT CHANGE UP (ref 34.5–45)
HGB BLD-MCNC: 11.6 G/DL — SIGNIFICANT CHANGE UP (ref 11.5–15.5)
IMM GRANULOCYTES NFR BLD AUTO: 0.4 % — SIGNIFICANT CHANGE UP (ref 0–1.5)
LYMPHOCYTES # BLD AUTO: 1.14 K/UL — SIGNIFICANT CHANGE UP (ref 1–3.3)
LYMPHOCYTES # BLD AUTO: 16.7 % — SIGNIFICANT CHANGE UP (ref 13–44)
MCHC RBC-ENTMCNC: 29.5 PG — SIGNIFICANT CHANGE UP (ref 27–34)
MCHC RBC-ENTMCNC: 31.7 G/DL — LOW (ref 32–36)
MCV RBC AUTO: 93.1 FL — SIGNIFICANT CHANGE UP (ref 80–100)
MONOCYTES # BLD AUTO: 0.78 K/UL — SIGNIFICANT CHANGE UP (ref 0–0.9)
MONOCYTES NFR BLD AUTO: 11.4 % — SIGNIFICANT CHANGE UP (ref 2–14)
NEUTROPHILS # BLD AUTO: 4.78 K/UL — SIGNIFICANT CHANGE UP (ref 1.8–7.4)
NEUTROPHILS NFR BLD AUTO: 70 % — SIGNIFICANT CHANGE UP (ref 43–77)
NRBC # BLD: 0 /100 WBCS — SIGNIFICANT CHANGE UP (ref 0–0)
PLATELET # BLD AUTO: 211 K/UL — SIGNIFICANT CHANGE UP (ref 150–400)
POTASSIUM SERPL-MCNC: 3.3 MMOL/L — LOW (ref 3.5–5.3)
POTASSIUM SERPL-SCNC: 3.3 MMOL/L — LOW (ref 3.5–5.3)
PROT SERPL-MCNC: 5.1 G/DL — LOW (ref 6–8.3)
RBC # BLD: 3.93 M/UL — SIGNIFICANT CHANGE UP (ref 3.8–5.2)
RBC # FLD: 16.3 % — HIGH (ref 10.3–14.5)
SODIUM SERPL-SCNC: 143 MMOL/L — SIGNIFICANT CHANGE UP (ref 135–145)
WBC # BLD: 6.83 K/UL — SIGNIFICANT CHANGE UP (ref 3.8–10.5)
WBC # FLD AUTO: 6.83 K/UL — SIGNIFICANT CHANGE UP (ref 3.8–10.5)

## 2022-04-13 PROCEDURE — 99214 OFFICE O/P EST MOD 30 MIN: CPT

## 2022-04-13 NOTE — PHYSICAL EXAM
[Fully active, able to carry on all pre-disease performance without restriction] : Status 0 - Fully active, able to carry on all pre-disease performance without restriction [Normal] : affect appropriate [de-identified] : + ostomy

## 2022-04-13 NOTE — HISTORY OF PRESENT ILLNESS
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 \par \par In June 2020 Abi noticed a rectal discharge and burning sensation in the rectum. Was evaluated by Dr. Lamar and ultimately saw Dr. Oropeza who ordered CT Pelvis and MRI Pelvis . Found to have a mass in the rectum s/p biopsy confirmed adenocarcinoma. \par \par 7/18/20: MRI Pelvis: large hypovascular mass c/w mucinous adenoca involving remaining distal rectum and vagina, tumor abuts anal sphincter\par 8/5/20: CT pelvis: residual chronic perineal mass, likely cystic with gas component\par 9/23/20: CT CAP: low density, possibly necrotic mass up to 6.2 cm with involvement of the vagina, nonspecific 6 mm LLL nodule is noted\par 10/3/20: PET/CT: hypermetabolic rectal mass, non specific small inguinal LN, nonspecific hypermetabolism within the colostomy insertion site\par 10/7/20: discussed at rectal TB, plan for DILMA with re-evaluation of response and then surgery if residual disease\par 10/26/20: C1 FOLFOX\par 11/9/20: C2 (c/b acute oxali neurotoxicity)\par 11/23/20: C3 (Oxali over 4 hrs) - tolerated well \par 12/7/20: C4 5FU/LV (developed difficulty swallowing, SOB after receiving Oxali, d/c Oxali for today's tx)\par 12/21-2/10/21: C5-8 FOLFOX (dose reduced Oxali 65 mg/m2 over 4 hrs)\par 2/13/21: CT CAP: 0.6 cm LLL nodule, rectal mass slightly decreased in size, small b/l inguinal LN minimally decr in size, pneumatosis is noted involving SB possible fistula \par 2/25-3/23/21: Xeloda/RT \par 4/21/21: CT CAP: mild interval decrease in size of rectal mass, stable pulm nodule \par 5/1/21: case reviewed at TB, due to disease involving SB and need for skin flap to repear large defect, surgery is not feasible. Recommendation to cont with palliative chemo. \par 5/26/21-: maintenance 5FU/LV (refused addition of Zirabev)\par 7/14/21: CT CAP: improved/stable disease\par 1/3/22: CT CAP: stable disease\par 4/8/22: CT CAP: stable disease  [de-identified] : adenocarcinoma [de-identified] : pt overall feeling well. denies any c/o. tolerating chemo well. requests ostomy and urinary supplies. PCP won't fill anymore.

## 2022-04-14 ENCOUNTER — NON-APPOINTMENT (OUTPATIENT)
Age: 77
End: 2022-04-14

## 2022-04-14 DIAGNOSIS — Z51.11 ENCOUNTER FOR ANTINEOPLASTIC CHEMOTHERAPY: ICD-10-CM

## 2022-04-14 DIAGNOSIS — R11.2 NAUSEA WITH VOMITING, UNSPECIFIED: ICD-10-CM

## 2022-04-15 ENCOUNTER — APPOINTMENT (OUTPATIENT)
Dept: INFUSION THERAPY | Facility: HOSPITAL | Age: 77
End: 2022-04-15

## 2022-04-27 ENCOUNTER — RESULT REVIEW (OUTPATIENT)
Age: 77
End: 2022-04-27

## 2022-04-27 ENCOUNTER — APPOINTMENT (OUTPATIENT)
Dept: HEMATOLOGY ONCOLOGY | Facility: CLINIC | Age: 77
End: 2022-04-27
Payer: MEDICARE

## 2022-04-27 ENCOUNTER — APPOINTMENT (OUTPATIENT)
Dept: INFUSION THERAPY | Facility: HOSPITAL | Age: 77
End: 2022-04-27

## 2022-04-27 LAB
ALBUMIN SERPL ELPH-MCNC: 3.9 G/DL — SIGNIFICANT CHANGE UP (ref 3.3–5)
ALP SERPL-CCNC: 57 U/L — SIGNIFICANT CHANGE UP (ref 40–120)
ALT FLD-CCNC: 6 U/L — LOW (ref 10–45)
ANION GAP SERPL CALC-SCNC: 14 MMOL/L — SIGNIFICANT CHANGE UP (ref 5–17)
AST SERPL-CCNC: 12 U/L — SIGNIFICANT CHANGE UP (ref 10–40)
BASOPHILS # BLD AUTO: 0.04 K/UL — SIGNIFICANT CHANGE UP (ref 0–0.2)
BASOPHILS NFR BLD AUTO: 0.5 % — SIGNIFICANT CHANGE UP (ref 0–2)
BILIRUB SERPL-MCNC: 0.4 MG/DL — SIGNIFICANT CHANGE UP (ref 0.2–1.2)
BUN SERPL-MCNC: 18 MG/DL — SIGNIFICANT CHANGE UP (ref 7–23)
CALCIUM SERPL-MCNC: 8.7 MG/DL — SIGNIFICANT CHANGE UP (ref 8.4–10.5)
CHLORIDE SERPL-SCNC: 108 MMOL/L — SIGNIFICANT CHANGE UP (ref 96–108)
CO2 SERPL-SCNC: 21 MMOL/L — LOW (ref 22–31)
CREAT SERPL-MCNC: 0.9 MG/DL — SIGNIFICANT CHANGE UP (ref 0.5–1.3)
EGFR: 65 ML/MIN/1.73M2 — SIGNIFICANT CHANGE UP
EOSINOPHIL # BLD AUTO: 0.09 K/UL — SIGNIFICANT CHANGE UP (ref 0–0.5)
EOSINOPHIL NFR BLD AUTO: 1.2 % — SIGNIFICANT CHANGE UP (ref 0–6)
GLUCOSE SERPL-MCNC: 76 MG/DL — SIGNIFICANT CHANGE UP (ref 70–99)
HCT VFR BLD CALC: 35.8 % — SIGNIFICANT CHANGE UP (ref 34.5–45)
HGB BLD-MCNC: 11.6 G/DL — SIGNIFICANT CHANGE UP (ref 11.5–15.5)
IMM GRANULOCYTES NFR BLD AUTO: 0.8 % — SIGNIFICANT CHANGE UP (ref 0–1.5)
LYMPHOCYTES # BLD AUTO: 1.27 K/UL — SIGNIFICANT CHANGE UP (ref 1–3.3)
LYMPHOCYTES # BLD AUTO: 16.8 % — SIGNIFICANT CHANGE UP (ref 13–44)
MCHC RBC-ENTMCNC: 30 PG — SIGNIFICANT CHANGE UP (ref 27–34)
MCHC RBC-ENTMCNC: 32.4 G/DL — SIGNIFICANT CHANGE UP (ref 32–36)
MCV RBC AUTO: 92.5 FL — SIGNIFICANT CHANGE UP (ref 80–100)
MONOCYTES # BLD AUTO: 0.84 K/UL — SIGNIFICANT CHANGE UP (ref 0–0.9)
MONOCYTES NFR BLD AUTO: 11.1 % — SIGNIFICANT CHANGE UP (ref 2–14)
NEUTROPHILS # BLD AUTO: 5.24 K/UL — SIGNIFICANT CHANGE UP (ref 1.8–7.4)
NEUTROPHILS NFR BLD AUTO: 69.6 % — SIGNIFICANT CHANGE UP (ref 43–77)
NRBC # BLD: 0 /100 WBCS — SIGNIFICANT CHANGE UP (ref 0–0)
PLATELET # BLD AUTO: 222 K/UL — SIGNIFICANT CHANGE UP (ref 150–400)
POTASSIUM SERPL-MCNC: 3.6 MMOL/L — SIGNIFICANT CHANGE UP (ref 3.5–5.3)
POTASSIUM SERPL-SCNC: 3.6 MMOL/L — SIGNIFICANT CHANGE UP (ref 3.5–5.3)
PROT SERPL-MCNC: 5.8 G/DL — LOW (ref 6–8.3)
RBC # BLD: 3.87 M/UL — SIGNIFICANT CHANGE UP (ref 3.8–5.2)
RBC # FLD: 16 % — HIGH (ref 10.3–14.5)
SODIUM SERPL-SCNC: 142 MMOL/L — SIGNIFICANT CHANGE UP (ref 135–145)
WBC # BLD: 7.54 K/UL — SIGNIFICANT CHANGE UP (ref 3.8–10.5)
WBC # FLD AUTO: 7.54 K/UL — SIGNIFICANT CHANGE UP (ref 3.8–10.5)

## 2022-04-27 PROCEDURE — 99214 OFFICE O/P EST MOD 30 MIN: CPT

## 2022-04-27 RX ORDER — CLOTRIMAZOLE AND BETAMETHASONE DIPROPIONATE 10; .5 MG/G; MG/G
1-0.05 CREAM TOPICAL TWICE DAILY
Qty: 1 | Refills: 2 | Status: COMPLETED | COMMUNITY
Start: 2022-03-16 | End: 2022-04-27

## 2022-04-27 NOTE — HISTORY OF PRESENT ILLNESS
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 \par \par In June 2020 Abi noticed a rectal discharge and burning sensation in the rectum. Was evaluated by Dr. Lamar and ultimately saw Dr. Oropeza who ordered CT Pelvis and MRI Pelvis . Found to have a mass in the rectum s/p biopsy confirmed adenocarcinoma. \par \par 7/18/20: MRI Pelvis: large hypovascular mass c/w mucinous adenoca involving remaining distal rectum and vagina, tumor abuts anal sphincter\par 8/5/20: CT pelvis: residual chronic perineal mass, likely cystic with gas component\par 9/23/20: CT CAP: low density, possibly necrotic mass up to 6.2 cm with involvement of the vagina, nonspecific 6 mm LLL nodule is noted\par 10/3/20: PET/CT: hypermetabolic rectal mass, non specific small inguinal LN, nonspecific hypermetabolism within the colostomy insertion site\par 10/7/20: discussed at rectal TB, plan for DILMA with re-evaluation of response and then surgery if residual disease\par 10/26/20: C1 FOLFOX\par 11/9/20: C2 (c/b acute oxali neurotoxicity)\par 11/23/20: C3 (Oxali over 4 hrs) - tolerated well \par 12/7/20: C4 5FU/LV (developed difficulty swallowing, SOB after receiving Oxali, d/c Oxali for today's tx)\par 12/21-2/10/21: C5-8 FOLFOX (dose reduced Oxali 65 mg/m2 over 4 hrs)\par 2/13/21: CT CAP: 0.6 cm LLL nodule, rectal mass slightly decreased in size, small b/l inguinal LN minimally decr in size, pneumatosis is noted involving SB possible fistula \par 2/25-3/23/21: Xeloda/RT \par 4/21/21: CT CAP: mild interval decrease in size of rectal mass, stable pulm nodule \par 5/26-7/7/21:C1-C4 5FU/LV maintenance \par 7/14/21: CTCAP: stable lung nodules + further decrease in size of rectal mass/fluid collection\par 7/21-10/13/21: C5-C11\par 10/7/21: CT CAP revealed stable disease. \par 10/27-12/21/21: C12-C16  \par 1/3/22: CT CAP: stable disease\par 1/5-3/30/22: C17-C23\par 4/8/22: CT CAP: stable disease \par 4/13-4/27/22: C24-C25 [de-identified] : adenocarcinoma [de-identified] : Patient was seen at the infusion room while receiving treatment. She currently feels fine; continued to report adequate appetite, stable weight and normal bowel movements (no issues with colostomy). The intermittent numbness/tingling within the fingertips of both and toes of both feet remains unchanged (worse with cold climate but it is well managed), she is able to complete her ADLs independently with minimal to no restrictions. She reported dry skin along the right palm (uses skin moisturizers as directed).

## 2022-04-27 NOTE — PHYSICAL EXAM
[Fully active, able to carry on all pre-disease performance without restriction] : Status 0 - Fully active, able to carry on all pre-disease performance without restriction [Normal] : affect appropriate [de-identified] : resolution of erythematous skin rash appearance noted along posterior aspect of left calf/Achilles region

## 2022-04-27 NOTE — REASON FOR VISIT
[Follow-Up Visit] : a follow-up [Spouse] : spouse [FreeTextEntry2] : Locally advanced rectal cancer yes

## 2022-04-29 ENCOUNTER — APPOINTMENT (OUTPATIENT)
Dept: UROLOGY | Facility: CLINIC | Age: 77
End: 2022-04-29

## 2022-04-29 ENCOUNTER — APPOINTMENT (OUTPATIENT)
Dept: INFUSION THERAPY | Facility: HOSPITAL | Age: 77
End: 2022-04-29

## 2022-05-09 ENCOUNTER — OUTPATIENT (OUTPATIENT)
Dept: OUTPATIENT SERVICES | Facility: HOSPITAL | Age: 77
LOS: 1 days | Discharge: HOME | End: 2022-05-09

## 2022-05-09 DIAGNOSIS — C20 MALIGNANT NEOPLASM OF RECTUM: ICD-10-CM

## 2022-05-09 DIAGNOSIS — Z93.2 ILEOSTOMY STATUS: Chronic | ICD-10-CM

## 2022-05-09 DIAGNOSIS — Z90.710 ACQUIRED ABSENCE OF BOTH CERVIX AND UTERUS: Chronic | ICD-10-CM

## 2022-05-11 ENCOUNTER — APPOINTMENT (OUTPATIENT)
Dept: HEMATOLOGY ONCOLOGY | Facility: CLINIC | Age: 77
End: 2022-05-11

## 2022-05-11 ENCOUNTER — APPOINTMENT (OUTPATIENT)
Dept: INFUSION THERAPY | Facility: HOSPITAL | Age: 77
End: 2022-05-11

## 2022-05-11 ENCOUNTER — RESULT REVIEW (OUTPATIENT)
Age: 77
End: 2022-05-11

## 2022-05-11 LAB
ALBUMIN SERPL ELPH-MCNC: 4.2 G/DL — SIGNIFICANT CHANGE UP (ref 3.3–5)
ALP SERPL-CCNC: 60 U/L — SIGNIFICANT CHANGE UP (ref 40–120)
ALT FLD-CCNC: 10 U/L — SIGNIFICANT CHANGE UP (ref 10–45)
ANION GAP SERPL CALC-SCNC: 11 MMOL/L — SIGNIFICANT CHANGE UP (ref 5–17)
AST SERPL-CCNC: 14 U/L — SIGNIFICANT CHANGE UP (ref 10–40)
BASOPHILS # BLD AUTO: 0.05 K/UL — SIGNIFICANT CHANGE UP (ref 0–0.2)
BASOPHILS NFR BLD AUTO: 0.6 % — SIGNIFICANT CHANGE UP (ref 0–2)
BILIRUB SERPL-MCNC: 0.3 MG/DL — SIGNIFICANT CHANGE UP (ref 0.2–1.2)
BUN SERPL-MCNC: 21 MG/DL — SIGNIFICANT CHANGE UP (ref 7–23)
CALCIUM SERPL-MCNC: 9.3 MG/DL — SIGNIFICANT CHANGE UP (ref 8.4–10.5)
CHLORIDE SERPL-SCNC: 105 MMOL/L — SIGNIFICANT CHANGE UP (ref 96–108)
CO2 SERPL-SCNC: 24 MMOL/L — SIGNIFICANT CHANGE UP (ref 22–31)
CREAT SERPL-MCNC: 0.86 MG/DL — SIGNIFICANT CHANGE UP (ref 0.5–1.3)
EGFR: 69 ML/MIN/1.73M2 — SIGNIFICANT CHANGE UP
EOSINOPHIL # BLD AUTO: 0.08 K/UL — SIGNIFICANT CHANGE UP (ref 0–0.5)
EOSINOPHIL NFR BLD AUTO: 0.9 % — SIGNIFICANT CHANGE UP (ref 0–6)
GLUCOSE SERPL-MCNC: 139 MG/DL — HIGH (ref 70–99)
HCT VFR BLD CALC: 36.6 % — SIGNIFICANT CHANGE UP (ref 34.5–45)
HGB BLD-MCNC: 11.7 G/DL — SIGNIFICANT CHANGE UP (ref 11.5–15.5)
IMM GRANULOCYTES NFR BLD AUTO: 0.9 % — SIGNIFICANT CHANGE UP (ref 0–1.5)
LYMPHOCYTES # BLD AUTO: 1.3 K/UL — SIGNIFICANT CHANGE UP (ref 1–3.3)
LYMPHOCYTES # BLD AUTO: 15.4 % — SIGNIFICANT CHANGE UP (ref 13–44)
MCHC RBC-ENTMCNC: 29.7 PG — SIGNIFICANT CHANGE UP (ref 27–34)
MCHC RBC-ENTMCNC: 32 G/DL — SIGNIFICANT CHANGE UP (ref 32–36)
MCV RBC AUTO: 92.9 FL — SIGNIFICANT CHANGE UP (ref 80–100)
MONOCYTES # BLD AUTO: 0.85 K/UL — SIGNIFICANT CHANGE UP (ref 0–0.9)
MONOCYTES NFR BLD AUTO: 10.1 % — SIGNIFICANT CHANGE UP (ref 2–14)
NEUTROPHILS # BLD AUTO: 6.08 K/UL — SIGNIFICANT CHANGE UP (ref 1.8–7.4)
NEUTROPHILS NFR BLD AUTO: 72.1 % — SIGNIFICANT CHANGE UP (ref 43–77)
NRBC # BLD: 0 /100 WBCS — SIGNIFICANT CHANGE UP (ref 0–0)
PLATELET # BLD AUTO: 213 K/UL — SIGNIFICANT CHANGE UP (ref 150–400)
POTASSIUM SERPL-MCNC: 3.2 MMOL/L — LOW (ref 3.5–5.3)
POTASSIUM SERPL-SCNC: 3.2 MMOL/L — LOW (ref 3.5–5.3)
PROT SERPL-MCNC: 6.2 G/DL — SIGNIFICANT CHANGE UP (ref 6–8.3)
RBC # BLD: 3.94 M/UL — SIGNIFICANT CHANGE UP (ref 3.8–5.2)
RBC # FLD: 15.8 % — HIGH (ref 10.3–14.5)
SODIUM SERPL-SCNC: 140 MMOL/L — SIGNIFICANT CHANGE UP (ref 135–145)
WBC # BLD: 8.44 K/UL — SIGNIFICANT CHANGE UP (ref 3.8–10.5)
WBC # FLD AUTO: 8.44 K/UL — SIGNIFICANT CHANGE UP (ref 3.8–10.5)

## 2022-05-12 DIAGNOSIS — R11.2 NAUSEA WITH VOMITING, UNSPECIFIED: ICD-10-CM

## 2022-05-12 DIAGNOSIS — Z51.11 ENCOUNTER FOR ANTINEOPLASTIC CHEMOTHERAPY: ICD-10-CM

## 2022-05-13 ENCOUNTER — APPOINTMENT (OUTPATIENT)
Dept: INFUSION THERAPY | Facility: HOSPITAL | Age: 77
End: 2022-05-13

## 2022-05-26 NOTE — PHYSICAL EXAM
[Fully active, able to carry on all pre-disease performance without restriction] : Status 0 - Fully active, able to carry on all pre-disease performance without restriction [Normal] : affect appropriate [de-identified] : resolution of erythematous skin rash appearance noted along posterior aspect of left calf/Achilles region

## 2022-05-26 NOTE — HISTORY OF PRESENT ILLNESS
[Disease: _____________________] : Disease: [unfilled] [T: ___] : T[unfilled] [N: ___] : N[unfilled] [M: ___] : M[unfilled] [AJCC Stage: ____] : AJCC Stage: [unfilled] [de-identified] : 77 F w/ h/o recurrent endocervical adenocarcinoma Stage IIB s/p radiation then required pelvic exoneration presents for further evaluation of rectal cancer. \par \par In June 2020 Abi noticed a rectal discharge and burning sensation in the rectum. Was evaluated by Dr. Lamar and ultimately saw Dr. Oropeza who ordered CT Pelvis and MRI Pelvis . Found to have a mass in the rectum s/p biopsy confirmed adenocarcinoma. \par \par 7/18/20: MRI Pelvis: large hypovascular mass c/w mucinous adenoca involving remaining distal rectum and vagina, tumor abuts anal sphincter\par 8/5/20: CT pelvis: residual chronic perineal mass, likely cystic with gas component\par 9/23/20: CT CAP: low density, possibly necrotic mass up to 6.2 cm with involvement of the vagina, nonspecific 6 mm LLL nodule is noted\par 10/3/20: PET/CT: hypermetabolic rectal mass, non specific small inguinal LN, nonspecific hypermetabolism within the colostomy insertion site\par 10/7/20: discussed at rectal TB, plan for DILMA with re-evaluation of response and then surgery if residual disease\par 10/26/20: C1 FOLFOX\par 11/9/20: C2 (c/b acute oxali neurotoxicity)\par 11/23/20: C3 (Oxali over 4 hrs) - tolerated well \par 12/7/20: C4 5FU/LV (developed difficulty swallowing, SOB after receiving Oxali, d/c Oxali for today's tx)\par 12/21-2/10/21: C5-8 FOLFOX (dose reduced Oxali 65 mg/m2 over 4 hrs)\par 2/13/21: CT CAP: 0.6 cm LLL nodule, rectal mass slightly decreased in size, small b/l inguinal LN minimally decr in size, pneumatosis is noted involving SB possible fistula \par 2/25-3/23/21: Xeloda/RT \par 4/21/21: CT CAP: mild interval decrease in size of rectal mass, stable pulm nodule \par 5/26-7/7/21:C1-C4 5FU/LV maintenance \par 7/14/21: CTCAP: stable lung nodules + further decrease in size of rectal mass/fluid collection\par 7/21-10/13/21: C5-C11\par 10/7/21: CT CAP revealed stable disease. \par 10/27-12/21/21: C12-C16  \par 1/3/22: CT CAP: stable disease\par 1/5-3/30/22: C17-C23\par 4/8/22: CT CAP: stable disease \par 4/13-5/25/22: C24-C26 [de-identified] : adenocarcinoma [Therapy: ___] : Therapy: [unfilled] [de-identified] : Patient was seen at the infusion room while receiving treatment. She currently feels fine; continued to report adequate appetite, stable weight and normal bowel movements (no issues with colostomy). The intermittent numbness/tingling within the fingertips of both and toes of both feet remains unchanged (worse with cold climate but it is well managed), she is able to complete her ADLs independently with minimal to no restrictions. She reported dry skin/itchiness along the back of her neck for the past 1 week (uses Claritin antihistamine and hydrocortisone 1% cream).

## 2022-06-22 PROBLEM — G60.8 COLD INDUCED NEUROPATHY: Status: ACTIVE | Noted: 2020-11-22

## 2022-06-22 NOTE — PHYSICAL EXAM
[Fully active, able to carry on all pre-disease performance without restriction] : Status 0 - Fully active, able to carry on all pre-disease performance without restriction [Normal] : affect appropriate [de-identified] : resolution of erythematous skin rash appearance noted along posterior aspect of left calf/Achilles region

## 2022-06-22 NOTE — HISTORY OF PRESENT ILLNESS
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 \par \par In June 2020 Abi noticed a rectal discharge and burning sensation in the rectum. Was evaluated by Dr. Lamar and ultimately saw Dr. Oropeza who ordered CT Pelvis and MRI Pelvis . Found to have a mass in the rectum s/p biopsy confirmed adenocarcinoma. \par \par 7/18/20: MRI Pelvis: large hypovascular mass c/w mucinous adenoca involving remaining distal rectum and vagina, tumor abuts anal sphincter\par 8/5/20: CT pelvis: residual chronic perineal mass, likely cystic with gas component\par 9/23/20: CT CAP: low density, possibly necrotic mass up to 6.2 cm with involvement of the vagina, nonspecific 6 mm LLL nodule is noted\par 10/3/20: PET/CT: hypermetabolic rectal mass, non specific small inguinal LN, nonspecific hypermetabolism within the colostomy insertion site\par 10/7/20: discussed at rectal TB, plan for DILMA with re-evaluation of response and then surgery if residual disease\par 10/26/20: C1 FOLFOX\par 11/9/20: C2 (c/b acute oxali neurotoxicity)\par 11/23/20: C3 (Oxali over 4 hrs) - tolerated well \par 12/7/20: C4 5FU/LV (developed difficulty swallowing, SOB after receiving Oxali, d/c Oxali for today's tx)\par 12/21-2/10/21: C5-8 FOLFOX (dose reduced Oxali 65 mg/m2 over 4 hrs)\par 2/13/21: CT CAP: 0.6 cm LLL nodule, rectal mass slightly decreased in size, small b/l inguinal LN minimally decr in size, pneumatosis is noted involving SB possible fistula \par 2/25-3/23/21: Xeloda/RT \par 4/21/21: CT CAP: mild interval decrease in size of rectal mass, stable pulm nodule \par 5/26-7/7/21:C1-C4 5FU/LV maintenance \par 7/14/21: CTCAP: stable lung nodules + further decrease in size of rectal mass/fluid collection\par 7/21-10/13/21: C5-C11\par 10/7/21: CT CAP revealed stable disease. \par 10/27-12/21/21: C12-C16  \par 1/3/22: CT CAP: stable disease\par 1/5-3/30/22: C17-C23\par 4/8/22: CT CAP: stable disease \par 4/13-6/22/22: C24-C28 [de-identified] : adenocarcinoma [de-identified] : Patient was seen at the infusion room while receiving treatment. She currently feels fine and reported no acute complaints. She admitted to adequate appetite, stable weight and normal bowel movements (no issues with colostomy). The intermittent numbness/tingling within the fingertips of both and toes of both feet remains unchanged (worse with cold climate but it is well managed), she is able to complete her ADLs independently with minimal to no restrictions.

## 2022-08-19 NOTE — HISTORY OF PRESENT ILLNESS
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 \par \par In June 2020 Abi noticed a rectal discharge and burning sensation in the rectum. Was evaluated by Dr. Lamar and ultimately saw Dr. Oropeza who ordered CT Pelvis and MRI Pelvis . Found to have a mass in the rectum s/p biopsy confirmed adenocarcinoma. \par \par 7/18/20: MRI Pelvis: large hypovascular mass c/w mucinous adenoca involving remaining distal rectum and vagina, tumor abuts anal sphincter\par 8/5/20: CT pelvis: residual chronic perineal mass, likely cystic with gas component\par 9/23/20: CT CAP: low density, possibly necrotic mass up to 6.2 cm with involvement of the vagina, nonspecific 6 mm LLL nodule is noted\par 10/3/20: PET/CT: hypermetabolic rectal mass, non specific small inguinal LN, nonspecific hypermetabolism within the colostomy insertion site\par 10/7/20: discussed at rectal TB, plan for DILMA with re-evaluation of response and then surgery if residual disease\par 10/26/20: C1 FOLFOX\par 11/9/20: C2 (c/b acute oxali neurotoxicity)\par 11/23/20: C3 (Oxali over 4 hrs) - tolerated well \par 12/7/20: C4 5FU/LV (developed difficulty swallowing, SOB after receiving Oxali, d/c Oxali for today's tx)\par 12/21-2/10/21: C5-8 FOLFOX (dose reduced Oxali 65 mg/m2 over 4 hrs)\par 2/13/21: CT CAP: 0.6 cm LLL nodule, rectal mass slightly decreased in size, small b/l inguinal LN minimally decr in size, pneumatosis is noted involving SB possible fistula \par 2/25-3/23/21: Xeloda/RT \par 4/21/21: CT CAP: mild interval decrease in size of rectal mass, stable pulm nodule \par 5/26-7/7/21:C1-C4 5FU/LV maintenance \par 7/14/21: CTCAP: stable lung nodules + further decrease in size of rectal mass/fluid collection\par 7/21-10/13/21: C5-C11\par 10/7/21: CT CAP revealed stable disease. \par 10/27-12/21/21: C12-C16  \par 1/3/22: CT CAP: stable disease\par 1/5-3/30/22: C17-C23\par 4/8/22: CT CAP: stable disease \par 4/13-7/6/22: C24-C29\par 7/11/22: CT CAP: stable disease\par 7/20-8/19/22: C30-C32\par  [de-identified] : adenocarcinoma [de-identified] : Patient was seen at the practice side prior to her CADD disconnect. She currently feels fine and reported no acute complaints. She admitted to adequate appetite, stable weight and normal bowel movements (no issues with colostomy). The intermittent numbness/tingling within the fingertips of both and toes of both feet remains unchanged (worse with cold climate but it is well managed), she is able to complete her ADLs independently with minimal to no restrictions. \par \par

## 2022-08-19 NOTE — PHYSICAL EXAM
[Fully active, able to carry on all pre-disease performance without restriction] : Status 0 - Fully active, able to carry on all pre-disease performance without restriction [Normal] : affect appropriate [de-identified] : resolution of erythematous skin rash appearance noted along posterior aspect of left calf/Achilles region

## 2022-08-28 NOTE — HISTORY OF PRESENT ILLNESS
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 \par \par In June 2020 Abi noticed a rectal discharge and burning sensation in the rectum. Was evaluated by Dr. Lamar and ultimately saw Dr. Oropeza who ordered CT Pelvis and MRI Pelvis . Found to have a mass in the rectum s/p biopsy confirmed adenocarcinoma. \par \par 7/18/20: MRI Pelvis: large hypovascular mass c/w mucinous adenoca involving remaining distal rectum and vagina, tumor abuts anal sphincter\par 8/5/20: CT pelvis: residual chronic perineal mass, likely cystic with gas component\par 9/23/20: CT CAP: low density, possibly necrotic mass up to 6.2 cm with involvement of the vagina, nonspecific 6 mm LLL nodule is noted\par 10/3/20: PET/CT: hypermetabolic rectal mass, non specific small inguinal LN, nonspecific hypermetabolism within the colostomy insertion site\par 10/7/20: discussed at rectal TB, plan for DILMA with re-evaluation of response and then surgery if residual disease\par 10/26/20: C1 FOLFOX\par 11/9/20: C2 (c/b acute oxali neurotoxicity)\par 11/23/20: C3 (Oxali over 4 hrs) - tolerated well \par 12/7/20: C4 5FU/LV (developed difficulty swallowing, SOB after receiving Oxali, d/c Oxali for today's tx)\par 12/21-2/10/21: C5-8 FOLFOX (dose reduced Oxali 65 mg/m2 over 4 hrs)\par 2/13/21: CT CAP: 0.6 cm LLL nodule, rectal mass slightly decreased in size, small b/l inguinal LN minimally decr in size, pneumatosis is noted involving SB possible fistula \par 2/25-3/23/21: Xeloda/RT \par 4/21/21: CT CAP: mild interval decrease in size of rectal mass, stable pulm nodule \par 5/1/21: case reviewed at TB, due to disease involving SB and need for skin flap to repear large defect, surgery is not feasible. Recommendation to cont with palliative chemo. \par 5/26/21- current: maintenance 5FU/LV (refused addition of Zirabev)\par 7/14/21: CT CAP: improved/stable disease\par 1/3/22: CT CAP: stable disease\par 4/8/22: CT CAP: stable disease \par 7/11/22: CT CAP: stable disease  [de-identified] : adenocarcinoma [de-identified] : pt overall feeling well. denies any c/o. tolerating chemo well.

## 2022-09-14 NOTE — PHYSICAL EXAM
[Fully active, able to carry on all pre-disease performance without restriction] : Status 0 - Fully active, able to carry on all pre-disease performance without restriction [Normal] : affect appropriate [de-identified] : raised skin rash appearance noted along posterior aspect of left calf/Achilles region, + ganglion cyst of R thumb

## 2022-09-14 NOTE — HISTORY OF PRESENT ILLNESS
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 \par \par In June 2020 Abi noticed a rectal discharge and burning sensation in the rectum. Was evaluated by Dr. Lamar and ultimately saw Dr. Oropeza who ordered CT Pelvis and MRI Pelvis . Found to have a mass in the rectum s/p biopsy confirmed adenocarcinoma. \par \par 7/18/20: MRI Pelvis: large hypovascular mass c/w mucinous adenoca involving remaining distal rectum and vagina, tumor abuts anal sphincter\par 8/5/20: CT pelvis: residual chronic perineal mass, likely cystic with gas component\par 9/23/20: CT CAP: low density, possibly necrotic mass up to 6.2 cm with involvement of the vagina, nonspecific 6 mm LLL nodule is noted\par 10/3/20: PET/CT: hypermetabolic rectal mass, non specific small inguinal LN, nonspecific hypermetabolism within the colostomy insertion site\par 10/7/20: discussed at rectal TB, plan for DILMA with re-evaluation of response and then surgery if residual disease\par 10/26/20: C1 FOLFOX\par 11/9/20: C2 (c/b acute oxali neurotoxicity)\par 11/23/20: C3 (Oxali over 4 hrs) - tolerated well \par 12/7/20: C4 5FU/LV (developed difficulty swallowing, SOB after receiving Oxali, d/c Oxali for today's tx)\par 12/21-2/10/21: C5-8 FOLFOX (dose reduced Oxali 65 mg/m2 over 4 hrs)\par 2/13/21: CT CAP: 0.6 cm LLL nodule, rectal mass slightly decreased in size, small b/l inguinal LN minimally decr in size, pneumatosis is noted involving SB possible fistula \par 2/25-3/23/21: Xeloda/RT \par 4/21/21: CT CAP: mild interval decrease in size of rectal mass, stable pulm nodule \par 5/26-7/7/21:C1-C4 5FU/LV maintenance \par 7/14/21: CTCAP: stable lung nodules + further decrease in size of rectal mass/fluid collection\par 7/21-10/13/21: C5-C11\par 10/7/21: CT CAP revealed stable disease. \par 10/27-12/21/21: C12-C16  \par 1/3/22: CT CAP: stable disease\par 1/5-3/30/22: C17-C23\par 4/8/22: CT CAP: stable disease \par 4/13-7/6/22: C24-C29\par 7/11/22: CT CAP: stable disease\par 7/20-9/14/22: C30-C34 [de-identified] : adenocarcinoma [de-identified] : Patient was seen at the infusion room while receiving treatment today. She currently feels fine and reported no acute complaints. She admitted to adequate appetite, stable weight and normal bowel movements (no issues with colostomy). The intermittent numbness/tingling within the fingertips of both and toes of both feet remains unchanged (worse with cold climate but it is well managed), she is able to complete her ADLs independently with minimal to no restrictions. Patient recently saw her PCP and was advised to f/u with hand orthopedist to address ganglion cyst on her R thumb and prescribed topical tx for skin rash on her left lower leg region (does not recall prescription name).

## 2022-10-12 NOTE — HISTORY OF PRESENT ILLNESS
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 \par \par In June 2020 Abi noticed a rectal discharge and burning sensation in the rectum. Was evaluated by Dr. Lamar and ultimately saw Dr. Oropeza who ordered CT Pelvis and MRI Pelvis . Found to have a mass in the rectum s/p biopsy confirmed adenocarcinoma. \par \par 7/18/20: MRI Pelvis: large hypovascular mass c/w mucinous adenoca involving remaining distal rectum and vagina, tumor abuts anal sphincter\par 8/5/20: CT pelvis: residual chronic perineal mass, likely cystic with gas component\par 9/23/20: CT CAP: low density, possibly necrotic mass up to 6.2 cm with involvement of the vagina, nonspecific 6 mm LLL nodule is noted\par 10/3/20: PET/CT: hypermetabolic rectal mass, non specific small inguinal LN, nonspecific hypermetabolism within the colostomy insertion site\par 10/7/20: discussed at rectal TB, plan for DILMA with re-evaluation of response and then surgery if residual disease\par 10/26/20: C1 FOLFOX\par 11/9/20: C2 (c/b acute oxali neurotoxicity)\par 11/23/20: C3 (Oxali over 4 hrs) - tolerated well \par 12/7/20: C4 5FU/LV (developed difficulty swallowing, SOB after receiving Oxali, d/c Oxali for today's tx)\par 12/21-2/10/21: C5-8 FOLFOX (dose reduced Oxali 65 mg/m2 over 4 hrs)\par 2/13/21: CT CAP: 0.6 cm LLL nodule, rectal mass slightly decreased in size, small b/l inguinal LN minimally decr in size, pneumatosis is noted involving SB possible fistula \par 2/25-3/23/21: Xeloda/RT \par 4/21/21: CT CAP: mild interval decrease in size of rectal mass, stable pulm nodule \par 5/26-7/7/21:C1-C4 5FU/LV maintenance \par 7/14/21: CTCAP: stable lung nodules + further decrease in size of rectal mass/fluid collection\par 7/21-10/13/21: C5-C11\par 10/7/21: CT CAP revealed stable disease. \par 10/27-12/21/21: C12-C16  \par 1/3/22: CT CAP: stable disease\par 1/5-3/30/22: C17-C23\par 4/8/22: CT CAP: stable disease \par 4/13-7/6/22: C24-C29\par 7/11/22: CT CAP: stable disease\par 7/20-10/12/22: C30-C36\par 10/3/22 CT C/A/P: reviewed images with radiology and there is disease recurrence with increased communication to vagina [de-identified] : adenocarcinoma [de-identified] : Patient was seen at the infusion room while receiving treatment today. She is feeling well, offering no complaints. Reviewed results of recent staging scans which show increased communication to the vagina with disease recurrence. She admits she has been having increased vaginal discharge, describes it as "mucous like, as if it's the end of your period". We reviewed recommendations to add irinotecan, patient has concerns and is hesitant. Will discuss with daughter and family at home before making a decision.

## 2022-10-12 NOTE — ASSESSMENT
[Palliative] : Goals of care discussed with patient: Palliative [Palliative Care Plan] : not applicable at this time [FreeTextEntry1] : 77 F w/ h/o recurrent cervical cancer s/p RT and pelvic exenteration now with Stage IIc\par     rectal adenocarcinoma c/b acute cold induced throat dysesthesia. Completed 8 cycles\par     FOLFOX (dose reduced Oxaliplatin over 4 hour infusion due to acute neurotoxicity)\par     -->completed Xeloda/RT 3/23/21 --> 5FU/LV maintenance (since surgery was not\par     feasible) as of 5/21/21.\par     \par     - has been on 5FU/LV maintenance every 2 weeks; patient deferred from adding Zirabev\par      due to concern of AEs \par     - CT CAP Oct 2022 with disease recurrence. After reviewing scans and due to her clinical symptoms, at this time recommend the addition of irinotecan. Patient understandably has concerns, reviewed side effects including but not limited to diarrhea and hair loss. She will take educational information home and discuss with daughter. \par     - Monitor for toxicity and physical activity as tolerated. \par     - All concerns and questions were addressed and answered in full detail to the patient and her 's \par     apparent satisfaction and agreement.\par     - RTC in 2 weeks, likely will begin C1D1 FOLFIRI

## 2022-11-09 NOTE — HISTORY OF PRESENT ILLNESS
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 \par \par In June 2020 Abi noticed a rectal discharge and burning sensation in the rectum. Was evaluated by Dr. Lamar and ultimately saw Dr. Oropeza who ordered CT Pelvis and MRI Pelvis . Found to have a mass in the rectum s/p biopsy confirmed adenocarcinoma. \par \par 7/18/20: MRI Pelvis: large hypovascular mass c/w mucinous adenoca involving remaining distal rectum and vagina, tumor abuts anal sphincter\par 8/5/20: CT pelvis: residual chronic perineal mass, likely cystic with gas component\par 9/23/20: CT CAP: low density, possibly necrotic mass up to 6.2 cm with involvement of the vagina, nonspecific 6 mm LLL nodule is noted\par 10/3/20: PET/CT: hypermetabolic rectal mass, non specific small inguinal LN, nonspecific hypermetabolism within the colostomy insertion site\par 10/7/20: discussed at rectal TB, plan for DILMA with re-evaluation of response and then surgery if residual disease\par 10/26/20: C1 FOLFOX\par 11/9/20: C2 (c/b acute oxali neurotoxicity)\par 11/23/20: C3 (Oxali over 4 hrs) - tolerated well \par 12/7/20: C4 5FU/LV (developed difficulty swallowing, SOB after receiving Oxali, d/c Oxali for today's tx)\par 12/21-2/10/21: C5-8 FOLFOX (dose reduced Oxali 65 mg/m2 over 4 hrs)\par 2/13/21: CT CAP: 0.6 cm LLL nodule, rectal mass slightly decreased in size, small b/l inguinal LN minimally decr in size, pneumatosis is noted involving SB possible fistula \par 2/25-3/23/21: Xeloda/RT \par 4/21/21: CT CAP: mild interval decrease in size of rectal mass, stable pulm nodule \par 5/26-7/7/21:C1-C4 5FU/LV maintenance \par 7/14/21: CTCAP: stable lung nodules + further decrease in size of rectal mass/fluid collection\par 7/21-10/13/21: C5-C11\par 10/7/21: CT CAP revealed stable disease. \par 10/27-12/21/21: C12-C16  \par 1/3/22: CT CAP: stable disease\par 1/5-3/30/22: C17-C23\par 4/8/22: CT CAP: stable disease \par 4/13-7/6/22: C24-C29\par 7/11/22: CT CAP: stable disease\par 7/20-10/12/22: C30-C36\par 10/3/22 CT C/A/P: reviewed images with radiology and there is disease recurrence with increased communication to vagina\par 10/26-11/9/22: C1-C2 FOLFIRI  [de-identified] : adenocarcinoma [de-identified] : Patient was seen at the infusion room while receiving treatment today. She currently feels fine and reported no acute complaints. Her vaginal mucinous discharge remains unchanged but stable. She admitted to adequate appetite, stable weight and normal bowel movements (no issues with colostomy). She is able to complete her ADLs independently with minimal to no restrictions.

## 2022-11-09 NOTE — PHYSICAL EXAM
[Fully active, able to carry on all pre-disease performance without restriction] : Status 0 - Fully active, able to carry on all pre-disease performance without restriction [Normal] : affect appropriate [de-identified] : + colostomy

## 2022-11-21 NOTE — PHYSICAL EXAM
[Normal] : affect appropriate [Restricted in physically strenuous activity but ambulatory and able to carry out work of a light or sedentary nature] : Status 1- Restricted in physically strenuous activity but ambulatory and able to carry out work of a light or sedentary nature, e.g., light house work, office work [de-identified] : + colostomy

## 2022-11-21 NOTE — REVIEW OF SYSTEMS
[Vaginal Discharge] : vaginal discharge [Negative] : Allergic/Immunologic [de-identified] : head hair thinning

## 2022-12-01 NOTE — HISTORY OF PRESENT ILLNESS
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 \par \par In June 2020 Abi noticed a rectal discharge and burning sensation in the rectum. Was evaluated by Dr. Lamar and ultimately saw Dr. Oropeza who ordered CT Pelvis and MRI Pelvis . Found to have a mass in the rectum s/p biopsy confirmed adenocarcinoma. \par \par 7/18/20: MRI Pelvis: large hypovascular mass c/w mucinous adenoca involving remaining distal rectum and vagina, tumor abuts anal sphincter\par 8/5/20: CT pelvis: residual chronic perineal mass, likely cystic with gas component\par 9/23/20: CT CAP: low density, possibly necrotic mass up to 6.2 cm with involvement of the vagina, nonspecific 6 mm LLL nodule is noted\par 10/3/20: PET/CT: hypermetabolic rectal mass, non specific small inguinal LN, nonspecific hypermetabolism within the colostomy insertion site\par 10/7/20: discussed at rectal TB, plan for DILMA with re-evaluation of response and then surgery if residual disease\par 10/26/20: C1 FOLFOX\par 11/9/20: C2 (c/b acute oxali neurotoxicity)\par 11/23/20: C3 (Oxali over 4 hrs) - tolerated well \par 12/7/20: C4 5FU/LV (developed difficulty swallowing, SOB after receiving Oxali, d/c Oxali for today's tx)\par 12/21-2/10/21: C5-8 FOLFOX (dose reduced Oxali 65 mg/m2 over 4 hrs)\par 2/13/21: CT CAP: 0.6 cm LLL nodule, rectal mass slightly decreased in size, small b/l inguinal LN minimally decr in size, pneumatosis is noted involving SB possible fistula \par 2/25-3/23/21: Xeloda/RT \par 4/21/21: CT CAP: mild interval decrease in size of rectal mass, stable pulm nodule \par 5/26-7/7/21:C1-C4 5FU/LV maintenance \par 7/14/21: CTCAP: stable lung nodules + further decrease in size of rectal mass/fluid collection\par 7/21-10/13/21: C5-C11\par 10/7/21: CT CAP revealed stable disease. \par 10/27-12/21/21: C12-C16  \par 1/3/22: CT CAP: stable disease\par 1/5-3/30/22: C17-C23\par 4/8/22: CT CAP: stable disease \par 4/13-7/6/22: C24-C29\par 7/11/22: CT CAP: stable disease\par 7/20-10/12/22: C30-C36\par 10/3/22 CT C/A/P: reviewed images with radiology and there is disease recurrence with increased communication to vagina\par 10/26-12/1/22: C1-C3 FOLFIRI  [de-identified] : adenocarcinoma [de-identified] : Patient was seen at the infusion room during her scheduled treatment appointment. She currently reported mild fatigue/SOB (worse with exertion, denied SOB at rest, chest pain, dizziness). Her CT chest was negative for PE/PNA and ECHO noted EJ 62%. Otherwise she reported no acute complaints. She admitted to mildly decreased appetite (makes conscious effort to eat), stable weight and normal bowel movements (no issues with colostomy). She is able to complete her ADLs independently with mild restrictions.

## 2022-12-01 NOTE — REVIEW OF SYSTEMS
[Vaginal Discharge] : vaginal discharge [Negative] : Allergic/Immunologic [de-identified] : head hair thinning

## 2022-12-01 NOTE — PHYSICAL EXAM
[Restricted in physically strenuous activity but ambulatory and able to carry out work of a light or sedentary nature] : Status 1- Restricted in physically strenuous activity but ambulatory and able to carry out work of a light or sedentary nature, e.g., light house work, office work [Normal] : affect appropriate [de-identified] : + colostomy

## 2022-12-23 NOTE — ASSESSMENT
[Palliative] : Goals of care discussed with patient: Palliative [Palliative Care Plan] : not applicable at this time [FreeTextEntry1] : - recheck labs (CEA, Vit B12, UA, A1c) - peripheral vs POF\par - appetite stimulant recommendation, refer to palliative care\par - f/u GI - scheduled next month

## 2022-12-24 PROBLEM — E83.42 HYPOMAGNESEMIA: Status: ACTIVE | Noted: 2022-01-01

## 2022-12-24 NOTE — HISTORY OF PRESENT ILLNESS
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 \par \par In June 2020 Abi noticed a rectal discharge and burning sensation in the rectum. Was evaluated by Dr. Lamar and ultimately saw Dr. Oropeza who ordered CT Pelvis and MRI Pelvis . Found to have a mass in the rectum s/p biopsy confirmed adenocarcinoma. \par \par 7/18/20: MRI Pelvis: large hypovascular mass c/w mucinous adenoca involving remaining distal rectum and vagina, tumor abuts anal sphincter\par 8/5/20: CT pelvis: residual chronic perineal mass, likely cystic with gas component\par 9/23/20: CT CAP: low density, possibly necrotic mass up to 6.2 cm with involvement of the vagina, nonspecific 6 mm LLL nodule is noted\par 10/3/20: PET/CT: hypermetabolic rectal mass, non specific small inguinal LN, nonspecific hypermetabolism within the colostomy insertion site\par 10/7/20: discussed at rectal TB, plan for DILMA with re-evaluation of response and then surgery if residual disease\par 10/26/20: C1 FOLFOX\par 11/9/20: C2 (c/b acute oxali neurotoxicity)\par 11/23/20: C3 (Oxali over 4 hrs) - tolerated well \par 12/7/20: C4 5FU/LV (developed difficulty swallowing, SOB after receiving Oxali, d/c Oxali for today's tx)\par 12/21-2/10/21: C5-8 FOLFOX (dose reduced Oxali 65 mg/m2 over 4 hrs)\par 2/13/21: CT CAP: 0.6 cm LLL nodule, rectal mass slightly decreased in size, small b/l inguinal LN minimally decr in size, pneumatosis is noted involving SB possible fistula \par 2/25-3/23/21: Xeloda/RT \par 4/21/21: CT CAP: mild interval decrease in size of rectal mass, stable pulm nodule \par 5/26-7/7/21:C1-C4 5FU/LV maintenance \par 7/14/21: CTCAP: stable lung nodules + further decrease in size of rectal mass/fluid collection\par 7/21-10/13/21: C5-C11\par 10/7/21: CT CAP revealed stable disease. \par 10/27-12/21/21: C12-C16  \par 1/3/22: CT CAP: stable disease\par 1/5-3/30/22: C17-C23\par 4/8/22: CT CAP: stable disease \par 4/13-7/6/22: C24-C29\par 7/11/22: CT CAP: stable disease\par 7/20-10/12/22: C30-C36\par 10/3/22 CT C/A/P: reviewed images with radiology and there is disease recurrence with increased communication to vagina\par 10/26-12/14/22: C1-C4 FOLFIRI  [de-identified] : adenocarcinoma [de-identified] : Patient presented to the office during the off-week for re-evaluation. Despite starting B12 injection treatment earlier this month, she continues to experience persistent fatigue/generalized weakness, SOB (worse with exertion, denied SOB at rest, chest pain, dizziness), decreased PO intake (intermittent satiety and nausea noted, makes conscious effort to eat, weight loss noted at 119 lbs). Her bowel movements remain stable (no issues with colostomy, no abnormalities with color, consistency, frequency). She is able to complete her ADLs independently with mild restrictions.

## 2022-12-24 NOTE — REVIEW OF SYSTEMS
[Vaginal Discharge] : vaginal discharge [Negative] : Allergic/Immunologic [de-identified] : head hair thinning

## 2022-12-24 NOTE — PHYSICAL EXAM
[Restricted in physically strenuous activity but ambulatory and able to carry out work of a light or sedentary nature] : Status 1- Restricted in physically strenuous activity but ambulatory and able to carry out work of a light or sedentary nature, e.g., light house work, office work [Normal] : affect appropriate [de-identified] : + colostomy

## 2023-01-01 ENCOUNTER — RESULT REVIEW (OUTPATIENT)
Age: 78
End: 2023-01-01

## 2023-01-01 ENCOUNTER — APPOINTMENT (OUTPATIENT)
Dept: GYNECOLOGIC ONCOLOGY | Facility: CLINIC | Age: 78
End: 2023-01-01

## 2023-01-01 ENCOUNTER — APPOINTMENT (OUTPATIENT)
Dept: GYNECOLOGIC ONCOLOGY | Facility: CLINIC | Age: 78
End: 2023-01-01
Payer: MEDICARE

## 2023-01-01 ENCOUNTER — APPOINTMENT (OUTPATIENT)
Dept: HEMATOLOGY ONCOLOGY | Facility: CLINIC | Age: 78
End: 2023-01-01

## 2023-01-01 ENCOUNTER — APPOINTMENT (OUTPATIENT)
Dept: INFUSION THERAPY | Facility: HOSPITAL | Age: 78
End: 2023-01-01

## 2023-01-01 ENCOUNTER — APPOINTMENT (OUTPATIENT)
Dept: RADIATION ONCOLOGY | Facility: CLINIC | Age: 78
End: 2023-01-01
Payer: MEDICARE

## 2023-01-01 ENCOUNTER — NON-APPOINTMENT (OUTPATIENT)
Age: 78
End: 2023-01-01

## 2023-01-01 ENCOUNTER — APPOINTMENT (OUTPATIENT)
Dept: HEMATOLOGY ONCOLOGY | Facility: CLINIC | Age: 78
End: 2023-01-01
Payer: MEDICARE

## 2023-01-01 ENCOUNTER — INPATIENT (INPATIENT)
Facility: HOSPITAL | Age: 78
LOS: 2 days | Discharge: HOME | End: 2023-01-26
Attending: HOSPITALIST | Admitting: HOSPITALIST
Payer: MEDICARE

## 2023-01-01 ENCOUNTER — APPOINTMENT (OUTPATIENT)
Dept: GASTROENTEROLOGY | Facility: CLINIC | Age: 78
End: 2023-01-01
Payer: MEDICARE

## 2023-01-01 ENCOUNTER — INPATIENT (INPATIENT)
Facility: HOSPITAL | Age: 78
LOS: 12 days | DRG: 374 | End: 2023-05-25
Attending: INTERNAL MEDICINE | Admitting: STUDENT IN AN ORGANIZED HEALTH CARE EDUCATION/TRAINING PROGRAM
Payer: MEDICARE

## 2023-01-01 ENCOUNTER — TRANSCRIPTION ENCOUNTER (OUTPATIENT)
Age: 78
End: 2023-01-01

## 2023-01-01 ENCOUNTER — APPOINTMENT (OUTPATIENT)
Dept: GASTROENTEROLOGY | Facility: AMBULATORY MEDICAL SERVICES | Age: 78
End: 2023-01-01

## 2023-01-01 ENCOUNTER — APPOINTMENT (OUTPATIENT)
Dept: SURGERY | Facility: CLINIC | Age: 78
End: 2023-01-01

## 2023-01-01 ENCOUNTER — OUTPATIENT (OUTPATIENT)
Dept: OUTPATIENT SERVICES | Facility: HOSPITAL | Age: 78
LOS: 1 days | Discharge: HOME | End: 2023-01-01
Payer: MEDICARE

## 2023-01-01 ENCOUNTER — APPOINTMENT (OUTPATIENT)
Dept: INFUSION THERAPY | Facility: HOSPITAL | Age: 78
End: 2023-01-01
Payer: MEDICARE

## 2023-01-01 ENCOUNTER — APPOINTMENT (OUTPATIENT)
Dept: CT IMAGING | Facility: CLINIC | Age: 78
End: 2023-01-01
Payer: MEDICARE

## 2023-01-01 ENCOUNTER — OUTPATIENT (OUTPATIENT)
Dept: OUTPATIENT SERVICES | Facility: HOSPITAL | Age: 78
LOS: 1 days | Discharge: HOME | End: 2023-01-01

## 2023-01-01 ENCOUNTER — OUTPATIENT (OUTPATIENT)
Dept: OUTPATIENT SERVICES | Facility: HOSPITAL | Age: 78
LOS: 1 days | End: 2023-01-01
Payer: MEDICARE

## 2023-01-01 VITALS
HEART RATE: 101 BPM | RESPIRATION RATE: 20 BRPM | WEIGHT: 115.96 LBS | TEMPERATURE: 98 F | SYSTOLIC BLOOD PRESSURE: 93 MMHG | DIASTOLIC BLOOD PRESSURE: 63 MMHG | OXYGEN SATURATION: 96 % | HEIGHT: 61 IN

## 2023-01-01 VITALS
SYSTOLIC BLOOD PRESSURE: 115 MMHG | HEART RATE: 90 BPM | BODY MASS INDEX: 22.45 KG/M2 | HEIGHT: 62 IN | WEIGHT: 122 LBS | DIASTOLIC BLOOD PRESSURE: 69 MMHG

## 2023-01-01 VITALS
BODY MASS INDEX: 21.62 KG/M2 | DIASTOLIC BLOOD PRESSURE: 69 MMHG | HEIGHT: 62.9 IN | SYSTOLIC BLOOD PRESSURE: 111 MMHG | HEART RATE: 101 BPM | WEIGHT: 122 LBS

## 2023-01-01 VITALS
HEIGHT: 63 IN | DIASTOLIC BLOOD PRESSURE: 66 MMHG | BODY MASS INDEX: 20.73 KG/M2 | HEART RATE: 86 BPM | OXYGEN SATURATION: 100 % | SYSTOLIC BLOOD PRESSURE: 112 MMHG | WEIGHT: 117 LBS

## 2023-01-01 VITALS
SYSTOLIC BLOOD PRESSURE: 112 MMHG | WEIGHT: 117.3 LBS | HEART RATE: 93 BPM | HEIGHT: 62 IN | OXYGEN SATURATION: 100 % | BODY MASS INDEX: 21.59 KG/M2 | RESPIRATION RATE: 17 BRPM | DIASTOLIC BLOOD PRESSURE: 64 MMHG

## 2023-01-01 VITALS
OXYGEN SATURATION: 84 % | RESPIRATION RATE: 20 BRPM | DIASTOLIC BLOOD PRESSURE: 65 MMHG | TEMPERATURE: 98 F | HEART RATE: 109 BPM | SYSTOLIC BLOOD PRESSURE: 99 MMHG

## 2023-01-01 VITALS
SYSTOLIC BLOOD PRESSURE: 115 MMHG | RESPIRATION RATE: 18 BRPM | DIASTOLIC BLOOD PRESSURE: 60 MMHG | TEMPERATURE: 98 F | OXYGEN SATURATION: 99 % | HEART RATE: 68 BPM

## 2023-01-01 VITALS
RESPIRATION RATE: 16 BRPM | DIASTOLIC BLOOD PRESSURE: 72 MMHG | WEIGHT: 119.93 LBS | HEART RATE: 87 BPM | BODY MASS INDEX: 21.25 KG/M2 | TEMPERATURE: 97 F | HEIGHT: 62.99 IN | OXYGEN SATURATION: 98 % | SYSTOLIC BLOOD PRESSURE: 119 MMHG

## 2023-01-01 VITALS
HEART RATE: 100 BPM | DIASTOLIC BLOOD PRESSURE: 55 MMHG | TEMPERATURE: 98 F | HEIGHT: 61.42 IN | RESPIRATION RATE: 16 BRPM | OXYGEN SATURATION: 99 % | SYSTOLIC BLOOD PRESSURE: 117 MMHG

## 2023-01-01 DIAGNOSIS — Z51.11 ENCOUNTER FOR ANTINEOPLASTIC CHEMOTHERAPY: ICD-10-CM

## 2023-01-01 DIAGNOSIS — E03.9 HYPOTHYROIDISM, UNSPECIFIED: ICD-10-CM

## 2023-01-01 DIAGNOSIS — Z51.89 ENCOUNTER FOR OTHER SPECIFIED AFTERCARE: ICD-10-CM

## 2023-01-01 DIAGNOSIS — T45.1X5A NAUSEA WITH VOMITING, UNSPECIFIED: ICD-10-CM

## 2023-01-01 DIAGNOSIS — Z71.89 OTHER SPECIFIED COUNSELING: ICD-10-CM

## 2023-01-01 DIAGNOSIS — Z93.2 ILEOSTOMY STATUS: Chronic | ICD-10-CM

## 2023-01-01 DIAGNOSIS — N17.9 ACUTE KIDNEY FAILURE, UNSPECIFIED: ICD-10-CM

## 2023-01-01 DIAGNOSIS — R06.00 DYSPNEA, UNSPECIFIED: ICD-10-CM

## 2023-01-01 DIAGNOSIS — E87.6 HYPOKALEMIA: ICD-10-CM

## 2023-01-01 DIAGNOSIS — N39.0 URINARY TRACT INFECTION, SITE NOT SPECIFIED: ICD-10-CM

## 2023-01-01 DIAGNOSIS — E83.51 HYPOCALCEMIA: ICD-10-CM

## 2023-01-01 DIAGNOSIS — Z90.710 ACQUIRED ABSENCE OF BOTH CERVIX AND UTERUS: Chronic | ICD-10-CM

## 2023-01-01 DIAGNOSIS — R06.02 SHORTNESS OF BREATH: ICD-10-CM

## 2023-01-01 DIAGNOSIS — R53.2 FUNCTIONAL QUADRIPLEGIA: ICD-10-CM

## 2023-01-01 DIAGNOSIS — Z29.9 ENCOUNTER FOR PROPHYLACTIC MEASURES, UNSPECIFIED: ICD-10-CM

## 2023-01-01 DIAGNOSIS — R06.03 ACUTE RESPIRATORY DISTRESS: ICD-10-CM

## 2023-01-01 DIAGNOSIS — Z01.812 ENCOUNTER FOR PREPROCEDURAL LABORATORY EXAMINATION: ICD-10-CM

## 2023-01-01 DIAGNOSIS — E53.8 DEFICIENCY OF OTHER SPECIFIED B GROUP VITAMINS: ICD-10-CM

## 2023-01-01 DIAGNOSIS — Z78.9 OTHER SPECIFIED HEALTH STATUS: ICD-10-CM

## 2023-01-01 DIAGNOSIS — N82.3 FISTULA OF VAGINA TO LARGE INTESTINE: ICD-10-CM

## 2023-01-01 DIAGNOSIS — K56.609 UNSPECIFIED INTESTINAL OBSTRUCTION, UNSPECIFIED AS TO PARTIAL VERSUS COMPLETE OBSTRUCTION: ICD-10-CM

## 2023-01-01 DIAGNOSIS — C20 MALIGNANT NEOPLASM OF RECTUM: ICD-10-CM

## 2023-01-01 DIAGNOSIS — E86.0 DEHYDRATION: ICD-10-CM

## 2023-01-01 DIAGNOSIS — Z51.5 ENCOUNTER FOR PALLIATIVE CARE: ICD-10-CM

## 2023-01-01 DIAGNOSIS — R11.0 NAUSEA: ICD-10-CM

## 2023-01-01 DIAGNOSIS — Z92.21 PERSONAL HISTORY OF ANTINEOPLASTIC CHEMOTHERAPY: ICD-10-CM

## 2023-01-01 DIAGNOSIS — I10 ESSENTIAL (PRIMARY) HYPERTENSION: ICD-10-CM

## 2023-01-01 DIAGNOSIS — R10.2 PELVIC AND PERINEAL PAIN: ICD-10-CM

## 2023-01-01 DIAGNOSIS — D64.9 ANEMIA, UNSPECIFIED: ICD-10-CM

## 2023-01-01 DIAGNOSIS — R19.7 DIARRHEA, UNSPECIFIED: ICD-10-CM

## 2023-01-01 DIAGNOSIS — R11.2 NAUSEA WITH VOMITING, UNSPECIFIED: ICD-10-CM

## 2023-01-01 DIAGNOSIS — Z92.3 PERSONAL HISTORY OF IRRADIATION: ICD-10-CM

## 2023-01-01 DIAGNOSIS — F41.9 ANXIETY DISORDER, UNSPECIFIED: ICD-10-CM

## 2023-01-01 DIAGNOSIS — R52 PAIN, UNSPECIFIED: ICD-10-CM

## 2023-01-01 DIAGNOSIS — Z20.822 ENCOUNTER FOR PREPROCEDURAL LABORATORY EXAMINATION: ICD-10-CM

## 2023-01-01 DIAGNOSIS — C53.9 MALIGNANT NEOPLASM OF CERVIX UTERI, UNSPECIFIED: ICD-10-CM

## 2023-01-01 DIAGNOSIS — E78.5 HYPERLIPIDEMIA, UNSPECIFIED: ICD-10-CM

## 2023-01-01 DIAGNOSIS — A08.11 ACUTE GASTROENTEROPATHY DUE TO NORWALK AGENT: ICD-10-CM

## 2023-01-01 DIAGNOSIS — N89.8 OTHER SPECIFIED NONINFLAMMATORY DISORDERS OF VAGINA: ICD-10-CM

## 2023-01-01 LAB
-  AMIKACIN: SIGNIFICANT CHANGE UP
-  AMOXICILLIN/CLAVULANIC ACID: SIGNIFICANT CHANGE UP
-  AMOXICILLIN/CLAVULANIC ACID: SIGNIFICANT CHANGE UP
-  AMPICILLIN/SULBACTAM: SIGNIFICANT CHANGE UP
-  AMPICILLIN: SIGNIFICANT CHANGE UP
-  AZTREONAM: SIGNIFICANT CHANGE UP
-  CEFAZOLIN: SIGNIFICANT CHANGE UP
-  CEFEPIME: SIGNIFICANT CHANGE UP
-  CEFOXITIN: SIGNIFICANT CHANGE UP
-  CEFTRIAXONE: SIGNIFICANT CHANGE UP
-  CEFUROXIME: SIGNIFICANT CHANGE UP
-  CEFUROXIME: SIGNIFICANT CHANGE UP
-  CIPROFLOXACIN: SIGNIFICANT CHANGE UP
-  ERTAPENEM: SIGNIFICANT CHANGE UP
-  GENTAMICIN: SIGNIFICANT CHANGE UP
-  IMIPENEM: SIGNIFICANT CHANGE UP
-  K. PNEUMONIAE GROUP: SIGNIFICANT CHANGE UP
-  LEVOFLOXACIN: SIGNIFICANT CHANGE UP
-  MEROPENEM: SIGNIFICANT CHANGE UP
-  NITROFURANTOIN: SIGNIFICANT CHANGE UP
-  NITROFURANTOIN: SIGNIFICANT CHANGE UP
-  PIPERACILLIN/TAZOBACTAM: SIGNIFICANT CHANGE UP
-  TOBRAMYCIN: SIGNIFICANT CHANGE UP
-  TRIMETHOPRIM/SULFAMETHOXAZOLE: SIGNIFICANT CHANGE UP
ALBUMIN SERPL ELPH-MCNC: 1.8 G/DL — LOW (ref 3.3–5)
ALBUMIN SERPL ELPH-MCNC: 2.3 G/DL — LOW (ref 3.3–5)
ALBUMIN SERPL ELPH-MCNC: 2.5 G/DL — LOW (ref 3.3–5)
ALBUMIN SERPL ELPH-MCNC: 2.5 G/DL — LOW (ref 3.3–5)
ALBUMIN SERPL ELPH-MCNC: 2.6 G/DL — LOW (ref 3.3–5)
ALBUMIN SERPL ELPH-MCNC: 3.2 G/DL — LOW (ref 3.3–5)
ALBUMIN SERPL ELPH-MCNC: 3.2 G/DL — LOW (ref 3.3–5)
ALBUMIN SERPL ELPH-MCNC: 3.3 G/DL — SIGNIFICANT CHANGE UP (ref 3.3–5)
ALBUMIN SERPL ELPH-MCNC: 3.3 G/DL — SIGNIFICANT CHANGE UP (ref 3.3–5)
ALBUMIN SERPL ELPH-MCNC: 3.4 G/DL — SIGNIFICANT CHANGE UP (ref 3.3–5)
ALBUMIN SERPL ELPH-MCNC: 3.4 G/DL — SIGNIFICANT CHANGE UP (ref 3.3–5)
ALBUMIN SERPL ELPH-MCNC: 3.6 G/DL — SIGNIFICANT CHANGE UP (ref 3.3–5)
ALBUMIN SERPL ELPH-MCNC: 3.6 G/DL — SIGNIFICANT CHANGE UP (ref 3.3–5)
ALBUMIN SERPL ELPH-MCNC: 3.7 G/DL — SIGNIFICANT CHANGE UP (ref 3.3–5)
ALBUMIN SERPL ELPH-MCNC: 4 G/DL — SIGNIFICANT CHANGE UP (ref 3.3–5)
ALBUMIN SERPL ELPH-MCNC: 4.4 G/DL — SIGNIFICANT CHANGE UP (ref 3.3–5)
ALP SERPL-CCNC: 113 U/L — SIGNIFICANT CHANGE UP (ref 40–120)
ALP SERPL-CCNC: 160 U/L — HIGH (ref 40–120)
ALP SERPL-CCNC: 44 U/L — SIGNIFICANT CHANGE UP (ref 40–120)
ALP SERPL-CCNC: 46 U/L — SIGNIFICANT CHANGE UP (ref 40–120)
ALP SERPL-CCNC: 49 U/L — SIGNIFICANT CHANGE UP (ref 40–120)
ALP SERPL-CCNC: 49 U/L — SIGNIFICANT CHANGE UP (ref 40–120)
ALP SERPL-CCNC: 50 U/L — SIGNIFICANT CHANGE UP (ref 40–120)
ALP SERPL-CCNC: 52 U/L — SIGNIFICANT CHANGE UP (ref 40–120)
ALP SERPL-CCNC: 52 U/L — SIGNIFICANT CHANGE UP (ref 40–120)
ALP SERPL-CCNC: 56 U/L — SIGNIFICANT CHANGE UP (ref 40–120)
ALP SERPL-CCNC: 63 U/L — SIGNIFICANT CHANGE UP (ref 40–120)
ALP SERPL-CCNC: 65 U/L — SIGNIFICANT CHANGE UP (ref 40–120)
ALP SERPL-CCNC: 74 U/L — SIGNIFICANT CHANGE UP (ref 40–120)
ALP SERPL-CCNC: 76 U/L — SIGNIFICANT CHANGE UP (ref 40–120)
ALT FLD-CCNC: 10 U/L — SIGNIFICANT CHANGE UP (ref 10–45)
ALT FLD-CCNC: 27 U/L — SIGNIFICANT CHANGE UP (ref 10–45)
ALT FLD-CCNC: 38 U/L — SIGNIFICANT CHANGE UP (ref 10–45)
ALT FLD-CCNC: 39 U/L — SIGNIFICANT CHANGE UP (ref 10–45)
ALT FLD-CCNC: 43 U/L — SIGNIFICANT CHANGE UP (ref 10–45)
ALT FLD-CCNC: 44 U/L — SIGNIFICANT CHANGE UP (ref 10–45)
ALT FLD-CCNC: 6 U/L — LOW (ref 10–45)
ALT FLD-CCNC: 6 U/L — LOW (ref 10–45)
ALT FLD-CCNC: 7 U/L — LOW (ref 10–45)
ALT FLD-CCNC: 7 U/L — SIGNIFICANT CHANGE UP (ref 4–33)
ALT FLD-CCNC: 8 U/L — LOW (ref 10–45)
ALT FLD-CCNC: 9 U/L — SIGNIFICANT CHANGE UP (ref 4–33)
AMMONIA BLD-MCNC: 17 UMOL/L — SIGNIFICANT CHANGE UP (ref 11–55)
ANION GAP SERPL CALC-SCNC: 11 MMOL/L — SIGNIFICANT CHANGE UP (ref 5–17)
ANION GAP SERPL CALC-SCNC: 12 MMOL/L — SIGNIFICANT CHANGE UP (ref 5–17)
ANION GAP SERPL CALC-SCNC: 12 MMOL/L — SIGNIFICANT CHANGE UP (ref 7–14)
ANION GAP SERPL CALC-SCNC: 13 MMOL/L — SIGNIFICANT CHANGE UP (ref 5–17)
ANION GAP SERPL CALC-SCNC: 13 MMOL/L — SIGNIFICANT CHANGE UP (ref 7–14)
ANION GAP SERPL CALC-SCNC: 14 MMOL/L — SIGNIFICANT CHANGE UP (ref 5–17)
ANION GAP SERPL CALC-SCNC: 15 MMOL/L — SIGNIFICANT CHANGE UP (ref 5–17)
ANION GAP SERPL CALC-SCNC: 16 MMOL/L — HIGH (ref 7–14)
ANION GAP SERPL CALC-SCNC: 16 MMOL/L — SIGNIFICANT CHANGE UP (ref 5–17)
ANION GAP SERPL CALC-SCNC: 17 MMOL/L — HIGH (ref 7–14)
ANION GAP SERPL CALC-SCNC: 17 MMOL/L — SIGNIFICANT CHANGE UP (ref 5–17)
ANION GAP SERPL CALC-SCNC: 18 MMOL/L — HIGH (ref 5–17)
ANION GAP SERPL CALC-SCNC: 18 MMOL/L — HIGH (ref 7–14)
ANION GAP SERPL CALC-SCNC: 19 MMOL/L — HIGH (ref 5–17)
ANION GAP SERPL CALC-SCNC: 20 MMOL/L — HIGH (ref 5–17)
ANION GAP SERPL CALC-SCNC: 23 MMOL/L — HIGH (ref 5–17)
ANISOCYTOSIS BLD QL: SLIGHT — SIGNIFICANT CHANGE UP
ANISOCYTOSIS BLD QL: SLIGHT — SIGNIFICANT CHANGE UP
APPEARANCE UR: ABNORMAL
APPEARANCE UR: ABNORMAL
APTT BLD: 25.3 SEC — LOW (ref 27.5–35.5)
APTT BLD: 26 SEC — LOW (ref 27–36.3)
APTT BLD: 39.2 SEC — HIGH (ref 27.5–35.5)
APTT BLD: 42.6 SEC — HIGH (ref 27.5–35.5)
APTT BLD: 48.1 SEC — HIGH (ref 27.5–35.5)
APTT BLD: 83.7 SEC — HIGH (ref 27.5–35.5)
AST SERPL-CCNC: 10 U/L — SIGNIFICANT CHANGE UP (ref 10–40)
AST SERPL-CCNC: 10 U/L — SIGNIFICANT CHANGE UP (ref 10–40)
AST SERPL-CCNC: 11 U/L — SIGNIFICANT CHANGE UP (ref 10–40)
AST SERPL-CCNC: 11 U/L — SIGNIFICANT CHANGE UP (ref 10–40)
AST SERPL-CCNC: 116 U/L — HIGH (ref 10–40)
AST SERPL-CCNC: 12 U/L — SIGNIFICANT CHANGE UP (ref 10–40)
AST SERPL-CCNC: 12 U/L — SIGNIFICANT CHANGE UP (ref 10–40)
AST SERPL-CCNC: 125 U/L — HIGH (ref 10–40)
AST SERPL-CCNC: 19 U/L — SIGNIFICANT CHANGE UP (ref 4–32)
AST SERPL-CCNC: 20 U/L — SIGNIFICANT CHANGE UP (ref 4–32)
AST SERPL-CCNC: 66 U/L — HIGH (ref 10–40)
AST SERPL-CCNC: 8 U/L — LOW (ref 10–40)
AST SERPL-CCNC: 8 U/L — LOW (ref 10–40)
AST SERPL-CCNC: 89 U/L — HIGH (ref 10–40)
AST SERPL-CCNC: 9 U/L — LOW (ref 10–40)
AST SERPL-CCNC: 93 U/L — HIGH (ref 10–40)
AUER BODIES BLD QL SMEAR: PRESENT — SIGNIFICANT CHANGE UP
B PERT DNA SPEC QL NAA+PROBE: SIGNIFICANT CHANGE UP
B PERT+PARAPERT DNA PNL SPEC NAA+PROBE: SIGNIFICANT CHANGE UP
BACTERIA # UR AUTO: ABNORMAL
BACTERIA # UR AUTO: ABNORMAL
BASE EXCESS BLDV CALC-SCNC: -1.8 MMOL/L — SIGNIFICANT CHANGE UP (ref -2–3)
BASE EXCESS BLDV CALC-SCNC: -17.7 MMOL/L — LOW (ref -2–3)
BASE EXCESS BLDV CALC-SCNC: 13.2 MMOL/L — HIGH (ref -2–3)
BASOPHILS # BLD AUTO: 0 K/UL — SIGNIFICANT CHANGE UP (ref 0–0.2)
BASOPHILS # BLD AUTO: 0.01 K/UL — SIGNIFICANT CHANGE UP (ref 0–0.2)
BASOPHILS # BLD AUTO: 0.02 K/UL — SIGNIFICANT CHANGE UP (ref 0–0.2)
BASOPHILS # BLD AUTO: 0.03 K/UL — SIGNIFICANT CHANGE UP (ref 0–0.2)
BASOPHILS # BLD AUTO: 0.03 K/UL — SIGNIFICANT CHANGE UP (ref 0–0.2)
BASOPHILS # BLD AUTO: 0.04 K/UL — SIGNIFICANT CHANGE UP (ref 0–0.2)
BASOPHILS # BLD AUTO: 0.05 K/UL — SIGNIFICANT CHANGE UP (ref 0–0.2)
BASOPHILS # BLD AUTO: 0.06 K/UL — SIGNIFICANT CHANGE UP (ref 0–0.2)
BASOPHILS # BLD AUTO: 0.08 K/UL — SIGNIFICANT CHANGE UP (ref 0–0.2)
BASOPHILS # BLD AUTO: 0.14 K/UL — SIGNIFICANT CHANGE UP (ref 0–0.2)
BASOPHILS NFR BLD AUTO: 0 % — SIGNIFICANT CHANGE UP (ref 0–2)
BASOPHILS NFR BLD AUTO: 0.4 % — SIGNIFICANT CHANGE UP (ref 0–2)
BASOPHILS NFR BLD AUTO: 0.5 % — SIGNIFICANT CHANGE UP (ref 0–2)
BASOPHILS NFR BLD AUTO: 0.5 % — SIGNIFICANT CHANGE UP (ref 0–2)
BASOPHILS NFR BLD AUTO: 0.6 % — SIGNIFICANT CHANGE UP (ref 0–2)
BASOPHILS NFR BLD AUTO: 0.9 % — SIGNIFICANT CHANGE UP (ref 0–2)
BASOPHILS NFR BLD AUTO: 1 % — SIGNIFICANT CHANGE UP (ref 0–2)
BASOPHILS NFR BLD AUTO: 1.1 % — SIGNIFICANT CHANGE UP (ref 0–2)
BASOPHILS NFR BLD AUTO: 1.1 % — SIGNIFICANT CHANGE UP (ref 0–2)
BILIRUB SERPL-MCNC: 0.2 MG/DL — SIGNIFICANT CHANGE UP (ref 0.2–1.2)
BILIRUB SERPL-MCNC: 0.3 MG/DL — SIGNIFICANT CHANGE UP (ref 0.2–1.2)
BILIRUB SERPL-MCNC: 0.3 MG/DL — SIGNIFICANT CHANGE UP (ref 0.2–1.2)
BILIRUB SERPL-MCNC: 0.5 MG/DL — SIGNIFICANT CHANGE UP (ref 0.2–1.2)
BILIRUB SERPL-MCNC: 0.7 MG/DL — SIGNIFICANT CHANGE UP (ref 0.2–1.2)
BILIRUB SERPL-MCNC: 0.7 MG/DL — SIGNIFICANT CHANGE UP (ref 0.2–1.2)
BILIRUB SERPL-MCNC: <0.2 MG/DL — SIGNIFICANT CHANGE UP (ref 0.2–1.2)
BILIRUB UR-MCNC: NEGATIVE — SIGNIFICANT CHANGE UP
BILIRUB UR-MCNC: NEGATIVE — SIGNIFICANT CHANGE UP
BLD GP AB SCN SERPL QL: POSITIVE — SIGNIFICANT CHANGE UP
BLOOD GAS VENOUS COMPREHENSIVE RESULT: SIGNIFICANT CHANGE UP
BORDETELLA PARAPERTUSSIS (RAPRVP): SIGNIFICANT CHANGE UP
BUN SERPL-MCNC: 13 MG/DL — SIGNIFICANT CHANGE UP (ref 7–23)
BUN SERPL-MCNC: 13 MG/DL — SIGNIFICANT CHANGE UP (ref 7–23)
BUN SERPL-MCNC: 14 MG/DL — SIGNIFICANT CHANGE UP (ref 7–23)
BUN SERPL-MCNC: 15 MG/DL — SIGNIFICANT CHANGE UP (ref 7–23)
BUN SERPL-MCNC: 15 MG/DL — SIGNIFICANT CHANGE UP (ref 7–23)
BUN SERPL-MCNC: 16 MG/DL — SIGNIFICANT CHANGE UP (ref 7–23)
BUN SERPL-MCNC: 17 MG/DL — SIGNIFICANT CHANGE UP (ref 7–23)
BUN SERPL-MCNC: 17 MG/DL — SIGNIFICANT CHANGE UP (ref 7–23)
BUN SERPL-MCNC: 18 MG/DL — SIGNIFICANT CHANGE UP (ref 7–23)
BUN SERPL-MCNC: 20 MG/DL — SIGNIFICANT CHANGE UP (ref 7–23)
BUN SERPL-MCNC: 20 MG/DL — SIGNIFICANT CHANGE UP (ref 7–23)
BUN SERPL-MCNC: 23 MG/DL — SIGNIFICANT CHANGE UP (ref 7–23)
BUN SERPL-MCNC: 24 MG/DL — HIGH (ref 7–23)
BUN SERPL-MCNC: 24 MG/DL — HIGH (ref 7–23)
BUN SERPL-MCNC: 25 MG/DL — HIGH (ref 7–23)
BUN SERPL-MCNC: 27 MG/DL — HIGH (ref 7–23)
BUN SERPL-MCNC: 28 MG/DL — HIGH (ref 7–23)
BUN SERPL-MCNC: 30 MG/DL — HIGH (ref 7–23)
BUN SERPL-MCNC: 31 MG/DL — HIGH (ref 7–23)
BUN SERPL-MCNC: 34 MG/DL — HIGH (ref 7–23)
BUN SERPL-MCNC: 36 MG/DL — HIGH (ref 7–23)
C PNEUM DNA SPEC QL NAA+PROBE: SIGNIFICANT CHANGE UP
CA-I SERPL-SCNC: 1.14 MMOL/L — LOW (ref 1.15–1.33)
CA-I SERPL-SCNC: 1.15 MMOL/L — SIGNIFICANT CHANGE UP (ref 1.15–1.33)
CALCIUM SERPL-MCNC: 6.8 MG/DL — LOW (ref 8.4–10.5)
CALCIUM SERPL-MCNC: 7.4 MG/DL — LOW (ref 8.4–10.5)
CALCIUM SERPL-MCNC: 7.4 MG/DL — LOW (ref 8.4–10.5)
CALCIUM SERPL-MCNC: 7.5 MG/DL — LOW (ref 8.4–10.5)
CALCIUM SERPL-MCNC: 7.7 MG/DL — LOW (ref 8.4–10.5)
CALCIUM SERPL-MCNC: 7.7 MG/DL — LOW (ref 8.4–10.5)
CALCIUM SERPL-MCNC: 7.8 MG/DL — LOW (ref 8.4–10.5)
CALCIUM SERPL-MCNC: 8 MG/DL — LOW (ref 8.4–10.5)
CALCIUM SERPL-MCNC: 8.1 MG/DL — LOW (ref 8.4–10.5)
CALCIUM SERPL-MCNC: 8.3 MG/DL — LOW (ref 8.4–10.5)
CALCIUM SERPL-MCNC: 8.4 MG/DL — SIGNIFICANT CHANGE UP (ref 8.4–10.5)
CALCIUM SERPL-MCNC: 8.4 MG/DL — SIGNIFICANT CHANGE UP (ref 8.4–10.5)
CALCIUM SERPL-MCNC: 8.5 MG/DL — SIGNIFICANT CHANGE UP (ref 8.4–10.5)
CALCIUM SERPL-MCNC: 8.6 MG/DL — SIGNIFICANT CHANGE UP (ref 8.4–10.5)
CALCIUM SERPL-MCNC: 8.7 MG/DL — SIGNIFICANT CHANGE UP (ref 8.4–10.5)
CALCIUM SERPL-MCNC: 8.8 MG/DL — SIGNIFICANT CHANGE UP (ref 8.4–10.5)
CALCIUM SERPL-MCNC: 8.9 MG/DL — SIGNIFICANT CHANGE UP (ref 8.4–10.5)
CALCIUM SERPL-MCNC: 8.9 MG/DL — SIGNIFICANT CHANGE UP (ref 8.4–10.5)
CALCIUM SERPL-MCNC: 9.1 MG/DL — SIGNIFICANT CHANGE UP (ref 8.4–10.5)
CALCIUM SERPL-MCNC: 9.4 MG/DL — SIGNIFICANT CHANGE UP (ref 8.4–10.5)
CALCIUM SERPL-MCNC: 9.5 MG/DL — SIGNIFICANT CHANGE UP (ref 8.4–10.5)
CALCIUM SERPL-MCNC: 9.6 MG/DL — SIGNIFICANT CHANGE UP (ref 8.4–10.5)
CEA SERPL-MCNC: 11.2 NG/ML — HIGH (ref 0–3.8)
CEA SERPL-MCNC: 4.7 NG/ML — HIGH (ref 0–3.8)
CEA SERPL-MCNC: 5.8 NG/ML — HIGH (ref 0–3.8)
CHLORIDE BLDV-SCNC: 100 MMOL/L — SIGNIFICANT CHANGE UP (ref 96–108)
CHLORIDE BLDV-SCNC: 94 MMOL/L — LOW (ref 96–108)
CHLORIDE BLDV-SCNC: 97 MMOL/L — SIGNIFICANT CHANGE UP (ref 96–108)
CHLORIDE SERPL-SCNC: 100 MMOL/L — SIGNIFICANT CHANGE UP (ref 96–108)
CHLORIDE SERPL-SCNC: 100 MMOL/L — SIGNIFICANT CHANGE UP (ref 98–107)
CHLORIDE SERPL-SCNC: 103 MMOL/L — SIGNIFICANT CHANGE UP (ref 96–108)
CHLORIDE SERPL-SCNC: 103 MMOL/L — SIGNIFICANT CHANGE UP (ref 98–107)
CHLORIDE SERPL-SCNC: 104 MMOL/L — SIGNIFICANT CHANGE UP (ref 96–108)
CHLORIDE SERPL-SCNC: 105 MMOL/L — SIGNIFICANT CHANGE UP (ref 96–108)
CHLORIDE SERPL-SCNC: 105 MMOL/L — SIGNIFICANT CHANGE UP (ref 98–107)
CHLORIDE SERPL-SCNC: 106 MMOL/L — SIGNIFICANT CHANGE UP (ref 96–108)
CHLORIDE SERPL-SCNC: 106 MMOL/L — SIGNIFICANT CHANGE UP (ref 96–108)
CHLORIDE SERPL-SCNC: 108 MMOL/L — SIGNIFICANT CHANGE UP (ref 96–108)
CHLORIDE SERPL-SCNC: 109 MMOL/L — HIGH (ref 96–108)
CHLORIDE SERPL-SCNC: 109 MMOL/L — HIGH (ref 98–107)
CHLORIDE SERPL-SCNC: 93 MMOL/L — LOW (ref 96–108)
CHLORIDE SERPL-SCNC: 94 MMOL/L — LOW (ref 96–108)
CHLORIDE SERPL-SCNC: 96 MMOL/L — SIGNIFICANT CHANGE UP (ref 96–108)
CHLORIDE SERPL-SCNC: 96 MMOL/L — SIGNIFICANT CHANGE UP (ref 96–108)
CHLORIDE SERPL-SCNC: 97 MMOL/L — LOW (ref 98–107)
CHLORIDE SERPL-SCNC: 99 MMOL/L — SIGNIFICANT CHANGE UP (ref 96–108)
CO2 BLDV-SCNC: 25.7 MMOL/L — SIGNIFICANT CHANGE UP (ref 22–26)
CO2 BLDV-SCNC: 40 MMOL/L — HIGH (ref 22–26)
CO2 BLDV-SCNC: 9 MMOL/L — LOW (ref 22–26)
CO2 SERPL-SCNC: 16 MMOL/L — LOW (ref 22–31)
CO2 SERPL-SCNC: 17 MMOL/L — LOW (ref 22–31)
CO2 SERPL-SCNC: 17 MMOL/L — LOW (ref 22–31)
CO2 SERPL-SCNC: 18 MMOL/L — LOW (ref 22–31)
CO2 SERPL-SCNC: 20 MMOL/L — LOW (ref 22–31)
CO2 SERPL-SCNC: 21 MMOL/L — LOW (ref 22–31)
CO2 SERPL-SCNC: 22 MMOL/L — SIGNIFICANT CHANGE UP (ref 22–31)
CO2 SERPL-SCNC: 23 MMOL/L — SIGNIFICANT CHANGE UP (ref 22–31)
CO2 SERPL-SCNC: 23 MMOL/L — SIGNIFICANT CHANGE UP (ref 22–31)
CO2 SERPL-SCNC: 24 MMOL/L — SIGNIFICANT CHANGE UP (ref 22–31)
CO2 SERPL-SCNC: 25 MMOL/L — SIGNIFICANT CHANGE UP (ref 22–31)
CO2 SERPL-SCNC: 26 MMOL/L — SIGNIFICANT CHANGE UP (ref 22–31)
CO2 SERPL-SCNC: 27 MMOL/L — SIGNIFICANT CHANGE UP (ref 22–31)
CO2 SERPL-SCNC: 32 MMOL/L — HIGH (ref 22–31)
COLOR SPEC: YELLOW — SIGNIFICANT CHANGE UP
COLOR SPEC: YELLOW — SIGNIFICANT CHANGE UP
CREAT ?TM UR-MCNC: 79 MG/DL — SIGNIFICANT CHANGE UP
CREAT SERPL-MCNC: 0.7 MG/DL — SIGNIFICANT CHANGE UP (ref 0.5–1.3)
CREAT SERPL-MCNC: 0.73 MG/DL — SIGNIFICANT CHANGE UP (ref 0.5–1.3)
CREAT SERPL-MCNC: 0.75 MG/DL — SIGNIFICANT CHANGE UP (ref 0.5–1.3)
CREAT SERPL-MCNC: 0.75 MG/DL — SIGNIFICANT CHANGE UP (ref 0.5–1.3)
CREAT SERPL-MCNC: 0.78 MG/DL — SIGNIFICANT CHANGE UP (ref 0.5–1.3)
CREAT SERPL-MCNC: 0.81 MG/DL — SIGNIFICANT CHANGE UP (ref 0.5–1.3)
CREAT SERPL-MCNC: 0.82 MG/DL — SIGNIFICANT CHANGE UP (ref 0.5–1.3)
CREAT SERPL-MCNC: 0.84 MG/DL — SIGNIFICANT CHANGE UP (ref 0.5–1.3)
CREAT SERPL-MCNC: 0.85 MG/DL — SIGNIFICANT CHANGE UP (ref 0.5–1.3)
CREAT SERPL-MCNC: 0.86 MG/DL — SIGNIFICANT CHANGE UP (ref 0.5–1.3)
CREAT SERPL-MCNC: 0.87 MG/DL — SIGNIFICANT CHANGE UP (ref 0.5–1.3)
CREAT SERPL-MCNC: 0.88 MG/DL — SIGNIFICANT CHANGE UP (ref 0.5–1.3)
CREAT SERPL-MCNC: 0.88 MG/DL — SIGNIFICANT CHANGE UP (ref 0.5–1.3)
CREAT SERPL-MCNC: 0.89 MG/DL — SIGNIFICANT CHANGE UP (ref 0.5–1.3)
CREAT SERPL-MCNC: 0.9 MG/DL — SIGNIFICANT CHANGE UP (ref 0.5–1.3)
CREAT SERPL-MCNC: 1.08 MG/DL — SIGNIFICANT CHANGE UP (ref 0.5–1.3)
CREAT SERPL-MCNC: 1.17 MG/DL — SIGNIFICANT CHANGE UP (ref 0.5–1.3)
CREAT SERPL-MCNC: 1.21 MG/DL — SIGNIFICANT CHANGE UP (ref 0.5–1.3)
CREAT SERPL-MCNC: 1.27 MG/DL — SIGNIFICANT CHANGE UP (ref 0.5–1.3)
CREAT SERPL-MCNC: 1.33 MG/DL — HIGH (ref 0.5–1.3)
CREAT SERPL-MCNC: 1.48 MG/DL — HIGH (ref 0.5–1.3)
CREAT SERPL-MCNC: 1.53 MG/DL — HIGH (ref 0.5–1.3)
CREAT SERPL-MCNC: 1.63 MG/DL — HIGH (ref 0.5–1.3)
CREAT SERPL-MCNC: 1.8 MG/DL — HIGH (ref 0.5–1.3)
CULTURE RESULTS: SIGNIFICANT CHANGE UP
DACRYOCYTES BLD QL SMEAR: SLIGHT — SIGNIFICANT CHANGE UP
DACRYOCYTES BLD QL SMEAR: SLIGHT — SIGNIFICANT CHANGE UP
DIFF PNL FLD: ABNORMAL
DIFF PNL FLD: NEGATIVE — SIGNIFICANT CHANGE UP
EGFR: 28 ML/MIN/1.73M2 — LOW
EGFR: 32 ML/MIN/1.73M2 — LOW
EGFR: 34 ML/MIN/1.73M2 — LOW
EGFR: 36 ML/MIN/1.73M2 — LOW
EGFR: 40 ML/MIN/1.73M2 — LOW
EGFR: 43 ML/MIN/1.73M2 — LOW
EGFR: 46 ML/MIN/1.73M2 — LOW
EGFR: 47 ML/MIN/1.73M2 — LOW
EGFR: 52 ML/MIN/1.73M2 — LOW
EGFR: 65 ML/MIN/1.73M2 — SIGNIFICANT CHANGE UP
EGFR: 66 ML/MIN/1.73M2 — SIGNIFICANT CHANGE UP
EGFR: 67 ML/MIN/1.73M2 — SIGNIFICANT CHANGE UP
EGFR: 69 ML/MIN/1.73M2 — SIGNIFICANT CHANGE UP
EGFR: 70 ML/MIN/1.73M2 — SIGNIFICANT CHANGE UP
EGFR: 70 ML/MIN/1.73M2 — SIGNIFICANT CHANGE UP
EGFR: 73 ML/MIN/1.73M2 — SIGNIFICANT CHANGE UP
EGFR: 74 ML/MIN/1.73M2 — SIGNIFICANT CHANGE UP
EGFR: 77 ML/MIN/1.73M2 — SIGNIFICANT CHANGE UP
EGFR: 81 ML/MIN/1.73M2 — SIGNIFICANT CHANGE UP
EGFR: 81 ML/MIN/1.73M2 — SIGNIFICANT CHANGE UP
EGFR: 84 ML/MIN/1.73M2 — SIGNIFICANT CHANGE UP
EGFR: 87 ML/MIN/1.73M2 — SIGNIFICANT CHANGE UP
ELLIPTOCYTES BLD QL SMEAR: SLIGHT — SIGNIFICANT CHANGE UP
ELLIPTOCYTES BLD QL SMEAR: SLIGHT — SIGNIFICANT CHANGE UP
EOSINOPHIL # BLD AUTO: 0 K/UL — SIGNIFICANT CHANGE UP (ref 0–0.5)
EOSINOPHIL # BLD AUTO: 0.01 K/UL — SIGNIFICANT CHANGE UP (ref 0–0.5)
EOSINOPHIL # BLD AUTO: 0.01 K/UL — SIGNIFICANT CHANGE UP (ref 0–0.5)
EOSINOPHIL # BLD AUTO: 0.02 K/UL — SIGNIFICANT CHANGE UP (ref 0–0.5)
EOSINOPHIL # BLD AUTO: 0.03 K/UL — SIGNIFICANT CHANGE UP (ref 0–0.5)
EOSINOPHIL # BLD AUTO: 0.04 K/UL — SIGNIFICANT CHANGE UP (ref 0–0.5)
EOSINOPHIL # BLD AUTO: 0.05 K/UL — SIGNIFICANT CHANGE UP (ref 0–0.5)
EOSINOPHIL # BLD AUTO: 0.05 K/UL — SIGNIFICANT CHANGE UP (ref 0–0.5)
EOSINOPHIL # BLD AUTO: 0.06 K/UL — SIGNIFICANT CHANGE UP (ref 0–0.5)
EOSINOPHIL # BLD AUTO: 0.06 K/UL — SIGNIFICANT CHANGE UP (ref 0–0.5)
EOSINOPHIL # BLD AUTO: 0.08 K/UL — SIGNIFICANT CHANGE UP (ref 0–0.5)
EOSINOPHIL # BLD AUTO: 0.11 K/UL — SIGNIFICANT CHANGE UP (ref 0–0.5)
EOSINOPHIL NFR BLD AUTO: 0 % — SIGNIFICANT CHANGE UP (ref 0–6)
EOSINOPHIL NFR BLD AUTO: 0.1 % — SIGNIFICANT CHANGE UP (ref 0–6)
EOSINOPHIL NFR BLD AUTO: 0.2 % — SIGNIFICANT CHANGE UP (ref 0–6)
EOSINOPHIL NFR BLD AUTO: 0.3 % — SIGNIFICANT CHANGE UP (ref 0–6)
EOSINOPHIL NFR BLD AUTO: 0.4 % — SIGNIFICANT CHANGE UP (ref 0–6)
EOSINOPHIL NFR BLD AUTO: 0.4 % — SIGNIFICANT CHANGE UP (ref 0–6)
EOSINOPHIL NFR BLD AUTO: 0.5 % — SIGNIFICANT CHANGE UP (ref 0–6)
EOSINOPHIL NFR BLD AUTO: 0.6 % — SIGNIFICANT CHANGE UP (ref 0–6)
EOSINOPHIL NFR BLD AUTO: 0.9 % — SIGNIFICANT CHANGE UP (ref 0–6)
EOSINOPHIL NFR BLD AUTO: 1 % — SIGNIFICANT CHANGE UP (ref 0–6)
EOSINOPHIL NFR BLD AUTO: 1.2 % — SIGNIFICANT CHANGE UP (ref 0–6)
EOSINOPHIL NFR BLD AUTO: 1.9 % — SIGNIFICANT CHANGE UP (ref 0–6)
EPI CELLS # UR: 1 /HPF — SIGNIFICANT CHANGE UP
EPI CELLS # UR: 8 /HPF — HIGH (ref 0–5)
FERRITIN SERPL-MCNC: 40 NG/ML — SIGNIFICANT CHANGE UP (ref 15–150)
FERRITIN SERPL-MCNC: 85 NG/ML — SIGNIFICANT CHANGE UP (ref 15–150)
FLUAV SUBTYP SPEC NAA+PROBE: SIGNIFICANT CHANGE UP
FLUBV RNA SPEC QL NAA+PROBE: SIGNIFICANT CHANGE UP
GAS PNL BLDA: SIGNIFICANT CHANGE UP
GAS PNL BLDV: 132 MMOL/L — LOW (ref 136–145)
GAS PNL BLDV: 132 MMOL/L — LOW (ref 136–145)
GAS PNL BLDV: 136 MMOL/L — SIGNIFICANT CHANGE UP (ref 136–145)
GAS PNL BLDV: SIGNIFICANT CHANGE UP
GI PCR PANEL: DETECTED
GI PCR PANEL: DETECTED
GIANT PLATELETS BLD QL SMEAR: PRESENT — SIGNIFICANT CHANGE UP
GIANT PLATELETS BLD QL SMEAR: PRESENT — SIGNIFICANT CHANGE UP
GLUCOSE BLDC GLUCOMTR-MCNC: 102 MG/DL — HIGH (ref 70–99)
GLUCOSE BLDC GLUCOMTR-MCNC: 132 MG/DL — HIGH (ref 70–99)
GLUCOSE BLDC GLUCOMTR-MCNC: 137 MG/DL — HIGH (ref 70–99)
GLUCOSE BLDC GLUCOMTR-MCNC: 165 MG/DL — HIGH (ref 70–99)
GLUCOSE BLDC GLUCOMTR-MCNC: 172 MG/DL — HIGH (ref 70–99)
GLUCOSE BLDC GLUCOMTR-MCNC: 190 MG/DL — HIGH (ref 70–99)
GLUCOSE BLDC GLUCOMTR-MCNC: 217 MG/DL — HIGH (ref 70–99)
GLUCOSE BLDC GLUCOMTR-MCNC: 290 MG/DL — HIGH (ref 70–99)
GLUCOSE BLDC GLUCOMTR-MCNC: 44 MG/DL — CRITICAL LOW (ref 70–99)
GLUCOSE BLDC GLUCOMTR-MCNC: 572 MG/DL — CRITICAL HIGH (ref 70–99)
GLUCOSE BLDC GLUCOMTR-MCNC: 59 MG/DL — LOW (ref 70–99)
GLUCOSE BLDC GLUCOMTR-MCNC: 60 MG/DL — LOW (ref 70–99)
GLUCOSE BLDC GLUCOMTR-MCNC: 61 MG/DL — LOW (ref 70–99)
GLUCOSE BLDC GLUCOMTR-MCNC: 61 MG/DL — LOW (ref 70–99)
GLUCOSE BLDC GLUCOMTR-MCNC: 73 MG/DL — SIGNIFICANT CHANGE UP (ref 70–99)
GLUCOSE BLDC GLUCOMTR-MCNC: 89 MG/DL — SIGNIFICANT CHANGE UP (ref 70–99)
GLUCOSE BLDC GLUCOMTR-MCNC: 91 MG/DL — SIGNIFICANT CHANGE UP (ref 70–99)
GLUCOSE BLDC GLUCOMTR-MCNC: 91 MG/DL — SIGNIFICANT CHANGE UP (ref 70–99)
GLUCOSE BLDV-MCNC: 84 MG/DL — SIGNIFICANT CHANGE UP (ref 70–99)
GLUCOSE BLDV-MCNC: 88 MG/DL — SIGNIFICANT CHANGE UP (ref 70–99)
GLUCOSE BLDV-MCNC: >660 MG/DL — CRITICAL HIGH (ref 70–99)
GLUCOSE SERPL-MCNC: 103 MG/DL — HIGH (ref 70–99)
GLUCOSE SERPL-MCNC: 105 MG/DL — HIGH (ref 70–99)
GLUCOSE SERPL-MCNC: 106 MG/DL — HIGH (ref 70–99)
GLUCOSE SERPL-MCNC: 107 MG/DL — HIGH (ref 70–99)
GLUCOSE SERPL-MCNC: 110 MG/DL — HIGH (ref 70–99)
GLUCOSE SERPL-MCNC: 117 MG/DL — HIGH (ref 70–99)
GLUCOSE SERPL-MCNC: 140 MG/DL — HIGH (ref 70–99)
GLUCOSE SERPL-MCNC: 169 MG/DL — HIGH (ref 70–99)
GLUCOSE SERPL-MCNC: 300 MG/DL — HIGH (ref 70–99)
GLUCOSE SERPL-MCNC: 348 MG/DL — HIGH (ref 70–99)
GLUCOSE SERPL-MCNC: 71 MG/DL — SIGNIFICANT CHANGE UP (ref 70–99)
GLUCOSE SERPL-MCNC: 82 MG/DL — SIGNIFICANT CHANGE UP (ref 70–99)
GLUCOSE SERPL-MCNC: 85 MG/DL — SIGNIFICANT CHANGE UP (ref 70–99)
GLUCOSE SERPL-MCNC: 86 MG/DL — SIGNIFICANT CHANGE UP (ref 70–99)
GLUCOSE SERPL-MCNC: 87 MG/DL — SIGNIFICANT CHANGE UP (ref 70–99)
GLUCOSE SERPL-MCNC: 88 MG/DL — SIGNIFICANT CHANGE UP (ref 70–99)
GLUCOSE SERPL-MCNC: 88 MG/DL — SIGNIFICANT CHANGE UP (ref 70–99)
GLUCOSE SERPL-MCNC: 95 MG/DL — SIGNIFICANT CHANGE UP (ref 70–99)
GLUCOSE SERPL-MCNC: 95 MG/DL — SIGNIFICANT CHANGE UP (ref 70–99)
GLUCOSE SERPL-MCNC: 96 MG/DL — SIGNIFICANT CHANGE UP (ref 70–99)
GLUCOSE SERPL-MCNC: 99 MG/DL — SIGNIFICANT CHANGE UP (ref 70–99)
GLUCOSE UR QL: NEGATIVE — SIGNIFICANT CHANGE UP
GLUCOSE UR QL: NEGATIVE — SIGNIFICANT CHANGE UP
GRAM STN FLD: SIGNIFICANT CHANGE UP
HADV DNA SPEC QL NAA+PROBE: SIGNIFICANT CHANGE UP
HCO3 BLDV-SCNC: 24 MMOL/L — SIGNIFICANT CHANGE UP (ref 22–29)
HCO3 BLDV-SCNC: 38 MMOL/L — HIGH (ref 22–29)
HCO3 BLDV-SCNC: 8 MMOL/L — CRITICAL LOW (ref 22–29)
HCOV 229E RNA SPEC QL NAA+PROBE: SIGNIFICANT CHANGE UP
HCOV HKU1 RNA SPEC QL NAA+PROBE: SIGNIFICANT CHANGE UP
HCOV NL63 RNA SPEC QL NAA+PROBE: SIGNIFICANT CHANGE UP
HCOV OC43 RNA SPEC QL NAA+PROBE: SIGNIFICANT CHANGE UP
HCT VFR BLD CALC: 23.2 % — LOW (ref 34.5–45)
HCT VFR BLD CALC: 24.6 % — LOW (ref 34.5–45)
HCT VFR BLD CALC: 25.3 % — LOW (ref 34.5–45)
HCT VFR BLD CALC: 26.7 % — LOW (ref 34.5–45)
HCT VFR BLD CALC: 27.6 % — LOW (ref 34.5–45)
HCT VFR BLD CALC: 28.1 % — LOW (ref 34.5–45)
HCT VFR BLD CALC: 28.8 % — LOW (ref 34.5–45)
HCT VFR BLD CALC: 29.3 % — LOW (ref 34.5–45)
HCT VFR BLD CALC: 29.6 % — LOW (ref 34.5–45)
HCT VFR BLD CALC: 29.8 % — LOW (ref 34.5–45)
HCT VFR BLD CALC: 30.8 % — LOW (ref 34.5–45)
HCT VFR BLD CALC: 31.2 % — LOW (ref 34.5–45)
HCT VFR BLD CALC: 31.7 % — LOW (ref 34.5–45)
HCT VFR BLD CALC: 31.8 % — LOW (ref 34.5–45)
HCT VFR BLD CALC: 32.1 % — LOW (ref 34.5–45)
HCT VFR BLD CALC: 32.7 % — LOW (ref 34.5–45)
HCT VFR BLD CALC: 32.9 % — LOW (ref 34.5–45)
HCT VFR BLD CALC: 33 % — LOW (ref 34.5–45)
HCT VFR BLD CALC: 33.4 % — LOW (ref 34.5–45)
HCT VFR BLD CALC: 34.2 % — LOW (ref 34.5–45)
HCT VFR BLD CALC: 35.9 % — SIGNIFICANT CHANGE UP (ref 34.5–45)
HCT VFR BLDA CALC: 29 % — LOW (ref 34.5–46.5)
HCT VFR BLDA CALC: 34 % — LOW (ref 34.5–46.5)
HCT VFR BLDA CALC: 9 % — CRITICAL LOW (ref 34.5–46.5)
HGB BLD CALC-MCNC: 11.3 G/DL — LOW (ref 11.5–15.5)
HGB BLD CALC-MCNC: 9.8 G/DL — LOW (ref 11.7–16.1)
HGB BLD CALC-MCNC: <4 G/DL — CRITICAL LOW (ref 11.7–16.1)
HGB BLD-MCNC: 10 G/DL — LOW (ref 11.5–15.5)
HGB BLD-MCNC: 11 G/DL — LOW (ref 11.5–15.5)
HGB BLD-MCNC: 7.1 G/DL — LOW (ref 11.5–15.5)
HGB BLD-MCNC: 7.2 G/DL — LOW (ref 11.5–15.5)
HGB BLD-MCNC: 7.5 G/DL — LOW (ref 11.5–15.5)
HGB BLD-MCNC: 8 G/DL — LOW (ref 11.5–15.5)
HGB BLD-MCNC: 8.4 G/DL — LOW (ref 11.5–15.5)
HGB BLD-MCNC: 8.6 G/DL — LOW (ref 11.5–15.5)
HGB BLD-MCNC: 8.9 G/DL — LOW (ref 11.5–15.5)
HGB BLD-MCNC: 9 G/DL — LOW (ref 11.5–15.5)
HGB BLD-MCNC: 9.1 G/DL — LOW (ref 11.5–15.5)
HGB BLD-MCNC: 9.1 G/DL — LOW (ref 11.5–15.5)
HGB BLD-MCNC: 9.2 G/DL — LOW (ref 11.5–15.5)
HGB BLD-MCNC: 9.3 G/DL — LOW (ref 11.5–15.5)
HGB BLD-MCNC: 9.4 G/DL — LOW (ref 11.5–15.5)
HGB BLD-MCNC: 9.4 G/DL — LOW (ref 11.5–15.5)
HGB BLD-MCNC: 9.5 G/DL — LOW (ref 11.5–15.5)
HMPV RNA SPEC QL NAA+PROBE: SIGNIFICANT CHANGE UP
HPIV1 RNA SPEC QL NAA+PROBE: SIGNIFICANT CHANGE UP
HPIV2 RNA SPEC QL NAA+PROBE: SIGNIFICANT CHANGE UP
HPIV3 RNA SPEC QL NAA+PROBE: SIGNIFICANT CHANGE UP
HPIV4 RNA SPEC QL NAA+PROBE: SIGNIFICANT CHANGE UP
HYALINE CASTS # UR AUTO: 1 /LPF — SIGNIFICANT CHANGE UP (ref 0–2)
IANC: 3.78 K/UL — SIGNIFICANT CHANGE UP (ref 1.8–7.4)
IANC: 4.22 K/UL — SIGNIFICANT CHANGE UP (ref 1.8–7.4)
IMM GRANULOCYTES NFR BLD AUTO: 0.4 % — SIGNIFICANT CHANGE UP (ref 0–0.9)
IMM GRANULOCYTES NFR BLD AUTO: 0.5 % — SIGNIFICANT CHANGE UP (ref 0–0.9)
IMM GRANULOCYTES NFR BLD AUTO: 0.6 % — SIGNIFICANT CHANGE UP (ref 0–0.9)
IMM GRANULOCYTES NFR BLD AUTO: 0.7 % — SIGNIFICANT CHANGE UP (ref 0–0.9)
IMM GRANULOCYTES NFR BLD AUTO: 0.8 % — SIGNIFICANT CHANGE UP (ref 0–0.9)
IMM GRANULOCYTES NFR BLD AUTO: 0.8 % — SIGNIFICANT CHANGE UP (ref 0–0.9)
IMM GRANULOCYTES NFR BLD AUTO: 1.1 % — HIGH (ref 0–0.9)
IMM GRANULOCYTES NFR BLD AUTO: 1.4 % — HIGH (ref 0–0.9)
IMM GRANULOCYTES NFR BLD AUTO: 1.9 % — HIGH (ref 0–0.9)
IMM GRANULOCYTES NFR BLD AUTO: 2.4 % — HIGH (ref 0–0.9)
IMM GRANULOCYTES NFR BLD AUTO: 2.4 % — HIGH (ref 0–0.9)
INR BLD: 1.1 RATIO — SIGNIFICANT CHANGE UP (ref 0.88–1.16)
INR BLD: 1.4 RATIO — HIGH (ref 0.88–1.16)
INR BLD: 1.76 RATIO — HIGH (ref 0.88–1.16)
INR BLD: 2.07 RATIO — HIGH (ref 0.88–1.16)
INR BLD: 2.08 RATIO — HIGH (ref 0.88–1.16)
INR BLD: 2.12 RATIO — HIGH (ref 0.88–1.16)
IRON SATN MFR SERPL: 12 % — LOW (ref 14–50)
IRON SATN MFR SERPL: 14 UG/DL — LOW (ref 30–160)
IRON SATN MFR SERPL: 20 UG/DL — LOW (ref 30–160)
IRON SATN MFR SERPL: 6 % — LOW (ref 14–50)
KETONES UR-MCNC: NEGATIVE — SIGNIFICANT CHANGE UP
KETONES UR-MCNC: NEGATIVE — SIGNIFICANT CHANGE UP
LACTATE BLDV-MCNC: 1.4 MMOL/L — SIGNIFICANT CHANGE UP (ref 0.5–2)
LACTATE BLDV-MCNC: 1.5 MMOL/L — SIGNIFICANT CHANGE UP (ref 0.5–2)
LACTATE BLDV-MCNC: >16 MMOL/L — CRITICAL HIGH (ref 0.5–2)
LDH SERPL L TO P-CCNC: 441 U/L — HIGH (ref 50–242)
LEUKOCYTE ESTERASE UR-ACNC: ABNORMAL
LEUKOCYTE ESTERASE UR-ACNC: NEGATIVE — SIGNIFICANT CHANGE UP
LG PLATELETS BLD QL AUTO: SLIGHT — SIGNIFICANT CHANGE UP
LIDOCAIN IGE QN: 10 U/L — SIGNIFICANT CHANGE UP (ref 7–60)
LIDOCAIN IGE QN: 14 U/L — SIGNIFICANT CHANGE UP (ref 7–60)
LIDOCAIN IGE QN: 16 U/L — SIGNIFICANT CHANGE UP (ref 7–60)
LYMPHOCYTES # BLD AUTO: 0.41 K/UL — LOW (ref 1–3.3)
LYMPHOCYTES # BLD AUTO: 0.45 K/UL — LOW (ref 1–3.3)
LYMPHOCYTES # BLD AUTO: 0.46 K/UL — LOW (ref 1–3.3)
LYMPHOCYTES # BLD AUTO: 0.46 K/UL — LOW (ref 1–3.3)
LYMPHOCYTES # BLD AUTO: 0.54 K/UL — LOW (ref 1–3.3)
LYMPHOCYTES # BLD AUTO: 0.61 K/UL — LOW (ref 1–3.3)
LYMPHOCYTES # BLD AUTO: 0.62 K/UL — LOW (ref 1–3.3)
LYMPHOCYTES # BLD AUTO: 0.86 K/UL — LOW (ref 1–3.3)
LYMPHOCYTES # BLD AUTO: 0.9 K/UL — LOW (ref 1–3.3)
LYMPHOCYTES # BLD AUTO: 0.95 K/UL — LOW (ref 1–3.3)
LYMPHOCYTES # BLD AUTO: 0.98 K/UL — LOW (ref 1–3.3)
LYMPHOCYTES # BLD AUTO: 1.03 K/UL — SIGNIFICANT CHANGE UP (ref 1–3.3)
LYMPHOCYTES # BLD AUTO: 1.06 K/UL — SIGNIFICANT CHANGE UP (ref 1–3.3)
LYMPHOCYTES # BLD AUTO: 1.08 K/UL — SIGNIFICANT CHANGE UP (ref 1–3.3)
LYMPHOCYTES # BLD AUTO: 1.29 K/UL — SIGNIFICANT CHANGE UP (ref 1–3.3)
LYMPHOCYTES # BLD AUTO: 1.37 K/UL — SIGNIFICANT CHANGE UP (ref 1–3.3)
LYMPHOCYTES # BLD AUTO: 1.41 K/UL — SIGNIFICANT CHANGE UP (ref 1–3.3)
LYMPHOCYTES # BLD AUTO: 1.9 % — LOW (ref 13–44)
LYMPHOCYTES # BLD AUTO: 10.1 % — LOW (ref 13–44)
LYMPHOCYTES # BLD AUTO: 10.2 % — LOW (ref 13–44)
LYMPHOCYTES # BLD AUTO: 10.3 % — LOW (ref 13–44)
LYMPHOCYTES # BLD AUTO: 11.5 % — LOW (ref 13–44)
LYMPHOCYTES # BLD AUTO: 12.1 % — LOW (ref 13–44)
LYMPHOCYTES # BLD AUTO: 14.4 % — SIGNIFICANT CHANGE UP (ref 13–44)
LYMPHOCYTES # BLD AUTO: 14.7 % — SIGNIFICANT CHANGE UP (ref 13–44)
LYMPHOCYTES # BLD AUTO: 15 % — SIGNIFICANT CHANGE UP (ref 13–44)
LYMPHOCYTES # BLD AUTO: 16.8 % — SIGNIFICANT CHANGE UP (ref 13–44)
LYMPHOCYTES # BLD AUTO: 17 % — SIGNIFICANT CHANGE UP (ref 13–44)
LYMPHOCYTES # BLD AUTO: 17.7 % — SIGNIFICANT CHANGE UP (ref 13–44)
LYMPHOCYTES # BLD AUTO: 2.6 % — LOW (ref 13–44)
LYMPHOCYTES # BLD AUTO: 2.7 % — LOW (ref 13–44)
LYMPHOCYTES # BLD AUTO: 23.2 % — SIGNIFICANT CHANGE UP (ref 13–44)
LYMPHOCYTES # BLD AUTO: 25.5 % — SIGNIFICANT CHANGE UP (ref 13–44)
LYMPHOCYTES # BLD AUTO: 8.3 % — LOW (ref 13–44)
M PNEUMO DNA SPEC QL NAA+PROBE: SIGNIFICANT CHANGE UP
MAGNESIUM SERPL-MCNC: 0.8 MG/DL — CRITICAL LOW (ref 1.6–2.6)
MAGNESIUM SERPL-MCNC: 1.4 MG/DL — LOW (ref 1.6–2.6)
MAGNESIUM SERPL-MCNC: 1.6 MG/DL — SIGNIFICANT CHANGE UP (ref 1.6–2.6)
MAGNESIUM SERPL-MCNC: 1.8 MG/DL — SIGNIFICANT CHANGE UP (ref 1.6–2.6)
MAGNESIUM SERPL-MCNC: 1.8 MG/DL — SIGNIFICANT CHANGE UP (ref 1.6–2.6)
MAGNESIUM SERPL-MCNC: 2 MG/DL — SIGNIFICANT CHANGE UP (ref 1.6–2.6)
MAGNESIUM SERPL-MCNC: 2 MG/DL — SIGNIFICANT CHANGE UP (ref 1.6–2.6)
MAGNESIUM SERPL-MCNC: 2.1 MG/DL — SIGNIFICANT CHANGE UP (ref 1.6–2.6)
MAGNESIUM SERPL-MCNC: 2.2 MG/DL — SIGNIFICANT CHANGE UP (ref 1.6–2.6)
MAGNESIUM SERPL-MCNC: 2.3 MG/DL — SIGNIFICANT CHANGE UP (ref 1.6–2.6)
MANUAL SMEAR VERIFICATION: SIGNIFICANT CHANGE UP
MANUAL SMEAR VERIFICATION: SIGNIFICANT CHANGE UP
MCHC RBC-ENTMCNC: 24.9 PG — LOW (ref 27–34)
MCHC RBC-ENTMCNC: 24.9 PG — LOW (ref 27–34)
MCHC RBC-ENTMCNC: 25 PG — LOW (ref 27–34)
MCHC RBC-ENTMCNC: 25.1 PG — LOW (ref 27–34)
MCHC RBC-ENTMCNC: 25.2 PG — LOW (ref 27–34)
MCHC RBC-ENTMCNC: 25.4 PG — LOW (ref 27–34)
MCHC RBC-ENTMCNC: 25.4 PG — LOW (ref 27–34)
MCHC RBC-ENTMCNC: 25.8 PG — LOW (ref 27–34)
MCHC RBC-ENTMCNC: 25.9 PG — LOW (ref 27–34)
MCHC RBC-ENTMCNC: 26 PG — LOW (ref 27–34)
MCHC RBC-ENTMCNC: 26.1 PG — LOW (ref 27–34)
MCHC RBC-ENTMCNC: 26.1 PG — LOW (ref 27–34)
MCHC RBC-ENTMCNC: 26.2 PG — LOW (ref 27–34)
MCHC RBC-ENTMCNC: 26.3 PG — LOW (ref 27–34)
MCHC RBC-ENTMCNC: 26.4 PG — LOW (ref 27–34)
MCHC RBC-ENTMCNC: 26.5 PG — LOW (ref 27–34)
MCHC RBC-ENTMCNC: 26.6 PG — LOW (ref 27–34)
MCHC RBC-ENTMCNC: 26.7 PG — LOW (ref 27–34)
MCHC RBC-ENTMCNC: 26.7 PG — LOW (ref 27–34)
MCHC RBC-ENTMCNC: 26.8 PG — LOW (ref 27–34)
MCHC RBC-ENTMCNC: 26.8 PG — LOW (ref 27–34)
MCHC RBC-ENTMCNC: 27.2 GM/DL — LOW (ref 32–36)
MCHC RBC-ENTMCNC: 27.5 GM/DL — LOW (ref 32–36)
MCHC RBC-ENTMCNC: 28.6 GM/DL — LOW (ref 32–36)
MCHC RBC-ENTMCNC: 28.6 GM/DL — LOW (ref 32–36)
MCHC RBC-ENTMCNC: 29.1 GM/DL — LOW (ref 32–36)
MCHC RBC-ENTMCNC: 29.3 GM/DL — LOW (ref 32–36)
MCHC RBC-ENTMCNC: 29.4 G/DL — LOW (ref 32–36)
MCHC RBC-ENTMCNC: 29.5 GM/DL — LOW (ref 32–36)
MCHC RBC-ENTMCNC: 29.6 G/DL — LOW (ref 32–36)
MCHC RBC-ENTMCNC: 29.6 GM/DL — LOW (ref 32–36)
MCHC RBC-ENTMCNC: 29.9 G/DL — LOW (ref 32–36)
MCHC RBC-ENTMCNC: 29.9 G/DL — LOW (ref 32–36)
MCHC RBC-ENTMCNC: 29.9 GM/DL — LOW (ref 32–36)
MCHC RBC-ENTMCNC: 30 G/DL — LOW (ref 32–36)
MCHC RBC-ENTMCNC: 30 G/DL — LOW (ref 32–36)
MCHC RBC-ENTMCNC: 30.1 GM/DL — LOW (ref 32–36)
MCHC RBC-ENTMCNC: 30.2 GM/DL — LOW (ref 32–36)
MCHC RBC-ENTMCNC: 30.3 G/DL — LOW (ref 32–36)
MCHC RBC-ENTMCNC: 30.4 G/DL — LOW (ref 32–36)
MCHC RBC-ENTMCNC: 30.6 GM/DL — LOW (ref 32–36)
MCHC RBC-ENTMCNC: 31.2 GM/DL — LOW (ref 32–36)
MCV RBC AUTO: 84.6 FL — SIGNIFICANT CHANGE UP (ref 80–100)
MCV RBC AUTO: 84.8 FL — SIGNIFICANT CHANGE UP (ref 80–100)
MCV RBC AUTO: 85.7 FL — SIGNIFICANT CHANGE UP (ref 80–100)
MCV RBC AUTO: 85.8 FL — SIGNIFICANT CHANGE UP (ref 80–100)
MCV RBC AUTO: 86.7 FL — SIGNIFICANT CHANGE UP (ref 80–100)
MCV RBC AUTO: 86.9 FL — SIGNIFICANT CHANGE UP (ref 80–100)
MCV RBC AUTO: 87 FL — SIGNIFICANT CHANGE UP (ref 80–100)
MCV RBC AUTO: 87.2 FL — SIGNIFICANT CHANGE UP (ref 80–100)
MCV RBC AUTO: 87.3 FL — SIGNIFICANT CHANGE UP (ref 80–100)
MCV RBC AUTO: 87.3 FL — SIGNIFICANT CHANGE UP (ref 80–100)
MCV RBC AUTO: 87.5 FL — SIGNIFICANT CHANGE UP (ref 80–100)
MCV RBC AUTO: 88.1 FL — SIGNIFICANT CHANGE UP (ref 80–100)
MCV RBC AUTO: 88.2 FL — SIGNIFICANT CHANGE UP (ref 80–100)
MCV RBC AUTO: 88.5 FL — SIGNIFICANT CHANGE UP (ref 80–100)
MCV RBC AUTO: 88.6 FL — SIGNIFICANT CHANGE UP (ref 80–100)
MCV RBC AUTO: 88.8 FL — SIGNIFICANT CHANGE UP (ref 80–100)
MCV RBC AUTO: 89.2 FL — SIGNIFICANT CHANGE UP (ref 80–100)
MCV RBC AUTO: 89.5 FL — SIGNIFICANT CHANGE UP (ref 80–100)
MCV RBC AUTO: 90.1 FL — SIGNIFICANT CHANGE UP (ref 80–100)
MCV RBC AUTO: 90.8 FL — SIGNIFICANT CHANGE UP (ref 80–100)
MCV RBC AUTO: 91.4 FL — SIGNIFICANT CHANGE UP (ref 80–100)
METAMYELOCYTES # FLD: 1 % — HIGH (ref 0–0)
METAMYELOCYTES # FLD: 5.6 % — HIGH (ref 0–0)
METHOD TYPE: SIGNIFICANT CHANGE UP
MONOCYTES # BLD AUTO: 0 K/UL — SIGNIFICANT CHANGE UP (ref 0–0.9)
MONOCYTES # BLD AUTO: 0 K/UL — SIGNIFICANT CHANGE UP (ref 0–0.9)
MONOCYTES # BLD AUTO: 0.11 K/UL — SIGNIFICANT CHANGE UP (ref 0–0.9)
MONOCYTES # BLD AUTO: 0.34 K/UL — SIGNIFICANT CHANGE UP (ref 0–0.9)
MONOCYTES # BLD AUTO: 0.38 K/UL — SIGNIFICANT CHANGE UP (ref 0–0.9)
MONOCYTES # BLD AUTO: 0.42 K/UL — SIGNIFICANT CHANGE UP (ref 0–0.9)
MONOCYTES # BLD AUTO: 0.61 K/UL — SIGNIFICANT CHANGE UP (ref 0–0.9)
MONOCYTES # BLD AUTO: 0.7 K/UL — SIGNIFICANT CHANGE UP (ref 0–0.9)
MONOCYTES # BLD AUTO: 0.71 K/UL — SIGNIFICANT CHANGE UP (ref 0–0.9)
MONOCYTES # BLD AUTO: 0.89 K/UL — SIGNIFICANT CHANGE UP (ref 0–0.9)
MONOCYTES # BLD AUTO: 0.93 K/UL — HIGH (ref 0–0.9)
MONOCYTES # BLD AUTO: 0.94 K/UL — HIGH (ref 0–0.9)
MONOCYTES # BLD AUTO: 0.97 K/UL — HIGH (ref 0–0.9)
MONOCYTES # BLD AUTO: 1.04 K/UL — HIGH (ref 0–0.9)
MONOCYTES # BLD AUTO: 1.05 K/UL — HIGH (ref 0–0.9)
MONOCYTES # BLD AUTO: 1.14 K/UL — HIGH (ref 0–0.9)
MONOCYTES # BLD AUTO: 1.17 K/UL — HIGH (ref 0–0.9)
MONOCYTES NFR BLD AUTO: 0 % — LOW (ref 2–14)
MONOCYTES NFR BLD AUTO: 0 % — LOW (ref 2–14)
MONOCYTES NFR BLD AUTO: 0.7 % — LOW (ref 2–14)
MONOCYTES NFR BLD AUTO: 1.9 % — LOW (ref 2–14)
MONOCYTES NFR BLD AUTO: 10.2 % — SIGNIFICANT CHANGE UP (ref 2–14)
MONOCYTES NFR BLD AUTO: 10.5 % — SIGNIFICANT CHANGE UP (ref 2–14)
MONOCYTES NFR BLD AUTO: 10.7 % — SIGNIFICANT CHANGE UP (ref 2–14)
MONOCYTES NFR BLD AUTO: 11.1 % — SIGNIFICANT CHANGE UP (ref 2–14)
MONOCYTES NFR BLD AUTO: 11.3 % — SIGNIFICANT CHANGE UP (ref 2–14)
MONOCYTES NFR BLD AUTO: 12.9 % — SIGNIFICANT CHANGE UP (ref 2–14)
MONOCYTES NFR BLD AUTO: 13 % — SIGNIFICANT CHANGE UP (ref 2–14)
MONOCYTES NFR BLD AUTO: 13.3 % — SIGNIFICANT CHANGE UP (ref 2–14)
MONOCYTES NFR BLD AUTO: 14.8 % — HIGH (ref 2–14)
MONOCYTES NFR BLD AUTO: 15 % — HIGH (ref 2–14)
MONOCYTES NFR BLD AUTO: 16.9 % — HIGH (ref 2–14)
MONOCYTES NFR BLD AUTO: 7.6 % — SIGNIFICANT CHANGE UP (ref 2–14)
MONOCYTES NFR BLD AUTO: 9.6 % — SIGNIFICANT CHANGE UP (ref 2–14)
MRSA PCR RESULT.: SIGNIFICANT CHANGE UP
MRSA PCR RESULT.: SIGNIFICANT CHANGE UP
MYELOCYTES NFR BLD: 0.9 % — HIGH (ref 0–0)
MYELOCYTES NFR BLD: 1.8 % — HIGH (ref 0–0)
MYELOCYTES NFR BLD: 3 % — HIGH (ref 0–0)
NEUTROPHILS # BLD AUTO: 1.72 K/UL — LOW (ref 1.8–7.4)
NEUTROPHILS # BLD AUTO: 14.02 K/UL — HIGH (ref 1.8–7.4)
NEUTROPHILS # BLD AUTO: 14.26 K/UL — HIGH (ref 1.8–7.4)
NEUTROPHILS # BLD AUTO: 2.02 K/UL — SIGNIFICANT CHANGE UP (ref 1.8–7.4)
NEUTROPHILS # BLD AUTO: 2.3 K/UL — SIGNIFICANT CHANGE UP (ref 1.8–7.4)
NEUTROPHILS # BLD AUTO: 2.32 K/UL — SIGNIFICANT CHANGE UP (ref 1.8–7.4)
NEUTROPHILS # BLD AUTO: 29.68 K/UL — HIGH (ref 1.8–7.4)
NEUTROPHILS # BLD AUTO: 3.78 K/UL — SIGNIFICANT CHANGE UP (ref 1.8–7.4)
NEUTROPHILS # BLD AUTO: 4.22 K/UL — SIGNIFICANT CHANGE UP (ref 1.8–7.4)
NEUTROPHILS # BLD AUTO: 4.27 K/UL — SIGNIFICANT CHANGE UP (ref 1.8–7.4)
NEUTROPHILS # BLD AUTO: 4.81 K/UL — SIGNIFICANT CHANGE UP (ref 1.8–7.4)
NEUTROPHILS # BLD AUTO: 5.15 K/UL — SIGNIFICANT CHANGE UP (ref 1.8–7.4)
NEUTROPHILS # BLD AUTO: 7 K/UL — SIGNIFICANT CHANGE UP (ref 1.8–7.4)
NEUTROPHILS # BLD AUTO: 7.11 K/UL — SIGNIFICANT CHANGE UP (ref 1.8–7.4)
NEUTROPHILS # BLD AUTO: 7.6 K/UL — HIGH (ref 1.8–7.4)
NEUTROPHILS # BLD AUTO: 8.09 K/UL — HIGH (ref 1.8–7.4)
NEUTROPHILS # BLD AUTO: 8.14 K/UL — HIGH (ref 1.8–7.4)
NEUTROPHILS NFR BLD AUTO: 45.3 % — SIGNIFICANT CHANGE UP (ref 43–77)
NEUTROPHILS NFR BLD AUTO: 58 % — SIGNIFICANT CHANGE UP (ref 43–77)
NEUTROPHILS NFR BLD AUTO: 61.9 % — SIGNIFICANT CHANGE UP (ref 43–77)
NEUTROPHILS NFR BLD AUTO: 62 % — SIGNIFICANT CHANGE UP (ref 43–77)
NEUTROPHILS NFR BLD AUTO: 62.1 % — SIGNIFICANT CHANGE UP (ref 43–77)
NEUTROPHILS NFR BLD AUTO: 64 % — SIGNIFICANT CHANGE UP (ref 43–77)
NEUTROPHILS NFR BLD AUTO: 68 % — SIGNIFICANT CHANGE UP (ref 43–77)
NEUTROPHILS NFR BLD AUTO: 68.4 % — SIGNIFICANT CHANGE UP (ref 43–77)
NEUTROPHILS NFR BLD AUTO: 71.5 % — SIGNIFICANT CHANGE UP (ref 43–77)
NEUTROPHILS NFR BLD AUTO: 75.5 % — SIGNIFICANT CHANGE UP (ref 43–77)
NEUTROPHILS NFR BLD AUTO: 75.6 % — SIGNIFICANT CHANGE UP (ref 43–77)
NEUTROPHILS NFR BLD AUTO: 75.7 % — SIGNIFICANT CHANGE UP (ref 43–77)
NEUTROPHILS NFR BLD AUTO: 77.3 % — HIGH (ref 43–77)
NEUTROPHILS NFR BLD AUTO: 77.8 % — HIGH (ref 43–77)
NEUTROPHILS NFR BLD AUTO: 78.7 % — HIGH (ref 43–77)
NEUTROPHILS NFR BLD AUTO: 93.2 % — HIGH (ref 43–77)
NEUTROPHILS NFR BLD AUTO: 94.8 % — HIGH (ref 43–77)
NEUTS BAND # BLD: 15.9 % — HIGH (ref 0–8)
NEUTS BAND # BLD: 4.4 % — SIGNIFICANT CHANGE UP (ref 0–8)
NITRITE UR-MCNC: NEGATIVE — SIGNIFICANT CHANGE UP
NITRITE UR-MCNC: POSITIVE
NOROVIRUS GI+II RNA STL QL NAA+NON-PROBE: DETECTED
NOROVIRUS GI+II RNA STL QL NAA+NON-PROBE: DETECTED
NRBC # BLD: 0 /100 WBCS — SIGNIFICANT CHANGE UP (ref 0–0)
NRBC # BLD: 0 /100 — SIGNIFICANT CHANGE UP (ref 0–0)
NRBC # BLD: 9 /100 — HIGH (ref 0–0)
NRBC # BLD: SIGNIFICANT CHANGE UP /100 WBCS (ref 0–0)
NRBC # FLD: 0 K/UL — SIGNIFICANT CHANGE UP (ref 0–0)
NT-PROBNP SERPL-SCNC: 176 PG/ML — SIGNIFICANT CHANGE UP
OB PNL STL: NEGATIVE — SIGNIFICANT CHANGE UP
ORGANISM # SPEC MICROSCOPIC CNT: SIGNIFICANT CHANGE UP
OTHER CELLS CSF MANUAL: 4.1 ML/DL — LOW (ref 18–22)
PCO2 BLDV: 20 MMHG — LOW (ref 39–42)
PCO2 BLDV: 46 MMHG — HIGH (ref 39–42)
PCO2 BLDV: 50 MMHG — HIGH (ref 39–42)
PH BLDV: 7.23 — LOW (ref 7.32–7.43)
PH BLDV: 7.33 — SIGNIFICANT CHANGE UP (ref 7.32–7.43)
PH BLDV: 7.49 — HIGH (ref 7.32–7.43)
PH UR: 6.5 — SIGNIFICANT CHANGE UP (ref 5–8)
PH UR: 8 — SIGNIFICANT CHANGE UP (ref 5–8)
PHOSPHATE SERPL-MCNC: 1.6 MG/DL — LOW (ref 2.5–4.5)
PHOSPHATE SERPL-MCNC: 1.8 MG/DL — LOW (ref 2.5–4.5)
PHOSPHATE SERPL-MCNC: 2.2 MG/DL — LOW (ref 2.5–4.5)
PHOSPHATE SERPL-MCNC: 2.5 MG/DL — SIGNIFICANT CHANGE UP (ref 2.5–4.5)
PHOSPHATE SERPL-MCNC: 2.5 MG/DL — SIGNIFICANT CHANGE UP (ref 2.5–4.5)
PHOSPHATE SERPL-MCNC: 3 MG/DL — SIGNIFICANT CHANGE UP (ref 2.5–4.5)
PHOSPHATE SERPL-MCNC: 3.1 MG/DL — SIGNIFICANT CHANGE UP (ref 2.5–4.5)
PHOSPHATE SERPL-MCNC: 3.3 MG/DL — SIGNIFICANT CHANGE UP (ref 2.5–4.5)
PHOSPHATE SERPL-MCNC: 3.6 MG/DL — SIGNIFICANT CHANGE UP (ref 2.5–4.5)
PHOSPHATE SERPL-MCNC: 4 MG/DL — SIGNIFICANT CHANGE UP (ref 2.5–4.5)
PHOSPHATE SERPL-MCNC: 4.4 MG/DL — SIGNIFICANT CHANGE UP (ref 2.5–4.5)
PLAT MORPH BLD: ABNORMAL
PLAT MORPH BLD: NORMAL — SIGNIFICANT CHANGE UP
PLAT MORPH BLD: NORMAL — SIGNIFICANT CHANGE UP
PLATELET # BLD AUTO: 118 K/UL — LOW (ref 150–400)
PLATELET # BLD AUTO: 162 K/UL — SIGNIFICANT CHANGE UP (ref 150–400)
PLATELET # BLD AUTO: 187 K/UL — SIGNIFICANT CHANGE UP (ref 150–400)
PLATELET # BLD AUTO: 224 K/UL — SIGNIFICANT CHANGE UP (ref 150–400)
PLATELET # BLD AUTO: 305 K/UL — SIGNIFICANT CHANGE UP (ref 150–400)
PLATELET # BLD AUTO: 305 K/UL — SIGNIFICANT CHANGE UP (ref 150–400)
PLATELET # BLD AUTO: 315 K/UL — SIGNIFICANT CHANGE UP (ref 150–400)
PLATELET # BLD AUTO: 318 K/UL — SIGNIFICANT CHANGE UP (ref 150–400)
PLATELET # BLD AUTO: 322 K/UL — SIGNIFICANT CHANGE UP (ref 150–400)
PLATELET # BLD AUTO: 334 K/UL — SIGNIFICANT CHANGE UP (ref 150–400)
PLATELET # BLD AUTO: 344 K/UL — SIGNIFICANT CHANGE UP (ref 150–400)
PLATELET # BLD AUTO: 349 K/UL — SIGNIFICANT CHANGE UP (ref 150–400)
PLATELET # BLD AUTO: 350 K/UL — SIGNIFICANT CHANGE UP (ref 150–400)
PLATELET # BLD AUTO: 368 K/UL — SIGNIFICANT CHANGE UP (ref 150–400)
PLATELET # BLD AUTO: 374 K/UL — SIGNIFICANT CHANGE UP (ref 150–400)
PLATELET # BLD AUTO: 411 K/UL — HIGH (ref 150–400)
PLATELET # BLD AUTO: 440 K/UL — HIGH (ref 150–400)
PLATELET # BLD AUTO: 443 K/UL — HIGH (ref 150–400)
PLATELET # BLD AUTO: 482 K/UL — HIGH (ref 150–400)
PLATELET # BLD AUTO: 525 K/UL — HIGH (ref 150–400)
PLATELET # BLD AUTO: 574 K/UL — HIGH (ref 150–400)
PO2 BLDV: 18 MMHG — LOW (ref 25–45)
PO2 BLDV: 55 MMHG — HIGH (ref 25–45)
PO2 BLDV: <20 MMHG — SIGNIFICANT CHANGE UP
POIKILOCYTOSIS BLD QL AUTO: SLIGHT — SIGNIFICANT CHANGE UP
POLYCHROMASIA BLD QL SMEAR: SLIGHT — SIGNIFICANT CHANGE UP
POTASSIUM BLDV-SCNC: 2.3 MMOL/L — CRITICAL LOW (ref 3.5–5.1)
POTASSIUM BLDV-SCNC: 3.3 MMOL/L — LOW (ref 3.5–5.1)
POTASSIUM BLDV-SCNC: 3.9 MMOL/L — SIGNIFICANT CHANGE UP (ref 3.5–5.1)
POTASSIUM SERPL-MCNC: 2.7 MMOL/L — CRITICAL LOW (ref 3.5–5.3)
POTASSIUM SERPL-MCNC: 2.9 MMOL/L — CRITICAL LOW (ref 3.5–5.3)
POTASSIUM SERPL-MCNC: 3.1 MMOL/L — LOW (ref 3.5–5.3)
POTASSIUM SERPL-MCNC: 3.2 MMOL/L — LOW (ref 3.5–5.3)
POTASSIUM SERPL-MCNC: 3.3 MMOL/L — LOW (ref 3.5–5.3)
POTASSIUM SERPL-MCNC: 3.3 MMOL/L — LOW (ref 3.5–5.3)
POTASSIUM SERPL-MCNC: 3.4 MMOL/L — LOW (ref 3.5–5.3)
POTASSIUM SERPL-MCNC: 3.4 MMOL/L — LOW (ref 3.5–5.3)
POTASSIUM SERPL-MCNC: 3.5 MMOL/L — SIGNIFICANT CHANGE UP (ref 3.5–5.3)
POTASSIUM SERPL-MCNC: 3.5 MMOL/L — SIGNIFICANT CHANGE UP (ref 3.5–5.3)
POTASSIUM SERPL-MCNC: 3.6 MMOL/L — SIGNIFICANT CHANGE UP (ref 3.5–5.3)
POTASSIUM SERPL-MCNC: 3.8 MMOL/L — SIGNIFICANT CHANGE UP (ref 3.5–5.3)
POTASSIUM SERPL-MCNC: 3.8 MMOL/L — SIGNIFICANT CHANGE UP (ref 3.5–5.3)
POTASSIUM SERPL-MCNC: 3.9 MMOL/L — SIGNIFICANT CHANGE UP (ref 3.5–5.3)
POTASSIUM SERPL-MCNC: 4 MMOL/L — SIGNIFICANT CHANGE UP (ref 3.5–5.3)
POTASSIUM SERPL-MCNC: 4.1 MMOL/L — SIGNIFICANT CHANGE UP (ref 3.5–5.3)
POTASSIUM SERPL-MCNC: 4.3 MMOL/L — SIGNIFICANT CHANGE UP (ref 3.5–5.3)
POTASSIUM SERPL-SCNC: 2.7 MMOL/L — CRITICAL LOW (ref 3.5–5.3)
POTASSIUM SERPL-SCNC: 2.9 MMOL/L — CRITICAL LOW (ref 3.5–5.3)
POTASSIUM SERPL-SCNC: 3.1 MMOL/L — LOW (ref 3.5–5.3)
POTASSIUM SERPL-SCNC: 3.2 MMOL/L — LOW (ref 3.5–5.3)
POTASSIUM SERPL-SCNC: 3.3 MMOL/L — LOW (ref 3.5–5.3)
POTASSIUM SERPL-SCNC: 3.3 MMOL/L — LOW (ref 3.5–5.3)
POTASSIUM SERPL-SCNC: 3.4 MMOL/L — LOW (ref 3.5–5.3)
POTASSIUM SERPL-SCNC: 3.4 MMOL/L — LOW (ref 3.5–5.3)
POTASSIUM SERPL-SCNC: 3.5 MMOL/L — SIGNIFICANT CHANGE UP (ref 3.5–5.3)
POTASSIUM SERPL-SCNC: 3.5 MMOL/L — SIGNIFICANT CHANGE UP (ref 3.5–5.3)
POTASSIUM SERPL-SCNC: 3.6 MMOL/L — SIGNIFICANT CHANGE UP (ref 3.5–5.3)
POTASSIUM SERPL-SCNC: 3.8 MMOL/L — SIGNIFICANT CHANGE UP (ref 3.5–5.3)
POTASSIUM SERPL-SCNC: 3.8 MMOL/L — SIGNIFICANT CHANGE UP (ref 3.5–5.3)
POTASSIUM SERPL-SCNC: 3.9 MMOL/L — SIGNIFICANT CHANGE UP (ref 3.5–5.3)
POTASSIUM SERPL-SCNC: 4 MMOL/L — SIGNIFICANT CHANGE UP (ref 3.5–5.3)
POTASSIUM SERPL-SCNC: 4.1 MMOL/L — SIGNIFICANT CHANGE UP (ref 3.5–5.3)
POTASSIUM SERPL-SCNC: 4.3 MMOL/L — SIGNIFICANT CHANGE UP (ref 3.5–5.3)
PROCALCITONIN SERPL-MCNC: 14 NG/ML — HIGH (ref 0.02–0.1)
PROT SERPL-MCNC: 3.3 G/DL — LOW (ref 6–8.3)
PROT SERPL-MCNC: 4 G/DL — LOW (ref 6–8.3)
PROT SERPL-MCNC: 4.1 G/DL — LOW (ref 6–8.3)
PROT SERPL-MCNC: 4.2 G/DL — LOW (ref 6–8.3)
PROT SERPL-MCNC: 4.2 G/DL — LOW (ref 6–8.3)
PROT SERPL-MCNC: 5.1 G/DL — LOW (ref 6–8.3)
PROT SERPL-MCNC: 5.3 G/DL — LOW (ref 6–8.3)
PROT SERPL-MCNC: 5.4 G/DL — LOW (ref 6–8.3)
PROT SERPL-MCNC: 5.4 G/DL — LOW (ref 6–8.3)
PROT SERPL-MCNC: 5.5 G/DL — LOW (ref 6–8.3)
PROT SERPL-MCNC: 5.5 G/DL — LOW (ref 6–8.3)
PROT SERPL-MCNC: 5.8 G/DL — LOW (ref 6–8.3)
PROT SERPL-MCNC: 6.5 G/DL — SIGNIFICANT CHANGE UP (ref 6–8.3)
PROT SERPL-MCNC: 6.8 G/DL — SIGNIFICANT CHANGE UP (ref 6–8.3)
PROT SERPL-MCNC: 6.8 G/DL — SIGNIFICANT CHANGE UP (ref 6–8.3)
PROT SERPL-MCNC: 7.6 G/DL — SIGNIFICANT CHANGE UP (ref 6–8.3)
PROT UR-MCNC: ABNORMAL
PROT UR-MCNC: ABNORMAL
PROTHROM AB SERPL-ACNC: 12.8 SEC — SIGNIFICANT CHANGE UP (ref 10.5–13.4)
PROTHROM AB SERPL-ACNC: 16.2 SEC — HIGH (ref 10.5–13.4)
PROTHROM AB SERPL-ACNC: 20.4 SEC — HIGH (ref 10.5–13.4)
PROTHROM AB SERPL-ACNC: 24.2 SEC — HIGH (ref 10.5–13.4)
PROTHROM AB SERPL-ACNC: 24.3 SEC — HIGH (ref 10.5–13.4)
PROTHROM AB SERPL-ACNC: 24.6 SEC — HIGH (ref 10.5–13.4)
RAPID RVP RESULT: SIGNIFICANT CHANGE UP
RBC # BLD: 2.66 M/UL — LOW (ref 3.8–5.2)
RBC # BLD: 2.75 M/UL — LOW (ref 3.8–5.2)
RBC # BLD: 2.99 M/UL — LOW (ref 3.8–5.2)
RBC # BLD: 3.15 M/UL — LOW (ref 3.8–5.2)
RBC # BLD: 3.22 M/UL — LOW (ref 3.8–5.2)
RBC # BLD: 3.23 M/UL — LOW (ref 3.8–5.2)
RBC # BLD: 3.3 M/UL — LOW (ref 3.8–5.2)
RBC # BLD: 3.36 M/UL — LOW (ref 3.8–5.2)
RBC # BLD: 3.38 M/UL — LOW (ref 3.8–5.2)
RBC # BLD: 3.43 M/UL — LOW (ref 3.8–5.2)
RBC # BLD: 3.48 M/UL — LOW (ref 3.8–5.2)
RBC # BLD: 3.52 M/UL — LOW (ref 3.8–5.2)
RBC # BLD: 3.53 M/UL — LOW (ref 3.8–5.2)
RBC # BLD: 3.57 M/UL — LOW (ref 3.8–5.2)
RBC # BLD: 3.6 M/UL — LOW (ref 3.8–5.2)
RBC # BLD: 3.68 M/UL — LOW (ref 3.8–5.2)
RBC # BLD: 3.73 M/UL — LOW (ref 3.8–5.2)
RBC # BLD: 3.74 M/UL — LOW (ref 3.8–5.2)
RBC # BLD: 3.77 M/UL — LOW (ref 3.8–5.2)
RBC # BLD: 3.81 M/UL — SIGNIFICANT CHANGE UP (ref 3.8–5.2)
RBC # BLD: 4.11 M/UL — SIGNIFICANT CHANGE UP (ref 3.8–5.2)
RBC # FLD: 18 % — HIGH (ref 10.3–14.5)
RBC # FLD: 18 % — HIGH (ref 10.3–14.5)
RBC # FLD: 18.1 % — HIGH (ref 10.3–14.5)
RBC # FLD: 18.5 % — HIGH (ref 10.3–14.5)
RBC # FLD: 18.6 % — HIGH (ref 10.3–14.5)
RBC # FLD: 18.6 % — HIGH (ref 10.3–14.5)
RBC # FLD: 18.9 % — HIGH (ref 10.3–14.5)
RBC # FLD: 19 % — HIGH (ref 10.3–14.5)
RBC # FLD: 19.1 % — HIGH (ref 10.3–14.5)
RBC # FLD: 19.1 % — HIGH (ref 10.3–14.5)
RBC # FLD: 19.4 % — HIGH (ref 10.3–14.5)
RBC # FLD: 19.6 % — HIGH (ref 10.3–14.5)
RBC # FLD: 19.9 % — HIGH (ref 10.3–14.5)
RBC # FLD: 19.9 % — HIGH (ref 10.3–14.5)
RBC # FLD: 20.2 % — HIGH (ref 10.3–14.5)
RBC # FLD: 20.5 % — HIGH (ref 10.3–14.5)
RBC # FLD: 20.5 % — HIGH (ref 10.3–14.5)
RBC # FLD: 21 % — HIGH (ref 10.3–14.5)
RBC # FLD: 21.1 % — HIGH (ref 10.3–14.5)
RBC BLD AUTO: ABNORMAL
RBC CASTS # UR COMP ASSIST: 2 /HPF — SIGNIFICANT CHANGE UP (ref 0–4)
RBC CASTS # UR COMP ASSIST: 29 /HPF — HIGH (ref 0–4)
RH IG SCN BLD-IMP: POSITIVE — SIGNIFICANT CHANGE UP
RSV RNA SPEC QL NAA+PROBE: SIGNIFICANT CHANGE UP
RV+EV RNA SPEC QL NAA+PROBE: SIGNIFICANT CHANGE UP
S AUREUS DNA NOSE QL NAA+PROBE: SIGNIFICANT CHANGE UP
S AUREUS DNA NOSE QL NAA+PROBE: SIGNIFICANT CHANGE UP
SAO2 % BLDV: 14.1 % — LOW (ref 67–88)
SAO2 % BLDV: 23.1 % — SIGNIFICANT CHANGE UP
SAO2 % BLDV: 83.1 % — SIGNIFICANT CHANGE UP (ref 67–88)
SARS-COV-2 N GENE NPH QL NAA+PROBE: NOT DETECTED
SARS-COV-2 RNA SPEC QL NAA+PROBE: SIGNIFICANT CHANGE UP
SCHISTOCYTES BLD QL AUTO: SLIGHT — SIGNIFICANT CHANGE UP
SODIUM SERPL-SCNC: 135 MMOL/L — SIGNIFICANT CHANGE UP (ref 135–145)
SODIUM SERPL-SCNC: 135 MMOL/L — SIGNIFICANT CHANGE UP (ref 135–145)
SODIUM SERPL-SCNC: 136 MMOL/L — SIGNIFICANT CHANGE UP (ref 135–145)
SODIUM SERPL-SCNC: 136 MMOL/L — SIGNIFICANT CHANGE UP (ref 135–145)
SODIUM SERPL-SCNC: 137 MMOL/L — SIGNIFICANT CHANGE UP (ref 135–145)
SODIUM SERPL-SCNC: 138 MMOL/L — SIGNIFICANT CHANGE UP (ref 135–145)
SODIUM SERPL-SCNC: 139 MMOL/L — SIGNIFICANT CHANGE UP (ref 135–145)
SODIUM SERPL-SCNC: 139 MMOL/L — SIGNIFICANT CHANGE UP (ref 135–145)
SODIUM SERPL-SCNC: 140 MMOL/L — SIGNIFICANT CHANGE UP (ref 135–145)
SODIUM SERPL-SCNC: 141 MMOL/L — SIGNIFICANT CHANGE UP (ref 135–145)
SODIUM SERPL-SCNC: 142 MMOL/L — SIGNIFICANT CHANGE UP (ref 135–145)
SODIUM SERPL-SCNC: 143 MMOL/L — SIGNIFICANT CHANGE UP (ref 135–145)
SODIUM UR-SCNC: <20 MMOL/L — SIGNIFICANT CHANGE UP
SP GR SPEC: 1.01 — SIGNIFICANT CHANGE UP (ref 1.01–1.02)
SP GR SPEC: 1.02 — SIGNIFICANT CHANGE UP (ref 1.01–1.05)
SPECIMEN SOURCE: SIGNIFICANT CHANGE UP
TIBC SERPL-MCNC: 168 UG/DL — LOW (ref 220–430)
TIBC SERPL-MCNC: 238 UG/DL — SIGNIFICANT CHANGE UP (ref 220–430)
TOXIC GRANULES BLD QL SMEAR: PRESENT — SIGNIFICANT CHANGE UP
TOXIC GRANULES BLD QL SMEAR: PRESENT — SIGNIFICANT CHANGE UP
TROPONIN T, HIGH SENSITIVITY RESULT: 21 NG/L — SIGNIFICANT CHANGE UP
TROPONIN T, HIGH SENSITIVITY RESULT: 25 NG/L — SIGNIFICANT CHANGE UP
TROPONIN T, HIGH SENSITIVITY RESULT: 25 NG/L — SIGNIFICANT CHANGE UP
TSH SERPL-MCNC: 1.72 UIU/ML — SIGNIFICANT CHANGE UP (ref 0.27–4.2)
TSH SERPL-MCNC: 2.14 UIU/ML — SIGNIFICANT CHANGE UP (ref 0.27–4.2)
UIBC SERPL-MCNC: 148 UG/DL — SIGNIFICANT CHANGE UP (ref 110–370)
UIBC SERPL-MCNC: 224 UG/DL — SIGNIFICANT CHANGE UP (ref 110–370)
URATE SERPL-MCNC: 5.8 MG/DL — SIGNIFICANT CHANGE UP (ref 2.5–7)
UROBILINOGEN FLD QL: NEGATIVE — SIGNIFICANT CHANGE UP
UROBILINOGEN FLD QL: SIGNIFICANT CHANGE UP
VARIANT LYMPHS # BLD: 0.9 % — SIGNIFICANT CHANGE UP (ref 0–6)
VARIANT LYMPHS # BLD: 0.9 % — SIGNIFICANT CHANGE UP (ref 0–6)
WBC # BLD: 10.52 K/UL — HIGH (ref 3.8–10.5)
WBC # BLD: 10.69 K/UL — HIGH (ref 3.8–10.5)
WBC # BLD: 11.18 K/UL — HIGH (ref 3.8–10.5)
WBC # BLD: 15.05 K/UL — HIGH (ref 3.8–10.5)
WBC # BLD: 2.59 K/UL — LOW (ref 3.8–10.5)
WBC # BLD: 2.74 K/UL — LOW (ref 3.8–10.5)
WBC # BLD: 23.85 K/UL — HIGH (ref 3.8–10.5)
WBC # BLD: 3.41 K/UL — LOW (ref 3.8–10.5)
WBC # BLD: 3.44 K/UL — LOW (ref 3.8–10.5)
WBC # BLD: 3.71 K/UL — LOW (ref 3.8–10.5)
WBC # BLD: 3.73 K/UL — LOW (ref 3.8–10.5)
WBC # BLD: 3.94 K/UL — SIGNIFICANT CHANGE UP (ref 3.8–10.5)
WBC # BLD: 31.34 K/UL — HIGH (ref 3.8–10.5)
WBC # BLD: 5.28 K/UL — SIGNIFICANT CHANGE UP (ref 3.8–10.5)
WBC # BLD: 5.42 K/UL — SIGNIFICANT CHANGE UP (ref 3.8–10.5)
WBC # BLD: 6.27 K/UL — SIGNIFICANT CHANGE UP (ref 3.8–10.5)
WBC # BLD: 7.02 K/UL — SIGNIFICANT CHANGE UP (ref 3.8–10.5)
WBC # BLD: 8.05 K/UL — SIGNIFICANT CHANGE UP (ref 3.8–10.5)
WBC # BLD: 9.25 K/UL — SIGNIFICANT CHANGE UP (ref 3.8–10.5)
WBC # BLD: 9.4 K/UL — SIGNIFICANT CHANGE UP (ref 3.8–10.5)
WBC # BLD: 9.67 K/UL — SIGNIFICANT CHANGE UP (ref 3.8–10.5)
WBC # FLD AUTO: 10.52 K/UL — HIGH (ref 3.8–10.5)
WBC # FLD AUTO: 10.69 K/UL — HIGH (ref 3.8–10.5)
WBC # FLD AUTO: 11.18 K/UL — HIGH (ref 3.8–10.5)
WBC # FLD AUTO: 15.05 K/UL — HIGH (ref 3.8–10.5)
WBC # FLD AUTO: 2.59 K/UL — LOW (ref 3.8–10.5)
WBC # FLD AUTO: 2.74 K/UL — LOW (ref 3.8–10.5)
WBC # FLD AUTO: 23.85 K/UL — HIGH (ref 3.8–10.5)
WBC # FLD AUTO: 3.41 K/UL — LOW (ref 3.8–10.5)
WBC # FLD AUTO: 3.44 K/UL — LOW (ref 3.8–10.5)
WBC # FLD AUTO: 3.71 K/UL — LOW (ref 3.8–10.5)
WBC # FLD AUTO: 3.73 K/UL — LOW (ref 3.8–10.5)
WBC # FLD AUTO: 3.94 K/UL — SIGNIFICANT CHANGE UP (ref 3.8–10.5)
WBC # FLD AUTO: 31.34 K/UL — HIGH (ref 3.8–10.5)
WBC # FLD AUTO: 5.28 K/UL — SIGNIFICANT CHANGE UP (ref 3.8–10.5)
WBC # FLD AUTO: 5.42 K/UL — SIGNIFICANT CHANGE UP (ref 3.8–10.5)
WBC # FLD AUTO: 6.27 K/UL — SIGNIFICANT CHANGE UP (ref 3.8–10.5)
WBC # FLD AUTO: 7.02 K/UL — SIGNIFICANT CHANGE UP (ref 3.8–10.5)
WBC # FLD AUTO: 8.05 K/UL — SIGNIFICANT CHANGE UP (ref 3.8–10.5)
WBC # FLD AUTO: 9.25 K/UL — SIGNIFICANT CHANGE UP (ref 3.8–10.5)
WBC # FLD AUTO: 9.4 K/UL — SIGNIFICANT CHANGE UP (ref 3.8–10.5)
WBC # FLD AUTO: 9.67 K/UL — SIGNIFICANT CHANGE UP (ref 3.8–10.5)
WBC UR QL: 3 /HPF — SIGNIFICANT CHANGE UP (ref 0–5)
WBC UR QL: 55 /HPF — HIGH (ref 0–5)

## 2023-01-01 PROCEDURE — 99233 SBSQ HOSP IP/OBS HIGH 50: CPT

## 2023-01-01 PROCEDURE — 74177 CT ABD & PELVIS W/CONTRAST: CPT | Mod: MA

## 2023-01-01 PROCEDURE — 82803 BLOOD GASES ANY COMBINATION: CPT

## 2023-01-01 PROCEDURE — 36415 COLL VENOUS BLD VENIPUNCTURE: CPT

## 2023-01-01 PROCEDURE — 85730 THROMBOPLASTIN TIME PARTIAL: CPT

## 2023-01-01 PROCEDURE — 82330 ASSAY OF CALCIUM: CPT

## 2023-01-01 PROCEDURE — 81001 URINALYSIS AUTO W/SCOPE: CPT

## 2023-01-01 PROCEDURE — 12345: CPT | Mod: NC

## 2023-01-01 PROCEDURE — 93970 EXTREMITY STUDY: CPT | Mod: 26

## 2023-01-01 PROCEDURE — 99232 SBSQ HOSP IP/OBS MODERATE 35: CPT

## 2023-01-01 PROCEDURE — 96374 THER/PROPH/DIAG INJ IV PUSH: CPT

## 2023-01-01 PROCEDURE — 99214 OFFICE O/P EST MOD 30 MIN: CPT

## 2023-01-01 PROCEDURE — 85027 COMPLETE CBC AUTOMATED: CPT

## 2023-01-01 PROCEDURE — 93005 ELECTROCARDIOGRAM TRACING: CPT

## 2023-01-01 PROCEDURE — 87077 CULTURE AEROBIC IDENTIFY: CPT

## 2023-01-01 PROCEDURE — 82435 ASSAY OF BLOOD CHLORIDE: CPT

## 2023-01-01 PROCEDURE — 82962 GLUCOSE BLOOD TEST: CPT

## 2023-01-01 PROCEDURE — 71260 CT THORAX DX C+: CPT

## 2023-01-01 PROCEDURE — 93306 TTE W/DOPPLER COMPLETE: CPT | Mod: 26

## 2023-01-01 PROCEDURE — C8929: CPT

## 2023-01-01 PROCEDURE — 87641 MR-STAPH DNA AMP PROBE: CPT

## 2023-01-01 PROCEDURE — 99223 1ST HOSP IP/OBS HIGH 75: CPT

## 2023-01-01 PROCEDURE — 99222 1ST HOSP IP/OBS MODERATE 55: CPT | Mod: GC

## 2023-01-01 PROCEDURE — 86850 RBC ANTIBODY SCREEN: CPT

## 2023-01-01 PROCEDURE — 86077 PHYS BLOOD BANK SERV XMATCH: CPT

## 2023-01-01 PROCEDURE — 71275 CT ANGIOGRAPHY CHEST: CPT | Mod: 26,QQ

## 2023-01-01 PROCEDURE — 99204 OFFICE O/P NEW MOD 45 MIN: CPT

## 2023-01-01 PROCEDURE — 74018 RADEX ABDOMEN 1 VIEW: CPT | Mod: 26,77

## 2023-01-01 PROCEDURE — 99213 OFFICE O/P EST LOW 20 MIN: CPT

## 2023-01-01 PROCEDURE — 74018 RADEX ABDOMEN 1 VIEW: CPT | Mod: 26

## 2023-01-01 PROCEDURE — 71045 X-RAY EXAM CHEST 1 VIEW: CPT | Mod: 26

## 2023-01-01 PROCEDURE — 84295 ASSAY OF SERUM SODIUM: CPT

## 2023-01-01 PROCEDURE — 99233 SBSQ HOSP IP/OBS HIGH 50: CPT | Mod: GC

## 2023-01-01 PROCEDURE — 84145 PROCALCITONIN (PCT): CPT

## 2023-01-01 PROCEDURE — 86900 BLOOD TYPING SEROLOGIC ABO: CPT

## 2023-01-01 PROCEDURE — 86923 COMPATIBILITY TEST ELECTRIC: CPT

## 2023-01-01 PROCEDURE — 85018 HEMOGLOBIN: CPT

## 2023-01-01 PROCEDURE — 86870 RBC ANTIBODY IDENTIFICATION: CPT

## 2023-01-01 PROCEDURE — 83605 ASSAY OF LACTIC ACID: CPT

## 2023-01-01 PROCEDURE — 85610 PROTHROMBIN TIME: CPT

## 2023-01-01 PROCEDURE — 99214 OFFICE O/P EST MOD 30 MIN: CPT | Mod: 25

## 2023-01-01 PROCEDURE — 87086 URINE CULTURE/COLONY COUNT: CPT

## 2023-01-01 PROCEDURE — 87640 STAPH A DNA AMP PROBE: CPT

## 2023-01-01 PROCEDURE — 83615 LACTATE (LD) (LDH) ENZYME: CPT

## 2023-01-01 PROCEDURE — 86905 BLOOD TYPING RBC ANTIGENS: CPT

## 2023-01-01 PROCEDURE — 74018 RADEX ABDOMEN 1 VIEW: CPT

## 2023-01-01 PROCEDURE — 85014 HEMATOCRIT: CPT

## 2023-01-01 PROCEDURE — 74019 RADEX ABDOMEN 2 VIEWS: CPT | Mod: 26

## 2023-01-01 PROCEDURE — 74177 CT ABD & PELVIS W/CONTRAST: CPT

## 2023-01-01 PROCEDURE — 74177 CT ABD & PELVIS W/CONTRAST: CPT | Mod: 26,QQ

## 2023-01-01 PROCEDURE — 99285 EMERGENCY DEPT VISIT HI MDM: CPT

## 2023-01-01 PROCEDURE — 93010 ELECTROCARDIOGRAM REPORT: CPT

## 2023-01-01 PROCEDURE — 99232 SBSQ HOSP IP/OBS MODERATE 35: CPT | Mod: GC

## 2023-01-01 PROCEDURE — 99222 1ST HOSP IP/OBS MODERATE 55: CPT

## 2023-01-01 PROCEDURE — 82140 ASSAY OF AMMONIA: CPT

## 2023-01-01 PROCEDURE — 74019 RADEX ABDOMEN 2 VIEWS: CPT

## 2023-01-01 PROCEDURE — 71045 X-RAY EXAM CHEST 1 VIEW: CPT

## 2023-01-01 PROCEDURE — 87186 SC STD MICRODIL/AGAR DIL: CPT

## 2023-01-01 PROCEDURE — 84100 ASSAY OF PHOSPHORUS: CPT

## 2023-01-01 PROCEDURE — 99215 OFFICE O/P EST HI 40 MIN: CPT

## 2023-01-01 PROCEDURE — 83735 ASSAY OF MAGNESIUM: CPT

## 2023-01-01 PROCEDURE — 85025 COMPLETE CBC W/AUTO DIFF WBC: CPT

## 2023-01-01 PROCEDURE — 80048 BASIC METABOLIC PNL TOTAL CA: CPT

## 2023-01-01 PROCEDURE — 83690 ASSAY OF LIPASE: CPT

## 2023-01-01 PROCEDURE — 86922 COMPATIBILITY TEST ANTIGLOB: CPT

## 2023-01-01 PROCEDURE — 99285 EMERGENCY DEPT VISIT HI MDM: CPT | Mod: GC

## 2023-01-01 PROCEDURE — 84550 ASSAY OF BLOOD/URIC ACID: CPT

## 2023-01-01 PROCEDURE — 93970 EXTREMITY STUDY: CPT

## 2023-01-01 PROCEDURE — 51700 IRRIGATION OF BLADDER: CPT

## 2023-01-01 PROCEDURE — 84132 ASSAY OF SERUM POTASSIUM: CPT

## 2023-01-01 PROCEDURE — 86901 BLOOD TYPING SEROLOGIC RH(D): CPT

## 2023-01-01 PROCEDURE — 80053 COMPREHEN METABOLIC PANEL: CPT

## 2023-01-01 PROCEDURE — 74177 CT ABD & PELVIS W/CONTRAST: CPT | Mod: 26

## 2023-01-01 PROCEDURE — 82947 ASSAY GLUCOSE BLOOD QUANT: CPT

## 2023-01-01 PROCEDURE — 71046 X-RAY EXAM CHEST 2 VIEWS: CPT | Mod: 26

## 2023-01-01 PROCEDURE — 99215 OFFICE O/P EST HI 40 MIN: CPT | Mod: 95

## 2023-01-01 PROCEDURE — 99498 ADVNCD CARE PLAN ADDL 30 MIN: CPT | Mod: 25

## 2023-01-01 PROCEDURE — P9047: CPT

## 2023-01-01 PROCEDURE — 86880 COOMBS TEST DIRECT: CPT

## 2023-01-01 PROCEDURE — 87040 BLOOD CULTURE FOR BACTERIA: CPT

## 2023-01-01 PROCEDURE — 99239 HOSP IP/OBS DSCHRG MGMT >30: CPT

## 2023-01-01 PROCEDURE — 82272 OCCULT BLD FECES 1-3 TESTS: CPT

## 2023-01-01 PROCEDURE — 82565 ASSAY OF CREATININE: CPT

## 2023-01-01 PROCEDURE — 99497 ADVNCD CARE PLAN 30 MIN: CPT | Mod: 25

## 2023-01-01 PROCEDURE — 87150 DNA/RNA AMPLIFIED PROBE: CPT

## 2023-01-01 PROCEDURE — 74177 CT ABD & PELVIS W/CONTRAST: CPT | Mod: 26,MA

## 2023-01-01 RX ORDER — GLUCAGON INJECTION, SOLUTION 0.5 MG/.1ML
1 INJECTION, SOLUTION SUBCUTANEOUS ONCE
Refills: 0 | Status: DISCONTINUED | OUTPATIENT
Start: 2023-01-01 | End: 2023-01-01

## 2023-01-01 RX ORDER — DEXTROSE 50 % IN WATER 50 %
15 SYRINGE (ML) INTRAVENOUS ONCE
Refills: 0 | Status: DISCONTINUED | OUTPATIENT
Start: 2023-01-01 | End: 2023-01-01

## 2023-01-01 RX ORDER — ONDANSETRON 8 MG/1
4 TABLET, FILM COATED ORAL ONCE
Refills: 0 | Status: COMPLETED | OUTPATIENT
Start: 2023-01-01 | End: 2023-01-01

## 2023-01-01 RX ORDER — LANOLIN ALCOHOL/MO/W.PET/CERES
3 CREAM (GRAM) TOPICAL AT BEDTIME
Refills: 0 | Status: DISCONTINUED | OUTPATIENT
Start: 2023-01-01 | End: 2023-01-01

## 2023-01-01 RX ORDER — MORPHINE SULFATE 50 MG/1
6 CAPSULE, EXTENDED RELEASE ORAL
Refills: 0 | Status: DISCONTINUED | OUTPATIENT
Start: 2023-01-01 | End: 2023-01-01

## 2023-01-01 RX ORDER — PANTOPRAZOLE SODIUM 20 MG/1
40 TABLET, DELAYED RELEASE ORAL
Refills: 0 | Status: DISCONTINUED | OUTPATIENT
Start: 2023-01-01 | End: 2023-01-01

## 2023-01-01 RX ORDER — SODIUM CHLORIDE 9 MG/ML
500 INJECTION INTRAMUSCULAR; INTRAVENOUS; SUBCUTANEOUS ONCE
Refills: 0 | Status: COMPLETED | OUTPATIENT
Start: 2023-01-01 | End: 2023-01-01

## 2023-01-01 RX ORDER — MAGNESIUM SULFATE 500 MG/ML
2 VIAL (ML) INJECTION ONCE
Refills: 0 | Status: COMPLETED | OUTPATIENT
Start: 2023-01-01 | End: 2023-01-01

## 2023-01-01 RX ORDER — SODIUM CHLORIDE 9 MG/ML
1000 INJECTION, SOLUTION INTRAVENOUS ONCE
Refills: 0 | Status: DISCONTINUED | OUTPATIENT
Start: 2023-01-01 | End: 2023-01-01

## 2023-01-01 RX ORDER — DEXTROSE 50 % IN WATER 50 %
50 SYRINGE (ML) INTRAVENOUS ONCE
Refills: 0 | Status: COMPLETED | OUTPATIENT
Start: 2023-01-01 | End: 2023-01-01

## 2023-01-01 RX ORDER — LACTOBACILLUS ACIDOPHILUS 100MM CELL
1 CAPSULE ORAL
Qty: 0 | Refills: 0 | DISCHARGE
Start: 2023-01-01

## 2023-01-01 RX ORDER — CHLORHEXIDINE GLUCONATE 213 G/1000ML
1 SOLUTION TOPICAL DAILY
Refills: 0 | Status: DISCONTINUED | OUTPATIENT
Start: 2023-01-01 | End: 2023-01-01

## 2023-01-01 RX ORDER — CEFEPIME 1 G/1
INJECTION, POWDER, FOR SOLUTION INTRAMUSCULAR; INTRAVENOUS
Refills: 0 | Status: DISCONTINUED | OUTPATIENT
Start: 2023-01-01 | End: 2023-01-01

## 2023-01-01 RX ORDER — MORPHINE SULFATE 50 MG/1
1 CAPSULE, EXTENDED RELEASE ORAL
Refills: 0 | Status: DISCONTINUED | OUTPATIENT
Start: 2023-01-01 | End: 2023-01-01

## 2023-01-01 RX ORDER — MEROPENEM 1 G/30ML
INJECTION INTRAVENOUS
Refills: 0 | Status: DISCONTINUED | OUTPATIENT
Start: 2023-01-01 | End: 2023-01-01

## 2023-01-01 RX ORDER — POTASSIUM CHLORIDE 20 MEQ
10 PACKET (EA) ORAL
Refills: 0 | Status: COMPLETED | OUTPATIENT
Start: 2023-01-01 | End: 2023-01-01

## 2023-01-01 RX ORDER — SODIUM CHLORIDE 9 MG/ML
1000 INJECTION, SOLUTION INTRAVENOUS
Refills: 0 | Status: DISCONTINUED | OUTPATIENT
Start: 2023-01-01 | End: 2023-01-01

## 2023-01-01 RX ORDER — ACETAMINOPHEN 500 MG
2 TABLET ORAL
Qty: 0 | Refills: 0 | DISCHARGE
Start: 2023-01-01

## 2023-01-01 RX ORDER — HEPARIN SODIUM 5000 [USP'U]/ML
5000 INJECTION INTRAVENOUS; SUBCUTANEOUS EVERY 8 HOURS
Refills: 0 | Status: DISCONTINUED | OUTPATIENT
Start: 2023-01-01 | End: 2023-01-01

## 2023-01-01 RX ORDER — MORPHINE SULFATE 50 MG/1
1 CAPSULE, EXTENDED RELEASE ORAL
Qty: 100 | Refills: 0 | Status: DISCONTINUED | OUTPATIENT
Start: 2023-01-01 | End: 2023-01-01

## 2023-01-01 RX ORDER — LEVOTHYROXINE SODIUM 125 MCG
75 TABLET ORAL DAILY
Refills: 0 | Status: DISCONTINUED | OUTPATIENT
Start: 2023-01-01 | End: 2023-01-01

## 2023-01-01 RX ORDER — POTASSIUM CHLORIDE 20 MEQ
40 PACKET (EA) ORAL ONCE
Refills: 0 | Status: COMPLETED | OUTPATIENT
Start: 2023-01-01 | End: 2023-01-01

## 2023-01-01 RX ORDER — ROBINUL 0.2 MG/ML
0.4 INJECTION INTRAMUSCULAR; INTRAVENOUS EVERY 6 HOURS
Refills: 0 | Status: DISCONTINUED | OUTPATIENT
Start: 2023-01-01 | End: 2023-01-01

## 2023-01-01 RX ORDER — DEXTROSE 50 % IN WATER 50 %
12.5 SYRINGE (ML) INTRAVENOUS ONCE
Refills: 0 | Status: DISCONTINUED | OUTPATIENT
Start: 2023-01-01 | End: 2023-01-01

## 2023-01-01 RX ORDER — VANCOMYCIN HCL 1 G
1000 VIAL (EA) INTRAVENOUS ONCE
Refills: 0 | Status: COMPLETED | OUTPATIENT
Start: 2023-01-01 | End: 2023-01-01

## 2023-01-01 RX ORDER — DEXAMETHASONE 0.5 MG/5ML
4 ELIXIR ORAL
Refills: 0 | Status: DISCONTINUED | OUTPATIENT
Start: 2023-01-01 | End: 2023-01-01

## 2023-01-01 RX ORDER — ACETAMINOPHEN 500 MG
1000 TABLET ORAL ONCE
Refills: 0 | Status: COMPLETED | OUTPATIENT
Start: 2023-01-01 | End: 2023-01-01

## 2023-01-01 RX ORDER — SODIUM CHLORIDE 9 MG/ML
500 INJECTION INTRAMUSCULAR; INTRAVENOUS; SUBCUTANEOUS ONCE
Refills: 0 | Status: DISCONTINUED | OUTPATIENT
Start: 2023-01-01 | End: 2023-01-01

## 2023-01-01 RX ORDER — ENOXAPARIN SODIUM 100 MG/ML
40 INJECTION SUBCUTANEOUS EVERY 24 HOURS
Refills: 0 | Status: DISCONTINUED | OUTPATIENT
Start: 2023-01-01 | End: 2023-01-01

## 2023-01-01 RX ORDER — SIMVASTATIN 20 MG/1
10 TABLET, FILM COATED ORAL AT BEDTIME
Refills: 0 | Status: DISCONTINUED | OUTPATIENT
Start: 2023-01-01 | End: 2023-01-01

## 2023-01-01 RX ORDER — ACYCLOVIR 50 MG/G
1 OINTMENT TOPICAL
Refills: 0 | Status: DISCONTINUED | OUTPATIENT
Start: 2023-01-01 | End: 2023-01-01

## 2023-01-01 RX ORDER — SODIUM CHLORIDE 9 MG/ML
1000 INJECTION INTRAMUSCULAR; INTRAVENOUS; SUBCUTANEOUS
Refills: 0 | Status: DISCONTINUED | OUTPATIENT
Start: 2023-01-01 | End: 2023-01-01

## 2023-01-01 RX ORDER — MORPHINE SULFATE 50 MG/1
2 CAPSULE, EXTENDED RELEASE ORAL
Refills: 0 | Status: DISCONTINUED | OUTPATIENT
Start: 2023-01-01 | End: 2023-01-01

## 2023-01-01 RX ORDER — MORPHINE SULFATE 50 MG/1
4 CAPSULE, EXTENDED RELEASE ORAL
Refills: 0 | Status: DISCONTINUED | OUTPATIENT
Start: 2023-01-01 | End: 2023-01-01

## 2023-01-01 RX ORDER — LEVOTHYROXINE SODIUM 125 MCG
37.5 TABLET ORAL
Refills: 0 | Status: DISCONTINUED | OUTPATIENT
Start: 2023-01-01 | End: 2023-01-01

## 2023-01-01 RX ORDER — CEFEPIME 1 G/1
2000 INJECTION, POWDER, FOR SOLUTION INTRAMUSCULAR; INTRAVENOUS EVERY 12 HOURS
Refills: 0 | Status: DISCONTINUED | OUTPATIENT
Start: 2023-01-01 | End: 2023-01-01

## 2023-01-01 RX ORDER — METHADONE HYDROCHLORIDE 5 MG/1
5 TABLET ORAL AT BEDTIME
Qty: 30 | Refills: 0 | Status: ACTIVE | COMMUNITY
Start: 2023-01-01 | End: 1900-01-01

## 2023-01-01 RX ORDER — METHADONE HYDROCHLORIDE 40 MG/1
1 TABLET ORAL
Refills: 0 | DISCHARGE

## 2023-01-01 RX ORDER — ALBUMIN HUMAN 25 %
100 VIAL (ML) INTRAVENOUS ONCE
Refills: 0 | Status: COMPLETED | OUTPATIENT
Start: 2023-01-01 | End: 2023-01-01

## 2023-01-01 RX ORDER — DEXTROSE 50 % IN WATER 50 %
15 SYRINGE (ML) INTRAVENOUS ONCE
Refills: 0 | Status: COMPLETED | OUTPATIENT
Start: 2023-01-01 | End: 2023-01-01

## 2023-01-01 RX ORDER — SODIUM CHLORIDE 9 MG/ML
1000 INJECTION INTRAMUSCULAR; INTRAVENOUS; SUBCUTANEOUS ONCE
Refills: 0 | Status: COMPLETED | OUTPATIENT
Start: 2023-01-01 | End: 2023-01-01

## 2023-01-01 RX ORDER — OCTREOTIDE ACETATE 200 UG/ML
100 INJECTION, SOLUTION INTRAVENOUS; SUBCUTANEOUS EVERY 8 HOURS
Refills: 0 | Status: DISCONTINUED | OUTPATIENT
Start: 2023-01-01 | End: 2023-01-01

## 2023-01-01 RX ORDER — POTASSIUM PHOSPHATE, MONOBASIC POTASSIUM PHOSPHATE, DIBASIC 236; 224 MG/ML; MG/ML
15 INJECTION, SOLUTION INTRAVENOUS ONCE
Refills: 0 | Status: COMPLETED | OUTPATIENT
Start: 2023-01-01 | End: 2023-01-01

## 2023-01-01 RX ORDER — METHADONE HYDROCHLORIDE 40 MG/1
5 TABLET ORAL AT BEDTIME
Refills: 0 | Status: DISCONTINUED | OUTPATIENT
Start: 2023-01-01 | End: 2023-01-01

## 2023-01-01 RX ORDER — NOREPINEPHRINE BITARTRATE/D5W 8 MG/250ML
0.05 PLASTIC BAG, INJECTION (ML) INTRAVENOUS
Qty: 8 | Refills: 0 | Status: DISCONTINUED | OUTPATIENT
Start: 2023-01-01 | End: 2023-01-01

## 2023-01-01 RX ORDER — LANOLIN ALCOHOL/MO/W.PET/CERES
1 CREAM (GRAM) TOPICAL
Qty: 0 | Refills: 0 | DISCHARGE
Start: 2023-01-01

## 2023-01-01 RX ORDER — POTASSIUM CHLORIDE 1500 MG/1
20 TABLET, FILM COATED, EXTENDED RELEASE ORAL
Qty: 10 | Refills: 0 | Status: ACTIVE | COMMUNITY
Start: 2022-01-01 | End: 1900-01-01

## 2023-01-01 RX ORDER — VASOPRESSIN 20 [USP'U]/ML
0.04 INJECTION INTRAVENOUS
Qty: 40 | Refills: 0 | Status: DISCONTINUED | OUTPATIENT
Start: 2023-01-01 | End: 2023-01-01

## 2023-01-01 RX ORDER — PIPERACILLIN AND TAZOBACTAM 4; .5 G/20ML; G/20ML
3.38 INJECTION, POWDER, LYOPHILIZED, FOR SOLUTION INTRAVENOUS ONCE
Refills: 0 | Status: DISCONTINUED | OUTPATIENT
Start: 2023-01-01 | End: 2023-01-01

## 2023-01-01 RX ORDER — ONDANSETRON 8 MG/1
4 TABLET, FILM COATED ORAL EVERY 8 HOURS
Refills: 0 | Status: DISCONTINUED | OUTPATIENT
Start: 2023-01-01 | End: 2023-01-01

## 2023-01-01 RX ORDER — MORPHINE SULFATE 50 MG/1
3 CAPSULE, EXTENDED RELEASE ORAL
Refills: 0 | Status: DISCONTINUED | OUTPATIENT
Start: 2023-01-01 | End: 2023-01-01

## 2023-01-01 RX ORDER — SIMVASTATIN 20 MG/1
1 TABLET, FILM COATED ORAL
Qty: 0 | Refills: 0 | DISCHARGE

## 2023-01-01 RX ORDER — MAGNESIUM SULFATE 500 MG/ML
2 VIAL (ML) INJECTION
Refills: 0 | Status: COMPLETED | OUTPATIENT
Start: 2023-01-01 | End: 2023-01-01

## 2023-01-01 RX ORDER — DIPHENHYDRAMINE HYDROCHLORIDE AND LIDOCAINE HYDROCHLORIDE AND ALUMINUM HYDROXIDE AND MAGNESIUM HYDRO
KIT 3 TIMES DAILY
Qty: 1 | Refills: 0 | Status: ACTIVE | COMMUNITY
Start: 2023-01-01 | End: 1900-01-01

## 2023-01-01 RX ORDER — CEFEPIME 1 G/1
1000 INJECTION, POWDER, FOR SOLUTION INTRAMUSCULAR; INTRAVENOUS DAILY
Refills: 0 | Status: DISCONTINUED | OUTPATIENT
Start: 2023-01-01 | End: 2023-01-01

## 2023-01-01 RX ORDER — CEFTRIAXONE 500 MG/1
1000 INJECTION, POWDER, FOR SOLUTION INTRAMUSCULAR; INTRAVENOUS ONCE
Refills: 0 | Status: COMPLETED | OUTPATIENT
Start: 2023-01-01 | End: 2023-01-01

## 2023-01-01 RX ORDER — DEXTROSE 50 % IN WATER 50 %
50 SYRINGE (ML) INTRAVENOUS ONCE
Refills: 0 | Status: DISCONTINUED | OUTPATIENT
Start: 2023-01-01 | End: 2023-01-01

## 2023-01-01 RX ORDER — METHADONE HYDROCHLORIDE 40 MG/1
2.5 TABLET ORAL AT BEDTIME
Refills: 0 | Status: DISCONTINUED | OUTPATIENT
Start: 2023-01-01 | End: 2023-01-01

## 2023-01-01 RX ORDER — MORPHINE SULFATE 50 MG/1
3 CAPSULE, EXTENDED RELEASE ORAL ONCE
Refills: 0 | Status: DISCONTINUED | OUTPATIENT
Start: 2023-01-01 | End: 2023-01-01

## 2023-01-01 RX ORDER — HYDROCHLOROTHIAZIDE 25 MG
1 TABLET ORAL
Qty: 0 | Refills: 0 | DISCHARGE

## 2023-01-01 RX ORDER — SODIUM CHLORIDE 9 MG/ML
1000 INJECTION, SOLUTION INTRAVENOUS ONCE
Refills: 0 | Status: COMPLETED | OUTPATIENT
Start: 2023-01-01 | End: 2023-01-01

## 2023-01-01 RX ORDER — MEROPENEM 1 G/30ML
1000 INJECTION INTRAVENOUS ONCE
Refills: 0 | Status: COMPLETED | OUTPATIENT
Start: 2023-01-01 | End: 2023-01-01

## 2023-01-01 RX ORDER — DEXAMETHASONE 0.5 MG/5ML
4 ELIXIR ORAL EVERY 12 HOURS
Refills: 0 | Status: DISCONTINUED | OUTPATIENT
Start: 2023-01-01 | End: 2023-01-01

## 2023-01-01 RX ORDER — METOCLOPRAMIDE HCL 10 MG
10 TABLET ORAL EVERY 8 HOURS
Refills: 0 | Status: DISCONTINUED | OUTPATIENT
Start: 2023-01-01 | End: 2023-01-01

## 2023-01-01 RX ORDER — METRONIDAZOLE 500 MG
500 TABLET ORAL ONCE
Refills: 0 | Status: COMPLETED | OUTPATIENT
Start: 2023-01-01 | End: 2023-01-01

## 2023-01-01 RX ORDER — SODIUM CHLORIDE 9 MG/ML
500 INJECTION, SOLUTION INTRAVENOUS ONCE
Refills: 0 | Status: COMPLETED | OUTPATIENT
Start: 2023-01-01 | End: 2023-01-01

## 2023-01-01 RX ORDER — SODIUM CHLORIDE 9 MG/ML
1000 INJECTION, SOLUTION INTRAVENOUS
Refills: 0 | Status: COMPLETED | OUTPATIENT
Start: 2023-01-01 | End: 2023-01-01

## 2023-01-01 RX ORDER — CHLORHEXIDINE GLUCONATE 213 G/1000ML
1 SOLUTION TOPICAL
Refills: 0 | Status: DISCONTINUED | OUTPATIENT
Start: 2023-01-01 | End: 2023-01-01

## 2023-01-01 RX ORDER — ACETAMINOPHEN 500 MG
650 TABLET ORAL EVERY 6 HOURS
Refills: 0 | Status: DISCONTINUED | OUTPATIENT
Start: 2023-01-01 | End: 2023-01-01

## 2023-01-01 RX ORDER — MORPHINE SULFATE 50 MG/1
0.5 CAPSULE, EXTENDED RELEASE ORAL
Qty: 100 | Refills: 0 | Status: DISCONTINUED | OUTPATIENT
Start: 2023-01-01 | End: 2023-01-01

## 2023-01-01 RX ORDER — CEFEPIME 1 G/1
2000 INJECTION, POWDER, FOR SOLUTION INTRAMUSCULAR; INTRAVENOUS EVERY 24 HOURS
Refills: 0 | Status: DISCONTINUED | OUTPATIENT
Start: 2023-01-01 | End: 2023-01-01

## 2023-01-01 RX ORDER — NOREPINEPHRINE BITARTRATE/D5W 8 MG/250ML
0.05 PLASTIC BAG, INJECTION (ML) INTRAVENOUS
Qty: 32 | Refills: 0 | Status: DISCONTINUED | OUTPATIENT
Start: 2023-01-01 | End: 2023-01-01

## 2023-01-01 RX ORDER — MORPHINE SULFATE 50 MG/1
2 CAPSULE, EXTENDED RELEASE ORAL EVERY 6 HOURS
Refills: 0 | Status: DISCONTINUED | OUTPATIENT
Start: 2023-01-01 | End: 2023-01-01

## 2023-01-01 RX ORDER — ROBINUL 0.2 MG/ML
0.2 INJECTION INTRAMUSCULAR; INTRAVENOUS EVERY 6 HOURS
Refills: 0 | Status: DISCONTINUED | OUTPATIENT
Start: 2023-01-01 | End: 2023-01-01

## 2023-01-01 RX ORDER — POTASSIUM CHLORIDE 1500 MG/1
20 TABLET, FILM COATED, EXTENDED RELEASE ORAL
Qty: 2 | Refills: 0 | Status: COMPLETED | COMMUNITY
Start: 2022-01-01 | End: 2023-01-01

## 2023-01-01 RX ORDER — POTASSIUM CHLORIDE 20 MEQ
40 PACKET (EA) ORAL EVERY 4 HOURS
Refills: 0 | Status: COMPLETED | OUTPATIENT
Start: 2023-01-01 | End: 2023-01-01

## 2023-01-01 RX ORDER — POTASSIUM CHLORIDE 20 MEQ
20 PACKET (EA) ORAL ONCE
Refills: 0 | Status: COMPLETED | OUTPATIENT
Start: 2023-01-01 | End: 2023-01-01

## 2023-01-01 RX ORDER — POTASSIUM CHLORIDE 20 MEQ
20 PACKET (EA) ORAL
Refills: 0 | Status: COMPLETED | OUTPATIENT
Start: 2023-01-01 | End: 2023-01-01

## 2023-01-01 RX ORDER — HYDROCORTISONE 20 MG
100 TABLET ORAL ONCE
Refills: 0 | Status: COMPLETED | OUTPATIENT
Start: 2023-01-01 | End: 2023-01-01

## 2023-01-01 RX ORDER — POTASSIUM CHLORIDE 20 MEQ
40 PACKET (EA) ORAL EVERY 4 HOURS
Refills: 0 | Status: DISCONTINUED | OUTPATIENT
Start: 2023-01-01 | End: 2023-01-01

## 2023-01-01 RX ORDER — DEXTROSE 50 % IN WATER 50 %
25 SYRINGE (ML) INTRAVENOUS ONCE
Refills: 0 | Status: DISCONTINUED | OUTPATIENT
Start: 2023-01-01 | End: 2023-01-01

## 2023-01-01 RX ORDER — MORPHINE SULFATE 50 MG/1
1.5 CAPSULE, EXTENDED RELEASE ORAL
Refills: 0 | Status: DISCONTINUED | OUTPATIENT
Start: 2023-01-01 | End: 2023-01-01

## 2023-01-01 RX ORDER — MORPHINE SULFATE 50 MG/1
2 CAPSULE, EXTENDED RELEASE ORAL
Qty: 100 | Refills: 0 | Status: DISCONTINUED | OUTPATIENT
Start: 2023-01-01 | End: 2023-01-01

## 2023-01-01 RX ORDER — LACTOBACILLUS ACIDOPHILUS 100MM CELL
1 CAPSULE ORAL DAILY
Refills: 0 | Status: DISCONTINUED | OUTPATIENT
Start: 2023-01-01 | End: 2023-01-01

## 2023-01-01 RX ORDER — DENOSUMAB 60 MG/ML
60 INJECTION SUBCUTANEOUS
Qty: 1 | Refills: 0 | Status: DISCONTINUED | COMMUNITY
Start: 2019-09-04 | End: 2023-01-01

## 2023-01-01 RX ORDER — METRONIDAZOLE 500 MG
TABLET ORAL
Refills: 0 | Status: DISCONTINUED | OUTPATIENT
Start: 2023-01-01 | End: 2023-01-01

## 2023-01-01 RX ORDER — DEXTROSE 50 % IN WATER 50 %
500 SYRINGE (ML) INTRAVENOUS ONCE
Refills: 0 | Status: DISCONTINUED | OUTPATIENT
Start: 2023-01-01 | End: 2023-01-01

## 2023-01-01 RX ORDER — MEROPENEM 1 G/30ML
1000 INJECTION INTRAVENOUS EVERY 12 HOURS
Refills: 0 | Status: DISCONTINUED | OUTPATIENT
Start: 2023-01-01 | End: 2023-01-01

## 2023-01-01 RX ORDER — SODIUM CHLORIDE 9 MG/ML
500 INJECTION, SOLUTION INTRAVENOUS
Refills: 0 | Status: DISCONTINUED | OUTPATIENT
Start: 2023-01-01 | End: 2023-01-01

## 2023-01-01 RX ORDER — POTASSIUM CHLORIDE 20 MEQ
1 PACKET (EA) ORAL
Qty: 0 | Refills: 0 | DISCHARGE
Start: 2023-01-01

## 2023-01-01 RX ORDER — METRONIDAZOLE 500 MG
500 TABLET ORAL EVERY 8 HOURS
Refills: 0 | Status: DISCONTINUED | OUTPATIENT
Start: 2023-01-01 | End: 2023-01-01

## 2023-01-01 RX ORDER — CEFEPIME 1 G/1
2000 INJECTION, POWDER, FOR SOLUTION INTRAMUSCULAR; INTRAVENOUS ONCE
Refills: 0 | Status: COMPLETED | OUTPATIENT
Start: 2023-01-01 | End: 2023-01-01

## 2023-01-01 RX ORDER — METOCLOPRAMIDE 10 MG/1
10 TABLET ORAL
Qty: 30 | Refills: 3 | Status: ACTIVE | COMMUNITY
Start: 2023-01-01 | End: 1900-01-01

## 2023-01-01 RX ORDER — CEFTRIAXONE 500 MG/1
1000 INJECTION, POWDER, FOR SOLUTION INTRAMUSCULAR; INTRAVENOUS EVERY 24 HOURS
Refills: 0 | Status: DISCONTINUED | OUTPATIENT
Start: 2023-01-01 | End: 2023-01-01

## 2023-01-01 RX ADMIN — Medication 50 MILLILITER(S): at 02:51

## 2023-01-01 RX ADMIN — Medication 10 MILLIGRAM(S): at 05:40

## 2023-01-01 RX ADMIN — METHADONE HYDROCHLORIDE 2.5 MILLIGRAM(S): 40 TABLET ORAL at 22:12

## 2023-01-01 RX ADMIN — Medication 4.93 MICROGRAM(S)/KG/MIN: at 11:51

## 2023-01-01 RX ADMIN — Medication 250 MILLIGRAM(S): at 06:25

## 2023-01-01 RX ADMIN — Medication 100 MILLIEQUIVALENT(S): at 20:50

## 2023-01-01 RX ADMIN — Medication 40 MILLIEQUIVALENT(S): at 10:03

## 2023-01-01 RX ADMIN — SODIUM CHLORIDE 75 MILLILITER(S): 9 INJECTION INTRAMUSCULAR; INTRAVENOUS; SUBCUTANEOUS at 11:20

## 2023-01-01 RX ADMIN — Medication 4 MILLIGRAM(S): at 05:10

## 2023-01-01 RX ADMIN — HEPARIN SODIUM 5000 UNIT(S): 5000 INJECTION INTRAVENOUS; SUBCUTANEOUS at 21:30

## 2023-01-01 RX ADMIN — Medication 0.5 MILLIGRAM(S): at 07:43

## 2023-01-01 RX ADMIN — MORPHINE SULFATE 2 MILLIGRAM(S): 50 CAPSULE, EXTENDED RELEASE ORAL at 16:20

## 2023-01-01 RX ADMIN — Medication 10 MILLIGRAM(S): at 12:11

## 2023-01-01 RX ADMIN — MORPHINE SULFATE 2 MILLIGRAM(S): 50 CAPSULE, EXTENDED RELEASE ORAL at 23:26

## 2023-01-01 RX ADMIN — HEPARIN SODIUM 5000 UNIT(S): 5000 INJECTION INTRAVENOUS; SUBCUTANEOUS at 05:10

## 2023-01-01 RX ADMIN — Medication 37.5 MICROGRAM(S): at 21:27

## 2023-01-01 RX ADMIN — MORPHINE SULFATE 2 MILLIGRAM(S): 50 CAPSULE, EXTENDED RELEASE ORAL at 12:50

## 2023-01-01 RX ADMIN — Medication 37.5 MICROGRAM(S): at 20:38

## 2023-01-01 RX ADMIN — Medication 10 MILLIGRAM(S): at 21:14

## 2023-01-01 RX ADMIN — Medication 25 GRAM(S): at 12:10

## 2023-01-01 RX ADMIN — Medication 10 MILLIGRAM(S): at 05:10

## 2023-01-01 RX ADMIN — Medication 50 MILLILITER(S): at 00:45

## 2023-01-01 RX ADMIN — CEFTRIAXONE 100 MILLIGRAM(S): 500 INJECTION, POWDER, FOR SOLUTION INTRAMUSCULAR; INTRAVENOUS at 01:12

## 2023-01-01 RX ADMIN — MORPHINE SULFATE 1 MILLIGRAM(S): 50 CAPSULE, EXTENDED RELEASE ORAL at 18:54

## 2023-01-01 RX ADMIN — CEFTRIAXONE 100 MILLIGRAM(S): 500 INJECTION, POWDER, FOR SOLUTION INTRAMUSCULAR; INTRAVENOUS at 00:11

## 2023-01-01 RX ADMIN — Medication 1 MILLIGRAM(S): at 22:16

## 2023-01-01 RX ADMIN — MORPHINE SULFATE 2 MILLIGRAM(S): 50 CAPSULE, EXTENDED RELEASE ORAL at 05:57

## 2023-01-01 RX ADMIN — SODIUM CHLORIDE 100 MILLILITER(S): 9 INJECTION, SOLUTION INTRAVENOUS at 21:57

## 2023-01-01 RX ADMIN — Medication 1000 MILLIGRAM(S): at 04:30

## 2023-01-01 RX ADMIN — Medication 37.5 MICROGRAM(S): at 21:33

## 2023-01-01 RX ADMIN — SIMVASTATIN 10 MILLIGRAM(S): 20 TABLET, FILM COATED ORAL at 21:20

## 2023-01-01 RX ADMIN — POTASSIUM PHOSPHATE, MONOBASIC POTASSIUM PHOSPHATE, DIBASIC 62.5 MILLIMOLE(S): 236; 224 INJECTION, SOLUTION INTRAVENOUS at 13:03

## 2023-01-01 RX ADMIN — Medication 1 TABLET(S): at 12:46

## 2023-01-01 RX ADMIN — MORPHINE SULFATE 2 MILLIGRAM(S): 50 CAPSULE, EXTENDED RELEASE ORAL at 06:43

## 2023-01-01 RX ADMIN — HEPARIN SODIUM 5000 UNIT(S): 5000 INJECTION INTRAVENOUS; SUBCUTANEOUS at 21:20

## 2023-01-01 RX ADMIN — MORPHINE SULFATE 2 MILLIGRAM(S): 50 CAPSULE, EXTENDED RELEASE ORAL at 22:26

## 2023-01-01 RX ADMIN — Medication 4.93 MICROGRAM(S)/KG/MIN: at 19:59

## 2023-01-01 RX ADMIN — ENOXAPARIN SODIUM 40 MILLIGRAM(S): 100 INJECTION SUBCUTANEOUS at 13:23

## 2023-01-01 RX ADMIN — POTASSIUM PHOSPHATE, MONOBASIC POTASSIUM PHOSPHATE, DIBASIC 62.5 MILLIMOLE(S): 236; 224 INJECTION, SOLUTION INTRAVENOUS at 13:07

## 2023-01-01 RX ADMIN — MORPHINE SULFATE 2 MILLIGRAM(S): 50 CAPSULE, EXTENDED RELEASE ORAL at 10:10

## 2023-01-01 RX ADMIN — MORPHINE SULFATE 2 MILLIGRAM(S): 50 CAPSULE, EXTENDED RELEASE ORAL at 06:34

## 2023-01-01 RX ADMIN — Medication 0.5 MILLIGRAM(S): at 17:50

## 2023-01-01 RX ADMIN — Medication 0.5 MILLIGRAM(S): at 23:20

## 2023-01-01 RX ADMIN — Medication 100 MILLIEQUIVALENT(S): at 17:50

## 2023-01-01 RX ADMIN — MORPHINE SULFATE 2 MILLIGRAM(S): 50 CAPSULE, EXTENDED RELEASE ORAL at 10:26

## 2023-01-01 RX ADMIN — Medication 2.71 MICROGRAM(S)/KG/MIN: at 05:09

## 2023-01-01 RX ADMIN — Medication 100 MILLIEQUIVALENT(S): at 11:57

## 2023-01-01 RX ADMIN — HEPARIN SODIUM 5000 UNIT(S): 5000 INJECTION INTRAVENOUS; SUBCUTANEOUS at 06:14

## 2023-01-01 RX ADMIN — MORPHINE SULFATE 2 MILLIGRAM(S): 50 CAPSULE, EXTENDED RELEASE ORAL at 01:06

## 2023-01-01 RX ADMIN — CEFEPIME 100 MILLIGRAM(S): 1 INJECTION, POWDER, FOR SOLUTION INTRAMUSCULAR; INTRAVENOUS at 21:22

## 2023-01-01 RX ADMIN — Medication 4.93 MICROGRAM(S)/KG/MIN: at 21:56

## 2023-01-01 RX ADMIN — ENOXAPARIN SODIUM 40 MILLIGRAM(S): 100 INJECTION SUBCUTANEOUS at 14:47

## 2023-01-01 RX ADMIN — MORPHINE SULFATE 1 MILLIGRAM(S): 50 CAPSULE, EXTENDED RELEASE ORAL at 20:40

## 2023-01-01 RX ADMIN — Medication 100 MILLIEQUIVALENT(S): at 23:43

## 2023-01-01 RX ADMIN — MORPHINE SULFATE 2 MILLIGRAM(S): 50 CAPSULE, EXTENDED RELEASE ORAL at 21:15

## 2023-01-01 RX ADMIN — OCTREOTIDE ACETATE 100 MICROGRAM(S): 200 INJECTION, SOLUTION INTRAVENOUS; SUBCUTANEOUS at 21:58

## 2023-01-01 RX ADMIN — ACYCLOVIR 1 APPLICATION(S): 50 OINTMENT TOPICAL at 17:54

## 2023-01-01 RX ADMIN — Medication 100 MILLIEQUIVALENT(S): at 15:11

## 2023-01-01 RX ADMIN — Medication 100 MILLIEQUIVALENT(S): at 04:39

## 2023-01-01 RX ADMIN — Medication 10 MILLIGRAM(S): at 06:25

## 2023-01-01 RX ADMIN — Medication 15 GRAM(S): at 23:27

## 2023-01-01 RX ADMIN — ROBINUL 0.4 MILLIGRAM(S): 0.2 INJECTION INTRAMUSCULAR; INTRAVENOUS at 11:54

## 2023-01-01 RX ADMIN — MORPHINE SULFATE 2 MILLIGRAM(S): 50 CAPSULE, EXTENDED RELEASE ORAL at 17:48

## 2023-01-01 RX ADMIN — Medication 1 MILLIGRAM(S): at 14:55

## 2023-01-01 RX ADMIN — MORPHINE SULFATE 2 MILLIGRAM(S): 50 CAPSULE, EXTENDED RELEASE ORAL at 00:02

## 2023-01-01 RX ADMIN — Medication 4 MILLIGRAM(S): at 05:40

## 2023-01-01 RX ADMIN — Medication 4 MILLIGRAM(S): at 06:25

## 2023-01-01 RX ADMIN — Medication 75 MICROGRAM(S): at 08:47

## 2023-01-01 RX ADMIN — HEPARIN SODIUM 5000 UNIT(S): 5000 INJECTION INTRAVENOUS; SUBCUTANEOUS at 14:45

## 2023-01-01 RX ADMIN — SODIUM CHLORIDE 10 MILLILITER(S): 9 INJECTION INTRAMUSCULAR; INTRAVENOUS; SUBCUTANEOUS at 05:59

## 2023-01-01 RX ADMIN — Medication 100 MILLIGRAM(S): at 21:58

## 2023-01-01 RX ADMIN — Medication 25 GRAM(S): at 09:53

## 2023-01-01 RX ADMIN — CEFEPIME 100 MILLIGRAM(S): 1 INJECTION, POWDER, FOR SOLUTION INTRAMUSCULAR; INTRAVENOUS at 06:25

## 2023-01-01 RX ADMIN — Medication 100 MILLIEQUIVALENT(S): at 06:27

## 2023-01-01 RX ADMIN — MORPHINE SULFATE 2 MILLIGRAM(S): 50 CAPSULE, EXTENDED RELEASE ORAL at 04:30

## 2023-01-01 RX ADMIN — MORPHINE SULFATE 1 MILLIGRAM(S): 50 CAPSULE, EXTENDED RELEASE ORAL at 07:43

## 2023-01-01 RX ADMIN — MEROPENEM 100 MILLIGRAM(S): 1 INJECTION INTRAVENOUS at 03:14

## 2023-01-01 RX ADMIN — CHLORHEXIDINE GLUCONATE 1 APPLICATION(S): 213 SOLUTION TOPICAL at 05:10

## 2023-01-01 RX ADMIN — Medication 0.5 MILLIGRAM(S): at 15:52

## 2023-01-01 RX ADMIN — MORPHINE SULFATE 2 MILLIGRAM(S): 50 CAPSULE, EXTENDED RELEASE ORAL at 06:09

## 2023-01-01 RX ADMIN — Medication 0.5 MILLIGRAM(S): at 05:59

## 2023-01-01 RX ADMIN — HEPARIN SODIUM 5000 UNIT(S): 5000 INJECTION INTRAVENOUS; SUBCUTANEOUS at 05:13

## 2023-01-01 RX ADMIN — Medication 0.5 MILLIGRAM(S): at 13:19

## 2023-01-01 RX ADMIN — Medication 400 MILLIGRAM(S): at 04:01

## 2023-01-01 RX ADMIN — Medication 10 MILLIGRAM(S): at 22:30

## 2023-01-01 RX ADMIN — PANTOPRAZOLE SODIUM 40 MILLIGRAM(S): 20 TABLET, DELAYED RELEASE ORAL at 05:10

## 2023-01-01 RX ADMIN — ACYCLOVIR 1 APPLICATION(S): 50 OINTMENT TOPICAL at 06:00

## 2023-01-01 RX ADMIN — OCTREOTIDE ACETATE 100 MICROGRAM(S): 200 INJECTION, SOLUTION INTRAVENOUS; SUBCUTANEOUS at 13:23

## 2023-01-01 RX ADMIN — MORPHINE SULFATE 2 MILLIGRAM(S): 50 CAPSULE, EXTENDED RELEASE ORAL at 18:16

## 2023-01-01 RX ADMIN — MORPHINE SULFATE 2 MILLIGRAM(S): 50 CAPSULE, EXTENDED RELEASE ORAL at 23:45

## 2023-01-01 RX ADMIN — Medication 0.5 MILLIGRAM(S): at 10:09

## 2023-01-01 RX ADMIN — OCTREOTIDE ACETATE 100 MICROGRAM(S): 200 INJECTION, SOLUTION INTRAVENOUS; SUBCUTANEOUS at 21:27

## 2023-01-01 RX ADMIN — MORPHINE SULFATE 2 MILLIGRAM(S): 50 CAPSULE, EXTENDED RELEASE ORAL at 17:33

## 2023-01-01 RX ADMIN — SODIUM CHLORIDE 100 MILLILITER(S): 9 INJECTION, SOLUTION INTRAVENOUS at 23:55

## 2023-01-01 RX ADMIN — Medication 400 MILLIGRAM(S): at 21:42

## 2023-01-01 RX ADMIN — Medication 20 MILLIEQUIVALENT(S): at 12:46

## 2023-01-01 RX ADMIN — MORPHINE SULFATE 2 MILLIGRAM(S): 50 CAPSULE, EXTENDED RELEASE ORAL at 01:21

## 2023-01-01 RX ADMIN — PANTOPRAZOLE SODIUM 40 MILLIGRAM(S): 20 TABLET, DELAYED RELEASE ORAL at 17:27

## 2023-01-01 RX ADMIN — MORPHINE SULFATE 6 MILLIGRAM(S): 50 CAPSULE, EXTENDED RELEASE ORAL at 23:38

## 2023-01-01 RX ADMIN — Medication 100 MILLIGRAM(S): at 15:48

## 2023-01-01 RX ADMIN — Medication 20 MILLIEQUIVALENT(S): at 11:01

## 2023-01-01 RX ADMIN — Medication 25 GRAM(S): at 20:50

## 2023-01-01 RX ADMIN — Medication 0.5 MILLIGRAM(S): at 23:03

## 2023-01-01 RX ADMIN — Medication 75 MICROGRAM(S): at 11:44

## 2023-01-01 RX ADMIN — SODIUM CHLORIDE 100 MILLILITER(S): 9 INJECTION, SOLUTION INTRAVENOUS at 05:09

## 2023-01-01 RX ADMIN — MORPHINE SULFATE 2 MILLIGRAM(S): 50 CAPSULE, EXTENDED RELEASE ORAL at 23:11

## 2023-01-01 RX ADMIN — MORPHINE SULFATE 4 MILLIGRAM(S): 50 CAPSULE, EXTENDED RELEASE ORAL at 15:28

## 2023-01-01 RX ADMIN — MORPHINE SULFATE 2 MILLIGRAM(S): 50 CAPSULE, EXTENDED RELEASE ORAL at 21:38

## 2023-01-01 RX ADMIN — VASOPRESSIN 6 UNIT(S)/MIN: 20 INJECTION INTRAVENOUS at 10:39

## 2023-01-01 RX ADMIN — MORPHINE SULFATE 1 MILLIGRAM(S): 50 CAPSULE, EXTENDED RELEASE ORAL at 07:58

## 2023-01-01 RX ADMIN — Medication 0.5 MILLIGRAM(S): at 23:59

## 2023-01-01 RX ADMIN — Medication 25 GRAM(S): at 02:50

## 2023-01-01 RX ADMIN — SODIUM CHLORIDE 10 MILLILITER(S): 9 INJECTION INTRAMUSCULAR; INTRAVENOUS; SUBCUTANEOUS at 07:50

## 2023-01-01 RX ADMIN — Medication 4.93 MICROGRAM(S)/KG/MIN: at 19:00

## 2023-01-01 RX ADMIN — MORPHINE SULFATE 2 MILLIGRAM(S): 50 CAPSULE, EXTENDED RELEASE ORAL at 04:45

## 2023-01-01 RX ADMIN — Medication 10 MILLIGRAM(S): at 13:22

## 2023-01-01 RX ADMIN — SODIUM CHLORIDE 500 MILLILITER(S): 9 INJECTION, SOLUTION INTRAVENOUS at 19:59

## 2023-01-01 RX ADMIN — ENOXAPARIN SODIUM 40 MILLIGRAM(S): 100 INJECTION SUBCUTANEOUS at 16:09

## 2023-01-01 RX ADMIN — CHLORHEXIDINE GLUCONATE 1 APPLICATION(S): 213 SOLUTION TOPICAL at 06:00

## 2023-01-01 RX ADMIN — Medication 4 MILLIGRAM(S): at 17:21

## 2023-01-01 RX ADMIN — Medication 75 MICROGRAM(S): at 06:13

## 2023-01-01 RX ADMIN — MORPHINE SULFATE 2 MILLIGRAM(S): 50 CAPSULE, EXTENDED RELEASE ORAL at 11:54

## 2023-01-01 RX ADMIN — MORPHINE SULFATE 3 MILLIGRAM(S): 50 CAPSULE, EXTENDED RELEASE ORAL at 07:56

## 2023-01-01 RX ADMIN — CHLORHEXIDINE GLUCONATE 1 APPLICATION(S): 213 SOLUTION TOPICAL at 05:37

## 2023-01-01 RX ADMIN — MORPHINE SULFATE 2 MILLIGRAM(S): 50 CAPSULE, EXTENDED RELEASE ORAL at 06:12

## 2023-01-01 RX ADMIN — ENOXAPARIN SODIUM 40 MILLIGRAM(S): 100 INJECTION SUBCUTANEOUS at 12:10

## 2023-01-01 RX ADMIN — MORPHINE SULFATE 0.5 MG/HR: 50 CAPSULE, EXTENDED RELEASE ORAL at 07:49

## 2023-01-01 RX ADMIN — Medication 400 MILLIGRAM(S): at 11:34

## 2023-01-01 RX ADMIN — CHLORHEXIDINE GLUCONATE 1 APPLICATION(S): 213 SOLUTION TOPICAL at 15:21

## 2023-01-01 RX ADMIN — OCTREOTIDE ACETATE 100 MICROGRAM(S): 200 INJECTION, SOLUTION INTRAVENOUS; SUBCUTANEOUS at 22:30

## 2023-01-01 RX ADMIN — ROBINUL 0.4 MILLIGRAM(S): 0.2 INJECTION INTRAMUSCULAR; INTRAVENOUS at 23:22

## 2023-01-01 RX ADMIN — MORPHINE SULFATE 4 MILLIGRAM(S): 50 CAPSULE, EXTENDED RELEASE ORAL at 15:43

## 2023-01-01 RX ADMIN — MORPHINE SULFATE 2 MILLIGRAM(S): 50 CAPSULE, EXTENDED RELEASE ORAL at 06:19

## 2023-01-01 RX ADMIN — MORPHINE SULFATE 0.5 MG/HR: 50 CAPSULE, EXTENDED RELEASE ORAL at 19:26

## 2023-01-01 RX ADMIN — MORPHINE SULFATE 2 MILLIGRAM(S): 50 CAPSULE, EXTENDED RELEASE ORAL at 06:27

## 2023-01-01 RX ADMIN — Medication 100 MILLIEQUIVALENT(S): at 10:12

## 2023-01-01 RX ADMIN — Medication 100 MILLIEQUIVALENT(S): at 20:09

## 2023-01-01 RX ADMIN — Medication 25 GRAM(S): at 18:59

## 2023-01-01 RX ADMIN — MORPHINE SULFATE 2 MILLIGRAM(S): 50 CAPSULE, EXTENDED RELEASE ORAL at 10:40

## 2023-01-01 RX ADMIN — ONDANSETRON 4 MILLIGRAM(S): 8 TABLET, FILM COATED ORAL at 12:22

## 2023-01-01 RX ADMIN — Medication 10 MILLIGRAM(S): at 21:27

## 2023-01-01 RX ADMIN — SODIUM CHLORIDE 500 MILLILITER(S): 9 INJECTION, SOLUTION INTRAVENOUS at 09:56

## 2023-01-01 RX ADMIN — Medication 4.93 MICROGRAM(S)/KG/MIN: at 06:26

## 2023-01-01 RX ADMIN — Medication 100 MILLIEQUIVALENT(S): at 14:47

## 2023-01-01 RX ADMIN — Medication 0.5 MILLIGRAM(S): at 21:34

## 2023-01-01 RX ADMIN — MORPHINE SULFATE 2 MG/HR: 50 CAPSULE, EXTENDED RELEASE ORAL at 22:15

## 2023-01-01 RX ADMIN — Medication 4 MILLIGRAM(S): at 17:13

## 2023-01-01 RX ADMIN — Medication 20 MILLIEQUIVALENT(S): at 16:16

## 2023-01-01 RX ADMIN — OCTREOTIDE ACETATE 100 MICROGRAM(S): 200 INJECTION, SOLUTION INTRAVENOUS; SUBCUTANEOUS at 13:06

## 2023-01-01 RX ADMIN — SODIUM CHLORIDE 500 MILLILITER(S): 9 INJECTION INTRAMUSCULAR; INTRAVENOUS; SUBCUTANEOUS at 21:19

## 2023-01-01 RX ADMIN — MORPHINE SULFATE 1 MILLIGRAM(S): 50 CAPSULE, EXTENDED RELEASE ORAL at 20:25

## 2023-01-01 RX ADMIN — MORPHINE SULFATE 6 MILLIGRAM(S): 50 CAPSULE, EXTENDED RELEASE ORAL at 22:16

## 2023-01-01 RX ADMIN — MORPHINE SULFATE 2 MILLIGRAM(S): 50 CAPSULE, EXTENDED RELEASE ORAL at 23:28

## 2023-01-01 RX ADMIN — SODIUM CHLORIDE 100 MILLILITER(S): 9 INJECTION, SOLUTION INTRAVENOUS at 06:26

## 2023-01-01 RX ADMIN — MORPHINE SULFATE 2 MILLIGRAM(S): 50 CAPSULE, EXTENDED RELEASE ORAL at 13:05

## 2023-01-01 RX ADMIN — MORPHINE SULFATE 1 MG/HR: 50 CAPSULE, EXTENDED RELEASE ORAL at 11:03

## 2023-01-01 RX ADMIN — Medication 37.5 MICROGRAM(S): at 21:24

## 2023-01-01 RX ADMIN — Medication 100 MILLIGRAM(S): at 06:26

## 2023-01-01 RX ADMIN — Medication 1 MILLIGRAM(S): at 20:52

## 2023-01-01 RX ADMIN — ENOXAPARIN SODIUM 40 MILLIGRAM(S): 100 INJECTION SUBCUTANEOUS at 13:07

## 2023-01-01 RX ADMIN — Medication 0.5 MILLIGRAM(S): at 08:22

## 2023-01-01 RX ADMIN — METHADONE HYDROCHLORIDE 2.5 MILLIGRAM(S): 40 TABLET ORAL at 22:21

## 2023-01-01 RX ADMIN — HEPARIN SODIUM 5000 UNIT(S): 5000 INJECTION INTRAVENOUS; SUBCUTANEOUS at 15:12

## 2023-01-01 RX ADMIN — Medication 50 MILLILITER(S): at 00:19

## 2023-01-01 RX ADMIN — Medication 37.5 MICROGRAM(S): at 21:57

## 2023-01-01 RX ADMIN — SODIUM CHLORIDE 500 MILLILITER(S): 9 INJECTION, SOLUTION INTRAVENOUS at 19:34

## 2023-01-01 RX ADMIN — Medication 75 MICROGRAM(S): at 05:13

## 2023-01-01 RX ADMIN — SIMVASTATIN 10 MILLIGRAM(S): 20 TABLET, FILM COATED ORAL at 21:30

## 2023-01-01 RX ADMIN — Medication 100 MILLIEQUIVALENT(S): at 02:27

## 2023-01-01 RX ADMIN — Medication 4 MILLIGRAM(S): at 18:59

## 2023-01-01 RX ADMIN — Medication 0.5 MILLIGRAM(S): at 23:23

## 2023-01-01 RX ADMIN — MORPHINE SULFATE 2 MILLIGRAM(S): 50 CAPSULE, EXTENDED RELEASE ORAL at 08:25

## 2023-01-01 RX ADMIN — MORPHINE SULFATE 2 MILLIGRAM(S): 50 CAPSULE, EXTENDED RELEASE ORAL at 21:53

## 2023-01-01 RX ADMIN — Medication 100 MILLIEQUIVALENT(S): at 22:14

## 2023-01-01 RX ADMIN — ACYCLOVIR 1 APPLICATION(S): 50 OINTMENT TOPICAL at 18:30

## 2023-01-01 RX ADMIN — MORPHINE SULFATE 2 MILLIGRAM(S): 50 CAPSULE, EXTENDED RELEASE ORAL at 17:58

## 2023-01-01 RX ADMIN — CHLORHEXIDINE GLUCONATE 1 APPLICATION(S): 213 SOLUTION TOPICAL at 12:47

## 2023-01-01 RX ADMIN — SODIUM CHLORIDE 1000 MILLILITER(S): 9 INJECTION INTRAMUSCULAR; INTRAVENOUS; SUBCUTANEOUS at 21:18

## 2023-01-01 RX ADMIN — Medication 100 MILLIEQUIVALENT(S): at 03:14

## 2023-01-01 RX ADMIN — MORPHINE SULFATE 2 MILLIGRAM(S): 50 CAPSULE, EXTENDED RELEASE ORAL at 21:45

## 2023-01-01 RX ADMIN — OCTREOTIDE ACETATE 100 MICROGRAM(S): 200 INJECTION, SOLUTION INTRAVENOUS; SUBCUTANEOUS at 05:40

## 2023-01-01 RX ADMIN — Medication 100 MILLIGRAM(S): at 22:18

## 2023-01-01 RX ADMIN — MORPHINE SULFATE 0.5 MG/HR: 50 CAPSULE, EXTENDED RELEASE ORAL at 16:11

## 2023-01-01 RX ADMIN — ACYCLOVIR 1 APPLICATION(S): 50 OINTMENT TOPICAL at 23:05

## 2023-01-01 RX ADMIN — OCTREOTIDE ACETATE 100 MICROGRAM(S): 200 INJECTION, SOLUTION INTRAVENOUS; SUBCUTANEOUS at 21:14

## 2023-01-01 RX ADMIN — SODIUM CHLORIDE 10 MILLILITER(S): 9 INJECTION INTRAMUSCULAR; INTRAVENOUS; SUBCUTANEOUS at 00:50

## 2023-01-01 RX ADMIN — MORPHINE SULFATE 6 MILLIGRAM(S): 50 CAPSULE, EXTENDED RELEASE ORAL at 23:23

## 2023-01-01 RX ADMIN — MORPHINE SULFATE 3 MILLIGRAM(S): 50 CAPSULE, EXTENDED RELEASE ORAL at 04:33

## 2023-01-01 RX ADMIN — HEPARIN SODIUM 5000 UNIT(S): 5000 INJECTION INTRAVENOUS; SUBCUTANEOUS at 21:58

## 2023-01-01 RX ADMIN — ACYCLOVIR 1 APPLICATION(S): 50 OINTMENT TOPICAL at 23:33

## 2023-01-01 RX ADMIN — OCTREOTIDE ACETATE 100 MICROGRAM(S): 200 INJECTION, SOLUTION INTRAVENOUS; SUBCUTANEOUS at 05:10

## 2023-01-01 RX ADMIN — MORPHINE SULFATE 2 MILLIGRAM(S): 50 CAPSULE, EXTENDED RELEASE ORAL at 18:01

## 2023-01-01 RX ADMIN — MORPHINE SULFATE 2 MILLIGRAM(S): 50 CAPSULE, EXTENDED RELEASE ORAL at 08:12

## 2023-01-01 RX ADMIN — MORPHINE SULFATE 1 MILLIGRAM(S): 50 CAPSULE, EXTENDED RELEASE ORAL at 18:24

## 2023-01-01 RX ADMIN — MORPHINE SULFATE 4 MILLIGRAM(S): 50 CAPSULE, EXTENDED RELEASE ORAL at 14:28

## 2023-01-01 RX ADMIN — MORPHINE SULFATE 4 MILLIGRAM(S): 50 CAPSULE, EXTENDED RELEASE ORAL at 19:03

## 2023-01-01 RX ADMIN — Medication 85 MILLIMOLE(S): at 21:57

## 2023-01-01 RX ADMIN — SODIUM CHLORIDE 100 MILLILITER(S): 9 INJECTION, SOLUTION INTRAVENOUS at 19:58

## 2023-01-01 RX ADMIN — Medication 10 MILLIGRAM(S): at 13:06

## 2023-01-01 RX ADMIN — SODIUM CHLORIDE 100 MILLILITER(S): 9 INJECTION, SOLUTION INTRAVENOUS at 10:40

## 2023-01-01 RX ADMIN — HEPARIN SODIUM 5000 UNIT(S): 5000 INJECTION INTRAVENOUS; SUBCUTANEOUS at 12:47

## 2023-01-01 RX ADMIN — MORPHINE SULFATE 2 MILLIGRAM(S): 50 CAPSULE, EXTENDED RELEASE ORAL at 16:35

## 2023-01-01 RX ADMIN — MORPHINE SULFATE 2 MILLIGRAM(S): 50 CAPSULE, EXTENDED RELEASE ORAL at 04:43

## 2023-01-01 RX ADMIN — Medication 40 MILLIEQUIVALENT(S): at 14:45

## 2023-01-01 RX ADMIN — MORPHINE SULFATE 2 MILLIGRAM(S): 50 CAPSULE, EXTENDED RELEASE ORAL at 09:52

## 2023-01-01 RX ADMIN — Medication 20 MILLIEQUIVALENT(S): at 14:54

## 2023-01-01 RX ADMIN — METHADONE HYDROCHLORIDE 2.5 MILLIGRAM(S): 40 TABLET ORAL at 21:57

## 2023-01-01 RX ADMIN — SODIUM CHLORIDE 100 MILLILITER(S): 9 INJECTION, SOLUTION INTRAVENOUS at 14:23

## 2023-01-01 RX ADMIN — MORPHINE SULFATE 4 MILLIGRAM(S): 50 CAPSULE, EXTENDED RELEASE ORAL at 14:43

## 2023-01-01 RX ADMIN — OCTREOTIDE ACETATE 100 MICROGRAM(S): 200 INJECTION, SOLUTION INTRAVENOUS; SUBCUTANEOUS at 06:25

## 2023-01-01 RX ADMIN — MORPHINE SULFATE 2 MILLIGRAM(S): 50 CAPSULE, EXTENDED RELEASE ORAL at 18:13

## 2023-01-01 RX ADMIN — MORPHINE SULFATE 2 MILLIGRAM(S): 50 CAPSULE, EXTENDED RELEASE ORAL at 12:08

## 2023-01-01 RX ADMIN — ROBINUL 0.4 MILLIGRAM(S): 0.2 INJECTION INTRAMUSCULAR; INTRAVENOUS at 22:27

## 2023-01-01 RX ADMIN — HEPARIN SODIUM 5000 UNIT(S): 5000 INJECTION INTRAVENOUS; SUBCUTANEOUS at 14:47

## 2023-01-01 RX ADMIN — Medication 100 MILLIEQUIVALENT(S): at 06:25

## 2023-01-01 RX ADMIN — Medication 4 MILLIGRAM(S): at 17:37

## 2023-01-01 RX ADMIN — ACYCLOVIR 1 APPLICATION(S): 50 OINTMENT TOPICAL at 11:39

## 2023-01-01 RX ADMIN — SODIUM CHLORIDE 1000 MILLILITER(S): 9 INJECTION INTRAMUSCULAR; INTRAVENOUS; SUBCUTANEOUS at 16:11

## 2023-01-01 RX ADMIN — Medication 40 MILLIEQUIVALENT(S): at 21:20

## 2023-01-01 RX ADMIN — CEFTRIAXONE 100 MILLIGRAM(S): 500 INJECTION, POWDER, FOR SOLUTION INTRAMUSCULAR; INTRAVENOUS at 01:02

## 2023-01-01 RX ADMIN — Medication 100 MILLIGRAM(S): at 00:50

## 2023-01-01 RX ADMIN — Medication 2.71 MICROGRAM(S)/KG/MIN: at 02:27

## 2023-01-01 RX ADMIN — SODIUM CHLORIDE 4000 MILLILITER(S): 9 INJECTION, SOLUTION INTRAVENOUS at 02:01

## 2023-01-01 RX ADMIN — MORPHINE SULFATE 3 MILLIGRAM(S): 50 CAPSULE, EXTENDED RELEASE ORAL at 23:23

## 2023-01-01 RX ADMIN — MORPHINE SULFATE 2 MILLIGRAM(S): 50 CAPSULE, EXTENDED RELEASE ORAL at 23:32

## 2023-01-01 RX ADMIN — MORPHINE SULFATE 4 MILLIGRAM(S): 50 CAPSULE, EXTENDED RELEASE ORAL at 21:09

## 2023-01-01 RX ADMIN — OCTREOTIDE ACETATE 100 MICROGRAM(S): 200 INJECTION, SOLUTION INTRAVENOUS; SUBCUTANEOUS at 12:10

## 2023-01-01 RX ADMIN — Medication 1000 MILLIGRAM(S): at 12:15

## 2023-01-01 RX ADMIN — HEPARIN SODIUM 5000 UNIT(S): 5000 INJECTION INTRAVENOUS; SUBCUTANEOUS at 06:26

## 2023-01-01 RX ADMIN — Medication 10 MILLIGRAM(S): at 21:58

## 2023-01-11 PROBLEM — R11.2 CHEMOTHERAPY INDUCED NAUSEA AND VOMITING: Status: ACTIVE | Noted: 2020-10-26

## 2023-01-11 PROBLEM — E53.8 VITAMIN B12 DEFICIENCY: Status: ACTIVE | Noted: 2022-01-01

## 2023-01-11 NOTE — REVIEW OF SYSTEMS
[Vaginal Discharge] : vaginal discharge [Negative] : Allergic/Immunologic [de-identified] : head hair thinning

## 2023-01-11 NOTE — HISTORY OF PRESENT ILLNESS
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 \par \par In June 2020 Abi noticed a rectal discharge and burning sensation in the rectum. Was evaluated by Dr. Lamar and ultimately saw Dr. Oropeza who ordered CT Pelvis and MRI Pelvis . Found to have a mass in the rectum s/p biopsy confirmed adenocarcinoma. \par \par 7/18/20: MRI Pelvis: large hypovascular mass c/w mucinous adenoca involving remaining distal rectum and vagina, tumor abuts anal sphincter\par 8/5/20: CT pelvis: residual chronic perineal mass, likely cystic with gas component\par 9/23/20: CT CAP: low density, possibly necrotic mass up to 6.2 cm with involvement of the vagina, nonspecific 6 mm LLL nodule is noted\par 10/3/20: PET/CT: hypermetabolic rectal mass, non specific small inguinal LN, nonspecific hypermetabolism within the colostomy insertion site\par 10/7/20: discussed at rectal TB, plan for DILMA with re-evaluation of response and then surgery if residual disease\par 10/26/20: C1 FOLFOX\par 11/9/20: C2 (c/b acute oxali neurotoxicity)\par 11/23/20: C3 (Oxali over 4 hrs) - tolerated well \par 12/7/20: C4 5FU/LV (developed difficulty swallowing, SOB after receiving Oxali, d/c Oxali for today's tx)\par 12/21-2/10/21: C5-8 FOLFOX (dose reduced Oxali 65 mg/m2 over 4 hrs)\par 2/13/21: CT CAP: 0.6 cm LLL nodule, rectal mass slightly decreased in size, small b/l inguinal LN minimally decr in size, pneumatosis is noted involving SB possible fistula \par 2/25-3/23/21: Xeloda/RT \par 4/21/21: CT CAP: mild interval decrease in size of rectal mass, stable pulm nodule \par 5/26-7/7/21:C1-C4 5FU/LV maintenance \par 7/14/21: CTCAP: stable lung nodules + further decrease in size of rectal mass/fluid collection\par 7/21-10/13/21: C5-C11\par 10/7/21: CT CAP revealed stable disease. \par 10/27-12/21/21: C12-C16  \par 1/3/22: CT CAP: stable disease\par 1/5-3/30/22: C17-C23\par 4/8/22: CT CAP: stable disease \par 4/13-7/6/22: C24-C29\par 7/11/22: CT CAP: stable disease\par 7/20-10/12/22: C30-C36\par 10/3/22 CT C/A/P: reviewed images with radiology and there is disease recurrence with increased communication to vagina\par 10/26-12/28/22: C1-C5 FOLFIRI \par 1/5/23: CT C/A/P: pending\par 1/11/23: C6 FOLFIRI [de-identified] : adenocarcinoma [de-identified] : Patient was seen in the infusion room during her scheduled treatment. Since starting B12 injection treatment last month, she noted mild improvement with fatigue/generalized weakness, SOB (worse with exertion, denied SOB at rest, chest pain, dizziness). Her PO intake has also slowly increased (intermittent satiety and nausea noted, makes conscious effort to eat). Her bowel movements remain stable (no issues with colostomy, no abnormalities with color, consistency, frequency). She is able to complete her ADLs independently with mild restrictions.

## 2023-01-11 NOTE — PHYSICAL EXAM
[Restricted in physically strenuous activity but ambulatory and able to carry out work of a light or sedentary nature] : Status 1- Restricted in physically strenuous activity but ambulatory and able to carry out work of a light or sedentary nature, e.g., light house work, office work [Normal] : affect appropriate [de-identified] : + colostomy

## 2023-01-18 PROBLEM — R11.0 CHRONIC NAUSEA: Status: ACTIVE | Noted: 2023-01-01

## 2023-01-18 NOTE — HISTORY OF PRESENT ILLNESS
[FreeTextEntry1] : Yuri Hammer MD\par [address]\[place] [address]\par \par Pleasant 77-year-old female here with her \par \par Long ago history of cervical cancer apparently involving rectum with need for colostomy and urostomy many years ago\par \par Apparent recurrence, receiving radiation and chemo\par \par Recent CAT scan with some questionable ileus or early small bowel partial obstruction\par \par Though she denies really having much in the way of nausea or vomiting\par \par She is fatigued\par \par Recent echocardiogram showing good left ventricular function\par \par She is sent by hematology oncology to me to perform upper endoscopy\par \par

## 2023-01-18 NOTE — REASON FOR VISIT
[Initial Evaluation] : an initial evaluation [FreeTextEntry1] : Cervical cancer with rectal involvement with long standing colostomy and urostomy, CAT scan showing recently questionable ileus versus partial small bowel obstruction, fatigue, some nausea

## 2023-01-18 NOTE — PHYSICAL EXAM
[Alert] : alert [Normal Voice/Communication] : normal voice/communication [Healthy Appearing] : healthy appearing [No Acute Distress] : no acute distress [Sclera] : the sclera and conjunctiva were normal [Hearing Threshold Finger Rub Not [place]] : hearing was normal [Normal Appearance] : the appearance of the neck was normal [No Respiratory Distress] : no respiratory distress [No Acc Muscle Use] : no accessory muscle use [Respiration, Rhythm And Depth] : normal respiratory rhythm and effort [Heart Rate And Rhythm] : heart rate was normal and rhythm regular [Bowel Sounds] : normal bowel sounds [Abdomen Tenderness] : non-tender [No Masses] : no abdominal mass palpated [Abdomen Soft] : soft [] : no hepatosplenomegaly [Cervical Lymph Nodes Enlarged Posterior Bilaterally] : no posterior cervical lymphadenopathy [No CVA Tenderness] : no CVA  tenderness [Abnormal Walk] : normal gait [Oriented To Time, Place, And Person] : oriented to person, place, and time [de-identified] : Colostomy, urostomy, functioning

## 2023-01-18 NOTE — ASSESSMENT
[FreeTextEntry1] : Impression\par \par Long ago diagnosis of cervical cancer involving rectum with colostomy and urostomy\par \par Recurrence apparently with chemo and radiation\par \par CAT scan showing questionable mild partial ileus or partial small bowel obstruction from January\par \par Hematology oncology requests upper endoscopy\par \par Patient without much nausea vomiting\par \par No abdominal pain\par \par Suggest\par \par Small bowel series\par \par Upper endoscopy with me\par \par Anesthesia clearance\par \par  DISPLAY PLAN FREE TEXT DISPLAY PLAN FREE TEXT DISPLAY PLAN FREE TEXT DISPLAY PLAN FREE TEXT DISPLAY PLAN FREE TEXT DISPLAY PLAN FREE TEXT

## 2023-01-20 PROBLEM — Z01.812 ENCOUNTER FOR PREPROCEDURE SCREENING LABORATORY TESTING FOR COVID-19: Status: ACTIVE | Noted: 2023-01-01

## 2023-01-23 NOTE — ED ADULT NURSE NOTE - OBJECTIVE STATEMENT
Break RN: Pt is a 76 y/o Female, A&Ox4, ambulatory at baseline with a hx of rectal CA on chemo, bladder removal, HLD, HTN, hypothyroid and ileostomy in place. Pt presents to the ED with c/o SOB, diarrhea and weakness x few weeks. Pt states symptoms started when her chemo therapy treatment was changed. Pt denies chest pain, palpitations, dizziness, h/a, abdominal pain, N/V, fevers, cough or any urinary symptoms. Neuro/sensory intact. Respirations even and unlabored, SpO2 at 100% in 2L NC. Pt denies use of oxygen at home. Oxygen in place for comfort. Chest rise equal b/l, NSR on cardiac monitor. Abdomen soft, nondistended, nontender. Skin warm, dry and intact. Pt has port on right upper chest; appears clean and intact. Pt has ileostomy bag in place; dressing and bag clean and intact. Pt states she straight caths herself at home through access on the RLQ. No redness, swelling or discharge noted. 20g IVL placed in RAC. Labs collected and sent. VS as noted in flow sheet. NS fluids infusing. Safety maintained throughout, will continue to monitor.

## 2023-01-23 NOTE — ED PROVIDER NOTE - NS ED ROS FT
GENERAL: +fatigue; no fever or chills  EYES: No change in vision  HEENT: No trouble swallowing or speaking  CARDIAC: No chest pain  PULMONARY: +SOB  GI: +nausea, vomiting, diarrhea  : No changes in urination  SKIN: No rashes  NEURO: No headache, no numbness  MSK: No joint pain  Otherwise as HPI or negative.

## 2023-01-23 NOTE — ED ADULT TRIAGE NOTE - CHIEF COMPLAINT QUOTE
Pt. c/o weakness, diarrhea and sob x few days. States she on chemotherapy for rectal cancer. Advised to come here for eval. Denies cough, n/v or fevers

## 2023-01-23 NOTE — ED PROVIDER NOTE - OBJECTIVE STATEMENT
The patient is a 77y Female with pmhx of remote cervical cancer s/p chemo/RT, then recurrence s/p pelvic exenteration with ostomy, currently being treated for locally advanced unresectable rectal cancer on chemotherapy, last dose almost 2 weeks ago, p/w persistent n/v, sob, diarrhea, and weakness. Pt feels that her symptoms have been getting worse over the last 5-6 weeks since her chemo regimen was changed (pt doesn't remember names of chemo meds). Denies fever, cp, abd pain, leg swelling, urinary symptoms, weakness/numbness. Allergic to PCN.

## 2023-01-23 NOTE — ED PROVIDER NOTE - CLINICAL SUMMARY MEDICAL DECISION MAKING FREE TEXT BOX
Ronald Beck MD (PGY-3): The patient is a 77y Female with pmhx of remote cervical cancer s/p chemo/RT, then recurrence s/p pelvic exenteration with ostomy, currently being treated for locally advanced unresectable rectal cancer on chemotherapy, last dose almost 2 weeks ago, p/w persistent n/v, sob, diarrhea, and weakness. DDx includes but not limited to: worsening cancer progression, chemotherapy side effects, PE, PNA, intraabdominal pathology like appendicitis or SBO, UTI, ACS. ekg, cxr, ctap, cta chest, labs, zofran, IVF. Will be admitted. Ronald Beck MD (PGY-3): The patient is a 77y Female with pmhx of remote cervical cancer s/p chemo/RT, then recurrence s/p pelvic exenteration with ostomy, currently being treated for locally advanced unresectable rectal cancer on chemotherapy, last dose almost 2 weeks ago, p/w persistent n/v, sob, diarrhea, and weakness. DDx includes but not limited to: worsening cancer progression, chemotherapy side effects, PE, PNA, intraabdominal pathology like appendicitis or SBO, UTI, ACS. ekg, cxr, ctap, cta chest, labs, zofran, IVF. Will be admitted.    Dilia Andrade MD attending physician.  The 77-year-old woman who comes in with a history of cervical cancer status coming chemoradiation and then had a recurrence of with pelvic exenteration with ostomy now being treated for locally advanced unresectable rectal cancer on chemo.  She is complaining of weakness nausea vomiting diarrhea and shortness of breath.  The plan is to do a scan evaluate for a pulmonary embolism but likely will need admission even if no pulmonary embolism because of the persistent weakness and failure to thrive.  Dr. Ladd is her oncologist.  She is tachypneic in the emergency department has an abdomen that is soft without rebound or guarding

## 2023-01-23 NOTE — ED PROVIDER NOTE - ATTENDING CONTRIBUTION TO CARE
Dilia Andrade MD attending physician.  The 7-year-old woman who comes in with a history of cervical cancer status coming chemoradiation and then had a recurrence of with pelvic exenteration with ostomy now being treated for locally advanced unresectable rectal cancer on chemo.  She is complaining of weakness nausea vomiting diarrhea and shortness of breath.  The plan is to do a scan evaluate for a pulmonary embolism but likely will need admission even if no pulmonary embolism because of the persistent weakness and failure to thrive.  Dr. Ladd is her oncologist.  She is tachypneic in the emergency department has an abdomen that is soft without rebound or guarding    I performed a history and physical exam of the patient and discussed their management with the resident and /or advanced care provider. I reviewed the resident and /or ACP's note and agree with the documented findings and plan of care. My medical decison making and observations are found above.

## 2023-01-23 NOTE — CHART NOTE - NSCHARTNOTEFT_GEN_A_CORE
77 F w/ h/o remote cervical cancer s/p chemo/RT, then recurrence s/p pelvic exenteration with ostomy, currently being treated for locally advanced unresectable rectal cancer on chemotherapy, last dose almost 2 weeks ago, sent in to the ER for persistent n/v, weakness.     Recommendation   - CT A/P w/co   - labs   - admission pending work up  - chemotherapy as the cause of symptoms is less likely as pt has been on chemo for >2 yrs without any issues and last dose was 2 weeks ago which is somewhat out of the window to have such severe side effects.   - please call me on teams with any questions

## 2023-01-23 NOTE — ED PROVIDER NOTE - PHYSICAL EXAMINATION
GEN - NAD, well appearing, A&Ox3  HEAD - NC/AT  EYES - PERRL, EOMI  ENT - Airway patent, mucous membranes dry  PULMONARY - CTA b/l, symmetric breath sounds, no W/R/R  CARDIAC - +S1S2, RRR, no M/G/R, no JVD  ABDOMEN - Ostomy bag in place (c/d/i), ND, NT, soft, no guarding, no rebound, no masses, no rigidity   - No CVA TTP, no suprapubic TTP  EXTREMITIES - FROM, symmetric pulses, no edema, 5/5 strength in b/l UE and LE  SKIN - No rash or bruising  NEUROLOGIC - Alert, speech clear, no focal deficits, CN II-XII grossly intact, normal gait, strength and sensation grossly intact  PSYCH - Normal mood/affect, normal insight

## 2023-01-23 NOTE — ED PROVIDER NOTE - NSICDXPASTSURGICALHX_GEN_ALL_CORE_FT
PAST SURGICAL HISTORY:  H/O total hysterectomy with bilateral salpingo-oophorectomy (BSO)     Ileostomy present

## 2023-01-23 NOTE — ED PROVIDER NOTE - NSICDXPASTMEDICALHX_GEN_ALL_CORE_FT
PAST MEDICAL HISTORY:  HLD (hyperlipidemia)     HTN (hypertension)     Hypothyroid     Rectal cancer

## 2023-01-24 NOTE — H&P ADULT - PROBLEM SELECTOR PLAN 3
- New TAMARA, likely 2/2 increased fluid losses from her diarrhea and UTI  - Check urine sodium, creatinine  - Monitor I/O  - Trend BUN/Cr  - Hold HCTZ for now  - Electrolyte abnormalities in setting of use of HCTZ with TAMARA, will hold HCTZ for now, replete lytes as needed

## 2023-01-24 NOTE — H&P ADULT - PROBLEM/PLAN-1
Sacroiliac Pain: Exercises Your Care Instructions Here are some examples of typical rehabilitation exercises for your condition. Start each exercise slowly. Ease off the exercise if you start to have pain. Your doctor or physical therapist will tell you when you can start these exercises and which ones will work best for you. How to do the exercises Knee-to-chest stretch 1. Do not do the knee-to-chest exercise if it causes or increases back or leg pain. 2. Lie on your back with your knees bent and your feet flat on the floor. You can put a small pillow under your head and neck if it is more comfortable. 3. Grasp your hands under one knee and bring the knee to your chest, keeping the other foot flat on the floor. 4. Keep your lower back pressed to the floor. Hold for at least 15 to 30 seconds. 5. Relax and lower the knee to the starting position. Repeat with the other leg. 6. Repeat 2 to 4 times with each leg. 7. To get more stretch, keep your other leg flat on the floor while pulling your knee to your chest. 
 
Bridging 1. Lie on your back with both knees bent. Your knees should be bent about 90 degrees. 2. Tighten your belly muscles by pulling in your belly button toward your spine. Then push your feet into the floor, squeeze your buttocks, and lift your hips off the floor until your shoulders, hips, and knees are all in a straight line. 3. Hold for about 6 seconds as you continue to breathe normally, and then slowly lower your hips back down to the floor and rest for up to 10 seconds. 4. Repeat 8 to 12 times. Hip extension 1. Get down on your hands and knees on the floor. 2. Keeping your back and neck straight, lift one leg straight out behind you. When you lift your leg, keep your hips level. Don't let your back twist, and don't let your hip drop toward the floor. 3. Hold for 6 seconds. Repeat 8 to 12 times with each leg. 4. If you feel steady and strong when you do this exercise, you can make it more difficult. To do this, when you lift your leg, also lift the opposite arm straight out in front of you. For example, lift the left leg and the right arm at the same time. (This is sometimes called the \"bird dog exercise. \") Hold for 6 seconds, and repeat 8 to 12 times on each side. Clamshell 1. Lie on your side with a pillow under your head. Keep your feet and knees together and your knees bent. 2. Raise your top knee, but keep your feet together. Do not let your hips roll back. Your legs should open up like a clamshell. 3. Hold for 6 seconds. 4. Slowly lower your knee back down. Rest for 10 seconds. 5. Repeat 8 to 12 times. 6. Switch to your other side and repeat steps 1 through 5. Hamstring wall stretch 1. Lie on your back in a doorway, with one leg through the open door. 2. Slide your affected leg up the wall to straighten your knee. You should feel a gentle stretch down the back of your leg. 1. Do not arch your back. 2. Do not bend either knee. 3. Keep one heel touching the floor and the other heel touching the wall. Do not point your toes. 3. Hold the stretch for at least 1 minute to begin. Then try to lengthen the time you hold the stretch to as long as 6 minutes. 4. Switch legs, and repeat steps 1 through 3. 
5. Repeat 2 to 4 times. 6. If you do not have a place to do this exercise in a doorway, there is another way to do it: 
7. Lie on your back, and bend one knee. 8. Loop a towel under the ball and toes of that foot, and hold the ends of the towel in your hands. 9. Straighten your knee, and slowly pull back on the towel. You should feel a gentle stretch down the back of your leg. 10. Switch legs, and repeat steps 1 through 3. 
11. Repeat 2 to 4 times. Lower abdominal strengthening 1. Lie on your back with your knees bent and your feet flat on the floor. 2. Tighten your belly muscles by pulling your belly button in toward your spine. 3. Lift one foot off the floor and bring your knee toward your chest, so that your knee is straight above your hip and your leg is bent like the letter \"L. \" 
4. Lift the other knee up to the same position. 5. Lower one leg at a time to the starting position. 6. Keep alternating legs until you have lifted each leg 8 to 12 times. 7. Be sure to keep your belly muscles tight and your back still as you are moving your legs. Be sure to breathe normally. Piriformis stretch 1. Lie on your back with your legs straight. 2. Lift your affected leg, and bend your knee. With your opposite hand, reach across your body, and then gently pull your knee toward your opposite shoulder. 3. Hold the stretch for 15 to 30 seconds. 4. Switch legs and repeat steps 1 through 3. 
5. Repeat 2 to 4 times. Follow-up care is a key part of your treatment and safety. Be sure to make and go to all appointments, and call your doctor if you are having problems. It's also a good idea to know your test results and keep a list of the medicines you take. Where can you learn more? Go to http://katja-maddie.info/. Enter B141 in the search box to learn more about \"Sacroiliac Pain: Exercises. \" Current as of: September 20, 2018 Content Version: 11.9 © 7185-4447 [employer]. Care instructions adapted under license by [employer] (which disclaims liability or warranty for this information). If you have questions about a medical condition or this instruction, always ask your healthcare professional. [address] any warranty or liability for your use of this information. DISPLAY PLAN FREE TEXT

## 2023-01-24 NOTE — PROGRESS NOTE ADULT - PROBLEM SELECTOR PLAN 4
baseline Cr 1, admission Cr 1.6. TAMARA likely pre-renal. resolved after IVF  - Hold HCTZ for now  -cont to monitor

## 2023-01-24 NOTE — PROGRESS NOTE ADULT - PROBLEM SELECTOR PLAN 1
Sub-acute diarrhea now worsening, r/o infectious etiology   - CT A/P shows some thickening of the small bowel loops, possibly 2/2 RT vs infection vs inflammation from chemotherapy  - Check CDiff, Gi PCR  - Monitor colostomy output

## 2023-01-24 NOTE — H&P ADULT - NSHPLABSRESULTS_GEN_ALL_CORE
LABS and ADDITIONAL STUDIES:                        11.0   5.42  )-----------( 349      ( 2023 16:13 )             35.9         136  |  100  |  x   ----------------------------<  x   3.2<L>   |  x   |  x     Comprehensive Metabolic Panel (23 @ 16:13)   Sodium, Serum: 135 mmol/L   Potassium, Serum: 2.7 mmol/L   Chloride, Serum: 97 mmol/L   Carbon Dioxide, Serum: 21 mmol/L   Anion Gap, Serum: 17 mmol/L   Blood Urea Nitrogen, Serum: 36 mg/dL   Creatinine, Serum: 1.63 mg/dL   Glucose, Serum: 85 mg/dL   Calcium, Total Serum: 9.5 mg/dL   Protein Total, Serum: 7.6 g/dL   Albumin, Serum: 4.4 g/dL   Bilirubin Total, Serum: 0.3 mg/dL   Alkaline Phosphatase, Serum: 74 U/L   Aspartate Aminotransferase (AST/SGOT): 19 U/L   Alanine Aminotransferase (ALT/SGPT): 9 U/L   eGFR: 32 mL/min/1.73m2     Ca    9.5      2023 16:13  Mg     1.40         TPro  7.6  /  Alb  4.4  /  TBili  0.3  /  DBili  x   /  AST  19  /  ALT  9   /  AlkPhos  74      LIVER FUNCTIONS - ( 2023 16:13 )  Alb: 4.4 g/dL / Pro: 7.6 g/dL / ALK PHOS: 74 U/L / ALT: 9 U/L / AST: 19 U/L / GGT: x           PT/INR - ( 2023 16:13 )   PT: 12.8 sec;   INR: 1.10 ratio    PTT - ( 2023 16:13 )  PTT:26.0 sec    Urinalysis Basic - ( 2023 17:40 )  Color: Yellow / Appearance: Turbid / S.020 / pH: x  Gluc: x / Ketone: Negative  / Bili: Negative / Urobili: <2 mg/dL   Blood: x / Protein: >600 mg/dL / Nitrite: Positive   Leuk Esterase: Moderate / RBC: 2 /HPF / WBC 55 /HPF   Sq Epi: x / Non Sq Epi: 8 /HPF / Bacteria: Many    Troponin T, High Sensitivity (23 @ 16:13)   Troponin T, High Sensitivity Result: 25 ng/L   Troponin T, High Sensitivity (23 @ 20:54)   Troponin T, High Sensitivity Result: 25 ng/L     Lactate - 1.5    RVP - neg    < from: Xray Chest 2 Views PA/Lat (23 @ 17:04) >    FINDINGS:  Right chest port with tip at the cavoatrial junction.  The heart is normal in size.  The lungs are clear.  There is no pneumothorax or pleural effusion.  There are no acute osseous abnormalities.    IMPRESSION:  Clear lungs.    --- End of Report ---    < end of copied text >    < from: CT Angio Chest PE Protocol w/ IV Cont (23 @ 19:45) >    IMPRESSION:    CHEST:    1.  No pulmonary embolus.  2.  No change in the groundglass opacities or pulmonary nodule.      ABDOMEN AND PELVIS:    1.  Rectal/rectal stump thickening likely represents the patient's known   rectal carcinoma and/or the sequelae of treatment.  2.  Left lower quadrant colostomy.  3.  Thickening of the small bowel loops are likely the sequelae of prior   radiation treatment.    --- End of Report ---    < end of copied text >    EKG - Sinus rhythm with PACs, rate 67, QTc 456, no significant ST-T wave changes.

## 2023-01-24 NOTE — PATIENT PROFILE ADULT - FALL HARM RISK - UNIVERSAL INTERVENTIONS
Bed in lowest position, wheels locked, appropriate side rails in place/Call bell, personal items and telephone in reach/Instruct patient to call for assistance before getting out of bed or chair/Non-slip footwear when patient is out of bed/[place] to call system/Physically safe environment - no spills, clutter or unnecessary equipment/Purposeful Proactive Rounding/Room/bathroom lighting operational, light cord in reach

## 2023-01-24 NOTE — H&P ADULT - PROBLEM SELECTOR PLAN 8
DVT ppx - HSQ  Diet - DASH soft and bite sized  Activity - OOB to chair/with assistance    Fall and aspiration precautions

## 2023-01-24 NOTE — H&P ADULT - NSHPPHYSICALEXAM_GEN_ALL_CORE
Vital Signs Last 24 Hrs  T(C): 36.7 (24 Jan 2023 00:07), Max: 37.2 (23 Jan 2023 16:00)  T(F): 98 (24 Jan 2023 00:07), Max: 99 (23 Jan 2023 16:00)  HR: 66 (24 Jan 2023 00:07) (66 - 100)  BP: 104/51 (24 Jan 2023 00:07) (100/55 - 117/55)  BP(mean): --  RR: 16 (24 Jan 2023 00:07) (16 - 18)  SpO2: 100% (24 Jan 2023 00:07) (99% - 100%)    Parameters below as of 24 Jan 2023 00:07  Patient On (Oxygen Delivery Method): room air    GENERAL: No acute distress, well-developed  EYES: EOMI, PERRL, conjunctiva and sclera clear  ENT: Neck supple, No JVD, moist mucosa  CHEST/LUNG: Mild crackles L upper lung, otherwise clear to auscultation bilaterally; No wheeze, equal breath sounds bilaterally   BACK: No spinal tenderness, no CVA TTP  HEART: Regular rate and rhythm; No murmurs, rubs, or gallops  ABDOMEN: Soft, Nontender, Nondistended; Bowel sounds present, L sided colostomy and R sided ileal conduit  EXTREMITIES: +DP/PT/Radial pulses, No clubbing, cyanosis, or edema  PSYCH: Nl behavior, nl affect  NEUROLOGY: AAOx3, non-focal  SKIN: Normal color, No rashes or lesions

## 2023-01-24 NOTE — H&P ADULT - HISTORY OF PRESENT ILLNESS
This is a 77F with history of Cervical cancer s/p chemo/RT with recurrence s/p Pelvic Exenteration now with colostomy and ileostomy hole, Stage 4 rectal cancer (currently on FOLFIRI, last dosed on 1/11/23), HTN, HLD, and Hypothyroidism (2/2 VALDOVINOS) who presents to the hospital with  This is a 77F with history of Cervical cancer s/p chemo/RT with recurrence s/p Pelvic Exenteration now with colostomy and ileostomy hole, Stage 4 rectal cancer (currently on FOLFIRI, last dosed on 1/11/23), HTN, HLD, and Hypothyroidism (2/2 VALDOVINOS) who presents to the hospital with complaints of diarrhea, fatigue, and SOB with exertion. Patient said that she has noted these symptoms since her chemotherapy was changed to FOLFIRI a few months ago, recently her symptoms have worsened. States that she has a few episodes of nonbloody liquid BMs from her colostomy without associated abd pain/nausea/emesis. Also with SOB with exertion but no chest pain, palpitations, PND, orthopnea, LE edema, or syncope. Recently her SOB worsened and she said that she is only able to walk a few steps before she has to stop due to her SOB. Also with generalized weakness. Was recommended to come to the hospital due to her worsening symptoms and therefore came to [facility]. Otherwise denies any other acute complaints.     On arrival to the ED, her vitals were T 97.8, P 100, /55, RR 16, O2 sat 99% RA. Her lab work showed anemia (chronic) with TAMARA and electrolyte abnormalities. Her UA was positive but she denied UTI complaints. Her imaging did not show acute abnormalities. She was given NS 1L x2, KCl supplementation, Mg sulfate 2g x1, and CTX 1G. She was admitted to medicine.

## 2023-01-24 NOTE — H&P ADULT - ASSESSMENT
This is a 77F with history as above who presents to the hospital with worsening diarrhea, SOB with exertion, and fatigue. Found to have TAMARA with electrolyte abnormalities.

## 2023-01-24 NOTE — H&P ADULT - PROBLEM SELECTOR PLAN 1
- Sub-acute diarrhea now worsening, suspect - Sub-acute diarrhea now worsening, suspect a side effect of irinotecan, unclear on how many episodes of diarrhea she has as she has a colostomy  - CT A/P shows some thickening of the small bowel loops, possibly 2/2 RT vs infection vs inflammation from chemotherapy  - Check CDiff, Gi PCR  - Monitor colostomy output, if significant output and patient without infectious diarrhea consider anti-diarrheals

## 2023-01-24 NOTE — H&P ADULT - NSHPREVIEWOFSYSTEMS_GEN_ALL_CORE
REVIEW OF SYSTEMS:    CONSTITUTIONAL: +Fatigue/generalized weakness, No fevers or chills  EYES: No visual changes or eye discharge  ENT: No rhinorrhea or sore throat  NECK: No pain or stiffness  RESPIRATORY: No cough, wheezing, hemoptysis; +shortness of breath with exertion  CARDIOVASCULAR: No chest pain or palpitations; No lower extremity edema  GASTROINTESTINAL: No abdominal or epigastric pain. No nausea, vomiting, or hematemesis; +diarrhea, No constipation. No melena or hematochezia.  BACK: No back pain, no flank pain  GENITOURINARY: No dysuria, frequency or hematuria  NEUROLOGICAL: No numbness or weakness  SKIN: No itching, burning, rashes, or lesions

## 2023-01-24 NOTE — H&P ADULT - PROBLEM SELECTOR PLAN 2
- Worsening SOB with exertion, now can only walk a few steps before having to stop, no cardiac complaints, no s/s of fluid overload  - CTA Chest neg for PE, shows stable GGOs and nodules  - Trops stable x2, EKG without acute ischemic findings  - Unclear on cause of her worsening SOB, possibly 2/2 worsening fluid losses from her diarrhea vs GGOs/nodules on imaging (though stable from prior) vs cardiac (lower suspicion as patient without acute cardiac complaints) vs other  - Check repeat trop in AM, check TTE, monitor on telemetry for now, check orthostatics

## 2023-01-24 NOTE — PROGRESS NOTE ADULT - SUBJECTIVE AND OBJECTIVE BOX
[facility] Division of Hospital Medicine  Rafael Harris MD  Pager [phone]      Patient is a 77y old  Female who presents with a chief complaint of Worsening fatigue with diarrhea and SOB (2023 02:39)      SUBJECTIVE / OVERNIGHT EVENTS:    no acute event o/n. BP remains soft.  still feeling weak and tired. No abd pain, dizziness     ADDITIONAL REVIEW OF SYSTEMS:    RESPIRATORY: No cough, wheezing, chills or hemoptysis; No shortness of breath  CARDIOVASCULAR: No chest pain, palpitations, dizziness, or leg swelling  GASTROINTESTINAL: No abdominal or epigastric pain. No nausea, vomiting, or hematemesis; No diarrhea or constipation. No melena or hematochezia.      MEDICATIONS  (STANDING):  cefTRIAXone   IVPB 1000 milliGRAM(s) IV Intermittent every 24 hours  heparin   Injectable 5000 Unit(s) SubCutaneous every 8 hours  levothyroxine 75 MICROGram(s) Oral daily  simvastatin 10 milliGRAM(s) Oral at bedtime    MEDICATIONS  (PRN):  acetaminophen     Tablet .. 650 milliGRAM(s) Oral every 6 hours PRN Temp greater or equal to 38C (100.4F), Mild Pain (1 - 3)  aluminum hydroxide/magnesium hydroxide/simethicone Suspension 30 milliLiter(s) Oral every 4 hours PRN Dyspepsia  melatonin 3 milliGRAM(s) Oral at bedtime PRN Insomnia  ondansetron Injectable 4 milliGRAM(s) IV Push every 8 hours PRN Nausea and/or Vomiting      CAPILLARY BLOOD GLUCOSE      POCT Blood Glucose.: 100 mg/dL (2023 16:05)    I&O's Summary      PHYSICAL EXAM:  Vital Signs Last 24 Hrs  T(C): 36.3 (2023 11:35), Max: 37.2 (2023 16:00)  T(F): 97.3 (2023 11:35), Max: 99 (2023 16:00)  HR: 69 (2023 11:35) (65 - 89)  BP: 102/57 (2023 11:35) (100/55 - 115/53)  BP(mean): 66 (2023 04:00) (66 - 66)  RR: 16 (2023 11:35) (16 - 18)  SpO2: 97% (2023 11:35) (97% - 100%)    Parameters below as of 2023 11:35  Patient On (Oxygen Delivery Method): room air        CONSTITUTIONAL: NAD,  EYES: PERRLA; conjunctiva and sclera clear  ENMT: Moist oral mucosa, no pharyngeal injection or exudates;   NECK: Supple, no palpable masses;  RESPIRATORY: Normal respiratory effort; lungs are clear to auscultation bilaterally  CARDIOVASCULAR: Regular rate and rhythm, normal S1 and S2, no murmur/rub/gallop; No lower extremity edema; Peripheral pulses are 2+ bilaterally  ABDOMEN: Nontender to palpation, normoactive bowel sounds, no rebound/guarding; +colostomy   MUSCLOSKELETAL:   no clubbing or cyanosis of digits; no joint swelling or tenderness to palpation  PSYCH: A+O to person, place, and time; affect appropriate  NEUROLOGY: CN 2-12 are intact and symmetric; no gross sensory deficits;   SKIN: No rashes;     LABS:                        9.1    5.28  )-----------( 305      ( 2023 05:50 )             30.8         136  |  103  |  31<H>  ----------------------------<  96  3.1<L>   |  17<L>  |  1.17    Ca    8.6      2023 05:50  Phos  3.0       Mg     2.30         TPro  6.8  /  Alb  3.6  /  TBili  <0.2  /  DBili  x   /  AST  20  /  ALT  7   /  AlkPhos  65      PT/INR - ( 2023 16:13 )   PT: 12.8 sec;   INR: 1.10 ratio         PTT - ( 2023 16:13 )  PTT:26.0 sec      Urinalysis Basic - ( 2023 17:40 )    Color: Yellow / Appearance: Turbid / S.020 / pH: x  Gluc: x / Ketone: Negative  / Bili: Negative / Urobili: <2 mg/dL   Blood: x / Protein: >600 mg/dL / Nitrite: Positive   Leuk Esterase: Moderate / RBC: 2 /HPF / WBC 55 /HPF   Sq Epi: x / Non Sq Epi: 8 /HPF / Bacteria: Many          RADIOLOGY & ADDITIONAL TESTS:  Results Reviewed:   Imaging Personally Reviewed:  Electrocardiogram Personally Reviewed:    COORDINATION OF CARE:  Care Discussed with Consultants/Other Providers [Y/N]:  Prior or Outpatient Records Reviewed [Y/N]:

## 2023-01-25 NOTE — DIETITIAN INITIAL EVALUATION ADULT - PERTINENT MEDS FT
MEDICATIONS  (STANDING):  cefTRIAXone   IVPB 1000 milliGRAM(s) IV Intermittent every 24 hours  chlorhexidine 2% Cloths 1 Application(s) Topical daily  heparin   Injectable 5000 Unit(s) SubCutaneous every 8 hours  levothyroxine 75 MICROGram(s) Oral daily  potassium chloride  10 mEq/100 mL IVPB 10 milliEquivalent(s) IV Intermittent every 1 hour  simvastatin 10 milliGRAM(s) Oral at bedtime  sodium chloride 0.9%. 1000 milliLiter(s) (75 mL/Hr) IV Continuous <Continuous>    MEDICATIONS  (PRN):  acetaminophen     Tablet .. 650 milliGRAM(s) Oral every 6 hours PRN Temp greater or equal to 38C (100.4F), Mild Pain (1 - 3)  aluminum hydroxide/magnesium hydroxide/simethicone Suspension 30 milliLiter(s) Oral every 4 hours PRN Dyspepsia  melatonin 3 milliGRAM(s) Oral at bedtime PRN Insomnia  ondansetron Injectable 4 milliGRAM(s) IV Push every 8 hours PRN Nausea and/or Vomiting

## 2023-01-25 NOTE — PROGRESS NOTE ADULT - PROBLEM SELECTOR PLAN 2
possible deconditioning from chemo, UTI, diarrhea  - CTA Chest neg for PE, shows stable GGOs and nodules  - Trops stable x2, EKG without acute ischemic findings. echo pending  - ECHO-  Normal LV systolic function. No segmental wall motion abnormalities.  Estimated LVEF in the 60% range  Normal left ventricular diastolic function.  -PT eval

## 2023-01-25 NOTE — DIETITIAN INITIAL EVALUATION ADULT - OTHER INFO
Per chart, 77 y.o. female PMH of HTN,remote cervical cancer s/p chemo/RT, then recurrence s/p pelvic exenteration with ostomy, currently being treated for locally advanced unresectable rectal cancer on chemotherapy, last dose almost 2 weeks ago, sent in to the ER for persistent n/v, weakness.     Nutrition interview: No recent episodes of nausea, vomiting, or constipation, pt c/o diarrhea, continues on contact precautions to r/o Cdiff at this time. BM noted on 1/25 per Pt. Consider probiotic to aide in normal BMs and promote gut health. Denies any chewing/swallowing difficulties. Pt states allergic to seafoods- updated in EMR. Stated UBW: ~120#, pt believes she has lost weight, believes admit wt 138# is inaccurate. Pt weighed herself PTA and was 114#. Food preferences explored and noted. Intake is 50-75% per RN flowsheets and per pt. Feeding skills: set up help required from staff. Pt amenable to trying Orgain 2x/day (440 kcal, 32 gm pro) to promote optimal PO intake. 
[id number]

## 2023-01-25 NOTE — PROGRESS NOTE ADULT - SUBJECTIVE AND OBJECTIVE BOX
Raudel Avina  Hospitalist  Pager- [phone]  Patient is a 77y old  Female who presents with a chief complaint of Worsening fatigue with diarrhea and SOB (2023 02:39)      SUBJECTIVE / OVERNIGHT EVENTS:      ADDITIONAL REVIEW OF SYSTEMS:    RESPIRATORY: No cough, wheezing, chills or hemoptysis; No shortness of breath  CARDIOVASCULAR: No chest pain, palpitations, dizziness, or leg swelling  GASTROINTESTINAL: No abdominal or epigastric pain. No nausea, vomiting, or hematemesis; No diarrhea or constipation. No melena or hematochezia.      MEDICATIONS  (STANDING):  cefTRIAXone   IVPB 1000 milliGRAM(s) IV Intermittent every 24 hours  chlorhexidine 2% Cloths 1 Application(s) Topical daily  heparin   Injectable 5000 Unit(s) SubCutaneous every 8 hours  levothyroxine 75 MICROGram(s) Oral daily  potassium chloride  10 mEq/100 mL IVPB 10 milliEquivalent(s) IV Intermittent every 1 hour  simvastatin 10 milliGRAM(s) Oral at bedtime    MEDICATIONS  (PRN):  acetaminophen     Tablet .. 650 milliGRAM(s) Oral every 6 hours PRN Temp greater or equal to 38C (100.4F), Mild Pain (1 - 3)  aluminum hydroxide/magnesium hydroxide/simethicone Suspension 30 milliLiter(s) Oral every 4 hours PRN Dyspepsia  melatonin 3 milliGRAM(s) Oral at bedtime PRN Insomnia  ondansetron Injectable 4 milliGRAM(s) IV Push every 8 hours PRN Nausea and/or Vomiting    CAPILLARY BLOOD GLUCOSE      POCT Blood Glucose.: 100 mg/dL (2023 16:05)    I&O's Summary      PHYSICAL EXAM:    Vital Signs Last 24 Hrs  T(C): 36.6 (2023 23:22), Max: 37.2 (2023 17:23)  T(F): 97.8 (2023 23:22), Max: 99 (2023 17:23)  HR: 67 (2023 23:22) (67 - 71)  BP: 103/62 (2023 23:22) (102/54 - 106/51)  BP(mean): --  RR: 19 (2023 23:22) (16 - 21)  SpO2: 97% (2023 23:22) (97% - 100%)    Parameters below as of 2023 23:22  Patient On (Oxygen Delivery Method): room air          CONSTITUTIONAL: NAD,  EYES: PERRLA; conjunctiva and sclera clear  ENMT: Moist oral mucosa, no pharyngeal injection or exudates;   NECK: Supple, no palpable masses;  RESPIRATORY: Normal respiratory effort; lungs are clear to auscultation bilaterally  CARDIOVASCULAR: Regular rate and rhythm, normal S1 and S2, no murmur/rub/gallop; No lower extremity edema; Peripheral pulses are 2+ bilaterally  ABDOMEN: Nontender to palpation, normoactive bowel sounds, no rebound/guarding; +colostomy   MUSCLOSKELETAL:   no clubbing or cyanosis of digits; no joint swelling or tenderness to palpation  PSYCH: A+O to person, place, and time; affect appropriate  NEUROLOGY: CN 2-12 are intact and symmetric; no gross sensory deficits;   SKIN: No rashes;     LABS:                                            9.5    3.94  )-----------( 318      ( 2023 07:25 )             32.1           135  |  105  |  23  ----------------------------<  85  2.9<LL>   |  17<L>  |  0.85    Ca    8.8      2023 07:25  Phos  2.5       Mg     2.30         TPro  6.8  /  Alb  3.6  /  TBili  <0.2  /  DBili  x   /  AST  20  /  ALT  7   /  AlkPhos  65       PTT - ( 2023 16:13 )  PTT:26.0 sec      Urinalysis Basic - ( 2023 17:40 )    Color: Yellow / Appearance: Turbid / S.020 / pH: x  Gluc: x / Ketone: Negative  / Bili: Negative / Urobili: <2 mg/dL   Blood: x / Protein: >600 mg/dL / Nitrite: Positive   Leuk Esterase: Moderate / RBC: 2 /HPF / WBC 55 /HPF   Sq Epi: x / Non Sq Epi: 8 /HPF / Bacteria: Many          RADIOLOGY & ADDITIONAL TESTS:  Results Reviewed:   Imaging Personally Reviewed:  Electrocardiogram Personally Reviewed:    COORDINATION OF CARE:  Care Discussed with Consultants/Other Providers [Y/N]:  Prior or Outpatient Records Reviewed [Y/N]:   Raudel Avina  Hospitalist  Pager- [phone]  Patient is a 77y old  Female who presents with a chief complaint of Worsening fatigue with diarrhea and SOB (2023 02:39)      SUBJECTIVE / OVERNIGHT EVENTS: Pt seen and examined by me   Feels better than onset. Had  3 loose BM yesterday and 1 semiformed BM this AM   Eating better. Denies N/V/Abdominal pain     ADDITIONAL REVIEW OF SYSTEMS:    RESPIRATORY: No cough, wheezing, chills or hemoptysis; No shortness of breath  CARDIOVASCULAR: No chest pain, palpitations, dizziness, or leg swelling  GASTROINTESTINAL: No abdominal or epigastric pain. No nausea, vomiting, or hematemesis; No diarrhea or constipation. No melena or hematochezia.      MEDICATIONS  (STANDING):  cefTRIAXone   IVPB 1000 milliGRAM(s) IV Intermittent every 24 hours  chlorhexidine 2% Cloths 1 Application(s) Topical daily  heparin   Injectable 5000 Unit(s) SubCutaneous every 8 hours  levothyroxine 75 MICROGram(s) Oral daily  potassium chloride  10 mEq/100 mL IVPB 10 milliEquivalent(s) IV Intermittent every 1 hour  simvastatin 10 milliGRAM(s) Oral at bedtime    MEDICATIONS  (PRN):  acetaminophen     Tablet .. 650 milliGRAM(s) Oral every 6 hours PRN Temp greater or equal to 38C (100.4F), Mild Pain (1 - 3)  aluminum hydroxide/magnesium hydroxide/simethicone Suspension 30 milliLiter(s) Oral every 4 hours PRN Dyspepsia  melatonin 3 milliGRAM(s) Oral at bedtime PRN Insomnia  ondansetron Injectable 4 milliGRAM(s) IV Push every 8 hours PRN Nausea and/or Vomiting    CAPILLARY BLOOD GLUCOSE      POCT Blood Glucose.: 100 mg/dL (2023 16:05)    I&O's Summary      PHYSICAL EXAM:    Vital Signs Last 24 Hrs  T(C): 36.6 (2023 23:22), Max: 37.2 (2023 17:23)  T(F): 97.8 (2023 23:22), Max: 99 (2023 17:23)  HR: 67 (2023 23:22) (67 - 71)  BP: 103/62 (2023 23:22) (102/54 - 106/51)  BP(mean): --  RR: 19 (2023 23:22) (16 - 21)  SpO2: 97% (2023 23:22) (97% - 100%)    Parameters below as of 2023 23:22  Patient On (Oxygen Delivery Method): room air          CONSTITUTIONAL: NAD,  EYES: PERRLA; conjunctiva and sclera clear  ENMT: Moist oral mucosa, no pharyngeal injection or exudates;   NECK: Supple, no palpable masses;  RESPIRATORY: Normal respiratory effort; lungs are clear to auscultation bilaterally  CARDIOVASCULAR: Regular rate and rhythm, normal S1 and S2, no murmur/rub/gallop; No lower extremity edema; Peripheral pulses are 2+ bilaterally  ABDOMEN: Nontender to palpation, normoactive bowel sounds, no rebound/guarding; +colostomy   MUSCLOSKELETAL:   no clubbing or cyanosis of digits; no joint swelling or tenderness to palpation  PSYCH: A+O to person, place, and time; affect appropriate  NEUROLOGY: CN 2-12 are intact and symmetric; no gross sensory deficits;   SKIN: No rashes;     LABS:                                            9.5    3.94  )-----------( 318      ( 2023 07:25 )             32.1       -    135  |  105  |  23  ----------------------------<  85  2.9<LL>   |  17<L>  |  0.85    Ca    8.8      2023 07:25  Phos  2.5       Mg     2.30         TPro  6.8  /  Alb  3.6  /  TBili  <0.2  /  DBili  x   /  AST  20  /  ALT  7   /  AlkPhos  65  -     PTT - ( 2023 16:13 )  PTT:26.0 sec      Urinalysis Basic - ( 2023 17:40 )    Color: Yellow / Appearance: Turbid / S.020 / pH: x  Gluc: x / Ketone: Negative  / Bili: Negative / Urobili: <2 mg/dL   Blood: x / Protein: >600 mg/dL / Nitrite: Positive   Leuk Esterase: Moderate / RBC: 2 /HPF / WBC 55 /HPF   Sq Epi: x / Non Sq Epi: 8 /HPF / Bacteria: Many          RADIOLOGY & ADDITIONAL TESTS:  Results Reviewed:   Imaging Personally Reviewed:  Electrocardiogram Personally Reviewed:    COORDINATION OF CARE:  Care Discussed with Consultants/Other Providers [Y/N]:  Prior or Outpatient Records Reviewed [Y/N]:

## 2023-01-25 NOTE — DIETITIAN INITIAL EVALUATION ADULT - PERTINENT LABORATORY DATA
01-25 Na 135 mmol/L Glu 85 mg/dL K+ 2.9 mmol/L<LL> Cr 0.85 mg/dL BUN 23 mg/dL Phos 2.5 mg/dL    A1C with Estimated Average Glucose Result: 5.5 % (12-28-22 @ 11:12)

## 2023-01-25 NOTE — CONSULT NOTE ADULT - ASSESSMENT
78 yo F with remote h/o cervical cancer s/p cis/RT then recurrence followed by pelvic exenteration with ostomy, ileal conduit, now has locally advanced rectal cancer and has been treated with FOLFOX then maintenance 5FU. She had recent progression and started on FOLFIRI. Sent it by Dr. Baker for n/v, fatigue. She has tested positive for norovirus. Oncology following for continuity.     # Rectal cancer  - Initially stage IIc disease; dx 2021; now unresectable locally advanced disease   - Completed 8 cycles FOLFOX then Xeloda/RT   - Deemed poor surgical candidate after neoadjuvant chemo/RT so was on 5-FU maintenance   - POD noted 10/26/22 and started on FOLFIRI  - Last chemo 1/11/23  - Now admitted with Norovirus   - No plans for inpt tm  - DVT/GI ppx per medicine. Supportive care per medicine      Raina Badillo, PGY-6  Hematology-Oncology Fellow  [phone] ([place]) [phone] ([facility])  I can also be reached on [employer] Teams  Please page fellow on call after 5pm and on weekends

## 2023-01-25 NOTE — PROGRESS NOTE ADULT - PROBLEM SELECTOR PLAN 4
baseline Cr 1, admission Cr 1.6. TAMARA likely pre-renal. resolved after IVF  - Hold HCTZ for now  -cont to monitor    #Hypokalemia- Replete

## 2023-01-25 NOTE — DIETITIAN INITIAL EVALUATION ADULT - NS FNS DIET ORDER
Diet, Regular:   DASH/TLC {Sodium & Cholesterol Restricted} (DASH)  Soft and Bite Sized (SOFTBTSZ) (01-24-23 @ 02:37) [Active]

## 2023-01-25 NOTE — PHYSICAL THERAPY INITIAL EVALUATION ADULT - NSPTDISCHREC_GEN_A_CORE
home. Patient will not be put on PT program as patient is at functional baseline/No skilled PT needs

## 2023-01-25 NOTE — CONSULT NOTE ADULT - SUBJECTIVE AND OBJECTIVE BOX
HPI:  This is a 77F with history of Cervical cancer s/p chemo/RT with recurrence s/p Pelvic Exenteration now with colostomy and ileostomy hole, Stage 4 rectal cancer (currently on FOLFIRI, last dosed on 1/11/23), HTN, HLD, and Hypothyroidism (2/2 VALDOVINOS) who presents to the hospital with complaints of diarrhea, fatigue, and SOB with exertion. Patient said that she has noted these symptoms since her chemotherapy was changed to FOLFIRI a few months ago, recently her symptoms have worsened. States that she has a few episodes of nonbloody liquid BMs from her colostomy without associated abd pain/nausea/emesis. Also with SOB with exertion but no chest pain, palpitations, PND, orthopnea, LE edema, or syncope. Recently her SOB worsened and she said that she is only able to walk a few steps before she has to stop due to her SOB. Also with generalized weakness. Was recommended to come to the hospital due to her worsening symptoms and therefore came to [facility]. Otherwise denies any other acute complaints.     On arrival to the ED, her vitals were T 97.8, P 100, /55, RR 16, O2 sat 99% RA. Her lab work showed anemia (chronic) with TAMARA and electrolyte abnormalities. Her UA was positive but she denied UTI complaints. Her imaging did not show acute abnormalities. She was given NS 1L x2, KCl supplementation, Mg sulfate 2g x1, and CTX 1G. She was admitted to medicine.  (24 Jan 2023 02:39)      14 point ROS otherwise negative    PAST MEDICAL & SURGICAL HISTORY:  HTN (hypertension)      HLD (hyperlipidemia)      Hypothyroid      Rectal cancer      H/O total hysterectomy with bilateral salpingo-oophorectomy (BSO)      Ileostomy present          Allergies    penicillin G benzathine (Rash; Anaphylaxis; Hives; Short breath)    Intolerances        MEDICATIONS  (STANDING):  cefTRIAXone   IVPB 1000 milliGRAM(s) IV Intermittent every 24 hours  chlorhexidine 2% Cloths 1 Application(s) Topical daily  heparin   Injectable 5000 Unit(s) SubCutaneous every 8 hours  levothyroxine 75 MICROGram(s) Oral daily  potassium chloride  10 mEq/100 mL IVPB 10 milliEquivalent(s) IV Intermittent every 1 hour  simvastatin 10 milliGRAM(s) Oral at bedtime  sodium chloride 0.9%. 1000 milliLiter(s) (75 mL/Hr) IV Continuous <Continuous>    MEDICATIONS  (PRN):  acetaminophen     Tablet .. 650 milliGRAM(s) Oral every 6 hours PRN Temp greater or equal to 38C (100.4F), Mild Pain (1 - 3)  aluminum hydroxide/magnesium hydroxide/simethicone Suspension 30 milliLiter(s) Oral every 4 hours PRN Dyspepsia  melatonin 3 milliGRAM(s) Oral at bedtime PRN Insomnia  ondansetron Injectable 4 milliGRAM(s) IV Push every 8 hours PRN Nausea and/or Vomiting      FAMILY HISTORY:  No pertinent family history in first degree relatives        SOCIAL HISTORY: No EtOH, no tobacco        VITALS:   T(F): 98.2 (01-25-23 @ 12:09), Max: 99 (01-24-23 @ 17:23)  HR: 88 (01-25-23 @ 12:09)  BP: 99/55 (01-25-23 @ 12:09)  RR: 18 (01-25-23 @ 12:09)  SpO2: 97% (01-25-23 @ 12:09)  Wt(kg): --    PHYSICAL EXAM    GENERAL: NAD, well-developed  HEAD:  Atraumatic, Normocephalic  EYES: EOMI, PERRLA, conjunctiva and sclera clear  NECK: Supple, No JVD  CHEST/LUNG: Clear to auscultation bilaterally; No wheeze  HEART: Regular rate and rhythm; No murmurs, rubs, or gallops  ABDOMEN: Soft, Nontender, Nondistended; Bowel sounds present  EXTREMITIES:  2+ Peripheral Pulses, No clubbing, cyanosis, or edema  NEUROLOGY: non-focal  SKIN: No rashes or lesions    LABS:                         9.5    3.94  )-----------( 318      ( 25 Jan 2023 07:25 )             32.1     01-25    135  |  105  |  23  ----------------------------<  85  2.9<LL>   |  17<L>  |  0.85    Ca    8.8      25 Jan 2023 07:25  Phos  2.5     01-25  Mg     2.30     01-25    TPro  6.8  /  Alb  3.6  /  TBili  <0.2  /  DBili  x   /  AST  20  /  ALT  7   /  AlkPhos  65  01-24    Phosphorus Level, Serum: 2.5 mg/dL (01-25 @ 07:25)  Magnesium, Serum: 2.30 mg/dL (01-25 @ 07:25)    PT/INR - ( 23 Jan 2023 16:13 )   PT: 12.8 sec;   INR: 1.10 ratio         PTT - ( 23 Jan 2023 16:13 )  PTT:26.0 sec  Clean Catch Clean Catch (Midstream)  01-23 @ 17:40   >100,000 CFU/ml Escherichia coli  <10,000 CFU/ml Normal Urogenital pedro pablo present  --  --      Clean Catch Clean Catch (Midstream)  12-30 @ 08:27   >100,000 CFU/ml Escherichia coli  --  Escherichia coli          IMAGING: Reviewed

## 2023-01-25 NOTE — PROGRESS NOTE ADULT - PROBLEM SELECTOR PLAN 9
DVT ppx - HSQ  Diet - DASH soft and bite sized  Activity - OOB to chair/with assistance    Fall and aspiration precautions DVT ppx - HSQ  Diet - DASH soft and bite sized  Activity - OOB to chair/with assistance    Fall and aspiration precautions  Anticipate dc in AM if pt continues to improve

## 2023-01-25 NOTE — PHYSICAL THERAPY INITIAL EVALUATION ADULT - GENERAL OBSERVATIONS, REHAB EVAL
Patient received in semifowler position in bed in [facility]. Patient denies chest pain, SOB, headache, and dizziness. verbal instruction

## 2023-01-25 NOTE — PROGRESS NOTE ADULT - PROBLEM SELECTOR PLAN 1
Sub-acute diarrhea now worsening, r/o infectious etiology   - CT A/P shows some thickening of the small bowel loops, possibly 2/2 RT vs infection vs inflammation from chemotherapy  - GI PCR  positive for Norovirus   - Check CDiff- pending collection  - Monitor colostomy output

## 2023-01-25 NOTE — PHYSICAL THERAPY INITIAL EVALUATION ADULT - PERTINENT HX OF CURRENT PROBLEM, REHAB EVAL
patient is a 77 year old female who presents with diarrhea, SOB, and fatigue. found to have TAMARA and electrolyte abnormalities

## 2023-01-25 NOTE — DIETITIAN INITIAL EVALUATION ADULT - ORAL INTAKE PTA/DIET HISTORY
Pt lives at home with her . They cook together at home. No difficulty obtaining groceries. No specific diet followed PTA. No supplements/Vitamins taken PTA.

## 2023-01-25 NOTE — DIETITIAN INITIAL EVALUATION ADULT - ADD RECOMMEND
1) Recommend d/c DASH/TLC and soft and bite sized restriction, Recommend Low fiber diet  2)  dept to provide Orgain 2x/day (440 kcal, 32 gm pro) to promote optimal PO intake  3) Consider probiotic supplement to help aide in normal BMs and promote gut health  4) Encourage PO intake and honor food preferences as able.  5) Continue to Monitor weights, labs, BM's, skin integrity, p.o. intake.   6) RD to f/u prn

## 2023-01-26 NOTE — PROGRESS NOTE ADULT - PROBLEM SELECTOR PLAN 2
possible deconditioning from chemo, UTI, diarrhea  - CTA Chest neg for PE, shows stable GGOs and nodules  - Trops stable x2, EKG without acute ischemic findings. echo pending  - ECHO-  Normal LV systolic function. No segmental wall motion abnormalities.  Estimated LVEF in the 60% range  Normal left ventricular diastolic function. possible deconditioning from chemo, UTI, diarrhea  CTA Chest neg for PE, shows stable GGOs and nodules  Trops stable x2, EKG without acute ischemic findings. echo pending  ECHO-  Normal LV systolic function. No segmental wall motion abnormalities.  Estimated LVEF in the 60% range  Normal left ventricular diastolic function.

## 2023-01-26 NOTE — DISCHARGE NOTE PROVIDER - NSDCCPCAREPLAN_GEN_ALL_CORE_FT
PRINCIPAL DISCHARGE DIAGNOSIS  Diagnosis: Diarrhea  Assessment and Plan of Treatment: Your diarrhea was likely secondary to Norovirus. Your CT showed   some thickening of the intestines, possibly secondary to infection vs inflammation from chemotherapy      SECONDARY DISCHARGE DIAGNOSES  Diagnosis: Shortness of breath on exertion  Assessment and Plan of Treatment: possible deconditioning from chemo, UTI, diarrhea  Your  CTA Chest was  negative for lung blood clot  for PE. your ECHO was normal .    Diagnosis: TAMARA (acute kidney injury)  Assessment and Plan of Treatment: You kidney function improved with IVF    Diagnosis: Rectal cancer  Assessment and Plan of Treatment: Follow up with Oncology as outpt    Diagnosis: Hypokalemia  Assessment and Plan of Treatment: Continue taking potassium pills as prescribed. Your bicarbw as also on the lower side. Ffollow up with your PMD within 5-7 days to recheck your electroylyes     PRINCIPAL DISCHARGE DIAGNOSIS  Diagnosis: Diarrhea  Assessment and Plan of Treatment: Your diarrhea was likely secondary to Norovirus. Your CT showed   some thickening of the intestines, possibly secondary to infection vs inflammation from chemotherapy      SECONDARY DISCHARGE DIAGNOSES  Diagnosis: Shortness of breath on exertion  Assessment and Plan of Treatment: possible deconditioning from chemo, UTI, diarrhea  Your  CTA Chest was  negative for lung blood clot  for PE. your ECHO was normal .    Diagnosis: TAMARA (acute kidney injury)  Assessment and Plan of Treatment: You kidney function improved with IVF    Diagnosis: Rectal cancer  Assessment and Plan of Treatment: Follow up with Oncology as outpt    Diagnosis: Hypokalemia  Assessment and Plan of Treatment: Continue taking potassium pills as prescribed. Your bicarbw as also on the lower side. Ffollow up with your PMD within 5-7 days to recheck your electroylyes    Diagnosis: Hypertension  Assessment and Plan of Treatment: Your home med HCTZ was held due to low potassium . Your BP in the hospital was normal. PLease Follow up with your PMD to recheck your BP and restart meds as needed

## 2023-01-26 NOTE — PROGRESS NOTE ADULT - PROBLEM SELECTOR PROBLEM 4
TAMARA (acute kidney injury)
negative - no nasal congestion
TAMARA (acute kidney injury)
TAMARA (acute kidney injury)

## 2023-01-26 NOTE — DISCHARGE NOTE NURSING/CASE MANAGEMENT/SOCIAL WORK - MODE OF TRANSPORTATION
Ab Reyes, [address],   Contact: [phone] [address]

## 2023-01-26 NOTE — PROGRESS NOTE ADULT - ASSESSMENT
This is a 77F PMH of HTN,remote cervical cancer s/p chemo/RT, then recurrence s/p pelvic exenteration with ostomy, currently being treated for locally advanced unresectable rectal cancer on chemotherapy, last dose almost 2 weeks ago, sent in to the ER for persistent n/v, weakness.     
This is a 77F with history as above who presents to the hospital with worsening diarrhea, SOB with exertion, and fatigue. Found to have TAMARA with electrolyte abnormalities. 
This is a 77F PMH of HTN,remote cervical cancer s/p chemo/RT, then recurrence s/p pelvic exenteration with ostomy, currently being treated for locally advanced unresectable rectal cancer on chemotherapy, last dose almost 2 weeks ago, sent in to the ER for persistent n/v, weakness.

## 2023-01-26 NOTE — DISCHARGE NOTE NURSING/CASE MANAGEMENT/SOCIAL WORK - NSDCPEFALRISK_GEN_ALL_CORE
For information on Fall & Injury Prevention, visit: [email or web address] OR  [email or web address] OR  https://www.cdc.gov/steadi/patient.html

## 2023-01-26 NOTE — DISCHARGE NOTE PROVIDER - HOSPITAL COURSE
77F PMH of HTN,remote cervical cancer s/p chemo/RT, then recurrence s/p pelvic exenteration with ostomy, currently being treated for locally advanced unresectable rectal cancer on chemotherapy, last dose almost 2 weeks ago, sent in to the ER for persistent n/v, weakness.     # Sub-acute diarrhea now worsening- likely secondary to Norovirus  - CT A/P shows some thickening of the small bowel loops, possibly 2/2 RT vs infection vs inflammation from chemotherapy  - GI PCR  positive for Norovirus   - Check CDiff- pending collection- not sent yet. Stool semiformed   - Monitor colostomy output.    # Shortness of breath on exertion-  possible deconditioning from chemo, UTI, diarrhea  - CTA Chest neg for PE, shows stable GGOs and nodules  - Trops stable x2, EKG without acute ischemic findings. echo pending  - ECHO-  Normal LV systolic function. No segmental wall motion abnormalities.  Estimated LVEF in the 60% range  Normal left ventricular diastolic function.    # Acute UTI.- -UC- Ecoli   -s/p ceftriaxone x 3 days.    #  TAMARA (acute kidney injury)-  baseline Cr 1, admission Cr 1.6. TAMARA likely pre-renal. resolved after IVF  - Hold HCTZ for now  -cont to monitor    #Hypokalemia- Replete    # Rectal cancer-  Plan: Appreciate Onc recs  FU outpt.     Problem/Plan - 6:  ·  Problem: Essential hypertension.   ·  Plan: - Hold HCTZ, monitor BPs.    # Hyperlipidemia-  c/w home simvastatin.    #Hypothyroidism-  c/w levothyroxine.

## 2023-01-26 NOTE — PROGRESS NOTE ADULT - REASON FOR ADMISSION
Worsening fatigue with diarrhea and SOB

## 2023-01-26 NOTE — PROGRESS NOTE ADULT - NUTRITIONAL ASSESSMENT
This patient has been assessed with a concern for Malnutrition and has been determined to have a diagnosis/diagnoses of Moderate protein-calorie malnutrition.    This patient is being managed with:   Diet Regular-  Fiber/Residue Restricted (LOWFIBER)  Soft and Bite Sized (SOFTBTSZ)  Entered: Jan 25 2023  3:00PM

## 2023-01-26 NOTE — DISCHARGE NOTE PROVIDER - NSDCMRMEDTOKEN_GEN_ALL_CORE_FT
acetaminophen 325 mg oral tablet: 2 tab(s) orally every 6 hours, As needed, Temp greater or equal to 38C (100.4F), Mild Pain (1 - 3)  aluminum hydroxide-magnesium hydroxide 200 mg-200 mg/5 mL oral suspension: 30 milliliter(s) orally every 4 hours, As needed, Dyspepsia  lactobacillus acidophilus oral capsule: 1 tab(s) orally once a day  melatonin 3 mg oral tablet: 1 tab(s) orally once a day (at bedtime), As needed, Insomnia  potassium chloride 20 mEq oral tablet, extended release: 1 tab(s) orally 2 times a day, Stop after 3 days  simvastatin 10 mg oral tablet: 1 tab(s) orally once a day (at bedtime)  Synthroid 75 mcg (0.075 mg) oral tablet: 1 tab(s) orally once a day

## 2023-01-26 NOTE — PROGRESS NOTE ADULT - PROBLEM SELECTOR PLAN 1
Sub-acute diarrhea now worsening, r/o infectious etiology   - CT A/P shows some thickening of the small bowel loops, possibly 2/2 RT vs infection vs inflammation from chemotherapy  - GI PCR  positive for Norovirus   - Check CDiff- pending collection- not sent yet. Stool semiformed   - Monitor colostomy output Sub-acute diarrhea now worsening, r/o infectious etiology   CT A/P shows some thickening of the small bowel loops, possibly 2/2 RT vs infection vs inflammation from chemotherapy  GI PCR  positive for Norovirus   CDiff not sent as stool semiformed   Monitor colostomy output

## 2023-01-26 NOTE — PROGRESS NOTE ADULT - PROBLEM SELECTOR PLAN 4
baseline Cr 1, admission Cr 1.6. TAMARA likely pre-renal. resolved after IVF  - Hold HCTZ for now  -cont to monitor    #Hypokalemia- Replete baseline Cr 1, admission Cr 1.6. TAMARA likely pre-renal. resolved after IVF  Hold HCTZ for now  cont to monitor    # Non anion gap metabolic acidosis- Likely secondary to diarrhea.  Advise to FU with PMD in 1 week for repeat labs    #Hypokalemia- Replete  Pt advised to FU with PMD  for repeat labs

## 2023-01-26 NOTE — DISCHARGE NOTE NURSING/CASE MANAGEMENT/SOCIAL WORK - PATIENT PORTAL LINK FT
You can access the FollowMyHealth Patient Portal offered by [facility] by registering at the following website: [email or web address]. By joining [facility]’s FollowMyHealth portal, you will also be able to view your health information using other applications (apps) compatible with our system.

## 2023-01-26 NOTE — DISCHARGE NOTE PROVIDER - DETAILS OF MALNUTRITION DIAGNOSIS/DIAGNOSES
This patient has been assessed with a concern for Malnutrition and was treated during this hospitalization for the following Nutrition diagnosis/diagnoses:     -  01/25/2023: Moderate protein-calorie malnutrition

## 2023-01-26 NOTE — PROGRESS NOTE ADULT - PROBLEM SELECTOR PLAN 9
DVT ppx - HSQ  Diet - DASH soft and bite sized  Activity - OOB to chair/with assistance    Fall and aspiration precautions  DC planning 33mins COUGH

## 2023-01-26 NOTE — PROGRESS NOTE ADULT - SUBJECTIVE AND OBJECTIVE BOX
Raudel Rodriguezira  Hospitalist  Pager- [phone]  Patient is a 77y old  Female who presents with a chief complaint of Worsening fatigue with diarrhea and SOB (24 Jan 2023 02:39)      SUBJECTIVE / OVERNIGHT EVENTS: Pt seen and examined by me   Feels better than onset.   Eating better. Denies N/V/Abdominal pain     ADDITIONAL REVIEW OF SYSTEMS:    RESPIRATORY: No cough, wheezing, chills or hemoptysis; No shortness of breath  CARDIOVASCULAR: No chest pain, palpitations, dizziness, or leg swelling  GASTROINTESTINAL: No abdominal or epigastric pain. No nausea, vomiting, or hematemesis; No diarrhea or constipation. No melena or hematochezia.      MEDICATIONS  (STANDING):  cefTRIAXone   IVPB 1000 milliGRAM(s) IV Intermittent every 24 hours  chlorhexidine 2% Cloths 1 Application(s) Topical daily  heparin   Injectable 5000 Unit(s) SubCutaneous every 8 hours  lactobacillus acidophilus 1 Tablet(s) Oral daily  levothyroxine 75 MICROGram(s) Oral daily  simvastatin 10 milliGRAM(s) Oral at bedtime  sodium chloride 0.9%. 1000 milliLiter(s) (75 mL/Hr) IV Continuous <Continuous>    MEDICATIONS  (PRN):  acetaminophen     Tablet .. 650 milliGRAM(s) Oral every 6 hours PRN Temp greater or equal to 38C (100.4F), Mild Pain (1 - 3)  aluminum hydroxide/magnesium hydroxide/simethicone Suspension 30 milliLiter(s) Oral every 4 hours PRN Dyspepsia  melatonin 3 milliGRAM(s) Oral at bedtime PRN Insomnia  ondansetron Injectable 4 milliGRAM(s) IV Push every 8 hours PRN Nausea and/or Vomiting    CAPILLARY BLOOD GLUCOSE      POCT Blood Glucose.: 100 mg/dL (23 Jan 2023 16:05)    I&O's Summary      PHYSICAL EXAM:    Vital Signs Last 24 Hrs  T(C): 36.3 (26 Jan 2023 04:35), Max: 37 (25 Jan 2023 20:30)  T(F): 97.3 (26 Jan 2023 04:35), Max: 98.6 (25 Jan 2023 20:30)  HR: 80 (26 Jan 2023 04:35) (71 - 88)  BP: 98/61 (26 Jan 2023 04:35) (98/61 - 106/49)  BP(mean): --  RR: 18 (26 Jan 2023 04:35) (18 - 19)  SpO2: 100% (26 Jan 2023 04:35) (97% - 100%)    Parameters below as of 26 Jan 2023 04:35  Patient On (Oxygen Delivery Method): room air          CONSTITUTIONAL: NAD,  EYES: PERRLA; conjunctiva and sclera clear  ENMT: Moist oral mucosa, no pharyngeal injection or exudates;   NECK: Supple, no palpable masses;  RESPIRATORY: Normal respiratory effort; lungs are clear to auscultation bilaterally  CARDIOVASCULAR: Regular rate and rhythm, normal S1 and S2, no murmur/rub/gallop; No lower extremity edema; Peripheral pulses are 2+ bilaterally  ABDOMEN: Nontender to palpation, normoactive bowel sounds, no rebound/guarding; +colostomy   MUSCLOSKELETAL:   no clubbing or cyanosis of digits; no joint swelling or tenderness to palpation  PSYCH: A+O to person, place, and time; affect appropriate  NEUROLOGY: CN 2-12 are intact and symmetric; no gross sensory deficits;   SKIN: No rashes;     LABS:                                   9.4    3.41  )-----------( 322      ( 26 Jan 2023 06:39 )             31.2   01-26    137  |  109<H>  |  17  ----------------------------<  85  3.2<L>   |  16<L>  |  0.86    Ca    8.9      26 Jan 2023 06:39  Phos  2.5     01-26  Mg     2.00     01-26          RADIOLOGY & ADDITIONAL TESTS:  Results Reviewed:   Imaging Personally Reviewed:  Electrocardiogram Personally Reviewed:    COORDINATION OF CARE:  Care Discussed with Consultants/Other Providers [Y/N]:  Prior or Outpatient Records Reviewed [Y/N]:   Raudel Avina  Hospitalist  Pager- [phone]  Patient is a 77y old  Female who presents with a chief complaint of Worsening fatigue with diarrhea and SOB (24 Jan 2023 02:39)      SUBJECTIVE / OVERNIGHT EVENTS: Pt seen and examined by me   Feels better than onset. Eating better. Denies N/V/Abdominal pain   Had 3-4 semiformed BM x 24 hours which is close to baseline  Feels more energetic than before     ADDITIONAL REVIEW OF SYSTEMS:    RESPIRATORY: No cough, wheezing, chills or hemoptysis; No shortness of breath  CARDIOVASCULAR: No chest pain, palpitations, dizziness, or leg swelling  GASTROINTESTINAL: No abdominal or epigastric pain. No nausea, vomiting, or hematemesis; No diarrhea or constipation. No melena or hematochezia.      MEDICATIONS  (STANDING):  cefTRIAXone   IVPB 1000 milliGRAM(s) IV Intermittent every 24 hours  chlorhexidine 2% Cloths 1 Application(s) Topical daily  heparin   Injectable 5000 Unit(s) SubCutaneous every 8 hours  lactobacillus acidophilus 1 Tablet(s) Oral daily  levothyroxine 75 MICROGram(s) Oral daily  simvastatin 10 milliGRAM(s) Oral at bedtime  sodium chloride 0.9%. 1000 milliLiter(s) (75 mL/Hr) IV Continuous <Continuous>    MEDICATIONS  (PRN):  acetaminophen     Tablet .. 650 milliGRAM(s) Oral every 6 hours PRN Temp greater or equal to 38C (100.4F), Mild Pain (1 - 3)  aluminum hydroxide/magnesium hydroxide/simethicone Suspension 30 milliLiter(s) Oral every 4 hours PRN Dyspepsia  melatonin 3 milliGRAM(s) Oral at bedtime PRN Insomnia  ondansetron Injectable 4 milliGRAM(s) IV Push every 8 hours PRN Nausea and/or Vomiting    CAPILLARY BLOOD GLUCOSE      POCT Blood Glucose.: 100 mg/dL (23 Jan 2023 16:05)    I&O's Summary      PHYSICAL EXAM:    Vital Signs Last 24 Hrs  T(C): 36.3 (26 Jan 2023 04:35), Max: 37 (25 Jan 2023 20:30)  T(F): 97.3 (26 Jan 2023 04:35), Max: 98.6 (25 Jan 2023 20:30)  HR: 80 (26 Jan 2023 04:35) (71 - 88)  BP: 98/61 (26 Jan 2023 04:35) (98/61 - 106/49)  BP(mean): --  RR: 18 (26 Jan 2023 04:35) (18 - 19)  SpO2: 100% (26 Jan 2023 04:35) (97% - 100%)    Parameters below as of 26 Jan 2023 04:35  Patient On (Oxygen Delivery Method): room air          CONSTITUTIONAL: NAD,  EYES: PERRLA; conjunctiva and sclera clear  ENMT: Moist oral mucosa, no pharyngeal injection or exudates;   NECK: Supple, no palpable masses;  RESPIRATORY: Normal respiratory effort; lungs are clear to auscultation bilaterally  CARDIOVASCULAR: Regular rate and rhythm, normal S1 and S2, no murmur/rub/gallop; No lower extremity edema; Peripheral pulses are 2+ bilaterally  ABDOMEN: Nontender to palpation, normoactive bowel sounds, no rebound/guarding; +colostomy   MUSCLOSKELETAL:   no clubbing or cyanosis of digits; no joint swelling or tenderness to palpation  PSYCH: A+O to person, place, and time; affect appropriate  NEUROLOGY: CN 2-12 are intact and symmetric; no gross sensory deficits;   SKIN: No rashes;     LABS:                                   9.4    3.41  )-----------( 322      ( 26 Jan 2023 06:39 )             31.2   01-26    137  |  109<H>  |  17  ----------------------------<  85  3.2<L>   |  16<L>  |  0.86    Ca    8.9      26 Jan 2023 06:39  Phos  2.5     01-26  Mg     2.00     01-26          RADIOLOGY & ADDITIONAL TESTS:  Results Reviewed:   Imaging Personally Reviewed:  Electrocardiogram Personally Reviewed:    COORDINATION OF CARE:  Care Discussed with Consultants/Other Providers [Y/N]:  Prior or Outpatient Records Reviewed [Y/N]:

## 2023-01-26 NOTE — DISCHARGE NOTE PROVIDER - CARE PROVIDER_API CALL
Yuri Hammer  INTERNAL MEDICINE  [address] [place] [address]  Phone: [phone]  Fax: [phone]  Follow Up Time:

## 2023-02-04 PROBLEM — A08.11: Status: ACTIVE | Noted: 2023-01-01

## 2023-02-04 NOTE — REASON FOR VISIT
[Follow-Up Visit] : a follow-up [Spouse] : spouse [Family Member] : family member [FreeTextEntry2] : Locally advanced rectal cancer

## 2023-02-04 NOTE — HISTORY OF PRESENT ILLNESS
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 \par \par In June 2020 Abi noticed a rectal discharge and burning sensation in the rectum. Was evaluated by Dr. Lamar and ultimately saw Dr. Oropeza who ordered CT Pelvis and MRI Pelvis . Found to have a mass in the rectum s/p biopsy confirmed adenocarcinoma. \par \par 7/18/20: MRI Pelvis: large hypovascular mass c/w mucinous adenoca involving remaining distal rectum and vagina, tumor abuts anal sphincter\par 8/5/20: CT pelvis: residual chronic perineal mass, likely cystic with gas component\par 9/23/20: CT CAP: low density, possibly necrotic mass up to 6.2 cm with involvement of the vagina, nonspecific 6 mm LLL nodule is noted\par 10/3/20: PET/CT: hypermetabolic rectal mass, non specific small inguinal LN, nonspecific hypermetabolism within the colostomy insertion site\par 10/7/20: discussed at rectal TB, plan for DILMA with re-evaluation of response and then surgery if residual disease\par 10/26/20: C1 FOLFOX\par 11/9/20: C2 (c/b acute oxali neurotoxicity)\par 11/23/20: C3 (Oxali over 4 hrs) - tolerated well \par 12/7/20: C4 5FU/LV (developed difficulty swallowing, SOB after receiving Oxali, d/c Oxali for today's tx)\par 12/21-2/10/21: C5-8 FOLFOX (dose reduced Oxali 65 mg/m2 over 4 hrs)\par 2/13/21: CT CAP: 0.6 cm LLL nodule, rectal mass slightly decreased in size, small b/l inguinal LN minimally decr in size, pneumatosis is noted involving SB possible fistula \par 2/25-3/23/21: Xeloda/RT \par 4/21/21: CT CAP: mild interval decrease in size of rectal mass, stable pulm nodule \par 5/1/21: case reviewed at TB, due to disease involving SB and need for skin flap to repear large defect, surgery is not feasible. Recommendation to cont with palliative chemo. \par 5/26/21-10/12/22- current: maintenance 5FU/LV (refused addition of Zirabev)\par 7/14/21: CT CAP: improved/stable disease\par 1/3/22: CT CAP: stable disease\par 4/8/22: CT CAP: stable disease \par 7/11/22: CT CAP: stable disease \par 10/3/22 CT C/A/P: reviewed images with radiology and there is disease recurrence with increased communication to vagina\par 10/26-12/28/22: C1-C5 FOLFIRI \par 11/23/22: found to have B12 deficiency, started on B12 supplementation \par 1/5/23: CT C/A/P: pending\par 1/11/23: C6 FOLFIRI. \par 1/23-1/26/23: admitted to [facility] with Norovirus, diarrhea, n/v [de-identified] : adenocarcinoma [FreeTextEntry1] : off chemo  [de-identified] : + LIAO started in Nov 2022, denies any palpitations, progressive. On further questioning, symptoms are more c/w fatigue. reports poor QOL since starting FOLFIRI. No significant diarrhea. Since hospital d/c diarrhea has resolved. still feels tired.

## 2023-03-01 PROBLEM — N89.8 VAGINAL DISCHARGE: Status: ACTIVE | Noted: 2020-07-10

## 2023-03-15 NOTE — PHYSICAL EXAM
[Fully active, able to carry on all pre-disease performance without restriction] : Status 0 - Fully active, able to carry on all pre-disease performance without restriction [Normal] : affect appropriate [de-identified] : + ostomy

## 2023-03-15 NOTE — HISTORY OF PRESENT ILLNESS
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 \par \par In June 2020 Abi noticed a rectal discharge and burning sensation in the rectum. Was evaluated by Dr. Lamar and ultimately saw Dr. Oropeza who ordered CT Pelvis and MRI Pelvis . Found to have a mass in the rectum s/p biopsy confirmed adenocarcinoma. \par \par 7/18/20: MRI Pelvis: large hypovascular mass c/w mucinous adenoca involving remaining distal rectum and vagina, tumor abuts anal sphincter\par 8/5/20: CT pelvis: residual chronic perineal mass, likely cystic with gas component\par 9/23/20: CT CAP: low density, possibly necrotic mass up to 6.2 cm with involvement of the vagina, nonspecific 6 mm LLL nodule is noted\par 10/3/20: PET/CT: hypermetabolic rectal mass, non specific small inguinal LN, nonspecific hypermetabolism within the colostomy insertion site\par 10/7/20: discussed at rectal TB, plan for DILMA with re-evaluation of response and then surgery if residual disease\par 10/26/20: C1 FOLFOX\par 11/9/20: C2 (c/b acute oxali neurotoxicity)\par 11/23/20: C3 (Oxali over 4 hrs) - tolerated well \par 12/7/20: C4 5FU/LV (developed difficulty swallowing, SOB after receiving Oxali, d/c Oxali for today's tx)\par 12/21-2/10/21: C5-8 FOLFOX (dose reduced Oxali 65 mg/m2 over 4 hrs)\par 2/13/21: CT CAP: 0.6 cm LLL nodule, rectal mass slightly decreased in size, small b/l inguinal LN minimally decr in size, pneumatosis is noted involving SB possible fistula \par 2/25-3/23/21: Xeloda/RT \par 4/21/21: CT CAP: mild interval decrease in size of rectal mass, stable pulm nodule \par 5/1/21: case reviewed at TB, due to disease involving SB and need for skin flap to repear large defect, surgery is not feasible. Recommendation to cont with palliative chemo. \par 5/26/21-10/12/22- current: maintenance 5FU/LV (refused addition of Zirabev)\par 7/14/21: CT CAP: improved/stable disease\par 1/3/22: CT CAP: stable disease\par 4/8/22: CT CAP: stable disease \par 7/11/22: CT CAP: stable disease \par 10/3/22 CT C/A/P: reviewed images with radiology and there is disease recurrence with increased communication to vagina\par 10/26-12/28/22: C1-C5 FOLFIRI \par 11/23/22: found to have B12 deficiency, started on B12 supplementation \par 1/5/23: CT C/A/P: unchanged rectal tumor, mildly dilated SB ileus vs partial SBO, mild prominence of CBD, correlate with LFTs, scattered pulm nodules \par 1/11/23: C6 FOLFIRI. \par 1/23-1/26/23: admitted to [facility] with Norovirus, diarrhea, n/v\par 2/8-3/8/23: 5FU/LV alone Q 2 week, irinotecan stopped due to AEs\par  [de-identified] : adenocarcinoma [de-identified] : reports vaginal discharge x 1 mos, needs to wear pads. occasionally burning in the perineum area. Saw Dr. Lamar who thinks there is a fistula and recommended supportive care.otherwise pt cont to tolerate chemo well without difficulty Appetite is stable. Energy level is fair. no N/v.

## 2023-03-31 NOTE — PHYSICAL EXAM
[Restricted in physically strenuous activity but ambulatory and able to carry out work of a light or sedentary nature] : Status 1- Restricted in physically strenuous activity but ambulatory and able to carry out work of a light or sedentary nature, e.g., light house work, office work [Normal] : affect appropriate [de-identified] : normal respiratory pattern

## 2023-03-31 NOTE — HISTORY OF PRESENT ILLNESS
[Home] : at home, [unfilled] , at the time of the visit. [Other Location: e.g. 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 \par \par In June 2020 Abi noticed a rectal discharge and burning sensation in the rectum. Was evaluated by Dr. Lamar and ultimately saw Dr. Oropeza who ordered CT Pelvis and MRI Pelvis . Found to have a mass in the rectum s/p biopsy confirmed adenocarcinoma. \par \par 7/18/20: MRI Pelvis: large hypovascular mass c/w mucinous adenoca involving remaining distal rectum and vagina, tumor abuts anal sphincter\par 8/5/20: CT pelvis: residual chronic perineal mass, likely cystic with gas component\par 9/23/20: CT CAP: low density, possibly necrotic mass up to 6.2 cm with involvement of the vagina, nonspecific 6 mm LLL nodule is noted\par 10/3/20: PET/CT: hypermetabolic rectal mass, non specific small inguinal LN, nonspecific hypermetabolism within the colostomy insertion site\par 10/7/20: discussed at rectal TB, plan for DILMA with re-evaluation of response and then surgery if residual disease\par 10/26/20: C1 FOLFOX\par 11/9/20: C2 (c/b acute oxali neurotoxicity)\par 11/23/20: C3 (Oxali over 4 hrs) - tolerated well \par 12/7/20: C4 5FU/LV (developed difficulty swallowing, SOB after receiving Oxali, d/c Oxali for today's tx)\par 12/21-2/10/21: C5-8 FOLFOX (dose reduced Oxali 65 mg/m2 over 4 hrs)\par 2/13/21: CT CAP: 0.6 cm LLL nodule, rectal mass slightly decreased in size, small b/l inguinal LN minimally decr in size, pneumatosis is noted involving SB possible fistula \par 2/25-3/23/21: Xeloda/RT \par 4/21/21: CT CAP: mild interval decrease in size of rectal mass, stable pulm nodule \par 5/1/21: case reviewed at TB, due to disease involving SB and need for skin flap to repear large defect, surgery is not feasible. Recommendation to cont with palliative chemo. \par 5/26/21-10/12/22- current: maintenance 5FU/LV (refused addition of Zirabev)\par 7/14/21: CT CAP: improved/stable disease\par 1/3/22: CT CAP: stable disease\par 4/8/22: CT CAP: stable disease \par 7/11/22: CT CAP: stable disease \par 10/3/22 CT C/A/P: reviewed images with radiology and there is disease recurrence with increased communication to vagina\par 10/26-12/28/22: C1-C5 FOLFIRI \par 11/23/22: found to have B12 deficiency, started on B12 supplementation \par 1/5/23: CT C/A/P: unchanged rectal tumor, mildly dilated SB ileus vs partial SBO, mild prominence of CBD, correlate with LFTs, scattered pulm nodules \par 1/11/23: C6 FOLFIRI. \par 1/23-1/26/23: admitted to [facility] with Norovirus, diarrhea, n/v\par 2/8-3/22/23: 5FU/LV alone Q 2 week, irinotecan stopped due to AEs\par 3/27/23: CT CAP: POD of rectal mass, incr from 5.2 to 7.3 cm, evidence of fistula [de-identified] : adenocarcinoma [de-identified] : perineal burning is persistent, interferes with ADLs. Is not moving around or cooking as much due to pain. Appetite has decreased, lost 5 lbs or so over the past few weeks. Per chart review, objective loss of 2-3 lbs in 1 mos. more fatigue. Has significant discharge from vagina and occasionally the rectum. Wakes up often during the night due to this. We reviewed the results of CT CAP which show POD.

## 2023-03-31 NOTE — ASSESSMENT
[Palliative] : Goals of care discussed with patient: Palliative [Palliative Care Plan] : not applicable at this time [FreeTextEntry1] : Patient was seen and plan discussed with Dr. Baker.\par \par Aryles Hedjar, MD, PGY-5\par Hematology/Oncology Fellow\par [facility]

## 2023-03-31 NOTE — PHYSICAL EXAM
[Fully active, able to carry on all pre-disease performance without restriction] : Status 0 - Fully active, able to carry on all pre-disease performance without restriction [Normal] : affect appropriate [de-identified] : + ostomy

## 2023-03-31 NOTE — HISTORY OF PRESENT ILLNESS
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 \par \par In June 2020 Abi noticed a rectal discharge and burning sensation in the rectum. Was evaluated by Dr. Lamar and ultimately saw Dr. Oropeza who ordered CT Pelvis and MRI Pelvis . Found to have a mass in the rectum s/p biopsy confirmed adenocarcinoma. \par \par 7/18/20: MRI Pelvis: large hypovascular mass c/w mucinous adenoca involving remaining distal rectum and vagina, tumor abuts anal sphincter\par 8/5/20: CT pelvis: residual chronic perineal mass, likely cystic with gas component\par 9/23/20: CT CAP: low density, possibly necrotic mass up to 6.2 cm with involvement of the vagina, nonspecific 6 mm LLL nodule is noted\par 10/3/20: PET/CT: hypermetabolic rectal mass, non specific small inguinal LN, nonspecific hypermetabolism within the colostomy insertion site\par 10/7/20: discussed at rectal TB, plan for DILMA with re-evaluation of response and then surgery if residual disease\par 10/26/20: C1 FOLFOX\par 11/9/20: C2 (c/b acute oxali neurotoxicity)\par 11/23/20: C3 (Oxali over 4 hrs) - tolerated well \par 12/7/20: C4 5FU/LV (developed difficulty swallowing, SOB after receiving Oxali, d/c Oxali for today's tx)\par 12/21-2/10/21: C5-8 FOLFOX (dose reduced Oxali 65 mg/m2 over 4 hrs)\par 2/13/21: CT CAP: 0.6 cm LLL nodule, rectal mass slightly decreased in size, small b/l inguinal LN minimally decr in size, pneumatosis is noted involving SB possible fistula \par 2/25-3/23/21: Xeloda/RT \par 4/21/21: CT CAP: mild interval decrease in size of rectal mass, stable pulm nodule \par 5/1/21: case reviewed at TB, due to disease involving SB and need for skin flap to repear large defect, surgery is not feasible. Recommendation to cont with palliative chemo. \par 5/26/21-10/12/22- current: maintenance 5FU/LV (refused addition of Zirabev)\par 7/14/21: CT CAP: improved/stable disease\par 1/3/22: CT CAP: stable disease\par 4/8/22: CT CAP: stable disease \par 7/11/22: CT CAP: stable disease \par 10/3/22 CT C/A/P: reviewed images with radiology and there is disease recurrence with increased communication to vagina\par 10/26-12/28/22: C1-C5 FOLFIRI \par 11/23/22: found to have B12 deficiency, started on B12 supplementation \par 1/5/23: CT C/A/P: unchanged rectal tumor, mildly dilated SB ileus vs partial SBO, mild prominence of CBD, correlate with LFTs, scattered pulm nodules \par 1/11/23: C6 FOLFIRI. \par 1/23-1/26/23: admitted to [facility] with Norovirus, diarrhea, n/v\par 2/8-3/22/23: 5FU/LV C1-4 5FU/LV alone Q 2 weeks, irinotecan stopped due to AEs\par  [de-identified] : adenocarcinoma [de-identified] : Patient still reports vaginal drip from known fistula. She denies pain/cramping, N/V, or diarrhea. She also notes inner vaginal burning. She continues to f/u with Dr. Lamar, who is recommending supportive care for a fistula. She otherwise has normal appetite and energy. She denies SOB or CP.\par \par Patient feels like she needs something to help for her vaginal discharge and burning.\par She also discussed her potassium is mildly low but she cannot take large pills and has concerns about the taste of the solution.

## 2023-03-31 NOTE — REASON FOR VISIT
[Follow-Up Visit] : a follow-up [Family Member] : family member [FreeTextEntry2] : Locally advanced rectal cancer

## 2023-04-14 PROBLEM — Z92.21 HISTORY OF CHEMOTHERAPY: Status: RESOLVED | Noted: 2023-01-01 | Resolved: 2023-01-01

## 2023-04-14 PROBLEM — Z78.9 NO FAMILY HISTORY OF CANCER: Status: ACTIVE | Noted: 2023-01-01

## 2023-04-14 NOTE — OB/GYN HISTORY
[Definite:  ___ (Date)] : the last menstrual period was [unfilled] [Currently In Menopause] : currently in menopause [___] : Living: [unfilled] [Spotting Between  Menses] : no spotting between menses [History of Birth Control Pills] : Patient has no history of taking birth control pills [History of Hormone Replacement Therapy] : no history of hormone replacement therapy [Experiencing Menopausal Sxs] : not experiencing menopausal symptoms

## 2023-04-14 NOTE — HISTORY OF PRESENT ILLNESS
[FreeTextEntry1] : Ms. Irby is a 77 year old woman with a history of Stage IIB cervical cancer s/p EBRT 45 Gy and 2 fractions LDR brachytherapy in 1998 at [facility] complicated by rectovaginal fistula, followed by pelvic exenteration in 1999 for persistent disease. Presented  for consult with Dr. Joao Rosa 9/28/2020 with with newly diagnosed [id number] Stage IIB rectal adenocarcinoma of the rectal stump with a rectovaginal fistula to the distal vagina. Her CEA was 8.1. She is s/p systemic therapy with FOLFOX and was on capecitabine with Dr. Baker. Has colostomy and urostomy. She completed a course of external beam radiation therapy to the pelvis and rectum to 36Gy/18Fx on 3/24/21. \par \par Patient with local disease progression and oncologic management by Dr. Baker for patient to be treated with ow dose Irinotecan 100 mg/m2 given pt's heterozygosity for UGT1A1 and titrating up as tolerated.\par \par CT-C/A/P performed on 3/27/2023: IMPRESSION: Since 1/23/2023, there has been increase in size of the centrally necrotic versus mucinous rectal stump mass now measuring up to 7.3 cm, previously 5.4 cm. Nonvisualization of the vagina is concerning for invasion/fistulization.\par Multiple moderately dilated small bowel loops without discrete transition point, suggesting partial bowel obstruction versus ileus. Degree of bowel dilatation is increased from mild on prior study 1/5/2023. Bowel dilatation is in the setting of a matted appearance of several small bowel loops in the deep pelvis, which may be related to adhesions or radiation treatment related changes. The matted appearance is stable on multiple priors.\par Stable pulmonary nodules.\par \par 4/14/2023: Patient presents to clinic for consultation in consideration of RT. Patient experiencing increased vaginal white discharge for a few months, states can be large amount at times. Denies vaginal bleeding. Vaginal burning for a few months was at 10/10 pain scale, improvement recently with Methadone pain management reports 5/10 pain scale. Unable to tell if any vaginal edema. Uses sitz baths and Vaseline for comfort measure with relief. Patient has colostomy and uroscopy and denies any changes in output. Denies any rectal pain/discomfort.\par

## 2023-04-14 NOTE — REVIEW OF SYSTEMS
[Fatigue] : fatigue [Negative] : Allergic/Immunologic [Constipation: Grade 0] : Constipation: Grade 0 [Diarrhea: Grade 0] : Diarrhea: Grade 0 [Dyspepsia: Grade 0] : Dyspepsia: Grade 0 [Dysphagia: Grade 0] : Dysphagia: Grade 0 [Esophagitis: Grade 0] : Esophagitis: Grade 0 [Nausea: Grade 0] : Nausea: Grade 0 [Rectal Pain: Grade 0] : Rectal Pain: Grade 0 [Vomiting: Grade 0] : Vomiting: Grade 0 [Fatigue: Grade 2 - Fatigue not relieved by rest; limiting instrumental ADL] : Fatigue: Grade 2 - Fatigue not relieved by rest; limiting instrumental ADL [Genital Edema: Grade 0] : Genital Edema: Grade 0 [Vaginal Infection: Grade 0] : Vaginal Infection: Grade 0  [Fever] : no fever [Chills] : no chills [Night Sweats] : no night sweats [FreeTextEntry2] : fatigue, chronic issue, not relieved by rest.  [FreeTextEntry7] : colostomy [FreeTextEntry8] : urostomy [FreeTextEntry3] : Baseline

## 2023-04-14 NOTE — VITALS
[Maximal Pain Intensity: 5/10] : 5/10 [Least Pain Intensity: 0/10] : 0/10 [Pain Description/Quality: ___] : Pain description/quality: [unfilled] [Pain Duration: ___] : Pain duration: [unfilled] [Pain Location: ___] : Pain Location: [unfilled] [Opioid] : opioid [80: Normal activity with effort; some signs or symptoms of disease.] : 80: Normal activity with effort; some signs or symptoms of disease.  [ECOG Performance Status: 2 - Ambulatory and capable of all self care but unable to carry out any work activities] : Performance Status: 2 - Ambulatory and capable of all self care but unable to carry out any work activities. Up and about more than 50% of waking hours [Date: ____________] : Patient's last distress assessment performed on [unfilled]. [4 - Distress Level] : Distress Level: 4 [Patient given social work contact information and resource sheet] : Patient was given social work contact information and resource sheet

## 2023-05-05 PROBLEM — E83.51 HYPOCALCEMIA: Status: ACTIVE | Noted: 2023-01-01

## 2023-05-05 PROBLEM — R10.2 PELVIC PAIN: Status: ACTIVE | Noted: 2020-08-03

## 2023-05-05 PROBLEM — D64.9 ANEMIA: Status: ACTIVE | Noted: 2023-01-01

## 2023-05-05 PROBLEM — E87.6 HYPOKALEMIA: Status: ACTIVE | Noted: 2022-01-01

## 2023-05-05 PROBLEM — N82.3 RECTOVAGINAL FISTULA: Status: ACTIVE | Noted: 2023-01-01

## 2023-05-05 NOTE — PHYSICAL EXAM
[Restricted in physically strenuous activity but ambulatory and able to carry out work of a light or sedentary nature] : Status 1- Restricted in physically strenuous activity but ambulatory and able to carry out work of a light or sedentary nature, e.g., light house work, office work [Normal] : affect appropriate [de-identified] : normal respiratory pattern

## 2023-05-05 NOTE — HISTORY OF PRESENT ILLNESS
[Disease: _____________________] : Disease: [unfilled] [T: ___] : T[unfilled] [N: ___] : N[unfilled] [M: ___] : M[unfilled] [AJCC Stage: ____] : AJCC Stage: [unfilled] [Therapy: ___] : Therapy: [unfilled] [Cycle: ___] : Cycle: [unfilled] [Day: ___] : Day: [unfilled] [Home] : at home, [unfilled] , at the time of the visit. [Other Location: e.g. Home (Enter Location, City,State)___] : at [unfilled] [Spouse] : spouse [Family Member] : family member [Verbal consent obtained from patient] : the patient, [unfilled] [de-identified] : 78 F w/ h/o recurrent endocervical adenocarcinoma Stage IIB s/p radiation then required pelvic exoneration presents for further evaluation of rectal cancer. \par \par In June 2020 Abi noticed a rectal discharge and burning sensation in the rectum. Was evaluated by Dr. Lamar and ultimately saw Dr. Oropeza who ordered CT Pelvis and MRI Pelvis . Found to have a mass in the rectum s/p biopsy confirmed adenocarcinoma. \par \par 7/18/20: MRI Pelvis: large hypovascular mass c/w mucinous adenoca involving remaining distal rectum and vagina, tumor abuts anal sphincter\par 8/5/20: CT pelvis: residual chronic perineal mass, likely cystic with gas component\par 9/23/20: CT CAP: low density, possibly necrotic mass up to 6.2 cm with involvement of the vagina, nonspecific 6 mm LLL nodule is noted\par 10/3/20: PET/CT: hypermetabolic rectal mass, non specific small inguinal LN, nonspecific hypermetabolism within the colostomy insertion site\par 10/7/20: discussed at rectal TB, plan for DILMA with re-evaluation of response and then surgery if residual disease\par 10/26/20: C1 FOLFOX\par 11/9/20: C2 (c/b acute oxali neurotoxicity)\par 11/23/20: C3 (Oxali over 4 hrs) - tolerated well \par 12/7/20: C4 5FU/LV (developed difficulty swallowing, SOB after receiving Oxali, d/c Oxali for today's tx)\par 12/21-2/10/21: C5-8 FOLFOX (dose reduced Oxali 65 mg/m2 over 4 hrs)\par 2/13/21: CT CAP: 0.6 cm LLL nodule, rectal mass slightly decreased in size, small b/l inguinal LN minimally decr in size, pneumatosis is noted involving SB possible fistula \par 2/25-3/23/21: Xeloda/RT \par 4/21/21: CT CAP: mild interval decrease in size of rectal mass, stable pulm nodule \par 5/1/21: case reviewed at TB, due to disease involving SB and need for skin flap to repear large defect, surgery is not feasible. Recommendation to cont with palliative chemo. \par 5/26/21-10/12/22- current: maintenance 5FU/LV (refused addition of Zirabev)\par 7/14/21: CT CAP: improved/stable disease\par 1/3/22: CT CAP: stable disease\par 4/8/22: CT CAP: stable disease \par 7/11/22: CT CAP: stable disease \par 10/3/22 CT C/A/P: reviewed images with radiology and there is disease recurrence with increased communication to vagina\par 10/26-12/28/22: C1-C5 FOLFIRI \par 11/23/22: found to have B12 deficiency, started on B12 supplementation \par 1/5/23: CT C/A/P: unchanged rectal tumor, mildly dilated SB ileus vs partial SBO, mild prominence of CBD, correlate with LFTs, scattered pulm nodules \par 1/11/23: C6 FOLFIRI. \par 1/23-1/26/23: admitted to [facility] with Norovirus, diarrhea, n/v\par 2/8-3/22/23: 5FU/LV alone Q 2 week, irinotecan stopped due to AEs\par 3/27/23: CT CAP: POD of rectal mass, incr from 5.2 to 7.3 cm, evidence of fistula\par 4/5/23: single agent Irino Q2 weeks [de-identified] : adenocarcinoma [de-identified] : Patient still notes perineal burning and dripping. She wears pads and changes them every 30-60 minutes. She denies blood or pain anywhere else. She is taking methadone 5 mg QHS. No diarrhea or constipation, but does have BMs up to 3 times per day. No fevers, chills, CP, cough, or SOB. She has an adequate appetite. She does feel fatigued but has no problems ambulating.

## 2023-05-05 NOTE — REVIEW OF SYSTEMS
[Fatigue] : fatigue [Vaginal Discharge] : vaginal discharge [Negative] : Allergic/Immunologic [FreeTextEntry8] : perineal burning

## 2023-05-12 PROBLEM — C20 RECTAL CARCINOMA: Status: ACTIVE | Noted: 2020-09-17

## 2023-05-12 NOTE — CHART NOTE - NSCHARTNOTEFT_GEN_A_CORE
78 F w/ locally advanced rectal cancer on 2nd line therapy with single agent Irinotecan Q 2 weeks, last dose was 9 days ago presents for evaluation of N/V, decrease output from ostomy. Pt with a remote h/o recurrent cervical cancer s/p pelvic exenteration with ostomy and ileal conduit.   Pt has had previous imaging which has suggested SBO but pt was without symptoms. Now  symptoms suggest possible SBO. She has not had nausea and vomiting previously with chemo so I do not believe this is the cause. Symptoms have not responded to oral antiemetic and she has not been able to tolerate much PO.     Recommendation   - CT A/P w/co to eval   - if surgical issues, please contact Dr. Oropeza, colorectal surgeon  - house onc will follow

## 2023-05-12 NOTE — ED PROVIDER NOTE - PROGRESS NOTE DETAILS
Javad Morales, DO PGY-2: Surgery saw patient, due to patient's significant cancer there is no acute surgical intervention at this time and recommend medicine admission.  We will admit patient to medicine.

## 2023-05-12 NOTE — PHYSICAL EXAM
[Ambulatory and capable of all self care but unable to carry out any work activities] : Status 2- Ambulatory and capable of all self care but unable to carry out any work activities. Up and about more than 50% of waking hours [Normal] : no peripheral adenopathy appreciated [de-identified] : chronically ill appearing [FreeTextEntry1] : ostomy bag in place. Abdomen is soft, non-tender

## 2023-05-12 NOTE — ED ADULT TRIAGE NOTE - CHIEF COMPLAINT QUOTE
c/o vomiting, dec appetite, hx colostomy/urostomy, sent from [facility] r/o SBO, (hx rectal ca - last chemo: last week, +has port in R anterior chest wall)

## 2023-05-12 NOTE — ED PROVIDER NOTE - OBJECTIVE STATEMENT
78y F here for vomiting x3d. Hx CA s/p resection with ostomy in place, still receiving chemotherapy most recently 9d pta. VOmiting started 3d ago, hesham with significant worsening. Per oncology note: "remote h/o recurrent cervical cancer s/p pelvic exenteration with ostomy and ileal conduit.   Pt has had previous imaging which has suggested SBO but pt was without symptoms. Now  symptoms suggest possible SBO. She has not had nausea and vomiting previously with chemo so I do not believe this is the cause. Symptoms have not responded to oral antiemetic and she has not been able to tolerate much PO."    Denies any bleeding, fever. No abd pain. No rashes, no dizziness or HA. Seen in onc clinic today where she was given 1L NS and sent to ED for further evaluation.

## 2023-05-12 NOTE — ED ADULT NURSE NOTE - NSFALLUNIVINTERV_ED_ALL_ED
Bed/Stretcher in lowest position, wheels locked, appropriate side rails in place/Call bell, personal items and telephone in reach/Instruct patient to call for assistance before getting out of bed/chair/stretcher/Non-slip footwear applied when patient is off stretcher/[place] to call system/Physically safe environment - no spills, clutter or unnecessary equipment/Purposeful proactive rounding/Room/bathroom lighting operational, light cord in reach

## 2023-05-12 NOTE — ED PROVIDER NOTE - CLINICAL SUMMARY MEDICAL DECISION MAKING FREE TEXT BOX
Primary concern given N/V, decreased ostomy output is for SBO. Place PIV, obtain CTAP w/ PO and IV contrast. May be side effect of chemotherapy but no changes to chemotherapeutic regimen and no hx of N/V related to meds in the past. No obvious infectious etiology given lack of fever and lack of focality on exam, no dysuria, no SOB, no cough so no c/f pna or UTI. Durga Dubose MD

## 2023-05-12 NOTE — ED CLERICAL - NS ED CLERK NOTE PRE-ARRIVAL INFORMATION; ADDITIONAL PRE-ARRIVAL INFORMATION
CC/Reason For referral: rectal CA, n/v, concern for SBO  Preferred Consultant(if applicable):  Who admits for you (if needed):  Do you have documents you would like to fax over?  Would you still like to speak to an ED attending? please call after patient is seen

## 2023-05-12 NOTE — CHART NOTE - NSCHARTNOTESELECT_GEN_ALL_CORE
Discharge instructions given to patient verbalized understanding. Patient waiting for ride. States he will put his call light on when ride gets here. Oncology attending/Event Note

## 2023-05-12 NOTE — ED PROVIDER NOTE - ATTENDING CONTRIBUTION TO CARE
Attending (Maik Rojas D.O.):  I have personally seen and examined this patient. I have performed a substantive portion of the visit including all aspects of the medical decision making. Resident, fellow, student, and/or ACP note reviewed. I agree on the plan of care except where noted.    78F hx of rectal cancer ongoing chemo, has right chest wall port, ileal conduit here for 3 days of nausea, vomiting, nonbloody nonbilious, small bm, not passing gas, seen by onc, ref for sbo eval. Denies infx sxs, trauma, bleeding. + nl ileal conduit output. Hemodynam stable, + firm palpable mass in rlq, nondistended abd. Ileal conduit site, with some urine leakage around but otherwise c/d/i. No jaundice, no signs of anemia. No bleeding externally. suspicion for sbo/lbo low. Eval with labs, CT. -> patient signed out pending remainder of w/u, close reassessments, discussion of results, dispo.

## 2023-05-12 NOTE — ED ADULT NURSE NOTE - CHIEF COMPLAINT QUOTE
c/o vomiting, dec appetite, hx colostomy/urostomy, sent from [facility] r/o SBO, (hx rectal ca - last chemo: last week, +has port in R anterior chest wall)

## 2023-05-12 NOTE — ED ADULT NURSE NOTE - OBJECTIVE STATEMENT
77YO female with PMH of CA, small bowel obstruction (SBO), ostomy bag, port- still receiving chemo last session was 9 days ago via walk in presenting with complaints of vomiting. Pt states having mx episodes of emesis for the last three days, with decreased PO.  Pt was at apt receiving "Fluids" and was informed to come to the ED due to the vomiting. As per pt, pt self catheterizes for urine, has own 14fr catheter, MD Dubose is aware and informed pt & RN that pt can use own supplies to self catheterize. Pt Axox4, respirations even, & non-labored. radial pulses strong and equal bilaterally. Abdomen non-distended. Skin warm, dry, and intact. Pt denies CP, SOB, HA, Fevers, dizziness, or lightheadedness.  at bedside. Pt placed in position of comfort. Pt educated on call bell system and provided call bell. Bed in lowest position, wheels locked, appropriate side rails raised. Pt denies needs at this time.

## 2023-05-12 NOTE — REVIEW OF SYSTEMS
[Fatigue] : fatigue [Constipation] : constipation [Negative] : Allergic/Immunologic [FreeTextEntry8] : +decreased urine output

## 2023-05-12 NOTE — ED PROVIDER NOTE - GASTROINTESTINAL, MLM
Abdomen soft, non-tender, no guarding. Mass felt underlying ostomy bag in LLQ. Urostomy stoma well appearing in RLQ, no overlying erythema or induration

## 2023-05-12 NOTE — HISTORY OF PRESENT ILLNESS
[Disease: _____________________] : Disease: [unfilled] [T: ___] : T[unfilled] [N: ___] : N[unfilled] [M: ___] : M[unfilled] [AJCC Stage: ____] : AJCC Stage: [unfilled] [Therapy: ___] : Therapy: [unfilled] [de-identified] : 78 F w/ h/o recurrent endocervical adenocarcinoma Stage IIB s/p radiation then required pelvic exoneration presents for further evaluation of rectal cancer. \par \par In June 2020 Abi noticed a rectal discharge and burning sensation in the rectum. Was evaluated by Dr. Lamar and ultimately saw Dr. Oropeza who ordered CT Pelvis and MRI Pelvis. Found to have a mass in the rectum s/p biopsy confirmed adenocarcinoma. \par \par 7/18/20: MRI Pelvis: large hypovascular mass c/w mucinous adenoca involving remaining distal rectum and vagina, tumor abuts anal sphincter\par 8/5/20: CT pelvis: residual chronic perineal mass, likely cystic with gas component\par 9/23/20: CT CAP: low density, possibly necrotic mass up to 6.2 cm with involvement of the vagina, nonspecific 6 mm LLL nodule is noted\par 10/3/20: PET/CT: hypermetabolic rectal mass, non specific small inguinal LN, nonspecific hypermetabolism within the colostomy insertion site\par 10/7/20: discussed at rectal TB, plan for DILMA with re-evaluation of response and then surgery if residual disease\par 10/26/20: C1 FOLFOX\par 11/9/20: C2 (c/b acute oxali neurotoxicity)\par 11/23/20: C3 (Oxali over 4 hrs) - tolerated well \par 12/7/20: C4 5FU/LV (developed difficulty swallowing, SOB after receiving Oxali, d/c Oxali for today's tx)\par 12/21-2/10/21: C5-8 FOLFOX (dose reduced Oxali 65 mg/m2 over 4 hrs)\par 2/13/21: CT CAP: 0.6 cm LLL nodule, rectal mass slightly decreased in size, small b/l inguinal LN minimally decr in size, pneumatosis is noted involving SB possible fistula \par 2/25-3/23/21: Xeloda/RT \par 4/21/21: CT CAP: mild interval decrease in size of rectal mass, stable pulm nodule \par 5/1/21: case reviewed at TB, due to disease involving SB and need for skin flap to repear large defect, surgery is not feasible. Recommendation to cont with palliative chemo. \par 5/26/21-10/12/22- current: maintenance 5FU/LV (refused addition of Zirabev)\par 7/14/21: CT CAP: improved/stable disease\par 1/3/22: CT CAP: stable disease\par 4/8/22: CT CAP: stable disease \par 7/11/22: CT CAP: stable disease \par 10/3/22 CT C/A/P: reviewed images with radiology and there is disease recurrence with increased communication to vagina\par 10/26-12/28/22: C1-C5 FOLFIRI \par 11/23/22: found to have B12 deficiency, started on B12 supplementation \par 1/5/23: CT C/A/P: unchanged rectal tumor, mildly dilated SB ileus vs partial SBO, mild prominence of CBD, correlate with LFTs, scattered pulm nodules \par 1/11/23: C6 FOLFIRI. \par 1/23-1/26/23: [facility] with [facility]ovirus, diarrhea, n/v\par 2/8-3/22/23: 5FU/LV alone Q 2 week, irinotecan stopped due to AEs\par 3/27/23: CT CAP: POD of rectal mass, incr from 5.2 to 7.3 cm, evidence of fistula\par 4/5/23: single agent Irino Q2 weeks. \par  [de-identified] : adenocarcinoma [de-identified] : Patient is here with her  for an urgent visit. She has been feeling poorly. She is having episodes of vomiting and difficulty keeping food down. She is eating much less d/t this. She has tried reglan without relief. Associated with no stool output from her ostomy yesterday. Starting around 4 am this morning, she stated to have stool output, however, she is still vomiting. She also had a period of no urine output via her catheter.  Denies fever/chills, abdominal pain, bleeding, herniation

## 2023-05-13 NOTE — H&P ADULT - NSHPPHYSICALEXAM_GEN_ALL_CORE
Vital Signs Last 24 Hrs  T(C): 36.6 (13 May 2023 13:04), Max: 36.9 (12 May 2023 18:56)  T(F): 97.8 (13 May 2023 13:04), Max: 98.5 (12 May 2023 18:56)  HR: 80 (13 May 2023 13:04) (79 - 101)  BP: 98/64 (13 May 2023 13:04) (93/63 - 114/72)  BP(mean): 71 (13 May 2023 05:30) (68 - 71)  RR: 18 (13 May 2023 13:04) (18 - 20)  SpO2: 97% (13 May 2023 13:04) (95% - 100%)    Parameters below as of 13 May 2023 06:15  Patient On (Oxygen Delivery Method): room air        CONSTITUTIONAL: Well-groomed, in no apparent distress  EYES: No conjunctival or scleral injection, non-icteric; PERRLA and symmetric  ENMT: No external nasal lesions; no pharyngeal injection or exudates, oral mucosa with moist membranes  NECK: Trachea midline without palpable neck mass; thyroid not enlarged and non-tender  RESPIRATORY: Breathing comfortably; lungs CTA without wheeze/rhonchi/rales  CARDIOVASCULAR: +S1S2, RRR, no M/G/R; pedal pulses full and symmetric; no lower extremity edema  GASTROINTESTINAL: No palpable masses or tenderness, +BS throughout, no rebound/guarding; no hepatosplenomegaly; no hernia palpated. +ostomy bag, empty  MUSCULOSKELETAL: no digital clubbing or cyanosis; no paraspinal tenderness; normal strength and tone of extremities  SKIN: No rashes or ulcers noted; no subcutaneous nodules or induration palpable  NEUROLOGIC: CN II-XII intact; sensation intact in LEs b/l to light touch  PSYCHIATRIC: A+O x 3; mood and affect appropriate; appropriate insight and judgment

## 2023-05-13 NOTE — PATIENT PROFILE ADULT - FALL HARM RISK - UNIVERSAL INTERVENTIONS
Bed in lowest position, wheels locked, appropriate side rails in place/Call bell, personal items and telephone in reach/Instruct patient to call for assistance before getting out of bed or chair/Non-slip footwear when patient is out of bed/[place] to call system/Physically safe environment - no spills, clutter or unnecessary equipment/Purposeful Proactive Rounding/Room/bathroom lighting operational, light cord in reach

## 2023-05-13 NOTE — H&P ADULT - NSHPREVIEWOFSYSTEMS_GEN_ALL_CORE
REVIEW OF SYSTEMS:    CONSTITUTIONAL: No weakness, weight loss, fevers or chills  EYES/ENT: No visual changes;  No vertigo or throat pain   NECK: No pain or stiffness  RESPIRATORY: No cough, wheezing, hemoptysis; No shortness of breath  CARDIOVASCULAR: No chest pain or palpitations, LIAO  GASTROINTESTINAL: No abdominal or epigastric pain. No nausea, vomiting, or hematemesis; No diarrhea. + constipation. No melena or hematochezia.  GENITOURINARY: No dysuria, frequency or hematuria  NEUROLOGICAL: No numbness or weakness, AAOX3  SKIN: No itching, rashes  MUSCULOSKELETAL: no joint erythema, no joint swelling  PSYCHIATRIC: no depression, no anxiety

## 2023-05-13 NOTE — PATIENT PROFILE ADULT - INTERNATIONAL TRAVEL
Reported per patient who does not have medical care, patient discovered 6 mos ago from needle exchange service; not currently being treated  Will order chronic hepatitis labs including Hep C RNA     No

## 2023-05-13 NOTE — CONSULT NOTE ADULT - ASSESSMENT
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 Pt's last chemo session was 3 days ago. Pt reports no gas or stool in ostomy bag over the last day. Found to have a high-grade SBO.    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 Pt's last chemo session was 3 days ago. Pt reports no gas or stool in ostomy bag over the last day. Found to have a high-grade SBO.    PLAN  - No operative intervention  Pt was previously presented at the tumor board in 2021 and deemed a poor operative candidate at that time  - Non-operative SBO management, NGT IVF  - malignant SBO protocol, reglan 10mg iv q8, octreotide 100mg sc q8, decadron 4mg iv q12  - Care per primary    Green Surgery  [id number]

## 2023-05-13 NOTE — H&P ADULT - PROBLEM SELECTOR PLAN 2
-f/u with oncology clinic after discharge, sees Dr. Nury Baker  - prescribed methadone 5mg QHS for pelvic pain, confirmed on I-STOP, hold off for now due to SBO

## 2023-05-13 NOTE — H&P ADULT - PROBLEM SELECTOR PLAN 1
- seen on CT A/P  - currently patient is hungry, denies nausea, but still has not passed gas/had BM  - colorectal surgery following, patient is known to Dr. Oropeza  - place NGT, monitor output  - start IVF  - monitor for stool output  - colorectal surgery recommends malignant SBO protocol, reglan 10mg iv q8, octreotide 100mg sc q8, decadron 4mg iv q12

## 2023-05-13 NOTE — PATIENT PROFILE ADULT - FUNCTIONAL ASSESSMENT - DAILY ACTIVITY 3.
Abdomen , soft, nontender, nondistended , no guarding or rigidity , no masses palpable , normal bowel sounds 
4 = No assist / stand by assistance

## 2023-05-13 NOTE — H&P ADULT - ASSESSMENT
78F w/pmh remote h/o recurrent cervical cancer s/p pelvic exenteration with ostomy and ileal conduit, currently being treat for locally advanced rectal cancer with irinotecan q2 weeks, hypothyroidism, presents to [facility] for nausea, vomiting, and decreased ostomy output. Admitted for SBO, colorectal surgery following, recommend non-operative management.

## 2023-05-13 NOTE — H&P ADULT - NSHPADDITIONALINFOADULT_GEN_ALL_CORE
Plan discussed with NP Namrata Dalton. Also discussed with surgery team.    Mojgan Kenney, DO  Available on Teams todd

## 2023-05-13 NOTE — H&P ADULT - HISTORY OF PRESENT ILLNESS
78F w/pmh remote h/o recurrent cervical cancer s/p pelvic exenteration with ostomy and ileal conduit, currently being treat for locally advanced rectal cancer with irinotecan q2 weeks, hypothyroidism, presents to [facility] for nausea, vomiting, and decreased ostomy output. Patient was seen at oncology clinic and referred to the ER for further evaluation. CT a/p showed high grade SBO. The patient currently has no nausea or abdominal pain. However her ostomy bag is empty and she has not passed gas. The last time she had something to eat was breakfast on 5/12. She tolerated the oral contrast w/o issues for the CT scan. She is requesting something to eat or drink. Patient was seen by colorectal surgery team as well, who recommended non-operative management.

## 2023-05-14 NOTE — PROGRESS NOTE ADULT - ATTENDING COMMENTS
Surgery Attending    Pt remains clinically stable  No c/o pain or nausea w NG in place   No flatus or BM since admission    Abdomen soft, nontender, nondistended    Continue NG decompression, IV hydration

## 2023-05-14 NOTE — PROGRESS NOTE ADULT - ASSESSMENT
78F w/pmh remote h/o recurrent cervical cancer s/p pelvic exenteration with ostomy and ileal conduit, currently being treat for locally advanced rectal cancer with irinotecan q2 weeks, hypothyroidism, presents to [facility] for nausea, vomiting, and decreased ostomy output. Admitted for SBO, colorectal surgery following, recommend non-operative management.

## 2023-05-14 NOTE — PROGRESS NOTE ADULT - PROBLEM SELECTOR PLAN 1
- seen on CT A/P  - currently patient is hungry, denies nausea, but still has not passed gas/had BM  - colorectal surgery following, patient is known to Dr. Oropeza  - placed NGT, monitor output  - c/w IVF  - monitor for stool output  - malignant SBO protocol, reglan 10mg iv q8, octreotide 100mg sc q8, decadron 4mg iv q12

## 2023-05-14 NOTE — PROGRESS NOTE ADULT - SUBJECTIVE AND OBJECTIVE BOX
[facility] Division of Hospital Medicine  Reece Parker MD  Available via MS Teams  Date of Service: 05-14-23 @ 15:04    SUBJECTIVE / OVERNIGHT EVENTS:  No acute events overnight. Patient tolerating NGT well, denies pain, nausea, vomiting. Endorses hunger. Denies flatus or BM.  Denies diarrhea, fevers, chills, chest pain, SOB.       MEDICATIONS  (STANDING):  dexAMETHasone  Injectable 4 milliGRAM(s) IV Push every 12 hours  enoxaparin Injectable 40 milliGRAM(s) SubCutaneous every 24 hours  lactated ringers. 1000 milliLiter(s) (100 mL/Hr) IV Continuous <Continuous>  metoclopramide Injectable 10 milliGRAM(s) IV Push every 8 hours  octreotide  Injectable 100 MICROGram(s) SubCutaneous every 8 hours    MEDICATIONS  (PRN):  melatonin 3 milliGRAM(s) Oral at bedtime PRN Insomnia      I&O's Summary    13 May 2023 07:01  -  14 May 2023 07:00  --------------------------------------------------------  IN: 0 mL / OUT: 150 mL / NET: -150 mL        PHYSICAL EXAM:  Vital Signs Last 24 Hrs  T(C): 36.3 (14 May 2023 12:41), Max: 36.6 (13 May 2023 16:57)  T(F): 97.3 (14 May 2023 12:41), Max: 97.9 (13 May 2023 16:57)  HR: 61 (14 May 2023 12:41) (61 - 80)  BP: 96/62 (14 May 2023 12:41) (96/62 - 105/62)  BP(mean): --  RR: 18 (14 May 2023 12:41) (17 - 18)  SpO2: 99% (14 May 2023 12:41) (93% - 99%)    Parameters below as of 14 May 2023 12:41  Patient On (Oxygen Delivery Method): room air      CONSTITUTIONAL: NAD, comfortable appearing woman  EYES: PERRLA; conjunctiva and sclera clear  ENMT: Moist oral mucosa, no pharyngeal injection or exudates  RESPIRATORY: Normal respiratory effort; lungs are clear to auscultation bilaterally  CARDIOVASCULAR: Regular rate and rhythm, normal S1 and S2, no murmur/rub/gallop; No lower extremity edema; Peripheral pulses are 2+ bilaterally  ABDOMEN: Nontender to palpation, diminished bowel sounds, no rebound/guarding; Empty colostomy bag  MUSCULOSKELETAL:  no joint swelling or tenderness to palpation  PSYCH: A+O to person, place, and time; affect appropriate  NEUROLOGY: CN 2-12 are intact and symmetric; no gross sensory deficits   SKIN: No rashes; no palpable lesions    LABS:                        9.4    2.59  )-----------( 443      ( 14 May 2023 07:14 )             32.9     05-14    140  |  96  |  13  ----------------------------<  71  4.1   |  25  |  0.89    Ca    8.3<L>      14 May 2023 07:10  Phos  4.4     05-14  Mg     .8     05-14    TPro  6.8  /  Alb  3.7  /  TBili  0.5  /  DBili  x   /  AST  11  /  ALT  7<L>  /  AlkPhos  56  05-12    PT/INR - ( 13 May 2023 00:20 )   PT: 16.2 sec;   INR: 1.40 ratio         PTT - ( 13 May 2023 00:20 )  PTT:25.3 sec    SARS-CoV-2: NotDetec (23 Jan 2023 16:00)      RADIOLOGY & ADDITIONAL TESTS:  New Imaging Personally Reviewed Today:      New Electrocardiogram Personally Reviewed Today:  Other Results Reviewed Today:   Prior or Outpatient Records Reviewed Today with Summary:    COORDINATION OF CARE:  Consultant Communication and Details of Discussion (where applicable):  Reviewed Surgery notes, continue NGT

## 2023-05-15 NOTE — PROGRESS NOTE ADULT - ASSESSMENT
78F w/pmh remote h/o recurrent cervical cancer s/p pelvic exenteration with ostomy and ileal conduit, currently being treat for locally advanced rectal cancer with irinotecan q2 weeks, hypothyroidism, presents to [facility] for nausea, vomiting, and decreased ostomy output. Admitted for SBO, colorectal surgery following, recommend non-operative management.

## 2023-05-15 NOTE — DIETITIAN INITIAL EVALUATION ADULT - SIGNS/SYMPTOMS
<75% of estimated energy needs for > 7 days. mild muscle wasting & fat loss,  > 7.5% wt loss X 3 mon NPO >/=3 days since admission

## 2023-05-15 NOTE — DIETITIAN INITIAL EVALUATION ADULT - OTHER INFO
Weights:  --Drug Dosing Weight: 52.6 kg/ 116 pounds (5/12/23)  -- Pt reports her UBW to be between 120-117 pounds  -- [facility]: 55.3 kg/ 121.9 pounds (4/21/23); 57 kg/ 125.7 pounds (2/22/23); 56.2 kg/ 123.9 pounds (11/9/22); 59.9 kg/ 132.1 pounds (5/12/22)   -- Noted with significant weight loss; see data below for further details.   -- IBW: 105 pounds %IBW: 110%

## 2023-05-15 NOTE — DIETITIAN INITIAL EVALUATION ADULT - ADD RECOMMEND
1 Monitor PO intake, GI tolerance, skin integrity and labs. RD remains available upon request and will follow-up per protocol  2 Malnutrition Sticker placed in pt. chart

## 2023-05-15 NOTE — DIETITIAN INITIAL EVALUATION ADULT - ENERGY INTAKE
-- Pt currently NPO withe ice chips/sips of water; NGT in place for  decompression, ordered for IV fluids for hydration 3 Days/Number of days NPO (enter number of days in comment field)

## 2023-05-15 NOTE — DIETITIAN INITIAL EVALUATION ADULT - REASON FOR ADMISSION
Chart Reviewed, Events Noted  "78F w/pmh remote h/o recurrent cervical cancer s/p pelvic exenteration with ostomy and ileal conduit, currently being treat for locally advanced rectal cancer with irinotecan q2 weeks, hypothyroidism, presents to [facility] for nausea, vomiting, and decreased ostomy output. Admitted for SBO, colorectal surgery following, recommend non-operative management."

## 2023-05-15 NOTE — DIETITIAN INITIAL EVALUATION ADULT - ENTERAL
**If NPO status > 7 days, consider alternate means of nutrition if within pt/family GOC:  As tolerated, recommend Jevity 1.2 @ 15mL/hr and advance by 10mL q12H until goal rate of 55mL/hr x24hrs is reached. Regimen at goal provides 1320 mL total volume, 1065 mL free water, 1584 kcal/d and 73 g pro/day (30kcal/kg and 1.39g/kg) based on current  dosing weight 52.6kg

## 2023-05-15 NOTE — PROGRESS NOTE ADULT - SUBJECTIVE AND OBJECTIVE BOX
Patient is a 78y old  Female who presents with a chief complaint of Chart Reviewed, Events Noted  "78F w/pmh remote h/o recurrent cervical cancer s/p pelvic exenteration with ostomy and ileal conduit, currently being treat for locally advanced rectal cancer with irinotecan q2 weeks, hypothyroidism, presents to [facility] for nausea, vomiting, and decreased ostomy output. Admitted for SBO, colorectal surgery following, recommend non-operative management."    (15 May 2023 10:42)      SUBJECTIVE / OVERNIGHT EVENTS:    feels well. denies abdominal pain, nausea. feels hungry.     ROS:  14 point ROS negative in detail except stated as above    MEDICATIONS  (STANDING):  dexAMETHasone  Injectable 4 milliGRAM(s) IV Push every 12 hours  enoxaparin Injectable 40 milliGRAM(s) SubCutaneous every 24 hours  lactated ringers. 1000 milliLiter(s) (100 mL/Hr) IV Continuous <Continuous>  levothyroxine Injectable 37.5 MICROGram(s) IV Push <User Schedule>  metoclopramide Injectable 10 milliGRAM(s) IV Push every 8 hours  octreotide  Injectable 100 MICROGram(s) SubCutaneous every 8 hours    MEDICATIONS  (PRN):  melatonin 3 milliGRAM(s) Oral at bedtime PRN Insomnia      CAPILLARY BLOOD GLUCOSE        I&O's Summary    14 May 2023 07:01  -  15 May 2023 07:00  --------------------------------------------------------  IN: 500 mL / OUT: 860 mL / NET: -360 mL        PHYSICAL EXAM:  Vital Signs Last 24 Hrs  T(C): 36.3 (15 May 2023 04:49), Max: 36.3 (14 May 2023 12:41)  T(F): 97.3 (15 May 2023 04:49), Max: 97.3 (14 May 2023 12:41)  HR: 62 (15 May 2023 04:49) (61 - 78)  BP: 101/66 (15 May 2023 04:49) (96/62 - 103/65)  BP(mean): --  RR: 18 (15 May 2023 04:49) (18 - 18)  SpO2: 94% (15 May 2023 04:49) (94% - 100%)    Parameters below as of 15 May 2023 04:49  Patient On (Oxygen Delivery Method): room air      CONSTITUTIONAL: NAD, comfortable appearing woman  EYES: PERRL; conjunctiva and sclera clear  ENMT: Moist oral mucosa, no pharyngeal injection or exudates  RESPIRATORY: Normal respiratory effort; lungs are clear to auscultation bilaterally  CARDIOVASCULAR: Regular rate and rhythm, normal S1 and S2, no murmur/rub/gallop; No lower extremity edema; Peripheral pulses are 2+ bilaterally  ABDOMEN: Nontender to palpation, diminished bowel sounds, no rebound/guarding; Empty colostomy bag  MUSCULOSKELETAL:  no joint swelling or tenderness to palpation  PSYCH: A+O to person, place, and time; affect appropriate  NEUROLOGY: CN 2-12 are intact and symmetric; no gross sensory deficits   SKIN: No rashes; no palpable lesions      LABS:                        9.3    3.71  )-----------( 574      ( 15 May 2023 06:55 )             34.2     05-15    140  |  96  |  16  ----------------------------<  95  4.0   |  21<L>  |  0.88    Ca    8.0<L>      15 May 2023 06:58  Phos  4.4     05-14  Mg     2.2     05-15                RADIOLOGY & ADDITIONAL TESTS:    Imaging Personally Reviewed:    Consultant(s) Notes Reviewed:      Care Discussed with Consultants/Other Providers:

## 2023-05-15 NOTE — DIETITIAN INITIAL EVALUATION ADULT - CONTINUE CURRENT NUTRITION CARE PLAN
Medical team to advance diet when medically feasible via tolerated route. Consider advancing to clear liquid, followed by low fiber diet as tolerated./yes

## 2023-05-15 NOTE — DIETITIAN INITIAL EVALUATION ADULT - PERTINENT LABORATORY DATA
05-15    140  |  96  |  16  ----------------------------<  95  4.0   |  21<L>  |  0.88    Ca    8.0<L>      15 May 2023 06:58  Phos  4.4     05-14  Mg     2.2     05-15    A1C with Estimated Average Glucose Result: 5.5 % (12-28-22 @ 11:12)

## 2023-05-15 NOTE — PROGRESS NOTE ADULT - SUBJECTIVE AND OBJECTIVE BOX
Surgery Progress Note    SUBJECTIVE:  - Patient seen and examined at bedside   - Patient states feeling well  - NGT in place  - Ostomy bag with no gas or stool  --------------------------------------------------------------------------------------------------  OBJECTIVE:   Physical Exam:  General: AAOx3, NAD, lying comfortably in bed  HEENT: NC/AT  Respiratory: nonlabored breathing  Cardiovascular: RRR, normal S1 and S2, no murmurs or gallops  Abdomen: non-distended, soft, non-tender  Extremities: WWP, no edema  --------------------------------------------------------------------------------------------------  V/S:  Vital Signs Last 24 Hrs  T(C): 36.3 (15 May 2023 04:49), Max: 36.3 (14 May 2023 21:25)  T(F): 97.3 (15 May 2023 04:49), Max: 97.3 (14 May 2023 21:25)  HR: 62 (15 May 2023 04:49) (62 - 78)  BP: 101/66 (15 May 2023 04:49) (101/66 - 103/65)  BP(mean): --  RR: 18 (15 May 2023 04:49) (18 - 18)  SpO2: 94% (15 May 2023 04:49) (94% - 100%)    Parameters below as of 15 May 2023 04:49  Patient On (Oxygen Delivery Method): room air        --------------------------------------------------------------------------------------------------  I/Os:    14 May 2023 07:01  -  15 May 2023 07:00  --------------------------------------------------------  IN:    Lactated Ringers: 500 mL  Total IN: 500 mL    OUT:    Ileostomy (mL): 0 mL    Indwelling Catheter - Stomal (mL): 360 mL    Nasogastric/Oral tube (mL): 500 mL  Total OUT: 860 mL    Total NET: -360 mL      15 May 2023 07:01  -  15 May 2023 13:58  --------------------------------------------------------  IN:  Total IN: 0 mL    OUT:    Indwelling Catheter - Stomal (mL): 50 mL  Total OUT: 50 mL    Total NET: -50 mL        --------------------------------------------------------------------------------------------------  LABS:                        9.3    3.71  )-----------( 574      ( 15 May 2023 06:55 )             34.2     15 May 2023 06:58    140    |  96     |  16     ----------------------------<  95     4.0     |  21     |  0.88     Ca    8.0        15 May 2023 06:58  Phos  4.4       14 May 2023 07:10  Mg     2.2       15 May 2023 06:58        CAPILLARY BLOOD GLUCOSE                  --------------------------------------------------------------------------------------------------  MEDICATIONS  (STANDING):  dexAMETHasone  Injectable 4 milliGRAM(s) IV Push every 12 hours  enoxaparin Injectable 40 milliGRAM(s) SubCutaneous every 24 hours  lactated ringers. 1000 milliLiter(s) (100 mL/Hr) IV Continuous <Continuous>  levothyroxine Injectable 37.5 MICROGram(s) IV Push <User Schedule>  metoclopramide Injectable 10 milliGRAM(s) IV Push every 8 hours  octreotide  Injectable 100 MICROGram(s) SubCutaneous every 8 hours    MEDICATIONS  (PRN):  melatonin 3 milliGRAM(s) Oral at bedtime PRN Insomnia    --------------------------------------------------------------------------------------------------

## 2023-05-15 NOTE — PROGRESS NOTE ADULT - PROBLEM SELECTOR PLAN 1
- malignant SBO  - currently patient is hungry, denies nausea, but still has not passed gas/had BM  - colorectal surgery following, plan for UGI series today  - placed NGT to low wall suction;   - c/w IVF  - monitor for stool output  - malignant SBO protocol, reglan 10mg iv q8, octreotide 100mg sc q8, decadron 4mg iv q12

## 2023-05-15 NOTE — DIETITIAN INITIAL EVALUATION ADULT - NSFNSGIIOFT_GEN_A_CORE
05-14-23 @ 07:01  -  05-15-23 @ 07:00  --------------------------------------------------------  OUT:    Ileostomy (mL): 0 mL    Nasogastric/Oral tube (mL): 500 mL  Total OUT: 500 mL    Total NET: -500 mL

## 2023-05-15 NOTE — DIETITIAN INITIAL EVALUATION ADULT - ETIOLOGY
Physiological causes resulting in diminished intake  Decreased ability to consume sufficient energy

## 2023-05-15 NOTE — DIETITIAN INITIAL EVALUATION ADULT - EDUCATION DIETARY MODIFICATIONS
Patient seen for NPO status. Educated patient on diet advancement. Patient made aware RD remains available as needed and will follow up as indicated/requested by patient./(2) meets goals/outcomes/verbalization

## 2023-05-15 NOTE — CHART NOTE - NSCHARTNOTEFT_GEN_A_CORE
no abd pain  no n/v  nontender  a/p unresectable rectal ca on palliative chemo no w sbo (adhesive dz vs malignant obstruction vs radiation enteritis)  no indication for urgent OR- pt would be high surgical risk for complications  malignant sbo protocol  gastrograffin challenge

## 2023-05-15 NOTE — DIETITIAN INITIAL EVALUATION ADULT - REASON INDICATOR FOR ASSESSMENT
MST Score 2 or >  Information obtained from: Review of pt's current medical record and interview with pt in her assigned room on [facility].

## 2023-05-15 NOTE — DIETITIAN INITIAL EVALUATION ADULT - PERTINENT MEDS FT
MEDICATIONS  (STANDING):  dexAMETHasone  Injectable 4 milliGRAM(s) IV Push every 12 hours  enoxaparin Injectable 40 milliGRAM(s) SubCutaneous every 24 hours  lactated ringers. 1000 milliLiter(s) (100 mL/Hr) IV Continuous <Continuous>  levothyroxine Injectable 37.5 MICROGram(s) IV Push <User Schedule>  metoclopramide Injectable 10 milliGRAM(s) IV Push every 8 hours  octreotide  Injectable 100 MICROGram(s) SubCutaneous every 8 hours    MEDICATIONS  (PRN):  melatonin 3 milliGRAM(s) Oral at bedtime PRN Insomnia

## 2023-05-15 NOTE — DIETITIAN INITIAL EVALUATION ADULT - OTHER CALCULATIONS
-- Defer fluid needs to medical team  --Estimated calorie/protein needs based on current dosing weight of 116 pounds/52.6 kg

## 2023-05-15 NOTE — DIETITIAN INITIAL EVALUATION ADULT - ORAL INTAKE PTA/DIET HISTORY
Pt confirms food allergy to shellfish, reports not eating any fish, food preference updated in pt's menu profile. Reports having a poor appetite and PO intakes "a couple of days" PTA. Did not follow any specific dietary restrictions. Pt confirms having nausea and vomiting at home for "a couple of days" PTA, denies diarrhea or constipation. Denies difficulty chewing/swallowing. Denies any vitamin/mineral use at home, pt reports not taking oral nutritional supplement at home as well.

## 2023-05-15 NOTE — PROGRESS NOTE ADULT - ASSESSMENT
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 Pt's last chemo session was 3 days ago. Pt reports no gas or stool in ostomy bag over the last day. Found to have a high-grade SBO.    Recommendations:  - Gastrograffin challenge  - C/w Malignant SBO protocol  - Await return of bowel function  - C/w NGT to low continuous wall suction  - Care per primary team    ABA Pina, PGY-3  [facility]   Green Team Surgery   [id number]

## 2023-05-16 NOTE — PROGRESS NOTE ADULT - SUBJECTIVE AND OBJECTIVE BOX
Patient is a 78y old  Female who presents with a chief complaint of SBO (16 May 2023 05:37)      SUBJECTIVE / OVERNIGHT EVENTS:    feels OK. hungry, no abdominal pain, nauesea or vomiting. NGT clamped. had BM    ROS:  14 point ROS negative in detail except stated as above    MEDICATIONS  (STANDING):  dexAMETHasone  Injectable 4 milliGRAM(s) IV Push every 12 hours  enoxaparin Injectable 40 milliGRAM(s) SubCutaneous every 24 hours  lactated ringers. 1000 milliLiter(s) (100 mL/Hr) IV Continuous <Continuous>  levothyroxine Injectable 37.5 MICROGram(s) IV Push <User Schedule>  metoclopramide Injectable 10 milliGRAM(s) IV Push every 8 hours  octreotide  Injectable 100 MICROGram(s) SubCutaneous every 8 hours  potassium phosphate IVPB 15 milliMole(s) IV Intermittent once    MEDICATIONS  (PRN):  melatonin 3 milliGRAM(s) Oral at bedtime PRN Insomnia      CAPILLARY BLOOD GLUCOSE        I&O's Summary    15 May 2023 07:01  -  16 May 2023 07:00  --------------------------------------------------------  IN: 0 mL / OUT: 550 mL / NET: -550 mL        PHYSICAL EXAM:  Vital Signs Last 24 Hrs  T(C): 36.4 (16 May 2023 04:34), Max: 36.6 (15 May 2023 14:14)  T(F): 97.6 (16 May 2023 04:34), Max: 97.9 (15 May 2023 14:14)  HR: 60 (16 May 2023 04:34) (52 - 62)  BP: 123/61 (16 May 2023 04:34) (95/57 - 126/71)  BP(mean): --  RR: 18 (16 May 2023 04:34) (18 - 18)  SpO2: 92% (16 May 2023 04:34) (92% - 95%)    Parameters below as of 16 May 2023 04:34  Patient On (Oxygen Delivery Method): room air    CONSTITUTIONAL: NAD, comfortable appearing woman  EYES: PERRL; conjunctiva and sclera clear  ENMT: Moist oral mucosa, no pharyngeal injection or exudates  RESPIRATORY: Normal respiratory effort; lungs are clear to auscultation bilaterally  CARDIOVASCULAR: Regular rate and rhythm, normal S1 and S2, no murmur/rub/gallop; No lower extremity edema; Peripheral pulses are 2+ bilaterally  ABDOMEN: Nontender to palpation, diminished bowel sounds, no rebound/guarding; Empty colostomy bag  MUSCULOSKELETAL:  no joint swelling or tenderness to palpation  PSYCH: A+O to person, place, and time; affect appropriate  NEUROLOGY: CN 2-12 are intact and symmetric; no gross sensory deficits   SKIN: No rashes; no palpable lesions    LABS:                        9.2    3.44  )-----------( 525      ( 16 May 2023 07:12 )             33.4     05-16    142  |  99  |  20  ----------------------------<  117<H>  3.9   |  26  |  0.78    Ca    8.4      16 May 2023 07:20  Phos  2.2     05-16  Mg     2.3     05-16                RADIOLOGY & ADDITIONAL TESTS:    Imaging Personally Reviewed:    Consultant(s) Notes Reviewed:      Care Discussed with Consultants/Other Providers:  med NP Sonal

## 2023-05-16 NOTE — PROGRESS NOTE ADULT - SUBJECTIVE AND OBJECTIVE BOX
Surgery Progress Note    SUBJECTIVE:  - Patient seen and examined at bedside   - Patient states feeling well, no nausea  - NGT in place, clamped  - Ostomy bag with no gas or stool  --------------------------------------------------------------------------------------------------  OBJECTIVE:   Physical Exam:  General: AAOx3, NAD, lying comfortably in bed  HEENT: NC/AT  Respiratory: nonlabored breathing  Cardiovascular: RRR, normal S1 and S2, no murmurs or gallops  Abdomen: non-distended, soft, non-tender  Extremities: WWP, no edema  Ostomy: pink and viable, no gas or stool in bag  --------------------------------------------------------------------------------------------------  V/S:  Vital Signs Last 24 Hrs  T(C): 36.4 (16 May 2023 04:34), Max: 36.6 (15 May 2023 14:14)  T(F): 97.6 (16 May 2023 04:34), Max: 97.9 (15 May 2023 14:14)  HR: 60 (16 May 2023 04:34) (52 - 62)  BP: 123/61 (16 May 2023 04:34) (95/57 - 126/71)  BP(mean): --  RR: 18 (16 May 2023 04:34) (18 - 18)  SpO2: 92% (16 May 2023 04:34) (92% - 95%)    Parameters below as of 16 May 2023 04:34  Patient On (Oxygen Delivery Method): room air        --------------------------------------------------------------------------------------------------  I/Os:    14 May 2023 07:01  -  15 May 2023 07:00  --------------------------------------------------------  IN:    Lactated Ringers: 500 mL  Total IN: 500 mL    OUT:    Ileostomy (mL): 0 mL    Indwelling Catheter - Stomal (mL): 360 mL    Nasogastric/Oral tube (mL): 500 mL  Total OUT: 860 mL    Total NET: -360 mL      15 May 2023 07:01  -  16 May 2023 05:38  --------------------------------------------------------  IN:  Total IN: 0 mL    OUT:    Indwelling Catheter - Stomal (mL): 500 mL  Total OUT: 500 mL    Total NET: -500 mL        --------------------------------------------------------------------------------------------------  LABS:                        9.3    3.71  )-----------( 574      ( 15 May 2023 06:55 )             34.2     15 May 2023 06:58    140    |  96     |  16     ----------------------------<  95     4.0     |  21     |  0.88     Ca    8.0        15 May 2023 06:58  Phos  4.4       14 May 2023 07:10  Mg     2.2       15 May 2023 06:58        CAPILLARY BLOOD GLUCOSE                  --------------------------------------------------------------------------------------------------  MEDICATIONS  (STANDING):  dexAMETHasone  Injectable 4 milliGRAM(s) IV Push every 12 hours  enoxaparin Injectable 40 milliGRAM(s) SubCutaneous every 24 hours  lactated ringers. 1000 milliLiter(s) (100 mL/Hr) IV Continuous <Continuous>  levothyroxine Injectable 37.5 MICROGram(s) IV Push <User Schedule>  metoclopramide Injectable 10 milliGRAM(s) IV Push every 8 hours  octreotide  Injectable 100 MICROGram(s) SubCutaneous every 8 hours    MEDICATIONS  (PRN):  melatonin 3 milliGRAM(s) Oral at bedtime PRN Insomnia    --------------------------------------------------------------------------------------------------     Surgery Progress Note    SUBJECTIVE:  - Patient seen and examined at bedside   - Patient states feeling well, no nausea  - NGT in place, clamped  - Ostomy bag with gas and stool  --------------------------------------------------------------------------------------------------  OBJECTIVE:   Physical Exam:  General: AAOx3, NAD, lying comfortably in bed  HEENT: NC/AT  Respiratory: nonlabored breathing  Cardiovascular: RRR, normal S1 and S2, no murmurs or gallops  Abdomen: non-distended, soft, non-tender  Extremities: WWP, no edema  Ostomy: pink and viable, gas and stool in bag  --------------------------------------------------------------------------------------------------  V/S:  Vital Signs Last 24 Hrs  T(C): 36.4 (16 May 2023 04:34), Max: 36.6 (15 May 2023 14:14)  T(F): 97.6 (16 May 2023 04:34), Max: 97.9 (15 May 2023 14:14)  HR: 60 (16 May 2023 04:34) (52 - 62)  BP: 123/61 (16 May 2023 04:34) (95/57 - 126/71)  BP(mean): --  RR: 18 (16 May 2023 04:34) (18 - 18)  SpO2: 92% (16 May 2023 04:34) (92% - 95%)    Parameters below as of 16 May 2023 04:34  Patient On (Oxygen Delivery Method): room air        --------------------------------------------------------------------------------------------------  I/Os:    14 May 2023 07:01  -  15 May 2023 07:00  --------------------------------------------------------  IN:    Lactated Ringers: 500 mL  Total IN: 500 mL    OUT:    Ileostomy (mL): 0 mL    Indwelling Catheter - Stomal (mL): 360 mL    Nasogastric/Oral tube (mL): 500 mL  Total OUT: 860 mL    Total NET: -360 mL      15 May 2023 07:01  -  16 May 2023 05:38  --------------------------------------------------------  IN:  Total IN: 0 mL    OUT:    Indwelling Catheter - Stomal (mL): 500 mL  Total OUT: 500 mL    Total NET: -500 mL        --------------------------------------------------------------------------------------------------  LABS:                        9.3    3.71  )-----------( 574      ( 15 May 2023 06:55 )             34.2     15 May 2023 06:58    140    |  96     |  16     ----------------------------<  95     4.0     |  21     |  0.88     Ca    8.0        15 May 2023 06:58  Phos  4.4       14 May 2023 07:10  Mg     2.2       15 May 2023 06:58        CAPILLARY BLOOD GLUCOSE                  --------------------------------------------------------------------------------------------------  MEDICATIONS  (STANDING):  dexAMETHasone  Injectable 4 milliGRAM(s) IV Push every 12 hours  enoxaparin Injectable 40 milliGRAM(s) SubCutaneous every 24 hours  lactated ringers. 1000 milliLiter(s) (100 mL/Hr) IV Continuous <Continuous>  levothyroxine Injectable 37.5 MICROGram(s) IV Push <User Schedule>  metoclopramide Injectable 10 milliGRAM(s) IV Push every 8 hours  octreotide  Injectable 100 MICROGram(s) SubCutaneous every 8 hours    MEDICATIONS  (PRN):  melatonin 3 milliGRAM(s) Oral at bedtime PRN Insomnia    --------------------------------------------------------------------------------------------------

## 2023-05-16 NOTE — PROGRESS NOTE ADULT - ASSESSMENT
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 Pt's last chemo session was 3 days ago. Pt reports no gas or stool in ostomy bag over the last day. Found to have a high-grade SBO.    Recommendations:  - Gastrograffin challenge performed yesterday, follow up AM AXR to monitor progression of gastrograffin  - C/w Malignant SBO protocol  - Await return of bowel function  - NGT clamped; unclamp if patient becomes nauseous  - Care per primary team    ABA Pina, PGY-3  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 Pt's last chemo session was 3 days ago. Pt reports no gas or stool in ostomy bag over the last day. Found to have a high-grade SBO.    Recommendations:  - Remove NGT, as patient is having GI function through ostomy  - CLD  - discontinue malignant SBO meds  - No further recommendations from colorectal stand point  - No need for outpatient follow up since patient's case has been deemed nonoperative. Patient aware.  - Care per primary team    ABA Pina, PGY-3  [facility]   Green Team Surgery   [id number]

## 2023-05-16 NOTE — PROGRESS NOTE ADULT - ATTENDING COMMENTS
The patient has had bm" and gas in stoma bag - will d/c ng tube The patient has had bm" and gas in stoma bag according to the patient - will d/c ng tube

## 2023-05-16 NOTE — PROGRESS NOTE ADULT - ASSESSMENT
78F w/pmh remote h/o recurrent cervical cancer s/p pelvic exenteration with ostomy and ileal conduit, currently being treat for locally advanced rectal cancer with irinotecan q2 weeks, hypothyroidism, presents to [facility] for nausea, vomiting, and decreased ostomy output. Admitted for SBO, colorectal surgery following, recommend non-operative management.

## 2023-05-16 NOTE — PROGRESS NOTE ADULT - PROBLEM SELECTOR PLAN 1
- secondary to malignant SBO  - currently patient is hungry, denies nausea, but still has not passed gas/had BM  - colorectal surgery following, s/p gastrograffin series, abd XRay pending from this AM but looks improved, surgery rec dc NGT and d/w surgery if OK to start CLD  - monitor for stool output  - malignant SBO protocol, reglan 10mg iv q8, octreotide 100mg sc q8, decadron 4mg iv q12

## 2023-05-17 NOTE — PROGRESS NOTE ADULT - SUBJECTIVE AND OBJECTIVE BOX
GENERAL SURGERY PROGRESS NOTE    Ostomy continues to function. Advanced to CLD yesterday.    10-point review of systems completed and negative except as noted above.      OBJECTIVE    MEDICATIONS  enoxaparin Injectable 40 milliGRAM(s) SubCutaneous every 24 hours  lactated ringers. 1000 milliLiter(s) IV Continuous <Continuous>  levothyroxine Injectable 37.5 MICROGram(s) IV Push <User Schedule>  melatonin 3 milliGRAM(s) Oral at bedtime PRN      PHYSICAL EXAM  T(C): 36.6 (05-17-23 @ 04:09), Max: 36.6 (05-17-23 @ 04:09)  HR: 61 (05-17-23 @ 04:09) (56 - 93)  BP: 124/82 (05-17-23 @ 04:09) (118/57 - 124/82)  RR: 18 (05-17-23 @ 04:09) (18 - 18)  SpO2: 93% (05-17-23 @ 04:09) (91% - 97%)    05-16-23 @ 07:01  -  05-17-23 @ 07:00  --------------------------------------------------------  IN: 0 mL / OUT: 850 mL / NET: -850 mL        OBJECTIVE:   Physical Exam:  General: AAOx3, NAD, lying comfortably in bed  HEENT: NC/AT  Respiratory: nonlabored breathing  Cardiovascular: RRR, normal S1 and S2, no murmurs or gallops  Abdomen: non-distended, soft, non-tender  Extremities: WWP, no edema  Ostomy: pink and viable, gas and stool in bag    LABS                        9.2    3.44  )-----------( 525      ( 16 May 2023 07:12 )             33.4     05-17    140  |  100  |  20  ----------------------------<  107<H>  3.6   |  27  |  0.70    Ca    8.7      17 May 2023 07:10  Phos  1.6     05-17  Mg     2.3     05-16

## 2023-05-17 NOTE — RAPID RESPONSE TEAM SUMMARY - NSOTHERINTERVENTIONSRRT_GEN_ALL_CORE
AMS/lethargy likely 2/2 hypoglycemia - r/p finger stick s/p D50 reading 290 and pt mentation/alertness improved. Recommend repeat BG in 2 hrs and then q4-6 hrs from there, pt may need standing IVF with dextrose if not tolerating PO. Transient hypotension likely attributed to hypoglycemia as well, r/p BP stable before IVF.     Tachycardia perhaps 2/2 fever, which can also be contributing to AMS- pt with oral temp 99F and warm to touch however unable to get rectal temp d/t rectal ca. Pt neutropenic as well; blood cultures obtained and pt ordered for empiric Cefepime/Flagyl. Recommend primary team to f/u infectious w/u, procalcitonin, WBC and fever curve. Pending IV Tylenol.     Please f/u CXR/Abdominal Xray read, appears to be largely unchanged from prior with distended loops of bowel. Pt does have abdominal tenderness (unclear if improved or worse from prior), lactate on ABG 6.6. Lactate can be multifactorial I/s/o cancer or fever however would recommend touching base with surgery, consider r/p CT A/P, frequent abdominal exams and follow ostomy output, r/p lactate after IVF. Discussed with primary team at bedside. RRT ended with stable hemodynamics, all further care as per primary team.    AMS/lethargy likely 2/2 hypoglycemia - r/p finger stick s/p D50 reading 290 and pt mentation/alertness improved. Recommend repeat BG in 2 hrs and then q4-6 hrs from there, pt may need standing IVF with dextrose if not tolerating PO. Transient hypotension likely attributed to hypoglycemia as well, r/p BP stable before IVF.     Tachycardia perhaps 2/2 fever, which can also be contributing to AMS- pt with oral temp 99F and warm to touch however unable to get rectal temp d/t rectal ca. Pt neutropenic as well; blood cultures obtained and pt ordered for empiric Cefepime/Flagyl. Recommend primary team to f/u infectious w/u, procalcitonin, WBC and fever curve. Pending IV Tylenol.     Please f/u CXR/Abdominal Xray read, appears to be largely unchanged from prior with distended loops of bowel. Pt does have abdominal tenderness (unclear if improved or worse from prior), lactate on ABG 6.6. Lactate can be multifactorial I/s/o cancer or fever however would f/u Colorectal Surgery recommendations, consider r/p CT A/P, frequent abdominal exams and follow ostomy output, r/p lactate after IVF. Discussed with primary team at bedside. RRT ended with stable hemodynamics, all further care as per primary team.

## 2023-05-17 NOTE — PROGRESS NOTE ADULT - PROBLEM SELECTOR PLAN 2
-f/u with oncology clinic after discharge, sees Dr. Nury Baker  - prescribed methadone 5mg QHS for pelvic pain

## 2023-05-17 NOTE — PROGRESS NOTE ADULT - ASSESSMENT
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    Recommendations:  -Continue to advance diet as tolerated  - No need for outpatient follow up since patient's case has been deemed nonoperative. Patient aware.  - Care per primary team  - Dispo per primary team          Green Team Surgery   [id number]

## 2023-05-17 NOTE — PROVIDER CONTACT NOTE (HYPOGLYCEMIA EVENT) - NS PROVIDER CONTACT BACKGROUND-HYPO
Age: 78y    Gender: Female    POCT Blood Glucose:  60 mg/dL (05-17-23 @ 23:13)  61 mg/dL (05-17-23 @ 23:12)  132 mg/dL (05-17-23 @ 21:03)  290 mg/dL (05-17-23 @ 20:44)  44 mg/dL (05-17-23 @ 20:31)      eMAR:  dextrose 40% Gel   15 Gram(s) Oral (05-17-23 @ 23:27)    levothyroxine Injectable   37.5 MICROGram(s) IV Push (05-17-23 @ 21:33)

## 2023-05-17 NOTE — PROGRESS NOTE ADULT - SUBJECTIVE AND OBJECTIVE BOX
Patient is a 78y old  Female who presents with a chief complaint of SBO (17 May 2023 10:14)      SUBJECTIVE / OVERNIGHT EVENTS:    c/o of 5/10 of abdominal pain. no nausea, no vomiting, having BMs    ROS:  14 point ROS negative in detail except stated as above    MEDICATIONS  (STANDING):  enoxaparin Injectable 40 milliGRAM(s) SubCutaneous every 24 hours  lactated ringers. 1000 milliLiter(s) (100 mL/Hr) IV Continuous <Continuous>  levothyroxine Injectable 37.5 MICROGram(s) IV Push <User Schedule>    MEDICATIONS  (PRN):  melatonin 3 milliGRAM(s) Oral at bedtime PRN Insomnia      CAPILLARY BLOOD GLUCOSE        I&O's Summary    16 May 2023 07:01  -  17 May 2023 07:00  --------------------------------------------------------  IN: 0 mL / OUT: 850 mL / NET: -850 mL        PHYSICAL EXAM:  Vital Signs Last 24 Hrs  T(C): 36.6 (17 May 2023 04:09), Max: 36.6 (17 May 2023 04:09)  T(F): 97.8 (17 May 2023 04:09), Max: 97.8 (17 May 2023 04:09)  HR: 61 (17 May 2023 04:09) (56 - 93)  BP: 124/82 (17 May 2023 04:09) (118/57 - 124/82)  BP(mean): --  RR: 18 (17 May 2023 04:09) (18 - 18)  SpO2: 93% (17 May 2023 04:09) (91% - 97%)    Parameters below as of 17 May 2023 04:09  Patient On (Oxygen Delivery Method): room air    CONSTITUTIONAL: NAD, comfortable appearing woman  EYES: PERRL; conjunctiva and sclera clear  ENMT: Moist oral mucosa, no pharyngeal injection or exudates  RESPIRATORY: Normal respiratory effort; lungs are clear to auscultation bilaterally  CARDIOVASCULAR: Regular rate and rhythm, normal S1 and S2, no murmur/rub/gallop; No lower extremity edema; Peripheral pulses are 2+ bilaterally  ABDOMEN: mild tenderness to palpation in lower quadrants, diminished bowel sounds, no rebound/guarding;  colostomy bag  MUSCULOSKELETAL:  no joint swelling or tenderness to palpation  PSYCH: A+O to person, place, and time; affect appropriate  NEUROLOGY: CN 2-12 are intact and symmetric; no gross sensory deficits   SKIN: No rashes; no palpable lesions  LABS:                        9.2    3.44  )-----------( 525      ( 16 May 2023 07:12 )             33.4     05-17    140  |  100  |  20  ----------------------------<  107<H>  3.6   |  27  |  0.70    Ca    8.7      17 May 2023 07:10  Phos  1.6     05-17  Mg     2.3     05-16                RADIOLOGY & ADDITIONAL TESTS:    Imaging Personally Reviewed:    Consultant(s) Notes Reviewed:      Care Discussed with Consultants/Other Providers:  med NP Sonal

## 2023-05-17 NOTE — CONSULT NOTE ADULT - ASSESSMENT
Urinary retention, s/p aspiration of [place] pouch and placement of catheter  - KEEP CATHETER IN  - can irrigate with NS prn if not draining well  - please monitor for post obstructive diuresis, >200cc /h for 4 hours, check BMP tonight   - encourage fluids, IV fluids   - will follow   - d/w Dr. Darling  Urinary retention, s/p aspiration of [place] pouch and placement of catheter  - KEEP CATHETER IN  - can irrigate with NS prn if not draining well  -Monitor patient for post-obstructive diuresis.  -Maintain strict intake and output  -POD = >200cc of urine/hr for 3 or more consecutive hours  -Allow patient to drink to thirst.  Keep two pitchers of water at the bedside at all times.  -Check q6 BMPs to monitor electrolytes until UOP normalizes  -Continue C[place] catheter  - encourage fluids, IV fluids   - will follow   - Tentative plan to discharge with cunha   - d/w Dr. Darling

## 2023-05-17 NOTE — PROGRESS NOTE ADULT - ASSESSMENT
78F w/pmh remote h/o recurrent cervical cancer s/p pelvic exenteration with ostomy and ileal conduit, currently being treat for locally advanced rectal cancer with irinotecan q2 weeks, hypothyroidism, presents to [facility] for nausea, vomiting, and decreased ostomy output. Admitted for SBO, colorectal surgery following, recommend non-operative management.

## 2023-05-17 NOTE — PROGRESS NOTE ADULT - PROBLEM SELECTOR PLAN 1
- secondary to malignant SBO/ rectal cancer  - currently patient is hungry, denies nausea, but still has not passed gas/had BM  - colorectal surgery following, s/p gastrograffin series NGT dc as well as malignant SBO protocol per surgery 5/16 and diet advanced however this morning some abd pain will get abd xray to reassess  - monitor for stool output- having BMs

## 2023-05-17 NOTE — DISCHARGE NOTE NURSING/CASE MANAGEMENT/SOCIAL WORK - PATIENT PORTAL LINK FT
You can access the FollowMyHealth Patient Portal offered by [facility] by registering at the following website: [email or web address]. By joining [facility]’s FollowMyHealth portal, you will also be able to view your health information using other applications (apps) compatible with our system.

## 2023-05-18 NOTE — CHART NOTE - NSCHARTNOTEFT_GEN_A_CORE
MICU Accept Note    HPI / INTERVAL HISTORY:  77 yo F with PMH of recurrent cervical cancer s/p pelvic exenteration with ostomy and ileal conduit, currently on treatment for locally advanced rectal cancer (irinotecan q2 wks), hypothyroidism, admitted  with high grade malignant SBO followed by colorectal surgery recommending non-operative management. Multiple RRT called for hypotension over past two days. Was started on empiric antibiotics . AOx2 and lethargic. This evening s/p 3.5L IVF, albumin 25% x2, solucortef 100 mg. MICU consulted for persistent hypotension.      PAST MEDICAL & SURGICAL HISTORY:  HTN (hypertension)      HLD (hyperlipidemia)      Hypothyroid      Rectal cancer      H/O total hysterectomy with bilateral salpingo-oophorectomy (BSO)      Ileostomy present        FAMILY HISTORY:  No pertinent family history in first degree relatives      Allergies    penicillin G benzathine (Rash; Anaphylaxis; Hives; Short breath)  Seafood (Hives)    Intolerances        REVIEW OF SYSTEMS:  Unable to obtain ROS 2/2 mental status.    OBJECTIVE:  ICU Vital Signs Last 24 Hrs  T(C): 36.6 (18 May 2023 00:14), Max: 36.7 (17 May 2023 11:46)  T(F): 97.9 (18 May 2023 00:14), Max: 98 (17 May 2023 11:46)  HR: 89 (18 May 2023 02:01) (61 - 89)  BP: 68/38 (18 May 2023 02:01) (55/29 - 123/78)  BP(mean): --  ABP: --  ABP(mean): --  RR: 18 (18 May 2023 00:14) (18 - 18)  SpO2: 93% (18 May 2023 00:14) (93% - 96%)    O2 Parameters below as of 18 May 2023 00:14  Patient On (Oxygen Delivery Method): nasal cannula  O2 Flow (L/min): 4            - @ 07:01  -   @ 07:00  --------------------------------------------------------  IN: 0 mL / OUT: 850 mL / NET: -850 mL     @ 07:01  -  05-18 @ 04:27  --------------------------------------------------------  IN: 0 mL / OUT: 1800 mL / NET: -1800 mL      CAPILLARY BLOOD GLUCOSE      POCT Blood Glucose.: 102 mg/dL (18 May 2023 02:42)      PHYSICAL EXAM:  Gen: lethargic but arousable  HEENT: atraumatic, normocephalic, EOMI  CV: RRR, no murmurs  Resp: CTAB, increased WOB  GI: soft, distended, diffusely TTP, +involuntary guarding  MSK: BLE 2+ edema  Neuro: GARCIA, follows simple commands  Psych: alert and oriented x2    HOSPITAL MEDICATIONS:  MEDICATIONS  (STANDING):  cefepime   IVPB 2000 milliGRAM(s) IV Intermittent every 24 hours  chlorhexidine 4% Liquid 1 Application(s) Topical <User Schedule>  dextrose 5% + lactated ringers. 1000 milliLiter(s) (100 mL/Hr) IV Continuous <Continuous>  dextrose 5%. 1000 milliLiter(s) (100 mL/Hr) IV Continuous <Continuous>  dextrose 5%. 1000 milliLiter(s) (50 mL/Hr) IV Continuous <Continuous>  dextrose 50% Injectable 12.5 Gram(s) IV Push once  dextrose 50% Injectable 25 Gram(s) IV Push once  dextrose 50% Injectable 25 Gram(s) IV Push once  glucagon  Injectable 1 milliGRAM(s) IntraMuscular once  levothyroxine Injectable 37.5 MICROGram(s) IV Push <User Schedule>  methadone    Tablet 5 milliGRAM(s) Oral at bedtime  metroNIDAZOLE  IVPB      metroNIDAZOLE  IVPB 500 milliGRAM(s) IV Intermittent every 8 hours  potassium chloride  10 mEq/100 mL IVPB 10 milliEquivalent(s) IV Intermittent every 1 hour    MEDICATIONS  (PRN):  dextrose Oral Gel 15 Gram(s) Oral once PRN Blood Glucose LESS THAN 70 milliGRAM(s)/deciliter      LABS:                        7.2    11.18 )-----------( 224      ( 18 May 2023 00:55 )             24.6     Hgb Trend: 7.2<--, 9.1<--, 9.2<--, 9.3<--, 9.4<--  18    141  |  104  |  30<H>  ----------------------------<  300<H>  3.3<L>   |  21<L>  |  1.80<H>    Ca    7.4<L>      18 May 2023 00:55  Phos  3.3       Mg     1.6         TPro  3.3<L>  /  Alb  1.8<L>  /  TBili  0.5  /  DBili  x   /  AST  116<H>  /  ALT  38  /  AlkPhos  113      Creatinine Trend: 1.80<--, 1.48<--, 0.70<--, 0.78<--, 0.88<--, 0.90<--  PT/INR - ( 18 May 2023 00:55 )   PT: 24.3 sec;   INR: 2.08 ratio         PTT - ( 18 May 2023 00:55 )  PTT:39.2 sec  Urinalysis Basic - ( 18 May 2023 00:55 )    Color: Yellow / Appearance: Slightly Turbid / S.013 / pH: x  Gluc: x / Ketone: Negative  / Bili: Negative / Urobili: Negative   Blood: x / Protein: 100 mg/dl / Nitrite: Negative   Leuk Esterase: Negative / RBC: 29 /hpf / WBC 3 /HPF   Sq Epi: x / Non Sq Epi: x / Bacteria: Moderate      Arterial Blood Gas:   @ 00:50  7.46/32/104/23/97.8/-0.8  ABG lactate: --  Arterial Blood Gas:   @ 20:52  7.43/34/88/23/95.6/-1.4  ABG lactate: --    Venous Blood Gas:   @ 00:23  7.23/20/55/8/83.1  VBG Lactate: >16.0      ASSESSMENT  77 yo F with PMH of recurrent cervical cancer s/p pelvic exenteration with ostomy and ileal conduit, currently on treatment for locally advanced rectal cancer (irinotecan q2 wks), hypothyroidism, admitted  with high grade malignant SBO which was managed non-operatively, admitted to MICU for hypotension, concern for septic shock.    PLAN  Neuro/Psych  AOx3 at baseline  Currently lethargic, AOx2  Likely 2/2 metabolic encephalopathy iso sepsis    CV  #Septic shock  s/p fluid resuscitation (3.5L IVF, albumin 25% x2) overnight  - infectious work up and treatment as below  - titrate levo to MAP>65    Respiratory  Currently on 4L NC, unclear if for comfort or actual desaturation. Was on RA prior to overnight rapid  - wean O2 as tolerated    GI  #SBO    Renal/Uro  #TAMARA  Likely iso hypovolemia and sepsis  Baseline SCr   - renally dose medications, avoid nephrotoxic agents    S/p [place] pouch      MSK/Skin  - No active issues    ID  #Sepsis  - c/w empiric cefepime and flagyl (-)  - f/u     Endo  #Hypoglycemia  Likely iso sepsis and poor PO intake  - FS q6h  - C/w D5W 100cc/hr    Heme/Onc  #Anemia    #Rectal Ca    #DVT ppx: hold iso acute anemia, restart if stabilized    Ethics  GOC: D/w family (, sons) at bedside  risks/benefits of comfort care vs pressor support and treatment of sepsis. Family opted for pressor support. Family understands CPR is not being offered at this time. Trial of intubation for now with plan for ongoing GOC discussions.  Fluids:  Electrolytes: Replete K > 4, Mg > 2, Phos > 3  Nutrition:  PPX:  — VTE:  — GI:  — Resp:  Access:  Code status:  Dispo: MICU Accept Note    HPI / INTERVAL HISTORY:  79 yo F with PMH of recurrent cervical cancer s/p pelvic exenteration with ostomy and ileal conduit, currently on treatment for locally advanced rectal cancer (irinotecan q2 wks), hypothyroidism, admitted  with high grade malignant SBO followed by colorectal surgery recommending non-operative management. Multiple RRT called for hypotension over past two days. Was started on empiric antibiotics . AOx2 and lethargic. This evening s/p 3.5L IVF, albumin 25% x2, solucortef 100 mg. MICU consulted for persistent hypotension.      PAST MEDICAL & SURGICAL HISTORY:  HTN (hypertension)      HLD (hyperlipidemia)      Hypothyroid      Rectal cancer      H/O total hysterectomy with bilateral salpingo-oophorectomy (BSO)      Ileostomy present        FAMILY HISTORY:  No pertinent family history in first degree relatives      Allergies    penicillin G benzathine (Rash; Anaphylaxis; Hives; Short breath)  Seafood (Hives)    Intolerances        REVIEW OF SYSTEMS:  Unable to obtain ROS 2/2 mental status.    OBJECTIVE:  ICU Vital Signs Last 24 Hrs  T(C): 36.6 (18 May 2023 00:14), Max: 36.7 (17 May 2023 11:46)  T(F): 97.9 (18 May 2023 00:14), Max: 98 (17 May 2023 11:46)  HR: 89 (18 May 2023 02:01) (61 - 89)  BP: 68/38 (18 May 2023 02:01) (55/29 - 123/78)  BP(mean): --  ABP: --  ABP(mean): --  RR: 18 (18 May 2023 00:14) (18 - 18)  SpO2: 93% (18 May 2023 00:14) (93% - 96%)    O2 Parameters below as of 18 May 2023 00:14  Patient On (Oxygen Delivery Method): nasal cannula  O2 Flow (L/min): 4            - @ 07:01  -   @ 07:00  --------------------------------------------------------  IN: 0 mL / OUT: 850 mL / NET: -850 mL     @ 07:01  -  05-18 @ 04:27  --------------------------------------------------------  IN: 0 mL / OUT: 1800 mL / NET: -1800 mL      CAPILLARY BLOOD GLUCOSE      POCT Blood Glucose.: 102 mg/dL (18 May 2023 02:42)      PHYSICAL EXAM:  Gen: lethargic but arousable  HEENT: atraumatic, normocephalic, EOMI  CV: RRR, no murmurs  Resp: CTAB, increased WOB  GI: soft, distended, diffusely TTP, +involuntary guarding  MSK: BLE 2+ edema  Neuro: GARCIA, follows simple commands  Psych: alert and oriented x2    HOSPITAL MEDICATIONS:  MEDICATIONS  (STANDING):  cefepime   IVPB 2000 milliGRAM(s) IV Intermittent every 24 hours  chlorhexidine 4% Liquid 1 Application(s) Topical <User Schedule>  dextrose 5% + lactated ringers. 1000 milliLiter(s) (100 mL/Hr) IV Continuous <Continuous>  dextrose 5%. 1000 milliLiter(s) (100 mL/Hr) IV Continuous <Continuous>  dextrose 5%. 1000 milliLiter(s) (50 mL/Hr) IV Continuous <Continuous>  dextrose 50% Injectable 12.5 Gram(s) IV Push once  dextrose 50% Injectable 25 Gram(s) IV Push once  dextrose 50% Injectable 25 Gram(s) IV Push once  glucagon  Injectable 1 milliGRAM(s) IntraMuscular once  levothyroxine Injectable 37.5 MICROGram(s) IV Push <User Schedule>  methadone    Tablet 5 milliGRAM(s) Oral at bedtime  metroNIDAZOLE  IVPB      metroNIDAZOLE  IVPB 500 milliGRAM(s) IV Intermittent every 8 hours  potassium chloride  10 mEq/100 mL IVPB 10 milliEquivalent(s) IV Intermittent every 1 hour    MEDICATIONS  (PRN):  dextrose Oral Gel 15 Gram(s) Oral once PRN Blood Glucose LESS THAN 70 milliGRAM(s)/deciliter      LABS:                        7.2    11.18 )-----------( 224      ( 18 May 2023 00:55 )             24.6     Hgb Trend: 7.2<--, 9.1<--, 9.2<--, 9.3<--, 9.4<--  18    141  |  104  |  30<H>  ----------------------------<  300<H>  3.3<L>   |  21<L>  |  1.80<H>    Ca    7.4<L>      18 May 2023 00:55  Phos  3.3       Mg     1.6         TPro  3.3<L>  /  Alb  1.8<L>  /  TBili  0.5  /  DBili  x   /  AST  116<H>  /  ALT  38  /  AlkPhos  113      Creatinine Trend: 1.80<--, 1.48<--, 0.70<--, 0.78<--, 0.88<--, 0.90<--  PT/INR - ( 18 May 2023 00:55 )   PT: 24.3 sec;   INR: 2.08 ratio         PTT - ( 18 May 2023 00:55 )  PTT:39.2 sec  Urinalysis Basic - ( 18 May 2023 00:55 )    Color: Yellow / Appearance: Slightly Turbid / S.013 / pH: x  Gluc: x / Ketone: Negative  / Bili: Negative / Urobili: Negative   Blood: x / Protein: 100 mg/dl / Nitrite: Negative   Leuk Esterase: Negative / RBC: 29 /hpf / WBC 3 /HPF   Sq Epi: x / Non Sq Epi: x / Bacteria: Moderate      Arterial Blood Gas:   @ 00:50  7.46/32/104/23/97.8/-0.8  ABG lactate: --  Arterial Blood Gas:   @ 20:52  7.43/34/88/23/95.6/-1.4  ABG lactate: --    Venous Blood Gas:   @ 00:23  7.23/20/55/8/83.1  VBG Lactate: >16.0      ASSESSMENT  79 yo F with PMH of recurrent cervical cancer s/p pelvic exenteration with ostomy and ileal conduit, currently on treatment for locally advanced rectal cancer (irinotecan q2 wks), hypothyroidism, admitted  with high grade malignant SBO which was managed non-operatively, admitted to MICU for hypotension, concern for septic shock.    PLAN  Neuro/Psych  AOx3 at baseline  Currently lethargic, AOx2  Likely 2/2 metabolic encephalopathy iso sepsis    CV  #Septic shock  s/p fluid resuscitation (3.5L IVF, albumin 25% x2) overnight  - infectious work up and treatment as below  - titrate levo to MAP>65    Respiratory  Currently on 4L NC, unclear if for comfort or actual desaturation. Was on RA prior to overnight rapid  - wean O2 as tolerated    GI  #SBO  - Followed by colorectal surgery, s/p malignant SBO protocol. Diet was advanced   - Obtain CTAP given worsening abdominal exam, concern for peritonitis/bowel perf/recurrent SBO    #Diet: NPO    Renal/Uro  #TAMARA  Likely iso hypovolemia and shock  Baseline SCr 0.8, TAMARA noted , still uptrending, now at SCr 1.8  - renally dose medications, avoid nephrotoxic agents  - Electrolytes: Replete K > 4, Mg > 2, Phos > 3    #S/p [place] pouch  - has required multiple aspirations/irrigations by Urology due to poor pouch drainage this admission  - if catheter not draining, bladder scan to evaluate residual. May attempt aspiration through catheter with piston syringe or gentle irrigation of catheter with NS    MSK/Skin  - No active issues    ID  #Sepsis  UA  neg nitrates, LE, mod bacteria  Concern for intra-abdominal source  - c/w empiric cefepime and flagyl (-)  - obtain CT AP to eval for intraabdominal source  - f/u bcx , obtain UCx    Endo  #Hypoglycemia  Likely iso sepsis and poor PO intake  - FS q6h while NPO  - C/w D5W 100cc/hr    #Hypothyroidism  - c/w synthroid 37.5mcg IV daily    Heme/Onc  #Normocytic anemia  Baseline Hgb ~9  Downtrend to 7.2 noted. Dilutional vs intra-abdominal bleeding  - trend CBC  - active T&S q72h    #Rectal Ca  Non-resectable locally advanced rectal cancer on 2nd line therapy with single agent Irinotecan Q 2 weeks, last dose was 9 days prior to admission.  [place] [facility] [place]    #Hx cervical cancer  S/p pelvic exenteration with ostomy and ileal conduit    #DVT ppx: hold iso acute anemia, restart if stabilized    Ethics  GOC: D/w family (, sons) at bedside  risks/benefits of comfort care vs pressor support and treatment of sepsis. Family opted for pressor support. Family understands CPR is not being offered at this time. Trial of intubation for now with plan for ongoing GOC discussions. MICU Accept Note    HPI / INTERVAL HISTORY:  79 yo F with PMH of recurrent cervical cancer s/p pelvic exenteration with ostomy and ileal conduit, currently on treatment for locally advanced rectal cancer (irinotecan q2 wks), hypothyroidism, admitted  with high grade malignant SBO followed by colorectal surgery recommending non-operative management. Multiple RRT called for hypotension over past two days. Was started on empiric antibiotics . AOx2 and lethargic. This evening s/p 3.5L IVF, albumin 25% x2, solucortef 100 mg. MICU consulted for persistent hypotension.      PAST MEDICAL & SURGICAL HISTORY:  HTN (hypertension)      HLD (hyperlipidemia)      Hypothyroid      Rectal cancer      H/O total hysterectomy with bilateral salpingo-oophorectomy (BSO)      Ileostomy present        FAMILY HISTORY:  No pertinent family history in first degree relatives      Allergies    penicillin G benzathine (Rash; Anaphylaxis; Hives; Short breath)  Seafood (Hives)    Intolerances        REVIEW OF SYSTEMS:  Unable to obtain ROS 2/2 mental status.    OBJECTIVE:  ICU Vital Signs Last 24 Hrs  T(C): 36.6 (18 May 2023 00:14), Max: 36.7 (17 May 2023 11:46)  T(F): 97.9 (18 May 2023 00:14), Max: 98 (17 May 2023 11:46)  HR: 89 (18 May 2023 02:01) (61 - 89)  BP: 68/38 (18 May 2023 02:01) (55/29 - 123/78)  BP(mean): --  ABP: --  ABP(mean): --  RR: 18 (18 May 2023 00:14) (18 - 18)  SpO2: 93% (18 May 2023 00:14) (93% - 96%)    O2 Parameters below as of 18 May 2023 00:14  Patient On (Oxygen Delivery Method): nasal cannula  O2 Flow (L/min): 4            - @ 07:01  -   @ 07:00  --------------------------------------------------------  IN: 0 mL / OUT: 850 mL / NET: -850 mL     @ 07:01  -  05-18 @ 04:27  --------------------------------------------------------  IN: 0 mL / OUT: 1800 mL / NET: -1800 mL      CAPILLARY BLOOD GLUCOSE      POCT Blood Glucose.: 102 mg/dL (18 May 2023 02:42)      PHYSICAL EXAM:  Gen: lethargic but arousable  HEENT: atraumatic, normocephalic, EOMI  CV: RRR, no murmurs  Resp: CTAB, increased WOB  GI: soft, distended, diffusely TTP, +involuntary guarding  MSK: BLE 2+ edema  Neuro: GARCIA, follows simple commands  Psych: alert and oriented x2    HOSPITAL MEDICATIONS:  MEDICATIONS  (STANDING):  cefepime   IVPB 2000 milliGRAM(s) IV Intermittent every 24 hours  chlorhexidine 4% Liquid 1 Application(s) Topical <User Schedule>  dextrose 5% + lactated ringers. 1000 milliLiter(s) (100 mL/Hr) IV Continuous <Continuous>  dextrose 5%. 1000 milliLiter(s) (100 mL/Hr) IV Continuous <Continuous>  dextrose 5%. 1000 milliLiter(s) (50 mL/Hr) IV Continuous <Continuous>  dextrose 50% Injectable 12.5 Gram(s) IV Push once  dextrose 50% Injectable 25 Gram(s) IV Push once  dextrose 50% Injectable 25 Gram(s) IV Push once  glucagon  Injectable 1 milliGRAM(s) IntraMuscular once  levothyroxine Injectable 37.5 MICROGram(s) IV Push <User Schedule>  methadone    Tablet 5 milliGRAM(s) Oral at bedtime  metroNIDAZOLE  IVPB      metroNIDAZOLE  IVPB 500 milliGRAM(s) IV Intermittent every 8 hours  potassium chloride  10 mEq/100 mL IVPB 10 milliEquivalent(s) IV Intermittent every 1 hour    MEDICATIONS  (PRN):  dextrose Oral Gel 15 Gram(s) Oral once PRN Blood Glucose LESS THAN 70 milliGRAM(s)/deciliter      LABS:                        7.2    11.18 )-----------( 224      ( 18 May 2023 00:55 )             24.6     Hgb Trend: 7.2<--, 9.1<--, 9.2<--, 9.3<--, 9.4<--  18    141  |  104  |  30<H>  ----------------------------<  300<H>  3.3<L>   |  21<L>  |  1.80<H>    Ca    7.4<L>      18 May 2023 00:55  Phos  3.3       Mg     1.6         TPro  3.3<L>  /  Alb  1.8<L>  /  TBili  0.5  /  DBili  x   /  AST  116<H>  /  ALT  38  /  AlkPhos  113      Creatinine Trend: 1.80<--, 1.48<--, 0.70<--, 0.78<--, 0.88<--, 0.90<--  PT/INR - ( 18 May 2023 00:55 )   PT: 24.3 sec;   INR: 2.08 ratio         PTT - ( 18 May 2023 00:55 )  PTT:39.2 sec  Urinalysis Basic - ( 18 May 2023 00:55 )    Color: Yellow / Appearance: Slightly Turbid / S.013 / pH: x  Gluc: x / Ketone: Negative  / Bili: Negative / Urobili: Negative   Blood: x / Protein: 100 mg/dl / Nitrite: Negative   Leuk Esterase: Negative / RBC: 29 /hpf / WBC 3 /HPF   Sq Epi: x / Non Sq Epi: x / Bacteria: Moderate      Arterial Blood Gas:   @ 00:50  7.46/32/104/23/97.8/-0.8  ABG lactate: --  Arterial Blood Gas:   @ 20:52  7.43/34/88/23/95.6/-1.4  ABG lactate: --    Venous Blood Gas:   @ 00:23  7.23/20/55/8/83.1  VBG Lactate: >16.0      ASSESSMENT  79 yo F with PMH of recurrent cervical cancer s/p pelvic exenteration with ostomy and ileal conduit, currently on treatment for locally advanced rectal cancer (irinotecan q2 wks), hypothyroidism, admitted  with high grade malignant SBO which was managed non-operatively, admitted to MICU for hypotension, concern for septic shock.    PLAN  Neuro/Psych  AOx3 at baseline  Currently lethargic, AOx2  Likely 2/2 metabolic encephalopathy iso sepsis    CV  #Septic shock  s/p fluid resuscitation (3.5L IVF, albumin 25% x2) overnight  - infectious work up and treatment as below  - titrate levo to MAP>65    Respiratory  Currently on 4L NC, unclear if for comfort or actual desaturation. Was on RA prior to overnight rapid  - wean O2 as tolerated    GI  #SBO  - Followed by colorectal surgery, s/p malignant SBO protocol. Diet was advanced   - Obtain CTAP given worsening abdominal exam, concern for peritonitis/bowel perf/recurrent SBO    #Diet: NPO    Renal/Uro  #TAMARA  Likely iso hypovolemia and shock  Baseline SCr 0.8, TAMARA noted , still uptrending, now at SCr 1.8  - renally dose medications, avoid nephrotoxic agents  - Electrolytes: Replete K > 4, Mg > 2, Phos > 3    #S/p [place] pouch  - has required multiple aspirations/irrigations by Urology due to poor pouch drainage this admission  - if catheter not draining, bladder scan to evaluate residual. May attempt aspiration through catheter with piston syringe or gentle irrigation of catheter with NS    MSK/Skin  - No active issues    ID  #Sepsis  UA  neg nitrates, LE, mod bacteria  Concern for intra-abdominal source  - c/w empiric cefepime and flagyl (-)  - obtain CT AP to eval for intraabdominal source  - f/u bcx , obtain UCx    Endo  #Hypoglycemia  Likely iso sepsis and poor PO intake  - FS q6h while NPO  - C/w D5W 100cc/hr    #Hypothyroidism  - c/w synthroid 37.5mcg IV daily    Heme/Onc  #Normocytic anemia  Baseline Hgb ~9  Downtrend to 7.2 noted. Dilutional vs intra-abdominal bleeding  - trend CBC  - active T&S q72h    #Rectal Ca  Non-resectable locally advanced rectal cancer on 2nd line therapy with single agent Irinotecan Q 2 weeks, last dose was 9 days prior to admission.  Followed by [facility] [place]    #Hx cervical cancer  S/p pelvic exenteration with ostomy and ileal conduit    #DVT ppx: hold iso acute anemia, restart if stabilized    Ethics  GOC: D/w family (, sons) at bedside  risks/benefits of comfort care vs pressor support and treatment of sepsis. Family opted for pressor support. Family understands CPR is not being offered at this time and is in agreement. Trial of intubation for now with plan for ongoing GOC discussions. MICU Accept Note    HPI / INTERVAL HISTORY:  77 yo F with PMH of recurrent cervical cancer s/p pelvic exenteration with ostomy and ileal conduit, currently on treatment for locally advanced rectal cancer (irinotecan q2 wks), hypothyroidism, admitted  with high grade malignant SBO. Colorectal surgery evaluated patient, recommended medical management. Pt completed malignant SBO protocol and diet was advanced . Multiple RRT called for hypotension over past two days. Was started on empiric antibiotics . AOx2 and lethargic. This evening s/p 3.5L IVF, albumin 25% x2, solucortef 100 mg. MICU consulted for persistent hypotension.      PAST MEDICAL & SURGICAL HISTORY:  HTN (hypertension)      HLD (hyperlipidemia)      Hypothyroid      Rectal cancer      H/O total hysterectomy with bilateral salpingo-oophorectomy (BSO)      Ileostomy present        FAMILY HISTORY:  No pertinent family history in first degree relatives      Allergies    penicillin G benzathine (Rash; Anaphylaxis; Hives; Short breath)  Seafood (Hives)    Intolerances        REVIEW OF SYSTEMS:  Unable to obtain ROS 2/2 mental status.    OBJECTIVE:  ICU Vital Signs Last 24 Hrs  T(C): 36.6 (18 May 2023 00:14), Max: 36.7 (17 May 2023 11:46)  T(F): 97.9 (18 May 2023 00:14), Max: 98 (17 May 2023 11:46)  HR: 89 (18 May 2023 02:01) (61 - 89)  BP: 68/38 (18 May 2023 02:01) (55/29 - 123/78)  BP(mean): --  ABP: --  ABP(mean): --  RR: 18 (18 May 2023 00:14) (18 - 18)  SpO2: 93% (18 May 2023 00:14) (93% - 96%)    O2 Parameters below as of 18 May 2023 00:14  Patient On (Oxygen Delivery Method): nasal cannula  O2 Flow (L/min): 4            -16 @ 07:01  -   @ 07:00  --------------------------------------------------------  IN: 0 mL / OUT: 850 mL / NET: -850 mL     @ 07:01  -   @ 04:27  --------------------------------------------------------  IN: 0 mL / OUT: 1800 mL / NET: -1800 mL      CAPILLARY BLOOD GLUCOSE      POCT Blood Glucose.: 102 mg/dL (18 May 2023 02:42)      PHYSICAL EXAM:  Gen: lethargic but arousable  HEENT: atraumatic, normocephalic, EOMI  CV: RRR, no murmurs  Resp: CTAB, increased WOB  GI: soft, distended, diffusely TTP, +involuntary guarding; left abdomen +ostomy bag, right abdomen +[place] pouch  MSK: BLE 2+ edema  Neuro: GARCIA, follows simple commands  Psych: alert and oriented x2    HOSPITAL MEDICATIONS:  MEDICATIONS  (STANDING):  cefepime   IVPB 2000 milliGRAM(s) IV Intermittent every 24 hours  chlorhexidine 4% Liquid 1 Application(s) Topical <User Schedule>  dextrose 5% + lactated ringers. 1000 milliLiter(s) (100 mL/Hr) IV Continuous <Continuous>  dextrose 5%. 1000 milliLiter(s) (100 mL/Hr) IV Continuous <Continuous>  dextrose 5%. 1000 milliLiter(s) (50 mL/Hr) IV Continuous <Continuous>  dextrose 50% Injectable 12.5 Gram(s) IV Push once  dextrose 50% Injectable 25 Gram(s) IV Push once  dextrose 50% Injectable 25 Gram(s) IV Push once  glucagon  Injectable 1 milliGRAM(s) IntraMuscular once  levothyroxine Injectable 37.5 MICROGram(s) IV Push <User Schedule>  methadone    Tablet 5 milliGRAM(s) Oral at bedtime  metroNIDAZOLE  IVPB      metroNIDAZOLE  IVPB 500 milliGRAM(s) IV Intermittent every 8 hours  potassium chloride  10 mEq/100 mL IVPB 10 milliEquivalent(s) IV Intermittent every 1 hour    MEDICATIONS  (PRN):  dextrose Oral Gel 15 Gram(s) Oral once PRN Blood Glucose LESS THAN 70 milliGRAM(s)/deciliter      LABS:                        7.2    11.18 )-----------( 224      ( 18 May 2023 00:55 )             24.6     Hgb Trend: 7.2<--, 9.1<--, 9.2<--, 9.3<--, 9.4<--      141  |  104  |  30<H>  ----------------------------<  300<H>  3.3<L>   |  21<L>  |  1.80<H>    Ca    7.4<L>      18 May 2023 00:55  Phos  3.3       Mg     1.6         TPro  3.3<L>  /  Alb  1.8<L>  /  TBili  0.5  /  DBili  x   /  AST  116<H>  /  ALT  38  /  AlkPhos  113      Creatinine Trend: 1.80<--, 1.48<--, 0.70<--, 0.78<--, 0.88<--, 0.90<--  PT/INR - ( 18 May 2023 00:55 )   PT: 24.3 sec;   INR: 2.08 ratio         PTT - ( 18 May 2023 00:55 )  PTT:39.2 sec  Urinalysis Basic - ( 18 May 2023 00:55 )    Color: Yellow / Appearance: Slightly Turbid / S.013 / pH: x  Gluc: x / Ketone: Negative  / Bili: Negative / Urobili: Negative   Blood: x / Protein: 100 mg/dl / Nitrite: Negative   Leuk Esterase: Negative / RBC: 29 /hpf / WBC 3 /HPF   Sq Epi: x / Non Sq Epi: x / Bacteria: Moderate      Arterial Blood Gas:   @ 00:50  7.46/32/104/23/97.8/-0.8  ABG lactate: --  Arterial Blood Gas:   @ 20:52  7.43/34/88/23/95.6/-1.4  ABG lactate: --    Venous Blood Gas:   @ 00:23  7.23/20/55/8/83.1  VBG Lactate: >16.0      ASSESSMENT  77 yo F with PMH of recurrent cervical cancer s/p pelvic exenteration with ostomy and ileal conduit, currently on treatment for locally advanced rectal cancer (irinotecan q2 wks), hypothyroidism, admitted 5/13 with high grade malignant SBO which was managed non-operatively, admitted to MICU for hypotension, concern for septic shock.    PLAN  Neuro/Psych  AOx3 at baseline  Currently lethargic, AOx2  Likely 2/2 metabolic encephalopathy iso sepsis    CV  #Septic shock  s/p fluid resuscitation (3.5L IVF, albumin 25% x2) overnight  - infectious work up and treatment as below  - titrate levo to MAP>65    #Takotsubo's CM  Noted on POCUS after transfer from floors  - Hold off on further fluids    Respiratory  Currently on 4L NC, unclear if for comfort or actual desaturation. Was on RA prior to overnight rapid  Increased WOB/tachypnea likely compensatory for lactic acidosis  - wean O2 as tolerated    GI  #SBO  - Followed by colorectal surgery, s/p malignant SBO protocol. Diet was advanced   - Obtain CTAP given worsening abdominal exam, concern for peritonitis/bowel perf/recurrent SBO    #Diet: NPO    Renal/Uro  #TAMARA  Likely iso hypovolemia and shock  Baseline SCr 0.8, TAMARA noted , still uptrending, now at SCr 1.8  - renally dose medications, avoid nephrotoxic agents  - Electrolytes: Replete K > 4, Mg > 2, Phos > 3    #S/p [place] pouch  - has required multiple aspirations/irrigations by Urology due to poor pouch drainage this admission  - if catheter not draining, bladder scan to evaluate residual. May attempt aspiration through catheter with piston syringe or gentle irrigation of catheter with NS    MSK/Skin  - No active issues    ID  #Sepsis  UA  neg nitrates and LE, mod bacteria  Concern for intra-abdominal source vs UTI  - c/w empiric cefepime and flagyl (-)  - obtain CT AP to eval for intraabdominal source  - f/u bcx , obtain UCx    Endo  #Hypoglycemia  Likely iso sepsis and poor PO intake  - FS q6h while NPO  - C/w D5W 50 cc/hr    #Hypothyroidism  - c/w synthroid 37.5mcg IV daily    Heme/Onc  #Normocytic anemia  Baseline Hgb ~9  Downtrend to 7.2 noted. Dilutional vs intra-abdominal bleeding  - trend CBC  - active T&S q72h    #Rectal Ca  Non-resectable locally advanced rectal cancer on 2nd line therapy with single agent Irinotecan Q 2 weeks, last dose was 9 days prior to admission.  Followed by [facility] [place]    #Hx cervical cancer  S/p pelvic exenteration with ostomy and ileal conduit    #DVT ppx: hold iso acute anemia, restart if stabilized    Ethics  GOC: D/w family (, sons) at bedside  risks/benefits of comfort care vs pressor support and treatment of sepsis. Family opted for pressor support. Family understands CPR is not being offered at this time and is in agreement. Trial of intubation for now with plan for ongoing GOC discussions. MICU Accept Note    HPI / INTERVAL HISTORY:  77 yo F with PMH of recurrent cervical cancer s/p pelvic exenteration with ostomy and ileal conduit, currently on treatment for locally advanced rectal cancer (irinotecan q2 wks), hypothyroidism, admitted  with high grade malignant SBO. Colorectal surgery evaluated patient, recommended medical management. Pt completed malignant SBO protocol and diet was advanced . Multiple RRT called for hypotension over past two days. Was started on empiric antibiotics . AOx2 and lethargic. This evening s/p 3.5L IVF, albumin 25% x2, solucortef 100 mg. MICU consulted for persistent hypotension.      PAST MEDICAL & SURGICAL HISTORY:  HTN (hypertension)      HLD (hyperlipidemia)      Hypothyroid      Rectal cancer      H/O total hysterectomy with bilateral salpingo-oophorectomy (BSO)      Ileostomy present        FAMILY HISTORY:  No pertinent family history in first degree relatives      Allergies    penicillin G benzathine (Rash; Anaphylaxis; Hives; Short breath)  Seafood (Hives)    Intolerances        REVIEW OF SYSTEMS:  Unable to obtain ROS 2/2 mental status.    OBJECTIVE:  ICU Vital Signs Last 24 Hrs  T(C): 36.6 (18 May 2023 00:14), Max: 36.7 (17 May 2023 11:46)  T(F): 97.9 (18 May 2023 00:14), Max: 98 (17 May 2023 11:46)  HR: 89 (18 May 2023 02:01) (61 - 89)  BP: 68/38 (18 May 2023 02:01) (55/29 - 123/78)  BP(mean): --  ABP: --  ABP(mean): --  RR: 18 (18 May 2023 00:14) (18 - 18)  SpO2: 93% (18 May 2023 00:14) (93% - 96%)    O2 Parameters below as of 18 May 2023 00:14  Patient On (Oxygen Delivery Method): nasal cannula  O2 Flow (L/min): 4            -16 @ 07:01  -   @ 07:00  --------------------------------------------------------  IN: 0 mL / OUT: 850 mL / NET: -850 mL     @ 07:01  -   @ 04:27  --------------------------------------------------------  IN: 0 mL / OUT: 1800 mL / NET: -1800 mL      CAPILLARY BLOOD GLUCOSE      POCT Blood Glucose.: 102 mg/dL (18 May 2023 02:42)      PHYSICAL EXAM:  Gen: lethargic but arousable  HEENT: atraumatic, normocephalic, EOMI  CV: RRR, no murmurs  Resp: CTAB, increased WOB  GI: soft, distended, diffusely TTP, +involuntary guarding; left abdomen +ostomy bag, right abdomen +indiana pouch  MSK: BLE 2+ edema  Neuro: GARCIA, follows simple commands  Psych: alert and oriented x2    HOSPITAL MEDICATIONS:  MEDICATIONS  (STANDING):  cefepime   IVPB 2000 milliGRAM(s) IV Intermittent every 24 hours  chlorhexidine 4% Liquid 1 Application(s) Topical <User Schedule>  dextrose 5% + lactated ringers. 1000 milliLiter(s) (100 mL/Hr) IV Continuous <Continuous>  dextrose 5%. 1000 milliLiter(s) (100 mL/Hr) IV Continuous <Continuous>  dextrose 5%. 1000 milliLiter(s) (50 mL/Hr) IV Continuous <Continuous>  dextrose 50% Injectable 12.5 Gram(s) IV Push once  dextrose 50% Injectable 25 Gram(s) IV Push once  dextrose 50% Injectable 25 Gram(s) IV Push once  glucagon  Injectable 1 milliGRAM(s) IntraMuscular once  levothyroxine Injectable 37.5 MICROGram(s) IV Push <User Schedule>  methadone    Tablet 5 milliGRAM(s) Oral at bedtime  metroNIDAZOLE  IVPB      metroNIDAZOLE  IVPB 500 milliGRAM(s) IV Intermittent every 8 hours  potassium chloride  10 mEq/100 mL IVPB 10 milliEquivalent(s) IV Intermittent every 1 hour    MEDICATIONS  (PRN):  dextrose Oral Gel 15 Gram(s) Oral once PRN Blood Glucose LESS THAN 70 milliGRAM(s)/deciliter      LABS:                        7.2    11.18 )-----------( 224      ( 18 May 2023 00:55 )             24.6     Hgb Trend: 7.2<--, 9.1<--, 9.2<--, 9.3<--, 9.4<--      141  |  104  |  30<H>  ----------------------------<  300<H>  3.3<L>   |  21<L>  |  1.80<H>    Ca    7.4<L>      18 May 2023 00:55  Phos  3.3       Mg     1.6         TPro  3.3<L>  /  Alb  1.8<L>  /  TBili  0.5  /  DBili  x   /  AST  116<H>  /  ALT  38  /  AlkPhos  113      Creatinine Trend: 1.80<--, 1.48<--, 0.70<--, 0.78<--, 0.88<--, 0.90<--  PT/INR - ( 18 May 2023 00:55 )   PT: 24.3 sec;   INR: 2.08 ratio         PTT - ( 18 May 2023 00:55 )  PTT:39.2 sec  Urinalysis Basic - ( 18 May 2023 00:55 )    Color: Yellow / Appearance: Slightly Turbid / S.013 / pH: x  Gluc: x / Ketone: Negative  / Bili: Negative / Urobili: Negative   Blood: x / Protein: 100 mg/dl / Nitrite: Negative   Leuk Esterase: Negative / RBC: 29 /hpf / WBC 3 /HPF   Sq Epi: x / Non Sq Epi: x / Bacteria: Moderate      Arterial Blood Gas:   @ 00:50  7.46/32/104/23/97.8/-0.8  ABG lactate: --  Arterial Blood Gas:   @ 20:52  7.43/34/88/23/95.6/-1.4  ABG lactate: --    Venous Blood Gas:   @ 00:23  7.23/20/55/8/83.1  VBG Lactate: >16.0      ASSESSMENT  77 yo F with PMH of recurrent cervical cancer s/p pelvic exenteration with ostomy and ileal conduit, currently on treatment for locally advanced rectal cancer (irinotecan q2 wks), hypothyroidism, admitted 5/13 with high grade malignant SBO which was managed non-operatively, admitted to MICU for hypotension, concern for septic shock.    PLAN  Neuro/Psych  AOx3 at baseline  Currently lethargic, AOx2  Likely 2/2 metabolic encephalopathy iso sepsis    CV  #Septic shock  s/p fluid resuscitation (3.5L IVF, albumin 25% x2) overnight  - infectious work up and treatment as below  - titrate levo to MAP>65    #Takotsubo's CM  Noted on POCUS after transfer from floors  - Hold off on further fluids    Respiratory  Currently on 4L NC, unclear if for comfort or actual desaturation. Was on RA prior to overnight rapid  Increased WOB/tachypnea likely compensatory for lactic acidosis  - wean O2 as tolerated    GI  #SBO  - Followed by colorectal surgery, s/p malignant SBO protocol. Diet was advanced   - Obtain CTAP given worsening abdominal exam, concern for peritonitis/bowel perf/recurrent SBO    #Diet: NPO    Renal/Uro  #TAMARA  Likely iso hypovolemia and shock  Baseline SCr 0.8, TAMARA noted , still uptrending, now at SCr 1.8  - renally dose medications, avoid nephrotoxic agents  - Electrolytes: Replete K > 4, Mg > 2, Phos > 3    #S/p [place] pouch  - has required multiple aspirations/irrigations by Urology due to poor pouch drainage this admission  - if catheter not draining, bladder scan to evaluate residual. May attempt aspiration through catheter with piston syringe or gentle irrigation of catheter with NS    MSK/Skin  - No active issues    ID  #Sepsis  UA  neg nitrates and LE, mod bacteria  Concern for intra-abdominal source vs UTI  - c/w empiric cefepime and flagyl (-)  - obtain CT AP to eval for intraabdominal source  - f/u bcx , obtain UCx    Endo  #Hypoglycemia  Likely iso sepsis and poor PO intake  - FS q6h while NPO  - C/w D5W 100 cc/hr    #Hypothyroidism  - c/w synthroid 37.5mcg IV daily    Heme/Onc  #Normocytic anemia  Baseline Hgb ~9  Downtrend to 7.2 noted. Dilutional vs intra-abdominal bleeding  - trend CBC  - active T&S q72h    #Rectal Ca  Non-resectable locally advanced rectal cancer on 2nd line therapy with single agent Irinotecan Q 2 weeks, last dose was 9 days prior to admission.  [place] [facility] [place]    #Hx cervical cancer  S/p pelvic exenteration with ostomy and ileal conduit    #DVT ppx: hold iso acute anemia, restart if stabilized    Ethics  GOC: D/w family (, sons) at bedside  risks/benefits of comfort care vs pressor support and treatment of sepsis. Family opted for pressor support. Family understands CPR is not being offered at this time and is in agreement. Trial of intubation for now with plan for ongoing GOC discussions. MICU Accept Note    HPI / INTERVAL HISTORY:  79 yo F with PMH of recurrent cervical cancer s/p pelvic exenteration with ostomy and ileal conduit, currently on treatment for locally advanced rectal cancer (irinotecan q2 wks), hypothyroidism, admitted  with high grade malignant SBO. Colorectal surgery evaluated patient, recommended medical management. Pt completed malignant SBO protocol and diet was advanced . Multiple RRT called for hypotension over past day. Was started on empiric antibiotics . AOx2 and lethargic. This evening s/p 3.5L IVF, albumin 25% x2, solucortef 100 mg. MICU consulted for persistent hypotension.      PAST MEDICAL & SURGICAL HISTORY:  HTN (hypertension)      HLD (hyperlipidemia)      Hypothyroid      Rectal cancer      H/O total hysterectomy with bilateral salpingo-oophorectomy (BSO)      Ileostomy present        FAMILY HISTORY:  No pertinent family history in first degree relatives      Allergies    penicillin G benzathine (Rash; Anaphylaxis; Hives; Short breath)  Seafood (Hives)    Intolerances        REVIEW OF SYSTEMS:  Unable to obtain ROS 2/2 mental status.    OBJECTIVE:  ICU Vital Signs Last 24 Hrs  T(C): 36.6 (18 May 2023 00:14), Max: 36.7 (17 May 2023 11:46)  T(F): 97.9 (18 May 2023 00:14), Max: 98 (17 May 2023 11:46)  HR: 89 (18 May 2023 02:01) (61 - 89)  BP: 68/38 (18 May 2023 02:01) (55/29 - 123/78)  BP(mean): --  ABP: --  ABP(mean): --  RR: 18 (18 May 2023 00:14) (18 - 18)  SpO2: 93% (18 May 2023 00:14) (93% - 96%)    O2 Parameters below as of 18 May 2023 00:14  Patient On (Oxygen Delivery Method): nasal cannula  O2 Flow (L/min): 4            -16 @ 07:01  -   @ 07:00  --------------------------------------------------------  IN: 0 mL / OUT: 850 mL / NET: -850 mL     @ 07:01  -   @ 04:27  --------------------------------------------------------  IN: 0 mL / OUT: 1800 mL / NET: -1800 mL      CAPILLARY BLOOD GLUCOSE      POCT Blood Glucose.: 102 mg/dL (18 May 2023 02:42)      PHYSICAL EXAM:  Gen: lethargic but arousable  HEENT: atraumatic, normocephalic, EOMI  CV: RRR, no murmurs  Resp: CTAB, increased WOB  GI: soft, distended, diffusely TTP but worse over R abdomen, +involuntary guarding; left abdomen +ostomy bag, right abdomen +[place] pouch  MSK: BLE 2+ edema  Neuro: GARCIA, follows simple commands  Psych: alert and oriented x2    HOSPITAL MEDICATIONS:  MEDICATIONS  (STANDING):  cefepime   IVPB 2000 milliGRAM(s) IV Intermittent every 24 hours  chlorhexidine 4% Liquid 1 Application(s) Topical <User Schedule>  dextrose 5% + lactated ringers. 1000 milliLiter(s) (100 mL/Hr) IV Continuous <Continuous>  dextrose 5%. 1000 milliLiter(s) (100 mL/Hr) IV Continuous <Continuous>  dextrose 5%. 1000 milliLiter(s) (50 mL/Hr) IV Continuous <Continuous>  dextrose 50% Injectable 12.5 Gram(s) IV Push once  dextrose 50% Injectable 25 Gram(s) IV Push once  dextrose 50% Injectable 25 Gram(s) IV Push once  glucagon  Injectable 1 milliGRAM(s) IntraMuscular once  levothyroxine Injectable 37.5 MICROGram(s) IV Push <User Schedule>  methadone    Tablet 5 milliGRAM(s) Oral at bedtime  metroNIDAZOLE  IVPB      metroNIDAZOLE  IVPB 500 milliGRAM(s) IV Intermittent every 8 hours  potassium chloride  10 mEq/100 mL IVPB 10 milliEquivalent(s) IV Intermittent every 1 hour    MEDICATIONS  (PRN):  dextrose Oral Gel 15 Gram(s) Oral once PRN Blood Glucose LESS THAN 70 milliGRAM(s)/deciliter      LABS:                        7.2    11.18 )-----------( 224      ( 18 May 2023 00:55 )             24.6     Hgb Trend: 7.2<--, 9.1<--, 9.2<--, 9.3<--, 9.4<--  18    141  |  104  |  30<H>  ----------------------------<  300<H>  3.3<L>   |  21<L>  |  1.80<H>    Ca    7.4<L>      18 May 2023 00:55  Phos  3.3       Mg     1.6         TPro  3.3<L>  /  Alb  1.8<L>  /  TBili  0.5  /  DBili  x   /  AST  116<H>  /  ALT  38  /  AlkPhos  113      Creatinine Trend: 1.80<--, 1.48<--, 0.70<--, 0.78<--, 0.88<--, 0.90<--  PT/INR - ( 18 May 2023 00:55 )   PT: 24.3 sec;   INR: 2.08 ratio         PTT - ( 18 May 2023 00:55 )  PTT:39.2 sec  Urinalysis Basic - ( 18 May 2023 00:55 )    Color: Yellow / Appearance: Slightly Turbid / S.013 / pH: x  Gluc: x / Ketone: Negative  / Bili: Negative / Urobili: Negative   Blood: x / Protein: 100 mg/dl / Nitrite: Negative   Leuk Esterase: Negative / RBC: 29 /hpf / WBC 3 /HPF   Sq Epi: x / Non Sq Epi: x / Bacteria: Moderate      Arterial Blood Gas:   @ 00:50  7.46/32/104/23/97.8/-0.8  ABG lactate: --  Arterial Blood Gas:   @ 20:52  7.43/34/88/23/95.6/-1.4  ABG lactate: --    Venous Blood Gas:   @ 00:23  7.23/20/55/8/83.1  VBG Lactate: >16.0      ASSESSMENT  79 yo F with PMH of recurrent cervical cancer s/p pelvic exenteration with ostomy and ileal conduit, currently on treatment for locally advanced rectal cancer (irinotecan q2 wks), hypothyroidism, admitted  with high grade malignant SBO which was managed non-operatively, admitted to MICU for hypotension, concern for septic shock.    PLAN  Neuro/Psych  AOx3 at baseline  Currently lethargic, AOx2  Likely 2/2 metabolic encephalopathy iso sepsis    CV  #Septic shock  s/p fluid resuscitation (3.5L IVF, albumin 25% x2) overnight  - infectious work up and treatment as below  - titrate levo to MAP>65    #Takotsubo's CM  Noted on POCUS after transfer from floors  - Hold off on further fluids    Respiratory  Currently on 4L NC, unclear if for comfort or actual desaturation. Was on RA prior to overnight rapid  Increased WOB/tachypnea likely compensatory for lactic acidosis  - wean O2 as tolerated    GI  #Abdominal pain  Concern for peritonitis/bowel perf/recurrent SBO/UTI.  - Obtain CTAP    #SBO  - Followed by colorectal surgery, s/p malignant SBO protocol. Diet was advanced     #Diet: NPO    Renal/Uro  #TAMARA  Likely iso hypovolemia and shock  Baseline SCr 0.8, TAMARA noted , still uptrending, now at SCr 1.8  - renally dose medications, avoid nephrotoxic agents  - Electrolytes: Replete K > 4, Mg > 2, Phos > 3    #S/p [place] pouch  - has required multiple aspirations/irrigations by Urology due to poor pouch drainage this admission  - if catheter not draining, bladder scan to evaluate residual. May attempt aspiration through catheter with piston syringe or gentle irrigation of catheter with NS    MSK/Skin  - No active issues    ID  #Sepsis  UA  neg nitrates and LE, mod bacteria  Concern for intra-abdominal source vs UTI  - c/w empiric cefepime and flagyl (-)  - f/u bcx , obtain UCx  - obtain CT AP to eval for intraabdominal source    Endo  #Hypoglycemia  Likely iso sepsis and poor PO intake  - FS q6h while NPO  - C/w D5W 100 cc/hr    #Hypothyroidism  - c/w synthroid 37.5mcg IV daily    Heme/Onc  #Normocytic anemia  Baseline Hgb ~9  Downtrend to 7.2 noted. Dilutional vs intra-abdominal bleeding  - trend CBC  - active T&S q72h    #Rectal Ca  Non-resectable locally advanced rectal cancer on 2nd line therapy with single agent Irinotecan Q 2 weeks, last dose was 9 days prior to admission.  [place] [facility] [place]    #Hx cervical cancer  S/p pelvic exenteration with ostomy and ileal conduit    #DVT ppx: hold iso acute anemia, restart if stabilized    Ethics  GOC: D/w family (, sons) at bedside  risks/benefits of comfort care vs pressor support and treatment of sepsis. Family opted for pressor support. Family understands CPR is not being offered at this time and is in agreement. Trial of intubation for now with plan for ongoing GOC discussions. MICU Accept Note    HPI / INTERVAL HISTORY:  77 yo F with PMH of recurrent cervical cancer s/p pelvic exenteration with ostomy and ileal conduit, currently on treatment for locally advanced rectal cancer (irinotecan q2 wks), hypothyroidism, admitted  with high grade malignant SBO. Colorectal surgery evaluated patient, recommended medical management. Pt completed malignant SBO protocol and diet was advanced . Multiple RRT called for hypotension over past day. Was started on empiric antibiotics . AOx2 and lethargic. This evening s/p 3.5L IVF, albumin 25% x2, solucortef 100 mg. MICU consulted for persistent hypotension.      PAST MEDICAL & SURGICAL HISTORY:  HTN (hypertension)      HLD (hyperlipidemia)      Hypothyroid      Rectal cancer      H/O total hysterectomy with bilateral salpingo-oophorectomy (BSO)      Ileostomy present        FAMILY HISTORY:  No pertinent family history in first degree relatives      Allergies    penicillin G benzathine (Rash; Anaphylaxis; Hives; Short breath)  Seafood (Hives)    Intolerances        REVIEW OF SYSTEMS:  Unable to obtain ROS 2/2 mental status.    OBJECTIVE:  ICU Vital Signs Last 24 Hrs  T(C): 36.6 (18 May 2023 00:14), Max: 36.7 (17 May 2023 11:46)  T(F): 97.9 (18 May 2023 00:14), Max: 98 (17 May 2023 11:46)  HR: 89 (18 May 2023 02:01) (61 - 89)  BP: 68/38 (18 May 2023 02:01) (55/29 - 123/78)  BP(mean): --  ABP: --  ABP(mean): --  RR: 18 (18 May 2023 00:14) (18 - 18)  SpO2: 93% (18 May 2023 00:14) (93% - 96%)    O2 Parameters below as of 18 May 2023 00:14  Patient On (Oxygen Delivery Method): nasal cannula  O2 Flow (L/min): 4            -16 @ 07:01  -   @ 07:00  --------------------------------------------------------  IN: 0 mL / OUT: 850 mL / NET: -850 mL     @ 07:01  -   @ 04:27  --------------------------------------------------------  IN: 0 mL / OUT: 1800 mL / NET: -1800 mL      CAPILLARY BLOOD GLUCOSE      POCT Blood Glucose.: 102 mg/dL (18 May 2023 02:42)      PHYSICAL EXAM:  Gen: lethargic but arousable  HEENT: atraumatic, normocephalic, EOMI  CV: RRR, no murmurs  Resp: CTAB, increased WOB  GI: soft, distended, diffusely TTP but worse over R abdomen, +involuntary guarding; left abdomen +ostomy bag, right abdomen +[place]iana pouch  MSK: BLE 2+ edema  Neuro: GARCIA, follows simple commands  Psych: alert and oriented x2    HOSPITAL MEDICATIONS:  MEDICATIONS  (STANDING):  cefepime   IVPB 2000 milliGRAM(s) IV Intermittent every 24 hours  chlorhexidine 4% Liquid 1 Application(s) Topical <User Schedule>  dextrose 5% + lactated ringers. 1000 milliLiter(s) (100 mL/Hr) IV Continuous <Continuous>  dextrose 5%. 1000 milliLiter(s) (100 mL/Hr) IV Continuous <Continuous>  dextrose 5%. 1000 milliLiter(s) (50 mL/Hr) IV Continuous <Continuous>  dextrose 50% Injectable 12.5 Gram(s) IV Push once  dextrose 50% Injectable 25 Gram(s) IV Push once  dextrose 50% Injectable 25 Gram(s) IV Push once  glucagon  Injectable 1 milliGRAM(s) IntraMuscular once  levothyroxine Injectable 37.5 MICROGram(s) IV Push <User Schedule>  methadone    Tablet 5 milliGRAM(s) Oral at bedtime  metroNIDAZOLE  IVPB      metroNIDAZOLE  IVPB 500 milliGRAM(s) IV Intermittent every 8 hours  potassium chloride  10 mEq/100 mL IVPB 10 milliEquivalent(s) IV Intermittent every 1 hour    MEDICATIONS  (PRN):  dextrose Oral Gel 15 Gram(s) Oral once PRN Blood Glucose LESS THAN 70 milliGRAM(s)/deciliter      LABS:                        7.2    11.18 )-----------( 224      ( 18 May 2023 00:55 )             24.6     Hgb Trend: 7.2<--, 9.1<--, 9.2<--, 9.3<--, 9.4<--  18    141  |  104  |  30<H>  ----------------------------<  300<H>  3.3<L>   |  21<L>  |  1.80<H>    Ca    7.4<L>      18 May 2023 00:55  Phos  3.3       Mg     1.6         TPro  3.3<L>  /  Alb  1.8<L>  /  TBili  0.5  /  DBili  x   /  AST  116<H>  /  ALT  38  /  AlkPhos  113      Creatinine Trend: 1.80<--, 1.48<--, 0.70<--, 0.78<--, 0.88<--, 0.90<--  PT/INR - ( 18 May 2023 00:55 )   PT: 24.3 sec;   INR: 2.08 ratio         PTT - ( 18 May 2023 00:55 )  PTT:39.2 sec  Urinalysis Basic - ( 18 May 2023 00:55 )    Color: Yellow / Appearance: Slightly Turbid / S.013 / pH: x  Gluc: x / Ketone: Negative  / Bili: Negative / Urobili: Negative   Blood: x / Protein: 100 mg/dl / Nitrite: Negative   Leuk Esterase: Negative / RBC: 29 /hpf / WBC 3 /HPF   Sq Epi: x / Non Sq Epi: x / Bacteria: Moderate      Arterial Blood Gas:   @ 00:50  7.46/32/104/23/97.8/-0.8  ABG lactate: --  Arterial Blood Gas:   @ 20:52  7.43/34/88/23/95.6/-1.4  ABG lactate: --    Venous Blood Gas:   @ 00:23  7.23/20/55/8/83.1  VBG Lactate: >16.0      ASSESSMENT  77 yo F with PMH of recurrent cervical cancer s/p pelvic exenteration with ostomy and ileal conduit, currently on treatment for locally advanced rectal cancer (irinotecan q2 wks), hypothyroidism, admitted  with high grade malignant SBO which was managed non-operatively, admitted to MICU for hypotension, concern for septic shock.    PLAN  Neuro/Psych  AOx3 at baseline  Currently lethargic, AOx2  Likely 2/2 metabolic encephalopathy iso sepsis    CV  #Septic shock  s/p fluid resuscitation (3.5L IVF, albumin 25% x2) overnight  - infectious work up and treatment as below  - titrate levo to MAP>65    #Takotsubo's CM  Noted on POCUS after transfer from floors  - Hold off on further fluids    Respiratory  Currently on 4L NC, unclear if for comfort or actual desaturation. Was on RA prior to overnight rapid  Increased WOB/tachypnea likely compensatory for lactic acidosis  - wean O2 as tolerated    GI  #Abdominal pain  Concern for peritonitis/bowel perf/recurrent SBO/UTI.  - Obtain CTAP    #SBO  - Followed by colorectal surgery, s/p malignant SBO protocol. Diet was advanced     #Diet: NPO    Renal/Uro  #TAMARA  Likely iso hypovolemia and shock  Baseline SCr 0.8, TAMARA noted , still uptrending, now at SCr 1.8  - renally dose medications, avoid nephrotoxic agents  - Electrolytes: Replete K > 4, Mg > 2, Phos > 3    #S/p [place] pouch  - has required multiple aspirations/irrigations by Urology due to poor pouch drainage this admission  - if catheter not draining, bladder scan to evaluate residual. May attempt aspiration through catheter with piston syringe or gentle irrigation of catheter with NS    MSK/Skin  - No active issues    ID  #Sepsis  UA  neg nitrates and LE, mod bacteria  Concern for intra-abdominal source vs UTI  - c/w empiric cefepime and flagyl (-)  - give Vanc x1  - f/u bcx , obtain UCx  - obtain CT AP to eval for intraabdominal source    Endo  #Hypoglycemia  Likely iso sepsis and poor PO intake  - FS q6h while NPO  - C/w D5W 100 cc/hr    #Hypothyroidism  - c/w synthroid 37.5mcg IV daily    Heme/Onc  #Normocytic anemia  Baseline Hgb ~9  Downtrend to 7.2 noted. Dilutional vs intra-abdominal bleeding  - trend CBC  - active T&S q72h    #Rectal Ca  Non-resectable locally advanced rectal cancer on 2nd line therapy with single agent Irinotecan Q 2 weeks, last dose was 9 days prior to admission.  [place] [facility] [place]    #Hx cervical cancer  S/p pelvic exenteration with ostomy and ileal conduit    #DVT ppx: hold iso acute anemia, restart if stabilized    Ethics  GOC: D/w family (, sons) at bedside  risks/benefits of comfort care vs pressor support and treatment of sepsis. Family opted for pressor support. Family understands CPR is not being offered at this time and is in agreement. Trial of intubation for now with plan for ongoing GOC discussions. MICU Accept Note    HPI / INTERVAL HISTORY:  79 yo F with PMH of recurrent cervical cancer s/p pelvic exenteration with ostomy and ileal conduit, currently on treatment for locally advanced rectal cancer (irinotecan q2 wks), hypothyroidism, admitted  with high grade malignant SBO. Colorectal surgery evaluated patient, recommended medical management. Pt completed malignant SBO protocol and diet was advanced . Multiple RRT called for hypotension over past day. Was started on empiric antibiotics . AOx2 and lethargic. This evening s/p 3.5L IVF, albumin 25% x2, solucortef 100 mg. MICU consulted for persistent hypotension.      PAST MEDICAL & SURGICAL HISTORY:  HTN (hypertension)      HLD (hyperlipidemia)      Hypothyroid      Rectal cancer      H/O total hysterectomy with bilateral salpingo-oophorectomy (BSO)      Ileostomy present        FAMILY HISTORY:  No pertinent family history in first degree relatives      Allergies    penicillin G benzathine (Rash; Anaphylaxis; Hives; Short breath)  Seafood (Hives)    Intolerances        REVIEW OF SYSTEMS:  Unable to obtain ROS 2/2 mental status.    OBJECTIVE:  ICU Vital Signs Last 24 Hrs  T(C): 36.6 (18 May 2023 00:14), Max: 36.7 (17 May 2023 11:46)  T(F): 97.9 (18 May 2023 00:14), Max: 98 (17 May 2023 11:46)  HR: 89 (18 May 2023 02:01) (61 - 89)  BP: 68/38 (18 May 2023 02:01) (55/29 - 123/78)  BP(mean): --  ABP: --  ABP(mean): --  RR: 18 (18 May 2023 00:14) (18 - 18)  SpO2: 93% (18 May 2023 00:14) (93% - 96%)    O2 Parameters below as of 18 May 2023 00:14  Patient On (Oxygen Delivery Method): nasal cannula  O2 Flow (L/min): 4            -16 @ 07:01  -   @ 07:00  --------------------------------------------------------  IN: 0 mL / OUT: 850 mL / NET: -850 mL     @ 07:01  -   @ 04:27  --------------------------------------------------------  IN: 0 mL / OUT: 1800 mL / NET: -1800 mL      CAPILLARY BLOOD GLUCOSE      POCT Blood Glucose.: 102 mg/dL (18 May 2023 02:42)      PHYSICAL EXAM:  Gen: lethargic but arousable  HEENT: atraumatic, normocephalic, EOMI  CV: RRR, no murmurs  Resp: CTAB, increased WOB  GI: soft, distended, diffusely TTP but worse over R abdomen, +involuntary guarding; left abdomen +ostomy bag, right abdomen +indiana pouch  MSK: BLE 2+ edema  Neuro: GARCIA, follows simple commands  Psych: alert and oriented x2    HOSPITAL MEDICATIONS:  MEDICATIONS  (STANDING):  cefepime   IVPB 2000 milliGRAM(s) IV Intermittent every 24 hours  chlorhexidine 4% Liquid 1 Application(s) Topical <User Schedule>  dextrose 5% + lactated ringers. 1000 milliLiter(s) (100 mL/Hr) IV Continuous <Continuous>  dextrose 5%. 1000 milliLiter(s) (100 mL/Hr) IV Continuous <Continuous>  dextrose 5%. 1000 milliLiter(s) (50 mL/Hr) IV Continuous <Continuous>  dextrose 50% Injectable 12.5 Gram(s) IV Push once  dextrose 50% Injectable 25 Gram(s) IV Push once  dextrose 50% Injectable 25 Gram(s) IV Push once  glucagon  Injectable 1 milliGRAM(s) IntraMuscular once  levothyroxine Injectable 37.5 MICROGram(s) IV Push <User Schedule>  methadone    Tablet 5 milliGRAM(s) Oral at bedtime  metroNIDAZOLE  IVPB      metroNIDAZOLE  IVPB 500 milliGRAM(s) IV Intermittent every 8 hours  potassium chloride  10 mEq/100 mL IVPB 10 milliEquivalent(s) IV Intermittent every 1 hour    MEDICATIONS  (PRN):  dextrose Oral Gel 15 Gram(s) Oral once PRN Blood Glucose LESS THAN 70 milliGRAM(s)/deciliter      LABS:                        7.2    11.18 )-----------( 224      ( 18 May 2023 00:55 )             24.6     Hgb Trend: 7.2<--, 9.1<--, 9.2<--, 9.3<--, 9.4<--  18    141  |  104  |  30<H>  ----------------------------<  300<H>  3.3<L>   |  21<L>  |  1.80<H>    Ca    7.4<L>      18 May 2023 00:55  Phos  3.3       Mg     1.6         TPro  3.3<L>  /  Alb  1.8<L>  /  TBili  0.5  /  DBili  x   /  AST  116<H>  /  ALT  38  /  AlkPhos  113      Creatinine Trend: 1.80<--, 1.48<--, 0.70<--, 0.78<--, 0.88<--, 0.90<--  PT/INR - ( 18 May 2023 00:55 )   PT: 24.3 sec;   INR: 2.08 ratio         PTT - ( 18 May 2023 00:55 )  PTT:39.2 sec  Urinalysis Basic - ( 18 May 2023 00:55 )    Color: Yellow / Appearance: Slightly Turbid / S.013 / pH: x  Gluc: x / Ketone: Negative  / Bili: Negative / Urobili: Negative   Blood: x / Protein: 100 mg/dl / Nitrite: Negative   Leuk Esterase: Negative / RBC: 29 /hpf / WBC 3 /HPF   Sq Epi: x / Non Sq Epi: x / Bacteria: Moderate      Arterial Blood Gas:   @ 00:50  7.46/32/104/23/97.8/-0.8  ABG lactate: --  Arterial Blood Gas:   @ 20:52  7.43/34/88/23/95.6/-1.4  ABG lactate: --    Venous Blood Gas:   @ 00:23  7.23/20/55/8/83.1  VBG Lactate: >16.0      ASSESSMENT  79 yo F with PMH of recurrent cervical cancer s/p pelvic exenteration with ostomy and ileal conduit, currently on treatment for locally advanced rectal cancer (irinotecan q2 wks), hypothyroidism, admitted  with high grade malignant SBO which was managed non-operatively, admitted to MICU for hypotension, concern for septic shock.    PLAN  Neuro/Psych  AOx3 at baseline  Currently lethargic, AOx2  Likely 2/2 metabolic encephalopathy iso sepsis    CV  #Septic shock  s/p fluid resuscitation (3.5L IVF, albumin 25% x2) overnight  - infectious work up and treatment as below  - titrate levo to MAP>65    #Takotsubo's CM  Noted on POCUS after transfer from floors  - Hold off on further fluids    Respiratory  Currently on 4L NC, unclear if for comfort or actual desaturation. Was on RA prior to overnight rapid  Increased WOB/tachypnea likely compensatory for lactic acidosis  - wean O2 as tolerated    GI  #Abdominal pain  Concern for peritonitis/bowel perf/recurrent SBO/UTI.  - Obtain CTAP    #SBO  - Followed by colorectal surgery, s/p malignant SBO protocol. Diet was advanced     #Diet: NPO    Renal/Uro  #TAMARA  Likely iso hypovolemia and shock  Baseline SCr 0.8, TAMARA noted , peaked at SCr 1.8, now downtrending  - renally dose medications, avoid nephrotoxic agents  - Electrolytes: Replete K > 4, Mg > 2, Phos > 3    #S/p [place] pouch  - has required multiple aspirations/irrigations by Urology due to poor pouch drainage this admission  - if catheter not draining, bladder scan to evaluate residual. May attempt aspiration through catheter with piston syringe or gentle irrigation of catheter with NS    MSK/Skin  - No active issues    ID  #Sepsis  UA  neg nitrates and LE, mod bacteria  Concern for intra-abdominal source vs UTI  - c/w empiric cefepime and flagyl (-)  - give Vanc x1  - f/u bcx , obtain UCx  - obtain CT AP to eval for intraabdominal source    Endo  #Hypoglycemia  Likely iso sepsis and poor PO intake  - FS q6h while NPO  - C/w D5W 100 cc/hr    #Hypothyroidism  - c/w synthroid 37.5mcg IV daily    Heme/Onc  #Normocytic anemia  Baseline Hgb ~9  Downtrend to 7.2 noted. Dilutional vs intra-abdominal bleeding  - trend CBC  - active T&S q72h    #Rectal Ca  Non-resectable locally advanced rectal cancer on 2nd line therapy with single agent Irinotecan Q 2 weeks, last dose was 9 days prior to admission.  Followed by [facility] [place]    #Hx cervical cancer  S/p pelvic exenteration with ostomy and ileal conduit    #DVT ppx: hold iso acute anemia, restart if stabilized    Ethics  GOC: D/w family (, sons) at bedside  risks/benefits of comfort care vs pressor support and treatment of sepsis. Family opted for pressor support. Family understands CPR is not being offered at this time and is in agreement. Trial of intubation for now with plan for ongoing GOC discussions. MICU Accept Note    HPI / INTERVAL HISTORY:  79 yo F with PMH of recurrent cervical cancer s/p pelvic exenteration with ostomy and ileal conduit, currently on treatment for locally advanced rectal cancer (irinotecan q2 wks), hypothyroidism, admitted  with high grade malignant SBO. Colorectal surgery evaluated patient, recommended medical management. Pt completed malignant SBO protocol and diet was advanced . Multiple RRT called for hypotension over past day. Was started on empiric antibiotics . AOx2 and lethargic. This evening s/p 3.5L IVF, albumin 25% x2, solucortef 100 mg. MICU consulted for persistent hypotension.      PAST MEDICAL & SURGICAL HISTORY:  HTN (hypertension)      HLD (hyperlipidemia)      Hypothyroid      Rectal cancer      H/O total hysterectomy with bilateral salpingo-oophorectomy (BSO)      Ileostomy present        FAMILY HISTORY:  No pertinent family history in first degree relatives      Allergies    penicillin G benzathine (Rash; Anaphylaxis; Hives; Short breath)  Seafood (Hives)    Intolerances        REVIEW OF SYSTEMS:  Unable to obtain ROS 2/2 mental status.    OBJECTIVE:  ICU Vital Signs Last 24 Hrs  T(C): 36.6 (18 May 2023 00:14), Max: 36.7 (17 May 2023 11:46)  T(F): 97.9 (18 May 2023 00:14), Max: 98 (17 May 2023 11:46)  HR: 89 (18 May 2023 02:01) (61 - 89)  BP: 68/38 (18 May 2023 02:01) (55/29 - 123/78)  BP(mean): --  ABP: --  ABP(mean): --  RR: 18 (18 May 2023 00:14) (18 - 18)  SpO2: 93% (18 May 2023 00:14) (93% - 96%)    O2 Parameters below as of 18 May 2023 00:14  Patient On (Oxygen Delivery Method): nasal cannula  O2 Flow (L/min): 4            - @ 07:01  -   @ 07:00  --------------------------------------------------------  IN: 0 mL / OUT: 850 mL / NET: -850 mL     @ 07:01  -   @ 04:27  --------------------------------------------------------  IN: 0 mL / OUT: 1800 mL / NET: -1800 mL      CAPILLARY BLOOD GLUCOSE      POCT Blood Glucose.: 102 mg/dL (18 May 2023 02:42)      PHYSICAL EXAM:  Gen: lethargic but arousable  HEENT: atraumatic, normocephalic, EOMI  CV: RRR, no murmurs  Resp: CTAB, increased WOB  GI: soft, distended, diffusely TTP but worse over R abdomen, +involuntary guarding; left abdomen +ostomy bag, right abdomen +indiana pouch  MSK: BLE 2+ edema  Neuro: GARCIA, follows simple commands  Psych: alert and oriented x2    HOSPITAL MEDICATIONS:  MEDICATIONS  (STANDING):  cefepime   IVPB 2000 milliGRAM(s) IV Intermittent every 24 hours  chlorhexidine 4% Liquid 1 Application(s) Topical <User Schedule>  dextrose 5% + lactated ringers. 1000 milliLiter(s) (100 mL/Hr) IV Continuous <Continuous>  dextrose 5%. 1000 milliLiter(s) (100 mL/Hr) IV Continuous <Continuous>  dextrose 5%. 1000 milliLiter(s) (50 mL/Hr) IV Continuous <Continuous>  dextrose 50% Injectable 12.5 Gram(s) IV Push once  dextrose 50% Injectable 25 Gram(s) IV Push once  dextrose 50% Injectable 25 Gram(s) IV Push once  glucagon  Injectable 1 milliGRAM(s) IntraMuscular once  levothyroxine Injectable 37.5 MICROGram(s) IV Push <User Schedule>  methadone    Tablet 5 milliGRAM(s) Oral at bedtime  metroNIDAZOLE  IVPB      metroNIDAZOLE  IVPB 500 milliGRAM(s) IV Intermittent every 8 hours  potassium chloride  10 mEq/100 mL IVPB 10 milliEquivalent(s) IV Intermittent every 1 hour    MEDICATIONS  (PRN):  dextrose Oral Gel 15 Gram(s) Oral once PRN Blood Glucose LESS THAN 70 milliGRAM(s)/deciliter      LABS:                        7.2    11.18 )-----------( 224      ( 18 May 2023 00:55 )             24.6     Hgb Trend: 7.2<--, 9.1<--, 9.2<--, 9.3<--, 9.4<--  18    141  |  104  |  30<H>  ----------------------------<  300<H>  3.3<L>   |  21<L>  |  1.80<H>    Ca    7.4<L>      18 May 2023 00:55  Phos  3.3       Mg     1.6         TPro  3.3<L>  /  Alb  1.8<L>  /  TBili  0.5  /  DBili  x   /  AST  116<H>  /  ALT  38  /  AlkPhos  113      Creatinine Trend: 1.80<--, 1.48<--, 0.70<--, 0.78<--, 0.88<--, 0.90<--  PT/INR - ( 18 May 2023 00:55 )   PT: 24.3 sec;   INR: 2.08 ratio         PTT - ( 18 May 2023 00:55 )  PTT:39.2 sec  Urinalysis Basic - ( 18 May 2023 00:55 )    Color: Yellow / Appearance: Slightly Turbid / S.013 / pH: x  Gluc: x / Ketone: Negative  / Bili: Negative / Urobili: Negative   Blood: x / Protein: 100 mg/dl / Nitrite: Negative   Leuk Esterase: Negative / RBC: 29 /hpf / WBC 3 /HPF   Sq Epi: x / Non Sq Epi: x / Bacteria: Moderate      Arterial Blood Gas:   @ 00:50  7.46/32/104/23/97.8/-0.8  ABG lactate: --  Arterial Blood Gas:   @ 20:52  7.43/34/88/23/95.6/-1.4  ABG lactate: --    Venous Blood Gas:   @ 00:23  7.23/20/55/8/83.1  VBG Lactate: >16.0      ASSESSMENT  79 yo F with PMH of recurrent cervical cancer s/p pelvic exenteration with ostomy and ileal conduit, currently on treatment for locally advanced rectal cancer (irinotecan q2 wks), hypothyroidism, admitted  with high grade malignant SBO which was managed non-operatively, admitted to MICU for hypotension, concern for septic shock.    PLAN  Neuro/Psych  AOx3 at baseline  Currently lethargic, AOx2  Likely 2/2 metabolic encephalopathy iso sepsis    CV  #Septic shock  s/p fluid resuscitation (3.5L IVF, albumin 25% x2) overnight  - infectious work up and treatment as below  - titrate levo to MAP>65    #Takotsubo's CM  Noted on POCUS after transfer from floors  - Hold off on further fluids    Respiratory  Currently on 4L NC, unclear if for comfort or actual desaturation. Was on RA prior to overnight rapid  Increased WOB/tachypnea likely compensatory for lactic acidosis  - wean O2 as tolerated    GI  #Abdominal pain  Concern for peritonitis/bowel perf/recurrent SBO/UTI.  - Obtain CTAP    #SBO  - Followed by colorectal surgery, s/p malignant SBO protocol. Diet was advanced     #Diet: NPO    Renal/Uro  #TAMARA  Likely iso hypovolemia and shock  Baseline SCr 0.8, TAMARA noted , peaked at SCr 1.8, now downtrending  - renally dose medications, avoid nephrotoxic agents  - Electrolytes: Replete K > 4, Mg > 2, Phos > 3    #S/p [place] pouch  - has required multiple aspirations/irrigations by Urology due to poor pouch drainage this admission  - if catheter not draining, bladder scan to evaluate residual. May attempt aspiration through catheter with piston syringe or gentle irrigation of catheter with NS    MSK/Skin  - No active issues    ID  #Sepsis  UA  neg nitrates and LE, mod bacteria  Concern for intra-abdominal source vs UTI  - c/w empiric cefepime and flagyl (-)  - give Vanc x1  - f/u bcx , obtain UCx  - obtain CT AP to eval for intraabdominal source    Endo  #Hypoglycemia  Likely iso sepsis and poor PO intake  - FS q6h while NPO  - C/w D5W 100 cc/hr    #Hypothyroidism  - c/w synthroid 37.5mcg IV daily    Heme/Onc  #Normocytic anemia  Baseline Hgb ~9  Downtrend to 7.2 noted. Dilutional vs intra-abdominal bleeding  - trend CBC  - active T&S q72h    #Rectal Ca  Non-resectable locally advanced rectal cancer on 2nd line therapy with single agent Irinotecan Q 2 weeks, last dose was 9 days prior to admission.  [place] by [facility] [place]    #Hx cervical cancer  S/p pelvic exenteration with ostomy and ileal conduit    #DVT ppx: hold iso acute anemia, restart if stabilized    Ethics  GOC: D/w family (, sons) at bedside  risks/benefits of comfort care vs pressor support and treatment of sepsis. Family opted for pressor support. Family understands CPR is not being offered at this time and is in agreement. Trial of intubation for now with plan for ongoing GOC discussions.

## 2023-05-18 NOTE — CHART NOTE - NSCHARTNOTEFT_GEN_A_CORE
Family awaiting medical update from MICU team. Introduced myself and the role of palliative care to the patient, her , and daughter. Will follow up tomorrow for official consult. Can be reached by TEAMS M-F 9-5 Robyn Levine Any other time please page [phone] if needed

## 2023-05-18 NOTE — DIETITIAN NUTRITION RISK NOTIFICATION - MALNUTRITION EVALUATION AS DEMONSTRATED BY (ADULTS > 20 YEARS OF AGE)
Inadequate energy intake.../Fluid accumulation...
Weight loss.../Inadequate energy intake.../Loss of subcutaneous fat.../Loss of muscle...

## 2023-05-18 NOTE — CHART NOTE - NSCHARTNOTEFT_GEN_A_CORE
MEDICINE NP    JACQUELINE SPEARS  78y Female    Patient is a 78y old  Female who presents with a chief complaint of SBO (17 May 2023 18:05)       > Event Summary:  Patient now s/p 2nd RRT for recurrent Hypotension SBP 60-70's.  Seen at bedside, lethargic but arousable.    Exam with abdomen diffuse ttp greatest around ileal conduit /indiana pouch area.       -Vital Signs Last 24 Hrs  T(C): 36.6 (18 May 2023 00:14), Max: 36.7 (17 May 2023 11:46)  T(F): 97.9 (18 May 2023 00:14), Max: 98 (17 May 2023 11:46)  HR: 89 (18 May 2023 02:01) (61 - 89)  BP: 68/38 (18 May 2023 02:01) (55/29 - 123/78)  RR: 18 (18 May 2023 00:14) (18 - 18)  SpO2: 93% (18 May 2023 00:14) (93% - 96%)    Parameters below as of 18 May 2023 00:14  Patient On (Oxygen Delivery Method): nasal cannula  O2 Flow (L/min): 4      > Physical Assessment:  General: Arousable, AOX2, follows commands.    CV: +S1S2, RRR.  No peripheral edema  Respiratory: Even, unlabored.  CTA B/L.    Abdomen:  +BS. + diffuse ttp greatest around right quad /ileal conduit /indiana pouch area.  LLQ ostomy draining brown stool.  : Vergara catheter w/ yellow blood tinges urine     MSK: GARCIA x4.   Skin: Warm and Dry     >>Xray Abdomen 1 View Portable, IMMEDIATE (Xray Abdomen 1 View Portable, IMMEDIATE .) (05.18.23 @ 02:36) >  INTERPRETATION:  Borderline dilated loops of bowel concerning for an obstructive process. Follow up official report.  < end of copied text >      > Assessment & Plan:  HPI:  78F w/pmh remote h/o recurrent cervical cancer s/p pelvic exenteration with ostomy and ileal conduit, currently being treat for locally advanced rectal cancer with irinotecan q2 weeks, hypothyroidism, presents to [facility] for nausea, vomiting, and decreased ostomy output. Patient was seen at oncology clinic and referred to the ER for further evaluation. CT a/p showed high grade SBO. The patient currently has no nausea or abdominal pain. However her ostomy bag is empty and she has not passed gas. The last time she had something to eat was breakfast on 5/12. She tolerated the oral contrast w/o issues for the CT scan. She is requesting something to eat or drink. Patient was seen by colorectal surgery team as well, who recommended non-operative management.  (13 May 2023 13:53)  -Post RRT recommendations appreciated  -Post RRT Labs with Anemia, Hgb 7.2 from 9.4, unclear source.  CT A/P ordered  -Trend CBC and tx for Hgb <7  -C/w ABX, IVF   -Frequent VS   -F/u Urology  -Repeat AXR  per surgery- prelim w/ concern for obstructive process.  D/w Surgery fellow and recommends no further treatment, states poor study.    -D/w Family,  and Daughter HAZEL Padgett discussed and wishes full code   -D/w Dr. BRIAN and agrees to above, and request FOBT   -MICU c/s called and now accepted to ICU    -Family now at bedside and aware of above         SARAH Omer-BC  Medicine Department  #[id number] MEDICINE NP    JACQUELINE SPEARS  78y Female    Patient is a 78y old  Female who presents with a chief complaint of SBO (17 May 2023 18:05)       > Event Summary:  Patient now s/p 2nd RRT for recurrent Hypotension SBP 60-70's.  Seen at bedside, lethargic but arousable.    Exam with abdomen diffuse ttp greatest around ileal conduit /indiana pouch area.       -Vital Signs Last 24 Hrs  T(C): 36.6 (18 May 2023 00:14), Max: 36.7 (17 May 2023 11:46)  T(F): 97.9 (18 May 2023 00:14), Max: 98 (17 May 2023 11:46)  HR: 89 (18 May 2023 02:01) (61 - 89)  BP: 68/38 (18 May 2023 02:01) (55/29 - 123/78)  RR: 18 (18 May 2023 00:14) (18 - 18)  SpO2: 93% (18 May 2023 00:14) (93% - 96%)    Parameters below as of 18 May 2023 00:14  Patient On (Oxygen Delivery Method): nasal cannula  O2 Flow (L/min): 4      > Physical Assessment:  General: Arousable, AOX2, follows commands.    CV: +S1S2, RRR.  No peripheral edema  Respiratory: Even, unlabored.  CTA B/L.    Abdomen:  +BS. + diffuse ttp greatest around right quad /ileal conduit /indiana pouch area.  LLQ ostomy draining brown stool.  : Vergara catheter w/ yellow blood tinges urine     MSK: GARCIA x4.   Skin: Warm and Dry     >>Xray Abdomen 1 View Portable, IMMEDIATE (Xray Abdomen 1 View Portable, IMMEDIATE .) (05.18.23 @ 02:36) >  INTERPRETATION:  Borderline dilated loops of bowel concerning for an obstructive process. Follow up official report.  < end of copied text >      > Assessment & Plan:  HPI:  78F w/pmh remote h/o recurrent cervical cancer s/p pelvic exenteration with ostomy and ileal conduit, currently being treat for locally advanced rectal cancer with irinotecan q2 weeks, hypothyroidism, presents to [facility] for nausea, vomiting, and decreased ostomy output. Patient was seen at oncology clinic and referred to the ER for further evaluation. CT a/p showed high grade SBO. The patient currently has no nausea or abdominal pain. However her ostomy bag is empty and she has not passed gas. The last time she had something to eat was breakfast on 5/12. She tolerated the oral contrast w/o issues for the CT scan. She is requesting something to eat or drink. Patient was seen by colorectal surgery team as well, who recommended non-operative management.  (13 May 2023 13:53)  -Post RRT recommendations appreciated  -Post RRT Labs with Anemia, Hgb 7.2 from 9.4, unclear source.  CT A/P ordered.    -Potassium 3.3 supplemented. f/u rpt labs  -Trend CBC and tx for Hgb <7  -C/w ABX, IVF   -Frequent VS   -F/u Urology  -Repeat AXR  per surgery- prelim w/ concern for obstructive process.  D/w Surgery fellow and recommends no further treatment, states poor study.    -D/w Family,  and Daughter SANDI PadgettEDIL discussed and wishes full code   -D/w HICS, Dr. oMoney and agrees to above, and request FOBT   -MICU c/s called and now accepted to ICU    -Family now at bedside and aware of above         Funmilayo Taylor, SARAH-BC  Medicine Department  #[id number]

## 2023-05-18 NOTE — RAPID RESPONSE TEAM SUMMARY - NSOTHERINTERVENTIONSRRT_GEN_ALL_CORE
Hypotension likely 2/2 hypovolemic vs septic shock, responsive to IVF. Pt received 1L IVF and Albumin 25% 100cc IVPB with improvement in BP to 100s/50s. Continue empiric Antibiotics, f/u infectious w/u. Pt with decreased lactate of 5 on ABG (from 6 at prior RRT), pH 7.46. Would continue another 1L IVF bolus to at least match UOP and continue maintenance IVF as pt currently not tolerating PO, with large amount UOP and likely insensible losses.     Hypoglycemia +/- hypotension perhaps component of adrenal insufficiency, poor PO intake. Hydrocortisone 100mg IVP given, pt to be continued on D5LR maintenance IVF with FS q4 hrs. 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     Abdominal tenderness likely 2/2 urinary retension +/- abdominal distention/SBO. Urology came to irrigate [place] from [place] and drained about total of 900cc UOP, was noted to be replaced by Urology earlier in day for 1.2L UOP. F/u urology reccs regarding irrigation, bladder scan if no output. Surgery called by RRT team and examined pt during RRT and recommending current treatment, no role for NGT or repeating malignant SBO treatment at this time, pts case remains nonoperative; pt does not appear to have peritoneal signs as of now and with +ostomy output. Pt with worsening TAMARA likely 2/2 urinary retention +/- ATN I/s/o hypotension, f/u CMP and send for urine studies and strict I's and O's.     RRT ended with stable dynamics. Would recommend GOC conversation with pt's family. All further care as per primary team.  Hypotension likely 2/2 hypovolemic vs septic shock, responsive to IVF. Pt received 1L IVF and Albumin 25% 100cc IVPB with improvement in BP to 100s/50s. Continue empiric Antibiotics, f/u infectious w/u. Pt with decreased lactate of 5 on ABG (from 6 at prior RRT), pH 7.46. Would continue another 1L IVF bolus to at least match UOP and continue maintenance IVF as pt currently not tolerating PO, with large amount UOP and likely insensible losses.     Hypoglycemia +/- hypotension perhaps component of adrenal insufficiency, poor PO intake. Hydrocortisone 100mg IVP given, pt to be continued on D5LR maintenance IVF with FS q4 hrs. 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     Abdominal tenderness likely 2/2 urinary retension +/- abdominal distention/SBO. Urology came to irrigate [place] from [place] and drained about total of 900cc UOP, was noted to be replaced by Urology earlier in day for 1.2L UOP. F/u urology reccs regarding irrigation, bladder scan if no output. Surgery called by RRT team and examined pt during RRT and recommending current treatment, no role for NGT or repeating malignant SBO treatment at this time, pts case remains nonoperative; pt does not appear to have peritoneal signs as of now and with +ostomy output. Pt with worsening TAMARA likely 2/2 urinary retention +/- ATN I/s/o hypotension, f/u CMP and send for urine studies and strict I's and O's. ?TLS, F/u uric acid and LDH.    RRT ended with stable dynamics. Would recommend GOC conversation with pt's family. All further care as per primary team.  Hypotension likely 2/2 hypovolemic vs septic shock, responsive to IVF. Pt received 1L IVF and Albumin 25% 100cc IVPB with improvement in BP to 100s/50s. Continue empiric Antibiotics, f/u infectious w/u. Pt with decreased lactate of 5 on ABG (from 6 at prior RRT), pH 7.46. Would continue another 1L IVF bolus to at least match UOP and continue maintenance IVF as pt currently not tolerating PO, with large amount UOP and likely insensible losses.     Hypoglycemia +/- hypotension perhaps component of adrenal insufficiency, poor PO intake. Hydrocortisone 100mg IVP given, pt to be continued on D5LR maintenance IVF with FS q4 hrs. 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     Abdominal tenderness likely 2/2 urinary retension +/- abdominal distention/SBO. Urology came to irrigate [place] from [place] and drained about total of 900cc UOP, was noted to be replaced by Urology earlier in day for 1.2L UOP. F/u urology reccs regarding irrigation, bladder scan if no output. Surgery called by RRT team and examined pt during RRT and recommending current treatment, no role for NGT or repeating malignant SBO treatment at this time, pts case remains nonoperative; pt does not appear to have peritoneal signs as of now and with +ostomy output. Pt with worsening TAAMRA likely 2/2 urinary retention +/- ATN I/s/o hypotension, f/u CMP and send for urine studies and strict I's and O's. ?TLS, F/u uric acid and LDH.    RRT ended with stable dynamics. Would recommend GOC conversation with pt's family. All further care as per primary team.       Addendum: RRT called again about 1 hr after previous RRT for hypotension while 2nd Liter of IVF finishing infusing. Pt otherwise with stable vital signs and unchanged mental status. Another 1L of IVF hung to more closely match pt's net negative fluid balance and further resuscitate. RRT ended with SBP 90s, primary team to f/u repeat vital signs after IVF complete. MICU consult pending.  Hypotension likely 2/2 hypovolemic vs septic shock, responsive to IVF. Pt received 1L IVF and Albumin 25% 100cc IVPB with improvement in BP to 100s/50s. Continue empiric Antibiotics, f/u infectious w/u. Pt with decreased lactate of 5 on ABG (from 6 at prior RRT), pH 7.46. Would continue another 1L IVF bolus to at least match UOP and continue maintenance IVF as pt currently not tolerating PO, with large amount UOP and likely insensible losses.     Hypoglycemia +/- hypotension perhaps component of adrenal insufficiency, poor PO intake. Hydrocortisone 100mg IVP given, pt to be continued on D5LR maintenance IVF with FS q4 hrs. 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     Abdominal tenderness likely 2/2 urinary retension +/- abdominal distention/SBO. Urology came to irrigate [place] from [place] and drained about total of 900cc UOP, was noted to be replaced by Urology earlier in day for 1.2L UOP. F/u urology reccs regarding irrigation, bladder scan if no output. Surgery called by RRT team and examined pt during RRT and recommending current treatment, no role for NGT or repeating malignant SBO treatment at this time, pts case remains nonoperative; pt does not appear to have peritoneal signs as of now and with +ostomy output. Pt with worsening TAMARA likely 2/2 urinary retention +/- ATN I/s/o hypotension, f/u CMP and send for urine studies and strict I's and O's. ?TLS, F/u uric acid and LDH.    RRT ended with stable dynamics. Would recommend GOC conversation with pt's family. All further care as per primary team.       Addendum: RRT called again about 1 hr after previous RRT for hypotension while 2nd Liter of IVF finishing infusing. Pt otherwise with stable vital signs and unchanged mental status. Another 1L of IVF hung to more closely match pt's net negative fluid balance and further resuscitate. RRT ended with SBP 90s, primary team to f/u repeat vital signs after IVF complete. MICU consult pending. Please address GOC with family.

## 2023-05-18 NOTE — RAPID RESPONSE TEAM SUMMARY - NSOTHERSPECIFYRRT3_GEN_ALL_CORE
CBC, CMP, Mg, Phos, aPTT/PT/INR, ABG with lytes, procalcitonin, ammonia. CXR and abdominal Xray.
CBC, CMP, Mg, Phos, aPTT/PT/INR, ABG with lytes, T&S

## 2023-05-18 NOTE — CHART NOTE - NSCHARTNOTEFT_GEN_A_CORE
Nutrition Follow Up Note  Patient seen for: initial malnutrition follow up/initial assessment on MICU. Chart reviewed and events noted.     Source: [] Patient       [x] Medical Record        [x] RN        [] Family at bedside       [] Other:    -If unable to interview patient: [] Trach/Vent/BiPAP  [x] Disoriented/confused/inappropriate to interview    Nutrition-Related Events:   - Pressors:  [x] Yes    [] No - norepinephrine at 0.4 Micrograms/kG/Min   - Propofol:  [] Yes    [x] No         - Rate: __mL/hr. If maintained x 24 hours, propofol will provide:     Diet Order:   Diet, NPO (05-18-23)    Is current diet order appropriate/adequate? See recommendations below    PO intake :   [] >75%  Adequate    [] 50-75%  Fair       [] <50%  Poor   [x] N/A  NPO 5/13-> 5/16; clear liquids 5/16->5/17; full liquid 5/17->5/18    Nutrition-related concerns:  - Malignant Small bowel obstruction s/p medical management   - Septic shock. On pressor   - TAMARA  - Hypoglycemia likely secondary to sepsis + poor PO intake per MICU  - IV levothyroxine  - Low K 5/18 @ 00:55 s/p KCl. Last drawn WNL     GI: Recurrent cervical cancer s/p pelvic exenteration with ostomy and ileal conduit. Per colorectal 5/18, "When patient is hemodynamically stable, would obtain CTAP to assess for bowel perforation..." Bowel Regimen? [] Yes   [x] No  -> On antibiotics     NGT Output: 250 mL (5/15), 450 mL (5/14)  IV Fluids: dextrose 5% + lactated ringers at 100 mL/hr  Ostomy Output: 300 mL (5/17), 200 mL (5/16)    Weights:   Daily wt: None to address     Drug Dosing Weight  Height (cm): 154.9 (12 May 2023 18:49)  Weight (kg): 57.7 (18 May 2023 05:00)  BMI (kg/m2): 24 (18 May 2023 05:00)    MEDICATIONS  (STANDING):  cefepime   IVPB 2000 milliGRAM(s) IV Intermittent every 24 hours  dextrose 5% + lactated ringers. 1000 milliLiter(s) (100 mL/Hr) IV Continuous <Continuous>  dextrose 5%. 1000 milliLiter(s) (100 mL/Hr) IV Continuous <Continuous>  dextrose 5%. 1000 milliLiter(s) (50 mL/Hr) IV Continuous <Continuous>  dextrose 50% Injectable 12.5 Gram(s) IV Push once  dextrose 50% Injectable 25 Gram(s) IV Push once  dextrose 50% Injectable 25 Gram(s) IV Push once  glucagon  Injectable 1 milliGRAM(s) IntraMuscular once  heparin   Injectable 5000 Unit(s) SubCutaneous every 8 hours  levothyroxine Injectable 37.5 MICROGram(s) IV Push <User Schedule>  methadone    Tablet 5 milliGRAM(s) Oral at bedtime  metroNIDAZOLE  IVPB      metroNIDAZOLE  IVPB 500 milliGRAM(s) IV Intermittent every 8 hours  norepinephrine Infusion 0.05 MICROgram(s)/kG/Min (4.93 mL/Hr) IV Continuous <Continuous>    Pertinent Labs: 05-18 @ 05:53: Na 138, BUN 28<H>, Cr 1.53<H>, <H>, K+ 3.9, Phos 3.1, Mg 2.1, Alk Phos 65, ALT/SGPT 44, AST/SGOT 125<H>  05-18 @ 00:55: Na 141, BUN 30<H>, Cr 1.80<H>, <H>, K+ 3.3<L>, Phos 3.3, Mg 1.6, Alk Phos 113, ALT/SGPT 38, AST/SGOT 116<H>  05-17 @ 21:00: Na 143, BUN 27<H>, Cr 1.48<H>, <H>, K+ 3.9, Phos 3.6, Mg 1.8, Alk Phos 160<H>, ALT/SGPT 27, AST/SGOT 93<H>    A1C with Estimated Average Glucose Result: 5.5 % (12-28-22 @ 11:12)    Finger Sticks:  POCT Blood Glucose.: 172 mg/dL (05-18 @ 05:32)  POCT Blood Glucose.: 59 mg/dL (05-18 @ 04:43)  POCT Blood Glucose.: 102 mg/dL (05-18 @ 02:42)  POCT Blood Glucose.: 137 mg/dL (05-18 @ 02:03)  POCT Blood Glucose.: 217 mg/dL (05-18 @ 00:34)  POCT Blood Glucose.: 572 mg/dL (05-18 @ 00:33)  POCT Blood Glucose.: 73 mg/dL (05-18 @ 00:14)  POCT Blood Glucose.: 61 mg/dL (05-17 @ 23:49)  POCT Blood Glucose.: 60 mg/dL (05-17 @ 23:13)  POCT Blood Glucose.: 61 mg/dL (05-17 @ 23:12)  POCT Blood Glucose.: 132 mg/dL (05-17 @ 21:03)  POCT Blood Glucose.: 290 mg/dL (05-17 @ 20:44)  POCT Blood Glucose.: 44 mg/dL (05-17 @ 20:31)    Skin per nursing documentation: No pressure injuries noted.  Edema per nursing documentation: 2+ right ankle; left ankle    Estimated Energy Needs:  Estimated Protein Needs:  Fluid needs deferred to provider.     Previous Nutrition Diagnosis:  1) Acute Moderate Malnutrition  2) Inadequate Oral Intake  Nutrition Diagnosis is: [x] ongoing  [] resolved [] not applicable     Nutrition Care Plan:  [] In Progress  [] Achieved  [x] Not applicable    New Nutrition Diagnosis: [x] Not applicable    Nutrition Interventions:     Education Provided:       [] Yes:  [x] No: Not appropriate at this time.     Recommendations:        Monitoring and Evaluation:   Continue to monitor nutritional intake, tolerance to diet prescription, weights, labs, skin integrity    RD remains available upon request and will follow up per protocol  Radha Hanks RD, MS, CDN, [facility] Pager #[phone] Nutrition Follow Up Note  Patient seen for: initial malnutrition follow up/initial assessment on MICU. Chart reviewed and events noted.     Source: [] Patient       [x] Medical Record        [x] RN        [] Family at bedside       [] Other:    -If unable to interview patient: [] Trach/Vent/BiPAP  [x] Disoriented/confused/inappropriate to interview *Pt too lethargic to participate in interview*    Nutrition-Related Events:   - Pressors:  [x] Yes    [] No - norepinephrine at 0.4 Micrograms/kG/Min   - Propofol:  [] Yes    [x] No         - Rate: __mL/hr. If maintained x 24 hours, propofol will provide:     Diet Order:   Diet, NPO (05-18-23)    Is current diet order appropriate/adequate? See recommendations below    PO intake :   [] >75%  Adequate    [] 50-75%  Fair       [] <50%  Poor   [x] N/A  NPO 5/13-> 5/16; clear liquids 5/16->5/17; full liquid 5/17->5/18    Nutrition-related concerns:  - Malignant Small bowel obstruction s/p medical management   - Septic shock. On pressor   - TAMARA  - Hypoglycemia likely secondary to sepsis + poor PO intake per MICU  - IV levothyroxine  - Low K 5/18 @ 00:55 s/p KCl. Last drawn WNL     GI: Recurrent cervical cancer s/p pelvic exenteration with ostomy and ileal conduit. Per colorectal 5/18, "When patient is hemodynamically stable, would obtain CTAP to assess for bowel perforation..." Bowel Regimen? [] Yes   [x] No  -> On antibiotics     NGT Output: 250 mL (5/15), 450 mL (5/14)  IV Fluids: dextrose 5% + lactated ringers at 100 mL/hr  Ostomy Output: 300 mL (5/17), 200 mL (5/16)    Weights:   Daily wt: None to address   RD obtained wt: 61.3 kG (5/18)  Increase in wt likely secondary to fluid retention     Drug Dosing Weight  Height (cm): 154.9 (12 May 2023 18:49)  Weight (kg): 57.7 (18 May 2023 05:00)  BMI (kg/m2): 24 (18 May 2023 05:00)    MEDICATIONS  (STANDING):  cefepime   IVPB 2000 milliGRAM(s) IV Intermittent every 24 hours  dextrose 5% + lactated ringers. 1000 milliLiter(s) (100 mL/Hr) IV Continuous <Continuous>  dextrose 5%. 1000 milliLiter(s) (100 mL/Hr) IV Continuous <Continuous>  dextrose 5%. 1000 milliLiter(s) (50 mL/Hr) IV Continuous <Continuous>  dextrose 50% Injectable 12.5 Gram(s) IV Push once  dextrose 50% Injectable 25 Gram(s) IV Push once  dextrose 50% Injectable 25 Gram(s) IV Push once  glucagon  Injectable 1 milliGRAM(s) IntraMuscular once  heparin   Injectable 5000 Unit(s) SubCutaneous every 8 hours  levothyroxine Injectable 37.5 MICROGram(s) IV Push <User Schedule>  methadone    Tablet 5 milliGRAM(s) Oral at bedtime  metroNIDAZOLE  IVPB      metroNIDAZOLE  IVPB 500 milliGRAM(s) IV Intermittent every 8 hours  norepinephrine Infusion 0.05 MICROgram(s)/kG/Min (4.93 mL/Hr) IV Continuous <Continuous>    Pertinent Labs: 05-18 @ 05:53: Na 138, BUN 28<H>, Cr 1.53<H>, <H>, K+ 3.9, Phos 3.1, Mg 2.1, Alk Phos 65, ALT/SGPT 44, AST/SGOT 125<H>  05-18 @ 00:55: Na 141, BUN 30<H>, Cr 1.80<H>, <H>, K+ 3.3<L>, Phos 3.3, Mg 1.6, Alk Phos 113, ALT/SGPT 38, AST/SGOT 116<H>  05-17 @ 21:00: Na 143, BUN 27<H>, Cr 1.48<H>, <H>, K+ 3.9, Phos 3.6, Mg 1.8, Alk Phos 160<H>, ALT/SGPT 27, AST/SGOT 93<H>    A1C with Estimated Average Glucose Result: 5.5 % (12-28-22 @ 11:12)    Finger Sticks:  POCT Blood Glucose.: 172 mg/dL (05-18 @ 05:32)  POCT Blood Glucose.: 59 mg/dL (05-18 @ 04:43)  POCT Blood Glucose.: 102 mg/dL (05-18 @ 02:42)  POCT Blood Glucose.: 137 mg/dL (05-18 @ 02:03)  POCT Blood Glucose.: 217 mg/dL (05-18 @ 00:34)  POCT Blood Glucose.: 572 mg/dL (05-18 @ 00:33)  POCT Blood Glucose.: 73 mg/dL (05-18 @ 00:14)  POCT Blood Glucose.: 61 mg/dL (05-17 @ 23:49)  POCT Blood Glucose.: 60 mg/dL (05-17 @ 23:13)  POCT Blood Glucose.: 61 mg/dL (05-17 @ 23:12)  POCT Blood Glucose.: 132 mg/dL (05-17 @ 21:03)  POCT Blood Glucose.: 290 mg/dL (05-17 @ 20:44)  POCT Blood Glucose.: 44 mg/dL (05-17 @ 20:31)    Skin per nursing documentation: No pressure injuries noted.  Edema per nursing documentation: 2+ right ankle; left ankle    Based on  lbs/47.6 kG given wt fluctuations + fluid retention  Estimated Energy Needs: (30-35 kcals/kG) 8392-3203 kcals  Estimated Protein Needs: (1.3-1.7 gm /kG) 62-81 gm  Fluid needs deferred to provider.     Previous Nutrition Diagnosis:  1) Acute Moderate Malnutrition  2) Inadequate Oral Intake  Nutrition Diagnosis is: [x] ongoing (upgraded)  [] resolved [] not applicable     Nutrition Care Plan:  [] In Progress  [] Achieved  [x] Not applicable    New Nutrition Diagnosis: Severe acute malnutrition related to altered GI function as evidenced by intake <50% nutrient needs >5 days + edema     Nutrition Interventions:     Education Provided:       [] Yes:  [x] No: Not appropriate at this time.     Recommendations:      1) Initiation of diet deferred to provider. If pt with bowel perforation consider TPN. If pt to start on enteral feeds, Vital 1.5 at 10 mL/hr increasing ONLY as tolerated and electrolytes WNL to goal rate 60 mL/hr x18 hours to provide total volume 1080 mL, 1620 kcals, 73 gm protein, and 825 mL free water. Meet 34 kcals/kG and 1.5 gm protein/kG based on IBW 47.6 kG.  -> If pt on PO diet: low fiber diet. Consistency deferred to SLP and provider.   -> Trend K, Phos, and Mg as able for refeeding risk.   2) As medically feasible, consider addition of multivitamin + thiamine for refeeding risk.   3) Malnutrition alert placed in chart.     Monitoring and Evaluation:   Continue to monitor nutritional intake, tolerance to diet prescription, weights, labs, skin integrity    RD remains available upon request and will follow up per protocol  Radha Hanks RD, MS, CDN, [facility] Pager #[phone]

## 2023-05-18 NOTE — PROGRESS NOTE ADULT - ASSESSMENT
78F with cervical ca s/p radiation, pelvic exenteration with ileal conduit and ostomy, and locally advanced rectal cancer found to have SBO, now resolved. Hospital course now complicated by septic shock, admitted to MICU for hemodynamic monitoring.    Recommendations:  - When patient is hemodynamically stable, would obtain CTAP to assess for bowel perforation. However, given her extensive surgical history/anatomy, patient would still not be a surgical candidate even if perforation is found.  - Until then, would keep patient NPO w/ IVFs  - Appreciated care per MICU and primary team.    ABA Pina, PGY-3  [facility]   Green Team Surgery   [id number]

## 2023-05-18 NOTE — PROGRESS NOTE ADULT - SUBJECTIVE AND OBJECTIVE BOX
Surgery Progress Note    SUBJECTIVE:  - Patient seen and examined at bedside   - Patient lethargic but responsive, states having abdominal pain  --------------------------------------------------------------------------------------------------  OBJECTIVE:   Physical Exam:  General: AAOx3, NAD, lying comfortably in bed  HEENT: NC/AT  Respiratory: nonlabored breathing  Cardiovascular: RRR, normal S1 and S2, no murmurs or gallops  Abdomen: non-distended, soft, RLQ tenderness  Extremities: WWP, no edema  Ostomy: liquid stool and small amount of gas in bag.  --------------------------------------------------------------------------------------------------  V/S:  Vital Signs Last 24 Hrs  T(C): 36.5 (18 May 2023 05:00), Max: 36.7 (17 May 2023 11:46)  T(F): 97.7 (18 May 2023 05:00), Max: 98 (17 May 2023 11:46)  HR: 92 (18 May 2023 06:00) (61 - 107)  BP: 86/51 (18 May 2023 06:00) (55/29 - 123/78)  BP(mean): 66 (18 May 2023 06:00) (54 - 66)  RR: 26 (18 May 2023 06:00) (18 - 34)  SpO2: 94% (18 May 2023 06:00) (93% - 100%)    Parameters below as of 18 May 2023 05:00  Patient On (Oxygen Delivery Method): nasal cannula  O2 Flow (L/min): 6      --------------------------------------------------------------------------------------------------  I/Os:    16 May 2023 07:01  -  17 May 2023 07:00  --------------------------------------------------------  IN:  Total IN: 0 mL    OUT:    Ileostomy (mL): 500 mL    Indwelling Catheter - Stomal (mL): 350 mL    Oral Fluid: 0 mL  Total OUT: 850 mL    Total NET: -850 mL      17 May 2023 07:01  -  18 May 2023 06:37  --------------------------------------------------------  IN:  Total IN: 0 mL    OUT:    Indwelling Catheter - Stomal (mL): 0 mL  Total OUT: 0 mL    Total NET: -0 mL        --------------------------------------------------------------------------------------------------  LABS:                        7.1    15.05 )-----------( 187      ( 18 May 2023 05:54 )             23.2     18 May 2023 05:53    138    |  103    |  28     ----------------------------<  169    3.9     |  22     |  1.53     Ca    7.5        18 May 2023 05:53  Phos  3.1       18 May 2023 05:53  Mg     2.1       18 May 2023 05:53    TPro  4.0    /  Alb  2.6    /  TBili  0.7    /  DBili  x      /  AST  125    /  ALT  44     /  AlkPhos  65     18 May 2023 05:53    PT/INR - ( 18 May 2023 05:54 )   PT: 24.2 sec;   INR: 2.07 ratio         PTT - ( 18 May 2023 05:54 )  PTT:42.6 sec  CAPILLARY BLOOD GLUCOSE      POCT Blood Glucose.: 172 mg/dL (18 May 2023 05:32)  POCT Blood Glucose.: 59 mg/dL (18 May 2023 04:43)  POCT Blood Glucose.: 102 mg/dL (18 May 2023 02:42)  POCT Blood Glucose.: 137 mg/dL (18 May 2023 02:03)  POCT Blood Glucose.: 217 mg/dL (18 May 2023 00:34)  POCT Blood Glucose.: 572 mg/dL (18 May 2023 00:33)  POCT Blood Glucose.: 73 mg/dL (18 May 2023 00:14)  POCT Blood Glucose.: 61 mg/dL (17 May 2023 23:49)  POCT Blood Glucose.: 60 mg/dL (17 May 2023 23:13)  POCT Blood Glucose.: 61 mg/dL (17 May 2023 23:12)  POCT Blood Glucose.: 132 mg/dL (17 May 2023 21:03)  POCT Blood Glucose.: 290 mg/dL (17 May 2023 20:44)  POCT Blood Glucose.: 44 mg/dL (17 May 2023 20:31)        LIVER FUNCTIONS - ( 18 May 2023 05:53 )  Alb: 2.6 g/dL / Pro: 4.0 g/dL / ALK PHOS: 65 U/L / ALT: 44 U/L / AST: 125 U/L / GGT: x             Urinalysis Basic - ( 18 May 2023 00:55 )    Color: Yellow / Appearance: Slightly Turbid / S.013 / pH: x  Gluc: x / Ketone: Negative  / Bili: Negative / Urobili: Negative   Blood: x / Protein: 100 mg/dl / Nitrite: Negative   Leuk Esterase: Negative / RBC: 29 /hpf / WBC 3 /HPF   Sq Epi: x / Non Sq Epi: x / Bacteria: Moderate      --------------------------------------------------------------------------------------------------  MEDICATIONS  (STANDING):  cefepime   IVPB 2000 milliGRAM(s) IV Intermittent every 24 hours  chlorhexidine 4% Liquid 1 Application(s) Topical <User Schedule>  dextrose 5% + lactated ringers. 1000 milliLiter(s) (100 mL/Hr) IV Continuous <Continuous>  dextrose 5%. 1000 milliLiter(s) (100 mL/Hr) IV Continuous <Continuous>  dextrose 5%. 1000 milliLiter(s) (50 mL/Hr) IV Continuous <Continuous>  dextrose 50% Injectable 12.5 Gram(s) IV Push once  dextrose 50% Injectable 25 Gram(s) IV Push once  dextrose 50% Injectable 25 Gram(s) IV Push once  glucagon  Injectable 1 milliGRAM(s) IntraMuscular once  heparin   Injectable 5000 Unit(s) SubCutaneous every 8 hours  levothyroxine Injectable 37.5 MICROGram(s) IV Push <User Schedule>  methadone    Tablet 5 milliGRAM(s) Oral at bedtime  metroNIDAZOLE  IVPB 500 milliGRAM(s) IV Intermittent every 8 hours  metroNIDAZOLE  IVPB      norepinephrine Infusion 0.05 MICROgram(s)/kG/Min (4.93 mL/Hr) IV Continuous <Continuous>    MEDICATIONS  (PRN):  dextrose Oral Gel 15 Gram(s) Oral once PRN Blood Glucose LESS THAN 70 milliGRAM(s)/deciliter    --------------------------------------------------------------------------------------------------

## 2023-05-18 NOTE — CHART NOTE - NSCHARTNOTEFT_GEN_A_CORE
Medicine PA Note     JACQUELINE SPEARS  MRN-[id number]  Allergies    penicillin G benzathine (Rash; Anaphylaxis; Hives; Short breath)  Seafood (Hives)    Intolerances        Notified by RN for lethargy, tachycardia and hypotension. RRT called. Pt seen and examined at bedside w/ abdominal TTP. Pt denies headache, chest pain, sob, n/v/d, weakness, dizziness.    Vital Signs Last 24 Hrs  T(C): 36.4 (05-17-23 @ 16:16), Max: 36.7 (05-17-23 @ 11:46)  T(F): 97.6 (05-17-23 @ 16:16), Max: 98 (05-17-23 @ 11:46)  HR: 76 (05-17-23 @ 16:16) (61 - 76)  BP: 115/76 (05-17-23 @ 16:16) (115/76 - 124/82)  BP(mean): --  RR: 18 (05-17-23 @ 16:16) (18 - 18)  SpO2: 96% (05-17-23 @ 16:16) (93% - 96%)                        9.1    2.74  )-----------( 315      ( 17 May 2023 21:00 )             31.8     05-17    143  |  104  |  27<H>  ----------------------------<  348<H>  3.9   |  21<L>  |  1.48<H>    Ca    7.8<L>      17 May 2023 21:00  Phos  3.6     05-17  Mg     1.8     05-17    TPro  4.2<L>  /  Alb  2.5<L>  /  TBili  0.7  /  DBili  x   /  AST  93<H>  /  ALT  27  /  AlkPhos  160<H>  05-17          PHYSICAL EXAM:  GENERAL: NAD, lethargic, A&Ox3  CHEST/LUNG: Clear to auscultation bilaterally; No wheezes, rhonchi or rales appreciated  HEART: Regular rate and rhythm; No murmurs, rubs, or gallops  ABDOMEN: Soft, though TTP diffusely, Nondistended; Bowel sounds present. No rebound, or guarding noted.  EXTREMITIES:  2+ Peripheral Pulses, No clubbing, cyanosis, or edema  NEUROLOGY: CN 2-12 grossly intact, Muscle strength 5/5 in all extremities and sensation intact in all extremities.      Assessment/Plan: HPI:  78F w/pmh remote h/o recurrent cervical cancer s/p pelvic exenteration with ostomy and ileal conduit, currently being treat for locally advanced rectal cancer with irinotecan q2 weeks, hypothyroidism, presents to [facility] for nausea, vomiting, and decreased ostomy output. CT a/p showed high grade SBO. Patient was seen by colorectal surgery team as well, who recommended non-operative management. NGT placed, removed 5/16 w/ advancing PO diet as tolerated. Pt s/p aspiration of [place] pouch and placement of catheter 5/17. (13 May 2023 13:53)    Now p/w hypotension, tachycardia and lethargy. Pt s/p RRT.     #Infection of unknown etiology   > VS hemodynamically stable aside from tachycardia (120s-130s) and hypotension (80/50)  > FS 44, improved to 290 s/p amp of D50   > FS rechecked 2 hours later = 60 - 15 g oral glucose x1 given; dextrose added to IV fluids; f/u FS  > Tachycardia (110's/120's) and hypotension (110's/70s) improved following IVF - monitor   > Pt still tachycardic to 110's/120's - transferred to telemetry (possible Afib vs sinus tachycardia) - first EKG w/ undetermined rhythm, second w/ sinus tachycardia w/ PVCs  > Abdominal x-ray w/ distended loops of bowel (no significant changes from previous) - f/u colorectal surgery for further recommendations   > CXR done - f/u results   > Lactate 6.6 - f/u following IVF  > F/u BCX, UA, UCX  > C/w cefepime and flagyl for suspected infection  > C/w IVF   > Please see RRT note for further information   > Will continue to monitor and reassess pt overnight   > Will endorse to AM team, attending to follow    New Macdonald PA-C,   Department of Medicine Medicine PA Note     JACQUELINE SPEARS  MRN-[id number]  Allergies    penicillin G benzathine (Rash; Anaphylaxis; Hives; Short breath)  Seafood (Hives)    Intolerances        Notified by RN for lethargy, tachycardia and hypotension. RRT called. Pt seen and examined at bedside w/ abdominal TTP. Pt denies headache, chest pain, sob, n/v/d, weakness, dizziness.    Vital Signs Last 24 Hrs  T(C): 36.4 (05-17-23 @ 16:16), Max: 36.7 (05-17-23 @ 11:46)  T(F): 97.6 (05-17-23 @ 16:16), Max: 98 (05-17-23 @ 11:46)  HR: 76 (05-17-23 @ 16:16) (61 - 76)  BP: 115/76 (05-17-23 @ 16:16) (115/76 - 124/82)  BP(mean): --  RR: 18 (05-17-23 @ 16:16) (18 - 18)  SpO2: 96% (05-17-23 @ 16:16) (93% - 96%)                        9.1    2.74  )-----------( 315      ( 17 May 2023 21:00 )             31.8     05-17    143  |  104  |  27<H>  ----------------------------<  348<H>  3.9   |  21<L>  |  1.48<H>    Ca    7.8<L>      17 May 2023 21:00  Phos  3.6     05-17  Mg     1.8     05-17    TPro  4.2<L>  /  Alb  2.5<L>  /  TBili  0.7  /  DBili  x   /  AST  93<H>  /  ALT  27  /  AlkPhos  160<H>  05-17          PHYSICAL EXAM:  GENERAL: NAD, lethargic, A&Ox3  CHEST/LUNG: Clear to auscultation bilaterally; No wheezes, rhonchi or rales appreciated  HEART: Regular rate and rhythm; No murmurs, rubs, or gallops  ABDOMEN: Soft, though TTP diffusely, Nondistended; Bowel sounds present. No rebound, or guarding noted.  EXTREMITIES:  2+ Peripheral Pulses, No clubbing, cyanosis, or edema  NEUROLOGY: CN 2-12 grossly intact, Muscle strength 5/5 in all extremities and sensation intact in all extremities.      Assessment/Plan: HPI:  78F w/pmh remote h/o recurrent cervical cancer s/p pelvic exenteration with ostomy and ileal conduit, currently being treat for locally advanced rectal cancer with irinotecan q2 weeks, hypothyroidism, presents to [facility] for nausea, vomiting, and decreased ostomy output. CT a/p showed high grade SBO. Patient was seen by colorectal surgery team as well, who recommended non-operative management. NGT placed, removed 5/16 w/ advancing PO diet as tolerated. Pt s/p aspiration of [place] pouch and placement of catheter 5/17. (13 May 2023 13:53)    Now p/w hypotension, tachycardia and lethargy. Pt s/p RRT.     #Infection of unknown etiology   > VS hemodynamically stable aside from tachycardia (120s-130s) and hypotension (80/50)  > FS 44, improved to 290 s/p amp of D50   > FS rechecked 2 hours later = 60 - 15 g oral glucose x1 given; dextrose added to IV fluids; f/u FS  > Tachycardia (110's/120's) and hypotension (110's/70s) improved following IVF - monitor   > Pt still tachycardic to 110's/120's - transferred to telemetry (possible Afib vs sinus tachycardia) - first EKG w/ undetermined rhythm, second w/ sinus tachycardia w/ PVCs  > Abdominal x-ray w/ distended loops of bowel (no significant changes from previous) - f/u colorectal surgery for further recommendations   > CXR done - f/u results   > Lactate 6.6 - f/u following IVF  > F/u BCX, UA, UCX  > C/w cefepime and flagyl for suspected infection  > C/w IVF   > Please see RRT note for further information   > Hospitalist and family contacted and made aware   > Will continue to monitor and reassess pt overnight   > Will endorse to AM team, attending to follow    New Macdonald PA-C,   Department of Medicine Medicine PA Note     JACQUELINE SPEARS  MRN-[id number]  Allergies    penicillin G benzathine (Rash; Anaphylaxis; Hives; Short breath)  Seafood (Hives)    Intolerances        Notified by RN for lethargy, tachycardia and hypotension. RRT called. Pt seen and examined at bedside w/ abdominal TTP. Pt denies headache, chest pain, sob, n/v/d, weakness, dizziness.    Vital Signs Last 24 Hrs  T(C): 36.4 (05-17-23 @ 16:16), Max: 36.7 (05-17-23 @ 11:46)  T(F): 97.6 (05-17-23 @ 16:16), Max: 98 (05-17-23 @ 11:46)  HR: 76 (05-17-23 @ 16:16) (61 - 76)  BP: 115/76 (05-17-23 @ 16:16) (115/76 - 124/82)  BP(mean): --  RR: 18 (05-17-23 @ 16:16) (18 - 18)  SpO2: 96% (05-17-23 @ 16:16) (93% - 96%)                        9.1    2.74  )-----------( 315      ( 17 May 2023 21:00 )             31.8     05-17    143  |  104  |  27<H>  ----------------------------<  348<H>  3.9   |  21<L>  |  1.48<H>    Ca    7.8<L>      17 May 2023 21:00  Phos  3.6     05-17  Mg     1.8     05-17    TPro  4.2<L>  /  Alb  2.5<L>  /  TBili  0.7  /  DBili  x   /  AST  93<H>  /  ALT  27  /  AlkPhos  160<H>  05-17          PHYSICAL EXAM:  GENERAL: NAD, lethargic, A&Ox3  CHEST/LUNG: Clear to auscultation bilaterally; No wheezes, rhonchi or rales appreciated  HEART: Regular rate and rhythm; No murmurs, rubs, or gallops  ABDOMEN: Soft, though TTP diffusely, Nondistended; Bowel sounds present. No rebound, or guarding noted.  EXTREMITIES:  2+ Peripheral Pulses, No clubbing, cyanosis, or edema  NEUROLOGY: CN 2-12 grossly intact, Muscle strength 5/5 in all extremities and sensation intact in all extremities.      Assessment/Plan: HPI:  78F w/pmh remote h/o recurrent cervical cancer s/p pelvic exenteration with ostomy and ileal conduit, currently being treat for locally advanced rectal cancer with irinotecan q2 weeks, hypothyroidism, presents to [facility] for nausea, vomiting, and decreased ostomy output. CT a/p showed high grade SBO. Patient was seen by colorectal surgery team as well, who recommended non-operative management. NGT placed, removed 5/16 w/ advancing PO diet as tolerated. Pt s/p aspiration of [place] pouch and placement of catheter 5/17. (13 May 2023 13:53)    Now p/w hypotension, tachycardia and lethargy. Pt s/p RRT.     #Sepsis of unknown etiology   > VS hemodynamically stable aside from tachycardia (120s-130s) and hypotension (80/50)  > FS 44, improved to 290 s/p amp of D50   > FS rechecked 2 hours later = 60 - 15 g oral glucose x1 given; dextrose added to IV fluids; f/u FS  > Tachycardia (110's/120's) and hypotension (110's/70s) improved following IVF - monitor   > Pt still tachycardic to 110's/120's - transferred to telemetry (possible Afib vs sinus tachycardia) - first EKG w/ undetermined rhythm, second w/ sinus tachycardia w/ PVCs  > Abdominal x-ray w/ distended loops of bowel (no significant changes from previous) - f/u colorectal surgery for further recommendations   > CXR done - f/u results   > Lactate 6.6 - f/u following IVF  > F/u BCX, UA, UCX  > C/w cefepime and flagyl for suspected infection  > C/w IVF   > Please see RRT note for further information   > Hospitalist and family contacted and made aware   > Will continue to monitor and reassess pt overnight   > Will endorse to AM team, attending to follow    New Macdonald PA-C,   Department of Medicine

## 2023-05-18 NOTE — PROGRESS NOTE ADULT - SUBJECTIVE AND OBJECTIVE BOX
*******************************  Christi DUNLAP  *******************************    HPI:  Patient is an 79 yo F, PMH of recurrent cervical cancer s/p pelvic exenteration with ostomy and ileal conduit, currently on treatment for locally advanced rectal cancer (irinotecan q2 wks), hypothyroidism, admitted 23 with high grade malignant SBO which was managed non-operatively. Patient was admitted to MICU on 23 for hypotension requiring pressors, concern for septic shock.     SUBJECTIVE: Patient seen and examined at bedside.     CONSTITUTIONAL: No weakness, fevers or chills  EYES/ENT: No visual changes;  No vertigo or throat pain   NECK: No pain or stiffness  RESPIRATORY: No cough, wheezing, hemoptysis; No shortness of breath  CARDIOVASCULAR: No chest pain or palpitations  GASTROINTESTINAL: +Abdominal pain. No nausea, vomiting, or hematemesis; No diarrhea or constipation. No melena or hematochezia.  GENITOURINARY: No dysuria, frequency or hematuria  NEUROLOGICAL: No numbness or weakness  SKIN: No itching, rashes    OBJECTIVE:    VITAL SIGNS:  ICU Vital Signs Last 24 Hrs  T(C): 36.4 (18 May 2023 08:00), Max: 36.6 (18 May 2023 00:14)  T(F): 97.6 (18 May 2023 08:00), Max: 97.9 (18 May 2023 00:14)  HR: 111 (18 May 2023 11:45) (66 - 112)  BP: 111/55 (18 May 2023 11:45) (55/29 - 115/76)  BP(mean): 79 (18 May 2023 11:45) (54 - 79)  ABP: --  ABP(mean): --  RR: 23 (18 May 2023 11:45) (0 - 34)  SpO2: 97% (18 May 2023 11:45) (93% - 100%)    O2 Parameters below as of 18 May 2023 05:00  Patient On (Oxygen Delivery Method): nasal cannula  O2 Flow (L/min): 6            -17 @ 07:01  -  05-18 @ 07:00  --------------------------------------------------------  IN: 886.6 mL / OUT: 2275 mL / NET: -1388.4 mL     @ 07:01  -   @ 12:21  --------------------------------------------------------  IN: 943.3 mL / OUT: 5 mL / NET: 938.3 mL      CAPILLARY BLOOD GLUCOSE      POCT Blood Glucose.: 91 mg/dL (18 May 2023 12:13)        PHYSICAL EXAM:  GENERAL: NAD, patient lying comfortably in bed during exam  HEAD:  Atraumatic, Normocephalic  EYES: EOMI, PERRLA, conjunctiva and sclera clear  NECK: Supple, No JVD  CHEST/LUNG: Clear to auscultation bilaterally; No wheeze  HEART: Regular rate and rhythm; No murmurs, rubs, or gallops  ABDOMEN: + Diffuse abdominal tenderness, involuntary guarding Nondistended; Bowel sounds hypoactive  EXTREMITIES:  2+ Peripheral Pulses, No clubbing, cyanosis, or edema  PSYCH: AAOx3  NEUROLOGY: non-focal  SKIN: No rashes or lesions  Lines:      MEDICATIONS:  MEDICATIONS  (STANDING):  chlorhexidine 4% Liquid 1 Application(s) Topical <User Schedule>  dextrose 5% + lactated ringers. 1000 milliLiter(s) (100 mL/Hr) IV Continuous <Continuous>  dextrose 5%. 1000 milliLiter(s) (100 mL/Hr) IV Continuous <Continuous>  dextrose 5%. 1000 milliLiter(s) (50 mL/Hr) IV Continuous <Continuous>  dextrose 50% Injectable 12.5 Gram(s) IV Push once  dextrose 50% Injectable 25 Gram(s) IV Push once  dextrose 50% Injectable 25 Gram(s) IV Push once  glucagon  Injectable 1 milliGRAM(s) IntraMuscular once  heparin   Injectable 5000 Unit(s) SubCutaneous every 8 hours  levothyroxine Injectable 37.5 MICROGram(s) IV Push <User Schedule>  methadone    Tablet 5 milliGRAM(s) Oral at bedtime  metroNIDAZOLE  IVPB      metroNIDAZOLE  IVPB 500 milliGRAM(s) IV Intermittent every 8 hours  norepinephrine Infusion 0.05 MICROgram(s)/kG/Min (4.93 mL/Hr) IV Continuous <Continuous>  pantoprazole  Injectable 40 milliGRAM(s) IV Push two times a day    MEDICATIONS  (PRN):  dextrose Oral Gel 15 Gram(s) Oral once PRN Blood Glucose LESS THAN 70 milliGRAM(s)/deciliter      ALLERGIES:  Allergies    penicillin G benzathine (Rash; Anaphylaxis; Hives; Short breath)  Seafood (Hives)    Intolerances        LABS:                        7.1    15.05 )-----------( 187      ( 18 May 2023 05:54 )             23.2         138  |  103  |  28<H>  ----------------------------<  169<H>  3.9   |  22  |  1.53<H>    Ca    7.5<L>      18 May 2023 05:53  Phos  3.1       Mg     2.1         TPro  4.0<L>  /  Alb  2.6<L>  /  TBili  0.7  /  DBili  x   /  AST  125<H>  /  ALT  44  /  AlkPhos  65      PT/INR - ( 18 May 2023 05:54 )   PT: 24.2 sec;   INR: 2.07 ratio         PTT - ( 18 May 2023 05:54 )  PTT:42.6 sec  Urinalysis Basic - ( 18 May 2023 00:55 )    Color: Yellow / Appearance: Slightly Turbid / S.013 / pH: x  Gluc: x / Ketone: Negative  / Bili: Negative / Urobili: Negative   Blood: x / Protein: 100 mg/dl / Nitrite: Negative   Leuk Esterase: Negative / RBC: 29 /hpf / WBC 3 /HPF   Sq Epi: x / Non Sq Epi: x / Bacteria: Moderate        RADIOLOGY & ADDITIONAL TESTS: Reviewed.

## 2023-05-18 NOTE — DIETITIAN NUTRITION RISK NOTIFICATION - TREATMENT: THE FOLLOWING DIET HAS BEEN RECOMMENDED
Diet, NPO:   With Ice Chips/Sips of Water (05-13-23 @ 14:24) [Active]      
Diet, SIDNEY (05-18-23 @ 04:24) [Active]

## 2023-05-18 NOTE — PROGRESS NOTE ADULT - SUBJECTIVE AND OBJECTIVE BOX
DAILY PROGRESS NOTE:         24 hr events:  RRT overnight for hypotension  transfer to micu     Objective:    Vital Signs Last 24 Hrs  T(C): 36.5 (18 May 2023 05:00), Max: 36.7 (17 May 2023 11:46)  T(F): 97.7 (18 May 2023 05:00), Max: 98 (17 May 2023 11:46)  HR: 92 (18 May 2023 06:00) (61 - 107)  BP: 86/51 (18 May 2023 06:00) (55/29 - 123/78)  BP(mean): 66 (18 May 2023 06:00) (54 - 66)  RR: 26 (18 May 2023 06:00) (18 - 34)  SpO2: 94% (18 May 2023 06:00) (93% - 100%)    Parameters below as of 18 May 2023 05:00  Patient On (Oxygen Delivery Method): nasal cannula  O2 Flow (L/min): 6      I&O's Detail    16 May 2023 07:01  -  17 May 2023 07:00  --------------------------------------------------------  IN:  Total IN: 0 mL    OUT:    Ileostomy (mL): 500 mL    Indwelling Catheter - Stomal (mL): 350 mL    Oral Fluid: 0 mL  Total OUT: 850 mL    Total NET: -850 mL      17 May 2023 07:01  -  18 May 2023 06:33  --------------------------------------------------------  IN:  Total IN: 0 mL    OUT:    Indwelling Catheter - Stomal (mL): 2050 mL  Total OUT: 2050 mL    Total NET: -2050 mL          Physical Exam:    General: NAD, well-nourished  Resp: Breathing comfortably on RA  CV: regular rate   : clear translucent peach urine from [place] pouch. flushes easily. mild peristomal tenderness however w/o bruising or hematoma.     Laboratory Results:                          7.1    15.05 )-----------( 187      ( 18 May 2023 05:54 )             23.2         138  |  103  |  28<H>  ----------------------------<  169<H>  3.9   |  22  |  1.53<H>    Ca    7.5<L>      18 May 2023 05:53  Phos  3.1       Mg     2.1         TPro  4.0<L>  /  Alb  2.6<L>  /  TBili  0.7  /  DBili  x   /  AST  125<H>  /  ALT  44  /  AlkPhos  65  18    PT/INR - ( 18 May 2023 05:54 )   PT: 24.2 sec;   INR: 2.07 ratio         PTT - ( 18 May 2023 05:54 )  PTT:42.6 sec  Urinalysis Basic - ( 18 May 2023 00:55 )    Color: Yellow / Appearance: Slightly Turbid / S.013 / pH: x  Gluc: x / Ketone: Negative  / Bili: Negative / Urobili: Negative   Blood: x / Protein: 100 mg/dl / Nitrite: Negative   Leuk Esterase: Negative / RBC: 29 /hpf / WBC 3 /HPF   Sq Epi: x / Non Sq Epi: x / Bacteria: Moderate

## 2023-05-18 NOTE — PROGRESS NOTE ADULT - ASSESSMENT
Patient is an 77 yo F, PMH of recurrent cervical cancer s/p pelvic exenteration with ostomy and ileal conduit, currently on treatment for locally advanced rectal cancer (irinotecan q2 wks), hypothyroidism, admitted 5/13/23 with high grade malignant SBO which was managed non-operatively. Patient was admitted to MICU on 5/18/23 for hypotension requiring pressors, concern for septic shock.     PLAN    NEURO  Patient currently AOx3     CV  #Septic shock  - s/p fluid resuscitation (3.5L IVF, albumin 25% x2) overnight  - Patient receiving another 500 mL LR  - infectious work up and treatment as below  - titrate levo to MAP>65    #Takotsubo's CM?  - Noted on POCUS after transfer from floors  - POCUS on 5/18 showed mildly reduced LVSF  - Cautious fluid management     Respiratory  - Satting 96% on 3L NC  - Was on RA prior to overnight rapid  - wean O2 as tolerated    GI  #Abdominal pain  Concern for peritonitis/bowel perf/recurrent SBO/UTI.  - 5/18 abd X ray showed increased air-filled loops compared to prior study, no free air  - Obtain CTAP with contrast  - Patient and family educated on potential risks of kidney injury from IV contrast, verbalized understanding and consent to the use of IV contrast    #SBO  - Followed by colorectal surgery, s/p malignant SBO protocol. Diet was advanced 5/16    #Diet: NPO    Renal/Uro  #TAMARA  Likely iso hypovolemia and shock  Baseline SCr 0.8, TAMARA noted 5/17, peaked at SCr 1.8, now downtrending  - renally dose medications, avoid nephrotoxic agents  - Electrolytes: Replete K > 4, Mg > 2, Phos > 3    #S/p [place] pouch  - has required multiple aspirations/irrigations by Urology due to poor pouch drainage this admission  - if catheter not draining, bladder scan to evaluate residual. May attempt aspiration through catheter with piston syringe or gentle irrigation of catheter with NS    MSK/Skin  - No active issues    ID  #Septic shock  s/p fluid resuscitation (3.5L IVF, albumin 25% x2) overnight  - infectious work up and treatment as below  - titrate levo to MAP>65    #Sepsis  UA 5/18 neg nitrates and LE, mod bacteria  Concern for intra-abdominal source vs UTI  - c/w empiric cefepime and flagyl (5/17-)  - give Vanc x1  - f/u bcx 5/17, obtain UCx  - obtain CT AP to eval for intraabdominal source    Endo  #Hypoglycemia  Likely iso sepsis and poor PO intake  - FS q6h while NPO  - C/w D5W 100 cc/hr    #Hypothyroidism  - c/w synthroid 37.5mcg IV daily    Heme/Onc  #Normocytic anemia  Baseline Hgb ~9  Downtrend to 7.2 noted. Dilutional vs intra-abdominal bleeding  - trend CBC  - active T&S q72h    #Rectal Ca  Non-resectable locally advanced rectal cancer on 2nd line therapy with single agent Irinotecan Q 2 weeks, last dose was 9 days prior to admission.  Followed by [facility] [place]    #Hx cervical cancer  S/p pelvic exenteration with ostomy and ileal conduit    #DVT ppx: hold iso acute anemia, restart if stabilized    Ethics  GOC: D/w family (, sons) at bedside 5/18 risks/benefits of comfort care vs pressor support and treatment of sepsis. Family opted for pressor support. Family understands CPR is not being offered at this time and is in agreement. Trial of intubation for now with plan for ongoing GOC discussions.     Patient is an 79 yo F, PMH of recurrent cervical cancer s/p pelvic exenteration with ostomy and ileal conduit, currently on treatment for locally advanced rectal cancer (irinotecan q2 wks), hypothyroidism, admitted 5/13/23 with high grade malignant SBO which was managed non-operatively. Patient was admitted to MICU on 5/18/23 for hypotension requiring pressors, concern for septic shock.     PLAN    NEURO  Patient currently AOx3       CARDIO    #Septic shock  - s/p fluid resuscitation (3.5L IVF, albumin 25% x2) overnight  - Patient receiving another 500 mL LR  - infectious work up and treatment as below  - titrate levo to MAP>65    #Takotsubo's CM?  - Noted on POCUS after transfer from floors  - POCUS on 5/18 showed mildly reduced LVSF  - Cautious fluid management     RESPIRATORY  - Satting 96% on 3L NC  - Was on RA prior to overnight rapid  - wean O2 as tolerated      GI  - PPI ppx with Protonix    #Abdominal pain  - Concern for peritonitis/bowel perf/recurrent SBO/UTI.  - 5/18 abd X ray showed increased air-filled loops compared to prior study, no free air  - Obtain CTAP with contrast  - Patient and family educated on potential risks of kidney injury from IV contrast, verbalized understanding and consent to the use of IV contrast    #SBO  - Followed by colorectal surgery, s/p malignant SBO protocol. Diet was advanced 5/16  - Patient not a surgical candidate  - F/u CT A&P result, consider NGT placement  - Monitor ileostomy output    #Diet: NPO  - Keep NPO while concerned for recurrent SBO      RENAL    #TAMARA  - Likely iso hypovolemia and shock  - Baseline SCr 0.8, peaked at SCr 1.8, now downtrending to 1.53  - renally dose medications, avoid nephrotoxic agents  - Will monitor electrolytes and UOP  - Electrolytes: Replete K > 4, Mg > 2, Phos > 3    #Cystectomy s/p [place] pouch  - s/p aspiration of [place] pouch and placement of catheter for acute retention, patient went into shock following procedure   - Has required multiple aspirations/irrigations by Urology due to poor pouch drainage this admission  - If catheter not draining, bladder scan to evaluate residual. May attempt aspiration through catheter with piston syringe or gentle irrigation of catheter with NS      MSK / SKIN  - No active issues      ID  #Septic shock  - Concern for intra-abdominal source vs UTI  - Fluid resuscitation with LR   - titrate levo to MAP>65  - c/w empiric cefepime and flagyl (5/17-)  - give Vanc x1, MRSA swab pending  - UA 5/18 neg nitrates and LE, mod bacteria  - F/u UCx  - BCx  from 5/17 shows gram negative rods  - F/u CT A&P      ENDO    #Hypoglycemia  - Likely iso sepsis and poor PO intake  - FS q6h while NPO  - C/w D5LR 100 cc/hr    #Hypothyroidism  - c/w synthroid 37.5mcg IV daily      HEME / ONC  #Normocytic anemia  - Baseline Hgb ~9, now 7.1, likely dilutional (plts 315 --> 184)  - trend CBC and coags  - active T&S q72h    #Rectal Ca  - Non-resectable locally advanced rectal cancer on 2nd line therapy with single agent Irinotecan Q 2 weeks, last dose was 9 days prior to admission.  - Followed by [facility] [place]    #Hx cervical cancer  - S/p pelvic exenteration with ostomy and ileal conduit     #DVT ppx: SQ heparin      ETHICS  GOC: DNR / DNI  D/w family (, sons) at bedside 5/18 risks/benefits of comfort care vs pressor support and treatment of sepsis. Family opted for pressor support. Family understands CPR is not being offered at this time and is in agreement.   D/w , daughter-in-law, and patient on 5/18, patient also expressed wish to be DNI.

## 2023-05-18 NOTE — PROGRESS NOTE ADULT - ASSESSMENT
79yo F hx of rectal cancer and cystectomy s/p [place] pouch creation. Now  s/p aspiration of [place] pouch and placement of catheter for acute retention. Went into shock following procedure needing transfer to MICU  --In setting of recent urinary instrumentation particularly in setting of acute retention, shock likely 2/2 sepsis.   --Please obtain urine cx, blood cx  --agree w/ broad spectrum abx coverage. currently on vanc/cefipime/flagyl  --pending CT  --H/H drop likely from hemodilution given significant drop in plts btwn CBC (plts 315--> 184 w/ Hb 9.1 --> 7.1). Unlikely to be acute bleeding event given exam however CT pending.   - KEEP CATHETER IN  - can irrigate with NS prn if not draining well  -Monitor patient for post-obstructive diuresis.  -Maintain strict intake and output  -POD = >200cc of urine/hr for 3 or more consecutive hours  -Allow patient to drink to thirst.  Keep two pitchers of water at the bedside at all times.  -Check q6 BMPs to monitor electrolytes until UOP normalizes

## 2023-05-18 NOTE — CHART NOTE - NSCHARTNOTEFT_GEN_A_CORE
: Tanya Castillo    INDICATION: hypotension    PROCEDURE:  [x ] LIMITED ECHO  [ x] LIMITED CHEST  [ ] LIMITED RETROPERITONEAL  [x ] LIMITED ABDOMINAL  [ ] LIMITED DVT  [ ] NEEDLE GUIDANCE VASCULAR  [ ] NEEDLE GUIDANCE THORACENTESIS  [ ] NEEDLE GUIDANCE PARACENTESIS  [ ] NEEDLE GUIDANCE PERICARDIOCENTESIS  [ ] OTHER    FINDINGS:  Lungs: A line predominance b/l, scattered B-lines on L. Irregular pleural lining b/l. Lung sliding b/l. LLL consolidation with surrounding small effusion.  Heart: Poor cardiac visualization. Mildly reduced LVSF. No pericardial effusion. IVC indeterminant.  Abd: Stomach distended, full    INTERPRETATION:  LLL consolidation w/ small effusion.  Poor cardiac windows, consider mildly reduced LVSF.  IVC indeterminant, 1.34cm, likely fluid tolerant.    Images uploaded on [employer] : Tanya Castillo    INDICATION: hypotension    PROCEDURE:  [x ] LIMITED ECHO  [ x] LIMITED CHEST  [ ] LIMITED RETROPERITONEAL  [x ] LIMITED ABDOMINAL  [ ] LIMITED DVT  [ ] NEEDLE GUIDANCE VASCULAR  [ ] NEEDLE GUIDANCE THORACENTESIS  [ ] NEEDLE GUIDANCE PARACENTESIS  [ ] NEEDLE GUIDANCE PERICARDIOCENTESIS  [ ] OTHER    FINDINGS:  Lungs: A line predominance b/l, scattered B-lines on L. Irregular pleural lining b/l. Lung sliding b/l. LLL consolidation with surrounding small effusion.  Heart: Poor cardiac visualization. Mildly reduced LVSF. No pericardial effusion. IVC indeterminant.  Abd: Stomach distended, full    INTERPRETATION:  LLL consolidation w/ small effusion.  Poor cardiac windows, consider mildly reduced LVSF.  IVC indeterminant, 1.34cm, likely fluid tolerant.    Images uploaded on [employer]    Attending Note: Agree with above. I was present through out the scan. : Tanya Castillo    INDICATION: hypotension    PROCEDURE:  [x ] LIMITED ECHO  [x] LIMITED CHEST  [ ] LIMITED RETROPERITONEAL  [x] LIMITED ABDOMINAL  [ ] LIMITED DVT  [ ] NEEDLE GUIDANCE VASCULAR  [ ] NEEDLE GUIDANCE THORACENTESIS  [ ] NEEDLE GUIDANCE PARACENTESIS  [ ] NEEDLE GUIDANCE PERICARDIOCENTESIS  [ ] OTHER    FINDINGS:  Lungs: A line predominance b/l, scattered B-lines on L. Irregular pleural lining b/l. Lung sliding b/l. LLL consolidation with surrounding small effusion.  Heart: Poor cardiac visualization. Mildly reduced LVSF. No pericardial effusion. IVC indeterminant.  Abd: Stomach distended, full    INTERPRETATION:  LLL consolidation w/ small effusion.  Poor cardiac windows, consider mildly reduced LVSF.  IVC indeterminant, 1.34cm, likely fluid tolerant.    Images uploaded on [employer]    Attending Note: Agree with above. I was present through out the scan.

## 2023-05-18 NOTE — PROGRESS NOTE ADULT - ATTENDING COMMENTS
Pt seen and examined in MICU. Has evidence of sepsis in setting of urinary retention within [place] Pouch (hx of pelvic exent). Able to place a cunha into pouch after initially decompressing with needle aspiration. Now draining well. Suspect hct change is from prior hemoconcentration in setting of sepsis. Lower suspicion for bleeding. COnt cunha for now.

## 2023-05-18 NOTE — CHART NOTE - NSCHARTNOTEFT_GEN_A_CORE
Called by primary team during pt's second RRT for catheter not draining. Pt with history of [place] pouch. indwelling catheter placed earlier by  team. Given overdistension of pouch, pt needed to have aspiration of [place] pouch by chief resident Dr. Montes prior to indwelling catheter placement. Per primary team, catheter has not been draining well for past few hours. Pt lethargic and not answering questions.     on exam, [place] pouch distended. indwelling cunha in place without output.  using aseptic technique, aspirated through catheter with piston syringe with removal of mucus and urine. manually aspirated ~500cc of malodorous yellow urine from catheter. after [place] pouch drained, gently irrigated catheter with NS until no further return of mucus. catheter now patent and draining well. catheter resecured in STAT lock,    Recs:  - DO NOT REMOVE CATHETER IN [place] POUCH  - monitor urine output  - if catheter not draining, may attempt aspiration through catheter with piston syringe or gentle irrigation of catheter with NS  - watch for signs of POD (as outlines in Urology Consult Note)  - remainder of care per primary team  - please reach out to  team if any catheter issues or  concerns arise.     pager [phone] Called by primary team during pt's second RRT for catheter not draining. Pt with history of [place] pouch. indwelling catheter placed earlier by  team. Given overdistension of pouch, pt needed to have aspiration of [place] pouch by chief resident Dr. Montes prior to indwelling catheter placement. Per primary team, catheter has not been draining well for past few hours. Pt lethargic and not answering questions.     on exam, [place] pouch distended. indwelling cunha in place without output.  using aseptic technique, aspirated through catheter with piston syringe with removal of mucus and urine. manually aspirated ~500cc of malodorous yellow urine from catheter. after [place] pouch drained, gently irrigated catheter with NS until no further return of mucus. catheter now patent and draining well. catheter resecured in STAT lock,    Recs:  - DO NOT REMOVE CATHETER IN [place] POUCH  - monitor urine output  - if catheter not draining, may attempt aspiration through catheter with piston syringe or gentle irrigation of catheter with NS  - watch for signs of POD (as outlines in Urology Consult Note)  - remainder of care per primary team  - please reach out to  team if any catheter issues or  concerns arise.     pager [phone]    FOLLOW UP EVENT 5/18 4:40AM:  called again for catheter not draining. Noted during RRT. Pt hypotensive.  pt seen and examined. reports abd pain, but currently "not that bad". worse when palpated.     on exam:  nad: lethargic, but arousable and answering questions  abd: soft, diffusely tender especially around [place] pouch stoma. catheter in place without outptut.    gently irrigated catheter was NS with removal of more mucus. urine yellow to slight pink tinge. catheter draining well following irrigation. resecured in stat lock.  labs noted: hgb 9.4 -> 7.2. Cr 1.48->1.8. UA yellow urine with 29RBC and moderate bacteria.    pt reevaluated during RRT and being transferred to MICU to pressor support.    Recs:  - continue catheter  - monitor output  - agree with plan for CT AP  - if issues with catheter please page back. p: [phone].    d/w Dr. Catalan

## 2023-05-18 NOTE — RAPID RESPONSE TEAM SUMMARY - NSADDTLFINDINGSRRT_GEN_ALL_CORE
On arrival to RRT pt's HR 100s sinus tach, BP 70s/40s, RR 24, SPO2 95% on NC, oral temp 97F, BG 60s. Pt lethargic yet again answering to questioning AAOx2 and following commands, abdomen soft and diffusely more tender than previous RRT. Vergara catheter from [place] pouch noted with minimal UOP, bladder scan revealing >500cc UOP. To note, at previous RRT pt underwent infectious w/u and was started on empiric antibiotics for presumed sepsis and received 500cc IVF with good response in BP. Pt was pending to receive D5LR maintenance IVF however transferred to telemetry unit at which time RRT called after assessment of vital signs. 
On arrival to RRT pt's BP 110s/70s, HR 120s-130s, SpO2 91% on 2L NC (poor wave form, ABG sent to confirm resulting 95%), RR 16, BG 44, oral temp 99F. Pt lethargic, answering to questioning and following commands on all extremities. Abdomen soft however diffusely tender. As per primary team, pt was found to be lethargic and at that time vital signs were found to be SBP 70s and HR 130s, BG 44 and RRT called. 1 amp of D50 administered with improvement in mental status - pt AAOx3 and BP remaining stable. To note, pt is followed by colorectal surgery for her malignant SBO which has been evaluated to be non-operative and she is s/p NGT, removed yesterday and written for PO diet to advance as tolerated. As per documentation pt with +ostomy output today.

## 2023-05-18 NOTE — RAPID RESPONSE TEAM SUMMARY - NSMEDICATIONSRRT_GEN_ALL_CORE
500cc IVF Bolus. D50 1amp IVP. Pt written for empiric Cefepime and Flagyl. 
D50 amp x1, 1L IVF Bolus, Albumin 25% 100cc IVPB, Solucortef 100mg IVP

## 2023-05-18 NOTE — PHARMACOTHERAPY INTERVENTION NOTE - COMMENTS
Patient is an 77yo F with pmhx of recurrent cervical cancer and locally advanced rectal cancer on irinotecan q2 wks. Currently admitted to MICU for concern for septic shock 2/2 possible UTI vs intra-abdominal infection on cefepime 2g Q24H and flagyl.    Recommendation:  Recommend to renally dose adjust cefepime to 2g q24H to 1g q24H as pt is now in TAMARA (Scr 0.78-->1.53, CrCl 26.4ml/min) and making minimal urine.    Robyn Culver, PharmD  PGY2 Critical Care Pharmacy Resident  Available on [employer] Teams

## 2023-05-18 NOTE — RAPID RESPONSE TEAM SUMMARY - NSSITUATIONBACKGROUNDRRT_GEN_ALL_CORE
79 yo F with PMH of recurrent cervical cancer s/p pelvic exenteration with ostomy and ileal conduit, currently on treatment for locally advanced rectal cancer (irinotecan q2 wks), hypothyroidism, admitted 5/13 with high grade malignant SBO followed by colorectal surgery recommending non-operative management. RRT called for AMS, hypotension.
77 yo F with PMH of recurrent cervical cancer s/p pelvic exenteration with ostomy and ileal conduit, currently on treatment for locally advanced rectal cancer (irinotecan q2 wks), hypothyroidism, admitted 5/13 with high grade malignant SBO followed by colorectal surgery recommending non-operative management. RRT called for AMS, hypotension, tachycardia.

## 2023-05-19 NOTE — PROGRESS NOTE ADULT - ASSESSMENT
Patient is an 77 yo F, PMH of recurrent cervical cancer s/p pelvic exenteration with ostomy and ileal conduit, currently on treatment for locally advanced rectal cancer (irinotecan q2 wks), hypothyroidism, admitted 5/13/23 with high grade malignant SBO which was managed non-operatively. Patient was admitted to MICU on 5/18/23 for hypotension requiring pressors, concern for septic shock.   Overnight, patient has continued to require 1 of Levo. Blood cultures are growing Klebsiella, antibiotics were switched from  Cefepime and Flagyl to Meropenem.     PLAN    NEURO  Patient currently AOx3       CARDIO    #Septic shock  - s/p fluid resuscitation (3.5L IVF, albumin 25% x2) overnight  - Patient receiving another 500 mL LR  - infectious work up and treatment as below  - titrate levo to MAP>65    #Takotsubo's CM?  - Noted on POCUS after transfer from floors  - POCUS on 5/18 showed mildly reduced LVSF  - Cautious fluid management     RESPIRATORY  - Satting 96% on 3L NC  - Was on RA prior to overnight rapid  - wean O2 as tolerated      GI  - PPI ppx with Protonix    #Abdominal pain  - Concern for peritonitis/bowel perf/recurrent SBO/UTI.  - 5/18 abd X ray showed increased air-filled loops compared to prior study, no free air  - Obtain CTAP with contrast  - Patient and family educated on potential risks of kidney injury from IV contrast, verbalized understanding and consent to the use of IV contrast    #SBO  - Followed by colorectal surgery, s/p malignant SBO protocol. Diet was advanced 5/16  - Patient not a surgical candidate  - F/u CT A&P result, consider NGT placement  - Monitor ileostomy output    #Diet: NPO  - Keep NPO while concerned for recurrent SBO      RENAL    #TAMARA  - Likely iso hypovolemia and shock  - Baseline SCr 0.8, peaked at SCr 1.8, now downtrending to 1.53  - renally dose medications, avoid nephrotoxic agents  - Will monitor electrolytes and UOP  - Electrolytes: Replete K > 4, Mg > 2, Phos > 3    #Cystectomy s/p [place] pouch  - s/p aspiration of [place] pouch and placement of catheter for acute retention, patient went into shock following procedure   - Has required multiple aspirations/irrigations by Urology due to poor pouch drainage this admission  - If catheter not draining, bladder scan to evaluate residual. May attempt aspiration through catheter with piston syringe or gentle irrigation of catheter with NS      MSK / SKIN  - No active issues      ID  #Septic shock  - Concern for intra-abdominal source vs UTI  - Fluid resuscitation with LR   - titrate levo to MAP>65  - c/w empiric cefepime and flagyl (5/17-)  - give Vanc x1, MRSA swab pending  - UA 5/18 neg nitrates and LE, mod bacteria  - F/u UCx  - BCx  from 5/17 shows gram negative rods  - F/u CT A&P      ENDO    #Hypoglycemia  - Likely iso sepsis and poor PO intake  - FS q6h while NPO  - C/w D5LR 100 cc/hr    #Hypothyroidism  - c/w synthroid 37.5mcg IV daily      HEME / ONC  #Normocytic anemia  - Baseline Hgb ~9, now 7.1, likely dilutional (plts 315 --> 184)  - trend CBC and coags  - active T&S q72h    #Rectal Ca  - Non-resectable locally advanced rectal cancer on 2nd line therapy with single agent Irinotecan Q 2 weeks, last dose was 9 days prior to admission.  - Followed by [facility]    #Hx cervical cancer  - S/p pelvic exenteration with ostomy and ileal conduit     #DVT ppx: SQ heparin      ETHICS  GOC: DNR / DNI  D/w family (, sons) at bedside 5/18 risks/benefits of comfort care vs pressor support and treatment of sepsis. Family opted for pressor support. Family understands CPR is not being offered at this time and is in agreement.   D/w , daughter-in-law, and patient on 5/18, patient also expressed wish to be DNI.      Patient is an 77 yo F, PMH of recurrent cervical cancer s/p pelvic exenteration with ostomy and ileal conduit, currently on treatment for locally advanced rectal cancer (irinotecan q2 wks), hypothyroidism, admitted 5/13/23 with high grade malignant SBO which was managed non-operatively. Patient was admitted to MICU on 5/18/23 for hypotension requiring pressors, concern for septic shock.   Overnight, patient has continued to require 1 of Levo. Blood cultures are growing Klebsiella, antibiotics were switched from  Cefepime and Flagyl to Meropenem.     PLAN    NEURO  - Patient currently AOx3   - Continue Methadone at bedtime      CARDIO    #Septic shock  - s/p fluid resuscitation (3.5L IVF, albumin 25% x2) overnight  - Patient receiving another 500 mL LR  - infectious work up and treatment as below  - titrate levo to MAP>65    #Takotsubo's CM?  - Noted on POCUS after transfer from floors  - POCUS on 5/18 showed mildly reduced LVSF  - Cautious fluid management     RESPIRATORY  - Satting 96% on 3L NC  - Was on RA prior to overnight rapid  - wean O2 as tolerated      GI  - PPI ppx with Protonix    #Abdominal pain  - Concern for peritonitis/bowel perf/recurrent SBO/UTI.  - 5/18 abd X ray showed increased air-filled loops compared to prior study, no free air  - Obtain CTAP with contrast  - Patient and family educated on potential risks of kidney injury from IV contrast, verbalized understanding and consent to the use of IV contrast    #SBO  - Followed by colorectal surgery, s/p malignant SBO protocol. Diet was advanced 5/16  - Patient not a surgical candidate  - F/u CT A&P result, consider NGT placement  - Monitor ileostomy output    #Diet: NPO  - Keep NPO while concerned for recurrent SBO      RENAL    #TAMARA  - Likely iso hypovolemia and shock  - Baseline SCr 0.8, peaked at SCr 1.8, now downtrending to 1.53  - renally dose medications, avoid nephrotoxic agents  - Will monitor electrolytes and UOP  - Electrolytes: Replete K > 4, Mg > 2, Phos > 3    #Cystectomy s/p [place] pouch  - s/p aspiration of [place] pouch and placement of catheter for acute retention, patient went into shock following procedure   - Has required multiple aspirations/irrigations by Urology due to poor pouch drainage this admission  - If catheter not draining, bladder scan to evaluate residual. May attempt aspiration through catheter with piston syringe or gentle irrigation of catheter with NS      MSK / SKIN  - No active issues      ID  #Septic shock  - Concern for intra-abdominal source vs UTI  - Fluid resuscitation with LR   - titrate levo to MAP>65  - c/w empiric cefepime and flagyl (5/17-)  - give Vanc x1, MRSA swab pending  - UA 5/18 neg nitrates and LE, mod bacteria  - F/u UCx  - BCx  from 5/17 shows gram negative rods  - F/u CT A&P      ENDO    #Hypoglycemia  - Likely iso sepsis and poor PO intake  - FS q6h while NPO  - C/w D5LR 100 cc/hr    #Hypothyroidism  - c/w synthroid 37.5mcg IV daily      HEME / ONC  #Normocytic anemia  - Baseline Hgb ~9, now 7.1, likely dilutional (plts 315 --> 184)  - trend CBC and coags  - active T&S q72h    #Rectal Ca  - Non-resectable locally advanced rectal cancer on 2nd line therapy with single agent Irinotecan Q 2 weeks, last dose was 9 days prior to admission.  - [place] [facility] [place]    #Hx cervical cancer  - S/p pelvic exenteration with ostomy and ileal conduit     #DVT ppx: SQ heparin      ETHICS  GOC: DNR / DNI  D/w family (, sons) at bedside 5/18 risks/benefits of comfort care vs pressor support and treatment of sepsis. Family opted for pressor support. Family understands CPR is not being offered at this time and is in agreement.   D/w , daughter-in-law, and patient on 5/18, patient also expressed wish to be DNI.      Patient is an 79 yo F, PMH of recurrent cervical cancer s/p pelvic exenteration with ostomy and ileal conduit, currently on treatment for locally advanced rectal cancer (irinotecan q2 wks), hypothyroidism, admitted 5/13/23 with high grade malignant SBO which was managed non-operatively. Patient was admitted to MICU on 5/18/23 for hypotension requiring pressors, concern for septic shock.   Overnight, patient has continued to require 1 of Levo. Blood cultures are growing Klebsiella, antibiotics were switched from  Cefepime and Flagyl to Meropenem.     PLAN    NEURO  - Patient currently AOx3   - Continue Methadone at bedtime      CARDIO    #Septic shock  - s/p fluid resuscitation - infectious work up and treatment as below  - Adding vaso  - Titrate levo to MAP >65    #Systolic dysfunction  - Takotsubo's CM? noted on POCUS after transfer from floors  - Echo on 5/18 showed EF of 35%  - Cautious fluid management     RESPIRATORY  - Satting 96% on 3L NC  - wean O2 as tolerated  - Addition of incentive spirometer as tolerated      GI  - d/c'd PPI ppx with Protonix    #SBO  - Followed by colorectal surgery, s/p malignant SBO protocol, diet was advanced 5/16  - Patient not a surgical candidate  - CTAP on 5/18 showed partial distal SBO   - Continue Reglan, Octreotide, Decadron  - Will monitor QTc daily while on Reglan  - Monitor ileostomy and NGT output    #Diet: NPO  - Keep NPO while decompressing SBO  - No adequate nutrition since 5/13  - Patient bacteremic, TPN not currently an option      RENAL    #TAMARA  - Likely iso hypovolemia and shock  - Baseline SCr 0.8, peaked at SCr 1.8, now downtrending to 1.27  - renally dose medications, avoid nephrotoxic agents  - Will monitor electrolytes and UOP  - Electrolytes: Replete K > 4, Mg > 2, Phos > 3    #Cystectomy s/p [place] pouch  - s/p aspiration of [place] pouch and placement of catheter for acute retention, patient went into shock following procedure   - Has required multiple aspirations/irrigations by Urology due to poor pouch drainage this admission  - If catheter not draining, bladder scan to evaluate residual. May attempt aspiration through catheter with piston syringe or gentle irrigation of catheter with NS      MSK / SKIN  - No active issues      ID  #Septic shock  - Concern for intra-abdominal source vs UTI   - continue vaso, titrate levo to MAP>65  - BCx  from 5/17 shows Klebsiella susceptibilities pending  - Empiric cefepime and flagyl (5/17-) broadened to Meropenem (5/19 - )  - Gave Vanc x1, MRSA swab negative  - UA 5/18 neg nitrates and LE, mod bacteria  - F/u UCx and sputum Cx  - 5/18 CT A&P shows partial SBO, colitis, pyelitis, and b/l lower lobe consolidations      ENDO    #Hypoglycemia  - Likely iso sepsis and poor PO intake  - FS q6h while NPO  - C/w D5LR 100 cc/hr    #Hypothyroidism  - c/w synthroid 37.5mcg IV daily      HEME / ONC  #Normocytic anemia  - Baseline Hgb ~9, dropped to 7.1, now stable at 8.4  - trend CBC and coags  - active T&S q72h    #Rectal Ca  - Non-resectable locally advanced rectal cancer on 2nd line therapy with single agent Irinotecan Q 2 weeks, last dose was 9 days prior to admission.  - Followed [place] [facility] [place]    #Hx cervical cancer  - S/p pelvic exenteration with ostomy and ileal conduit     #DVT ppx: SQ heparin      ETHICS  GOC: DNR / DNI  D/w family (, sons) at bedside 5/18 risks/benefits of comfort care vs pressor support and treatment of sepsis. Family opted for pressor support. Family understands CPR is not being offered at this time and is in agreement.   D/w , daughter-in-law, and patient on 5/18, patient also expressed wish to be DNI.      Patient is an 77 yo F, PMH of recurrent cervical cancer s/p pelvic exenteration with ostomy and ileal conduit, currently on treatment for locally advanced rectal cancer (irinotecan q2 wks), hypothyroidism, admitted 5/13/23 with high grade malignant SBO which was managed non-operatively. Patient was admitted to MICU on 5/18/23 for hypotension requiring pressors, concern for septic shock.   Overnight, patient has continued to require 1 of Levo. Blood cultures are growing Klebsiella, antibiotics were switched from  Cefepime and Flagyl to Meropenem.     PLAN    NEURO  - Patient currently AOx3   - Continue Methadone at bedtime      CARDIO    #Septic shock  - s/p fluid resuscitation - infectious work up and treatment as below  - Adding vaso  - Titrate levo to MAP >65    #Systolic dysfunction  - Takotsubo's CM? noted on POCUS after transfer from floors  - Echo on 5/18 showed EF of 35%  - Cautious fluid management     RESPIRATORY  - Satting 96% on 3L NC  - wean O2 as tolerated  - Addition of incentive spirometer as tolerated      GI  - d/c'd PPI ppx with Protonix    #SBO  - Followed by colorectal surgery, s/p malignant SBO protocol, diet was advanced 5/16  - Patient not a surgical candidate  - CTAP on 5/18 showed partial distal SBO   - Continue Reglan, Octreotide, Decadron  - Will monitor QTc daily while on Reglan  - Monitor ileostomy and NGT output    #Diet: NPO  - Keep NPO while decompressing SBO  - No adequate nutrition since 5/13  - Patient bacteremic, TPN not currently an option      RENAL    #TAMARA  - Likely iso hypovolemia and shock  - Baseline SCr 0.8, peaked at SCr 1.8, now downtrending to 1.27  - renally dose medications, avoid nephrotoxic agents  - Will monitor electrolytes and UOP  - Electrolytes: Replete K > 4, Mg > 2, Phos > 3    #Cystectomy s/p [place] pouch  - s/p aspiration of [place] pouch and placement of catheter for acute retention, patient went into shock following procedure   - Has required multiple aspirations/irrigations by Urology due to poor pouch drainage this admission  - If catheter not draining, bladder scan to evaluate residual. May attempt aspiration through catheter with piston syringe or gentle irrigation of catheter with NS      MSK / SKIN  - No active issues      ID  #Septic shock  - Concern for intra-abdominal source vs UTI   - continue vaso, titrate levo to MAP>65  - BCx  from 5/17 shows Klebsiella susceptibilities pending  - Empiric cefepime and flagyl (5/17-) broadened to Meropenem (5/19 - )  - Gave Vanc x1, MRSA swab negative  - UA 5/18 neg nitrates and LE, mod bacteria  - F/u UCx and sputum Cx  - 5/18 CT A&P shows partial SBO, colitis, pyelitis, and b/l lower lobe consolidations      ENDO    #Hypoglycemia  - Likely iso sepsis and poor PO intake  - FS q6h while NPO  - C/w D5LR 100 cc/hr    #Hypothyroidism  - c/w synthroid 37.5mcg IV daily      HEME / ONC  #Normocytic anemia  - Baseline Hgb ~9, dropped to 7.1, now stable at 8.4  - trend CBC and coags  - active T&S q72h    #Rectal Ca  - Non-resectable locally advanced rectal cancer on 2nd line therapy with single agent Irinotecan Q 2 weeks, last dose was 9 days prior to admission.  - Followed by [facility] [place]    #Hx cervical cancer  - S/p pelvic exenteration with ostomy and ileal conduit     #DVT ppx: SQ heparin      ETHICS  GOC: DNR / DNI  D/w family (, sons) at bedside 5/18 risks/benefits of comfort care vs pressor support and treatment of sepsis. Family opted for pressor support. Family understands CPR is not being offered at this time and is in agreement.   D/w , daughter-in-law, and patient on 5/18, patient also expressed wish to be DNI.       ADDENDUM: 5/19/2023, 14:00    Goals of care were addressed in a conversation with the patient, family ( and son at bedside), and palliative care. Patient is opting for symptom directed approach, comfort measures only, with no escalation of care.    - Palliative care recommendations appreciated  - Morphine prn for pain and dyspnea   - Ativan prn for anxiety  - Glycopyrrolate prn for secretions  - Will d/c pressors once patient's family is all present at bedside  - NGT d/c'd and regular diet ordered for patient's comfort

## 2023-05-19 NOTE — CONSULT NOTE ADULT - PROBLEM SELECTOR RECOMMENDATION 9
·   Pt comfortable at time of exam, endorsing intermittent severe abdominal pain opting for comfort measures only   Recommend   2mg IVP Morphine q2 hours prn for severe pain   1mg IVP Morphine q2 hours prn for moderate pain   can continue methadone 2.5mg IVP at bedtime   Please page palliative if >3 doses in 24 hours or if symptoms unmanaged [id number]

## 2023-05-19 NOTE — PROGRESS NOTE ADULT - SUBJECTIVE AND OBJECTIVE BOX
DAILY PROGRESS NOTE:         24 hr events:  continues in MICU  Bcx growing Klebsiella     Objective:    Vital Signs Last 24 Hrs  T(C): 36.3 (19 May 2023 12:00), Max: 36.8 (19 May 2023 04:00)  T(F): 97.4 (19 May 2023 12:00), Max: 98.2 (19 May 2023 04:00)  HR: 79 (19 May 2023 12:00) (79 - 116)  BP: 125/59 (19 May 2023 11:45) (79/44 - 149/66)  BP(mean): 85 (19 May 2023 11:45) (58 - 95)  RR: 20 (19 May 2023 12:00) (0 - 41)  SpO2: 97% (19 May 2023 12:00) (91% - 98%)    Parameters below as of 18 May 2023 20:00  Patient On (Oxygen Delivery Method): nasal cannula  O2 Flow (L/min): 3      I&O's Detail    18 May 2023 07:01  -  19 May 2023 07:00  --------------------------------------------------------  IN:    dextrose 5% + lactated ringers: 2400 mL    IV PiggyBack: 200 mL    IV PiggyBack: 499.8 mL    IV PiggyBack: 300 mL    Lactated Ringers: 500 mL    Lactated Ringers Bolus: 500 mL    Norepinephrine: 1557.3 mL    Norepinephrine: 216.4 mL  Total IN: 6173.5 mL    OUT:    Ileostomy (mL): 60 mL    Indwelling Catheter - Stomal (mL): 1815 mL    Nasogastric/Oral tube (mL): 450 mL  Total OUT: 2325 mL    Total NET: 3848.5 mL      19 May 2023 07:01  -  19 May 2023 12:17  --------------------------------------------------------  IN:    dextrose 5% + lactated ringers: 300 mL    Norepinephrine: 212 mL    Vasopressin: 12 mL  Total IN: 524 mL    OUT:    Indwelling Catheter - Stomal (mL): 215 mL  Total OUT: 215 mL    Total NET: 309 mL          Physical Exam:    General: in ICU. Awake .  Resp: Breathing comfortably on RA  CV: regular rate   : cunha in [place] pouch.       Laboratory Results:                          8.4    31.34 )-----------( 118      ( 19 May 2023 00:21 )             28.1         142  |  108  |  24<H>  ----------------------------<  140<H>  3.8   |  21<L>  |  1.27    Ca    7.4<L>      19 May 2023 00:21  Phos  4.0       Mg     2.3         TPro  4.1<L>  /  Alb  2.3<L>  /  TBili  0.5  /  DBili  x   /  AST  66<H>  /  ALT  39  /  AlkPhos  76      PT/INR - ( 19 May 2023 00:21 )   PT: 20.4 sec;   INR: 1.76 ratio         PTT - ( 19 May 2023 00:21 )  PTT:83.7 sec  Urinalysis Basic - ( 18 May 2023 00:55 )    Color: Yellow / Appearance: Slightly Turbid / S.013 / pH: x  Gluc: x / Ketone: Negative  / Bili: Negative / Urobili: Negative   Blood: x / Protein: 100 mg/dl / Nitrite: Negative   Leuk Esterase: Negative / RBC: 29 /hpf / WBC 3 /HPF   Sq Epi: x / Non Sq Epi: x / Bacteria: Moderate

## 2023-05-19 NOTE — CONSULT NOTE ADULT - PROBLEM SELECTOR RECOMMENDATION 4
- see GOC: Abi consenting to DNR/I comfort measures only no escalation of care  - will turn off pressors when patient's son arrives  - pt opting for pleasure feeds

## 2023-05-19 NOTE — PROGRESS NOTE ADULT - PROBLEM SELECTOR PLAN 3
Patient opting for symptom directed approach, comfort measures only no escalation of care    Patient requesting discontinuation of pressors, orange juice, and pain medication   If symptoms arise can consider   2mg IVP Morphine q2 hours prn for dyspnea   0.5mg IVP Ativan q2 hours prn for anxiety, agitation, delirium  0.4mg IVP Glycopyrrolate q2 hours prn for excessive audible secretions

## 2023-05-19 NOTE — PROGRESS NOTE ADULT - ATTENDING COMMENTS
rlq pain, stoma functioning, no evidence of intestinal perforation on ct  no role for suregry  if continues to deteriorate rec palliative care

## 2023-05-19 NOTE — PROGRESS NOTE ADULT - ASSESSMENT
79yo F hx of rectal cancer and cystectomy (i/s/o anterior exent) s/p [place] pouch creation. Now  s/p aspiration of [place] pouch and placement of catheter for acute retention. Went into shock following procedure needing transfer to MICU  --In setting of recent urinary instrumentation particularly in setting of acute retention, shock likely 2/2 sepsis.   --Please obtain urine cx, blood cx. Bcx now w/ Klebsiella, suggestive of sepsis.   --agree w/ broad spectrum abx coverage. currently on vanc/cefipime/flagyl  --CT w/ no signs of acute bleed to explain shock. Shock likely from sepsis.   - KEEP CATHETER IN  - can irrigate with NS prn if not draining well  -Monitor patient for post-obstructive diuresis.  -Maintain strict intake and output  -POD = >200cc of urine/hr for 3 or more consecutive hours  -Allow patient to drink to thirst.  Keep two pitchers of water at the bedside at all times.  -Check q6 BMPs to monitor electrolytes until UOP normalizes

## 2023-05-19 NOTE — CHART NOTE - NSCHARTNOTEFT_GEN_A_CORE
Nutrition Follow Up Note  Patient seen for: initial malnutrition follow up. Chart reviewed and events noted.     Source: [] Patient       [x] Medical Record        [] RN        [] Family at bedside       [x] Other: Provider    -If unable to interview patient: [] Trach/Vent/BiPAP  [] Disoriented/confused/inappropriate to interview   *Pt sleep during RD visit, given NPO status not appropriate to awaken at this time.    Nutrition-Related Events:   - Pressors:  [x] Yes    [] No - norepinephrine at 0.887 Micrograms/kG/Min   - Propofol:  [] Yes    [x] No         - Rate: __mL/hr. If maintained x 24 hours, propofol will provide:     Diet Order:   Diet, NPO (05-18-23 @ 04:24)    Is current diet order appropriate/adequate? See recommendations below    PO intake :   [] >75%  Adequate    [] 50-75%  Fair       [] <50%  Poor   [x] N/A  NPO 5/13-> 5/16; clear liquids 5/16->5/17; full liquid 5/17->5/18    Nutrition-related concerns:  - Malignant Small bowel obstruction s/p medical management. Now reoccurring with NGT to suction   - Septic shock. On pressor   - TAMARA  - Hypoglycemia likely secondary to sepsis + poor PO intake per MICU  -> Noted on steroid which can elevate BG   - IV levothyroxine  - Low Phos 5/18 @ 16:17 s/p NaPhosphate. Last drawn WNL     GI: Recurrent cervical cancer s/p pelvic exenteration with ostomy and ileal conduit. Per colorectal 5/19, "No perforation seen on CTAP." Bowel Regimen? [] Yes   [x] No  -> On antibiotics, Reglan, and pantoprazole     NGT Output: 450 mL (5/19), 250 mL (5/15), 450 mL (5/14)  IV Fluids: Lactated ringers at 500 mL/hr  Ostomy Output: 60 mL (5/18), 300 mL (5/17), 200 mL (5/16)    Weights:   Daily wt: None to address   RD obtained wt: 61.3 kG (5/18)  Increase in wt likely secondary to fluid retention     Drug Dosing Weight  Height (cm): 154.9 (12 May 2023 18:49)  Weight (kg): 57.7 (18 May 2023 05:00)  BMI (kg/m2): 24 (18 May 2023 05:00)    MEDICATIONS  (STANDING):  dexAMETHasone  Injectable 4 milliGRAM(s) IV Push two times a day  dextrose 5%. 1000 milliLiter(s) (100 mL/Hr) IV Continuous <Continuous>  dextrose 5%. 1000 milliLiter(s) (50 mL/Hr) IV Continuous <Continuous>  dextrose 50% Injectable 12.5 Gram(s) IV Push once  dextrose 50% Injectable 25 Gram(s) IV Push once  dextrose 50% Injectable 25 Gram(s) IV Push once  glucagon  Injectable 1 milliGRAM(s) IntraMuscular once  heparin   Injectable 5000 Unit(s) SubCutaneous every 8 hours  lactated ringers. 500 milliLiter(s) (500 mL/Hr) IV Continuous <Continuous>  levothyroxine Injectable 37.5 MICROGram(s) IV Push <User Schedule>  meropenem  IVPB 1000 milliGRAM(s) IV Intermittent every 12 hours  meropenem  IVPB      methadone Injectable 2.5 milliGRAM(s) IV Push at bedtime  metoclopramide Injectable 10 milliGRAM(s) IV Push every 8 hours  norepinephrine Infusion 0.05 MICROgram(s)/kG/Min (2.71 mL/Hr) IV Continuous <Continuous>  octreotide  Injectable 100 MICROGram(s) IV Push every 8 hours  pantoprazole  Injectable 40 milliGRAM(s) IV Push two times a day    Pertinent Labs: 05-19 @ 00:21: Na 142, BUN 24<H>, Cr 1.27, <H>, K+ 3.8, Phos 4.0, Mg 2.3, Alk Phos 76, ALT/SGPT 39, AST/SGOT 66<H>  05-18 @ 16:17: Na 140, BUN 25<H>, Cr 1.33<H>, BG 88, K+ 3.8, Phos 1.8<L>, Mg 1.8, Alk Phos 63, ALT/SGPT 43, AST/SGOT 89<H>    A1C with Estimated Average Glucose Result: 5.5 % (12-28-22 @ 11:12)    Finger Sticks:  POCT Blood Glucose.: 190 mg/dL (05-19 @ 06:00)  POCT Blood Glucose.: 91 mg/dL (05-18 @ 21:56)  POCT Blood Glucose.: 89 mg/dL (05-18 @ 16:14)  POCT Blood Glucose.: 91 mg/dL (05-18 @ 12:13)    Skin per nursing documentation: No pressure injuries noted.  Edema per nursing documentation: 1+ generalized 2+ right foot; left foot; right ankle; left ankle    Based on  lbs/47.6 kG given wt fluctuations + fluid retention  Estimated Energy Needs: (30-35 kcals/kG) 4259-8111 kcals  Estimated Protein Needs: (1.3-1.7 gm /kG) 62-81 gm  Fluid needs deferred to provider.     Previous Nutrition Diagnosis:  1) Severe Acute Malnutrition  2) Inadequate Oral Intake  Nutrition Diagnosis is: [x] ongoing  [] resolved [] not applicable     Nutrition Care Plan:  [] In Progress  [] Achieved  [x] Not applicable    New Nutrition Diagnosis: [x] Not applicable    Nutrition Interventions:     Education Provided:       [] Yes:  [x] No: Not appropriate at this time.     Recommendations:      1) Initiation of diet deferred to provider. If pt with persistent small bowel obstructions, consider TPN (likely not candidate at this time given infection). If pt to start on enteral feeds, Vital 1.5 at 10 mL/hr increasing ONLY as tolerated and electrolytes WNL to goal rate 60 mL/hr x18 hours to provide total volume 1080 mL, 1620 kcals, 73 gm protein, and 825 mL free water. Meet 34 kcals/kG and 1.5 gm protein/kG based on IBW 47.6 kG.  -> If pt on PO diet: low fiber diet. Consistency deferred to SLP and provider.   -> Trend K, Phos, and Mg as able for refeeding risk.   2) As medically feasible, consider addition of multivitamin + thiamine for refeeding risk.     Monitoring and Evaluation:   Continue to monitor nutritional intake, tolerance to diet prescription, weights, labs, skin integrity    RD remains available upon request and will follow up per protocol  Radha Hanks RD, MS, CDN, [facility] Pager #[phone] Nutrition Follow Up Note  Patient seen for: initial malnutrition follow up. Chart reviewed and events noted.     Source: [] Patient       [x] Medical Record        [] RN        [] Family at bedside       [x] Other: Provider    -If unable to interview patient: [] Trach/Vent/BiPAP  [] Disoriented/confused/inappropriate to interview   *Pt sleep during RD visit, given NPO status not appropriate to awaken at this time.    Nutrition-Related Events:   - Pressors:  [x] Yes    [] No - norepinephrine at 0.887 Micrograms/kG/Min   - Propofol:  [] Yes    [x] No         - Rate: __mL/hr. If maintained x 24 hours, propofol will provide:     Diet Order:   Diet, NPO (05-18-23 @ 04:24)    Is current diet order appropriate/adequate? See recommendations below    PO intake :   [] >75%  Adequate    [] 50-75%  Fair       [] <50%  Poor   [x] N/A  NPO 5/13-> 5/16; clear liquids 5/16->5/17; full liquid 5/17->5/18    Nutrition-related concerns:  - Malignant Small bowel obstruction s/p medical management. Now reoccurring per provider with NGT to suction   - Septic shock. On pressor   - TAMARA  - Hypoglycemia likely secondary to sepsis + poor PO intake per MICU  -> Noted on steroid which can elevate BG   - IV levothyroxine  - Low Phos 5/18 @ 16:17 s/p NaPhosphate. Last drawn WNL     GI: Recurrent cervical cancer s/p pelvic exenteration with ostomy and ileal conduit. Per colorectal 5/19, "No perforation seen on CTAP." Bowel Regimen? [] Yes   [x] No  -> On antibiotics, Reglan, and pantoprazole     NGT Output: 450 mL (5/19), 250 mL (5/15), 450 mL (5/14)  IV Fluids: Lactated ringers at 500 mL/hr  Ostomy Output: 60 mL (5/18), 300 mL (5/17), 200 mL (5/16)    Weights:   Daily wt: None to address   RD obtained wt: 61.3 kG (5/18)  Increase in wt likely secondary to fluid retention     Drug Dosing Weight  Height (cm): 154.9 (12 May 2023 18:49)  Weight (kg): 57.7 (18 May 2023 05:00)  BMI (kg/m2): 24 (18 May 2023 05:00)    MEDICATIONS  (STANDING):  dexAMETHasone  Injectable 4 milliGRAM(s) IV Push two times a day  dextrose 5%. 1000 milliLiter(s) (100 mL/Hr) IV Continuous <Continuous>  dextrose 5%. 1000 milliLiter(s) (50 mL/Hr) IV Continuous <Continuous>  dextrose 50% Injectable 12.5 Gram(s) IV Push once  dextrose 50% Injectable 25 Gram(s) IV Push once  dextrose 50% Injectable 25 Gram(s) IV Push once  glucagon  Injectable 1 milliGRAM(s) IntraMuscular once  heparin   Injectable 5000 Unit(s) SubCutaneous every 8 hours  lactated ringers. 500 milliLiter(s) (500 mL/Hr) IV Continuous <Continuous>  levothyroxine Injectable 37.5 MICROGram(s) IV Push <User Schedule>  meropenem  IVPB 1000 milliGRAM(s) IV Intermittent every 12 hours  meropenem  IVPB      methadone Injectable 2.5 milliGRAM(s) IV Push at bedtime  metoclopramide Injectable 10 milliGRAM(s) IV Push every 8 hours  norepinephrine Infusion 0.05 MICROgram(s)/kG/Min (2.71 mL/Hr) IV Continuous <Continuous>  octreotide  Injectable 100 MICROGram(s) IV Push every 8 hours  pantoprazole  Injectable 40 milliGRAM(s) IV Push two times a day    Pertinent Labs: 05-19 @ 00:21: Na 142, BUN 24<H>, Cr 1.27, <H>, K+ 3.8, Phos 4.0, Mg 2.3, Alk Phos 76, ALT/SGPT 39, AST/SGOT 66<H>  05-18 @ 16:17: Na 140, BUN 25<H>, Cr 1.33<H>, BG 88, K+ 3.8, Phos 1.8<L>, Mg 1.8, Alk Phos 63, ALT/SGPT 43, AST/SGOT 89<H>    A1C with Estimated Average Glucose Result: 5.5 % (12-28-22 @ 11:12)    Finger Sticks:  POCT Blood Glucose.: 190 mg/dL (05-19 @ 06:00)  POCT Blood Glucose.: 91 mg/dL (05-18 @ 21:56)  POCT Blood Glucose.: 89 mg/dL (05-18 @ 16:14)  POCT Blood Glucose.: 91 mg/dL (05-18 @ 12:13)    Skin per nursing documentation: No pressure injuries noted.  Edema per nursing documentation: 1+ generalized 2+ right foot; left foot; right ankle; left ankle    Based on  lbs/47.6 kG given wt fluctuations + fluid retention  Estimated Energy Needs: (30-35 kcals/kG) 6640-2059 kcals  Estimated Protein Needs: (1.3-1.7 gm /kG) 62-81 gm  Fluid needs deferred to provider.     Previous Nutrition Diagnosis:  1) Severe Acute Malnutrition  2) Inadequate Oral Intake  Nutrition Diagnosis is: [x] ongoing  [] resolved [] not applicable     Nutrition Care Plan:  [] In Progress  [] Achieved  [x] Not applicable    New Nutrition Diagnosis: [x] Not applicable    Nutrition Interventions:     Education Provided:       [] Yes:  [x] No: Not appropriate at this time.     Recommendations:      1) Initiation of diet deferred to provider. If pt with persistent small bowel obstructions, consider TPN (likely not candidate at this time given infection). If pt to start on enteral feeds, Vital 1.5 at 10 mL/hr increasing ONLY as tolerated and electrolytes WNL to goal rate 60 mL/hr x18 hours to provide total volume 1080 mL, 1620 kcals, 73 gm protein, and 825 mL free water. Meet 34 kcals/kG and 1.5 gm protein/kG based on IBW 47.6 kG.  -> If pt on PO diet: low fiber diet. Consistency deferred to SLP and provider.   -> Trend K, Phos, and Mg as able for refeeding risk.   2) As medically feasible, consider addition of multivitamin + thiamine for refeeding risk.     Monitoring and Evaluation:   Continue to monitor nutritional intake, tolerance to diet prescription, weights, labs, skin integrity    RD remains available upon request and will follow up per protocol  Radha Hanks, RD, MS, CDN, [facility] Pager #[phone]

## 2023-05-19 NOTE — CONSULT NOTE ADULT - PROBLEM SELECTOR RECOMMENDATION 5
· Will continue to follow for symptoms and GOC  -  notified to come to bedside as pt is Roni Koehler  - Case/Plan discussed with MICU team if patient is stable for transfer can consider transfer to PCU when bed is available      Patient opting for symptom directed approach, comfort measures only no escalation of care    Patient requesting discontinuation of pressors, orange juice, and pain medication   If symptoms arise can consider   2mg IVP Morphine q2 hours prn for dyspnea   0.5mg IVP Ativan q2 hours prn for anxiety, agitation, delirium  0.4mg IVP Glycopyrrolate q6 hours prn for excessive audible secretions  Please page palliative care if >3 doses in 24 hours or if symptoms unmanaged  Can be reached by TEAMS M-F 9-5 Robyn Levine Any other time please page [phone] if needed

## 2023-05-19 NOTE — PROGRESS NOTE ADULT - ATTENDING COMMENTS
Agree with the plan    - CT showed no acute intra-abdominal process   - pan cultures   - Vergara catheter Draining well

## 2023-05-19 NOTE — PROGRESS NOTE ADULT - PROBLEM SELECTOR PLAN 1
Pt endorsing severe abdominal pain opting for comfort measures only   Recommend   2mg IVP Morphine q2 hours prn for severe pain   1mg IVP Morphine q2 hours prn for moderate pain   can continue methadone 2.5mg IVP at bedtime   Please page palliative if >3 doses in 24 hours or if symptoms unmanaged

## 2023-05-19 NOTE — PROGRESS NOTE ADULT - SUBJECTIVE AND OBJECTIVE BOX
Surgery Progress Note    SUBJECTIVE:  - Patient seen and examined at bedside   - Patient responsive, states having abdominal pain  --------------------------------------------------------------------------------------------------  OBJECTIVE:   Physical Exam:  General: AAOx3, NAD, lying comfortably in bed  HEENT: NC/AT  Respiratory: nonlabored breathing  Cardiovascular: RRR, normal S1 and S2, no murmurs or gallops  Abdomen: non-distended, soft, RLQ tenderness  Extremities: WWP, no edema  Ostomy: liquid stool and small amount of gas in bag.  --------------------------------------------------------------------------------------------------  V/S:  Vital Signs Last 24 Hrs  T(C): 36.8 (19 May 2023 04:00), Max: 36.8 (19 May 2023 04:00)  T(F): 98.2 (19 May 2023 04:00), Max: 98.2 (19 May 2023 04:00)  HR: 90 (19 May 2023 04:30) (89 - 116)  BP: 112/56 (19 May 2023 04:30) (79/44 - 120/55)  BP(mean): 81 (19 May 2023 04:30) (58 - 84)  RR: 16 (19 May 2023 04:30) (0 - 41)  SpO2: 97% (19 May 2023 04:30) (92% - 98%)    Parameters below as of 18 May 2023 20:00  Patient On (Oxygen Delivery Method): nasal cannula  O2 Flow (L/min): 3      --------------------------------------------------------------------------------------------------  I/Os:    17 May 2023 07:01  -  18 May 2023 07:00  --------------------------------------------------------  IN:    dextrose 5% + lactated ringers: 200 mL    IV PiggyBack: 100 mL    IV PiggyBack: 250 mL    IV PiggyBack: 250 mL    Norepinephrine: 86.6 mL  Total IN: 886.6 mL    OUT:    Indwelling Catheter - Stomal (mL): 2275 mL  Total OUT: 2275 mL    Total NET: -1388.4 mL      18 May 2023 07:01  -  19 May 2023 06:11  --------------------------------------------------------  IN:    dextrose 5% + lactated ringers: 2200 mL    IV PiggyBack: 200 mL    IV PiggyBack: 499.8 mL    IV PiggyBack: 300 mL    Lactated Ringers: 500 mL    Lactated Ringers Bolus: 500 mL    Norepinephrine: 1557.3 mL    Norepinephrine: 162.3 mL  Total IN: 5919.4 mL    OUT:    Ileostomy (mL): 60 mL    Indwelling Catheter - Stomal (mL): 1545 mL  Total OUT: 1605 mL    Total NET: 4314.4 mL        --------------------------------------------------------------------------------------------------  LABS:                        8.4    31.34 )-----------( 118      ( 19 May 2023 00:21 )             28.1     19 May 2023 00:21    142    |  108    |  24     ----------------------------<  140    3.8     |  21     |  1.27     Ca    7.4        19 May 2023 00:21  Phos  4.0       19 May 2023 00:21  Mg     2.3       19 May 2023 00:21    TPro  4.1    /  Alb  2.3    /  TBili  0.5    /  DBili  x      /  AST  66     /  ALT  39     /  AlkPhos  76     19 May 2023 00:21    PT/INR - ( 19 May 2023 00:21 )   PT: 20.4 sec;   INR: 1.76 ratio         PTT - ( 19 May 2023 00:21 )  PTT:83.7 sec  CAPILLARY BLOOD GLUCOSE      POCT Blood Glucose.: 190 mg/dL (19 May 2023 06:00)  POCT Blood Glucose.: 91 mg/dL (18 May 2023 21:56)  POCT Blood Glucose.: 89 mg/dL (18 May 2023 16:14)  POCT Blood Glucose.: 91 mg/dL (18 May 2023 12:13)        LIVER FUNCTIONS - ( 19 May 2023 00:21 )  Alb: 2.3 g/dL / Pro: 4.1 g/dL / ALK PHOS: 76 U/L / ALT: 39 U/L / AST: 66 U/L / GGT: x             Culture - Blood (collected 17 May 2023 20:53)  Source: .Blood Blood-Peripheral  Gram Stain (18 May 2023 12:03):    Growth in aerobic and anaerobic bottles: Gram Negative Rods  Preliminary Report (18 May 2023 12:03):    Growth in aerobic and anaerobic bottles: Gram Negative Rods    Culture - Blood (collected 17 May 2023 20:45)  Source: .Blood Blood-Peripheral  Gram Stain (18 May 2023 12:46):    Growth in aerobic bottle: Gram Negative Rods    Growth in anaerobic bottle: Gram Negative Rods  Preliminary Report (18 May 2023 12:46):    Growth in aerobic bottle: Gram Negative Rods    Growth in anaerobic bottle: Gram Negative Rods    ***Blood Panel PCR results on this specimen are available    approximately 3 hours after the Gram stain result.***    Gram stain, PCR, and/or culture results may not always    correspond due to difference in methodologies.    ************************************************************    This PCR assay was performed by multiplex PCR. This    Assay tests for 66 bacterial and resistance gene targets.    Please refer to the [facility] test directory    at [email or web address] for details.  Organism: Blood Culture PCR (18 May 2023 13:02)  Organism: Blood Culture PCR (18 May 2023 13:02)      Urinalysis Basic - ( 18 May 2023 00:55 )    Color: Yellow / Appearance: Slightly Turbid / S.013 / pH: x  Gluc: x / Ketone: Negative  / Bili: Negative / Urobili: Negative   Blood: x / Protein: 100 mg/dl / Nitrite: Negative   Leuk Esterase: Negative / RBC: 29 /hpf / WBC 3 /HPF   Sq Epi: x / Non Sq Epi: x / Bacteria: Moderate      --------------------------------------------------------------------------------------------------  MEDICATIONS  (STANDING):  chlorhexidine 4% Liquid 1 Application(s) Topical <User Schedule>  dexAMETHasone  Injectable 4 milliGRAM(s) IV Push two times a day  dextrose 5%. 1000 milliLiter(s) (100 mL/Hr) IV Continuous <Continuous>  dextrose 5%. 1000 milliLiter(s) (50 mL/Hr) IV Continuous <Continuous>  dextrose 50% Injectable 12.5 Gram(s) IV Push once  dextrose 50% Injectable 25 Gram(s) IV Push once  dextrose 50% Injectable 25 Gram(s) IV Push once  glucagon  Injectable 1 milliGRAM(s) IntraMuscular once  heparin   Injectable 5000 Unit(s) SubCutaneous every 8 hours  lactated ringers. 500 milliLiter(s) (500 mL/Hr) IV Continuous <Continuous>  levothyroxine Injectable 37.5 MICROGram(s) IV Push <User Schedule>  meropenem  IVPB 1000 milliGRAM(s) IV Intermittent every 12 hours  meropenem  IVPB      methadone Injectable 2.5 milliGRAM(s) IV Push at bedtime  metoclopramide Injectable 10 milliGRAM(s) IV Push every 8 hours  norepinephrine Infusion 0.05 MICROgram(s)/kG/Min (2.71 mL/Hr) IV Continuous <Continuous>  octreotide  Injectable 100 MICROGram(s) IV Push every 8 hours  pantoprazole  Injectable 40 milliGRAM(s) IV Push two times a day    MEDICATIONS  (PRN):  dextrose Oral Gel 15 Gram(s) Oral once PRN Blood Glucose LESS THAN 70 milliGRAM(s)/deciliter    --------------------------------------------------------------------------------------------------

## 2023-05-19 NOTE — PROGRESS NOTE ADULT - ASSESSMENT
78F with cervical ca s/p radiation, pelvic exenteration with ileal conduit and ostomy, and locally advanced rectal cancer found to have SBO, now resolved. Hospital course now complicated by septic shock, admitted to MICU for hemodynamic monitoring.    Recommendations:  - No perforation seen on CTAP.   - No acute surgical intervention.  - Appreciated care per MICU and primary team.    ABA Pina, PGY-3  [facility]   Green Team Surgery   [id number]

## 2023-05-19 NOTE — CONSULT NOTE ADULT - SUBJECTIVE AND OBJECTIVE BOX
COLORECTAL SURGERY CONSULT  78F with cervical ca s/p radiation, pelvic exenteration with ileal conduit and ostomy, and locally advanced rectal cancer presents with nausea. Pt's last chemo session was 3 days ago. Pt reports no gas or stool in ostomy bag over the last day. Pt denies abd pain. Pt denies vomiting, CP, SOB, distension.  Pt was previously presented at the tumor board in 2021 and deemed a poor operative candidate.      PAST MEDICAL & SURGICAL HISTORY:  HTN (hypertension)      HLD (hyperlipidemia)      Hypothyroid      Rectal cancer      H/O total hysterectomy with bilateral salpingo-oophorectomy (BSO)      Ileostomy present          MEDICATIONS  (STANDING):    MEDICATIONS  (PRN):      Allergies    penicillin G benzathine (Rash; Anaphylaxis; Hives; Short breath)  Seafood (Hives)    Intolerances        SOCIAL HISTORY:    FAMILY HISTORY:  No pertinent family history in first degree relatives    Physical Exam:  General: NAD, resting comfortably, thin  HEENT: NC/AT, EOMI, normal hearing  Pulmonary: normal resp effort, CTA-B  Abdominal: soft, nontender, nondistended, ileal conduit, ostomy pink and viable, min stool, no gas in bag  Neuro: A/O x 3, CNs II-XII grossly intact, normal sensation      Vital Signs Last 24 Hrs  T(C): 36.4 (13 May 2023 06:15), Max: 36.9 (12 May 2023 18:56)  T(F): 97.6 (13 May 2023 06:15), Max: 98.5 (12 May 2023 18:56)  HR: 79 (13 May 2023 06:15) (79 - 101)  BP: 108/71 (13 May 2023 06:15) (93/63 - 114/72)  BP(mean): 71 (13 May 2023 05:30) (68 - 71)  RR: 18 (13 May 2023 06:15) (18 - 20)  SpO2: 99% (13 May 2023 06:15) (95% - 100%)    Parameters below as of 13 May 2023 06:15  Patient On (Oxygen Delivery Method): room air        I&O's Summary          LABS:                        9.5    3.73  )-----------( 411      ( 12 May 2023 21:11 )             32.7     05-12    140  |  93<L>  |  15  ----------------------------<  87  3.4<L>   |  32<H>  |  0.88    Ca    9.4      12 May 2023 21:11    TPro  6.8  /  Alb  3.7  /  TBili  0.5  /  DBili  x   /  AST  11  /  ALT  7<L>  /  AlkPhos  56  05-12    PT/INR - ( 13 May 2023 00:20 )   PT: 16.2 sec;   INR: 1.40 ratio         PTT - ( 13 May 2023 00:20 )  PTT:25.3 sec    CAPILLARY BLOOD GLUCOSE        LIVER FUNCTIONS - ( 12 May 2023 21:11 )  Alb: 3.7 g/dL / Pro: 6.8 g/dL / ALK PHOS: 56 U/L / ALT: 7 U/L / AST: 11 U/L / GGT: x           RADIOLOGY & ADDITIONAL STUDIES:  < from: CT Abdomen and Pelvis w/ Oral Cont and w/ IV Cont (05.12.23 @ 23:10) >  ACC: [id number] EXAM:  CT ABDOMEN AND PELVIS OC IC   ORDERED BY:  TAYLOR DUDLEY     PROCEDURE DATE:  05/12/2023      < end of copied text >  < from: CT Abdomen and Pelvis w/ Oral Cont and w/ IV Cont (05.12.23 @ 23:10) >  IMPRESSION:  High-grade small bowel obstruction with transition point in the left   pelvis.    Additional dilated loops of small bowel in the lower abdomen   demonstrating tethering of both ends in the left pelvis, stable in   appearance compared to multiple prior CTs, likely chronic ileus or   partial obstruction.    Rectal mass decrease in size from prior.    These findings were discussed with, and acknowledged by, Dr. Garcia at    5/13/2023 12:02 AM  by Dr. Lorin Lara.    < end of copied text >  
HPI:  Patient is a 78y Female with rectal cancer history of [place] pouch admitted for SBO, c/o abdominal distension and nurses unable to drain with straight catheter.  Bladder scan 1300cc.   Patient usually regularly self caths the pouch about 3-4x per day however the last time it was drained was Monday by nurse.  Patient to weak to do herself.        PAST MEDICAL & SURGICAL HISTORY:  HTN (hypertension)      HLD (hyperlipidemia)      Hypothyroid      Rectal cancer      H/O total hysterectomy with bilateral salpingo-oophorectomy (BSO)      Ileostomy present        MEDICATIONS  (STANDING):  enoxaparin Injectable 40 milliGRAM(s) SubCutaneous every 24 hours  lactated ringers. 1000 milliLiter(s) (100 mL/Hr) IV Continuous <Continuous>  levothyroxine Injectable 37.5 MICROGram(s) IV Push <User Schedule>  methadone    Tablet 5 milliGRAM(s) Oral at bedtime    MEDICATIONS  (PRN):  melatonin 3 milliGRAM(s) Oral at bedtime PRN Insomnia    FAMILY HISTORY:  No pertinent family history in first degree relatives      Allergies    penicillin G benzathine (Rash; Anaphylaxis; Hives; Short breath)  Seafood (Hives)    Intolerances      SOCIAL HISTORY:   Tobacco hx:    REVIEW OF SYSTEMS: Pertinent positives and negatives as stated in HPI, otherwise negative    Vital signs  T(C): 36.4, Max: 36.7 (05-17 @ 11:46)  HR: 76  BP: 115/76  SpO2: 96%    Physical Exam  Gen: NAD  Pulm: No respiratory distress, no subcostal retractions  CV: RRR, no JVD  Abd: Soft, distended, + colostomy   : Unable to pass 14 or 16 F catheters into [place] pouch;  Dr. Brian Montes aspirated 700cc of yellow urine from pouch; then under sterile technique placed 14F coude catheter, now draining yellow urine       LABS:          05-17 @ 07:10    WBC --    / Hct --    / SCr 0.70     05-16 @ 07:20    WBC --    / Hct --    / SCr 0.78     05-17    140  |  100  |  20  ----------------------------<  107<H>  3.6   |  27  |  0.70    Ca    8.7      17 May 2023 07:10  Phos  1.6     05-17  Mg     2.3     05-16            Urine Cx:       Blood Cx:        RADIOLOGY:    
Date of Service: 23 @ 14:02    HPI:  Patient is an 79 yo F, PMH of recurrent cervical cancer s/p pelvic exenteration with ostomy and ileal conduit, currently on treatment for locally advanced rectal cancer (irinotecan q2 wks), hypothyroidism, admitted 23 with high grade malignant SBO which was managed non-operatively. Patient was admitted to MICU on 23 for hypotension requiring pressors, concern for septic shock. Repeat CT demonstrates concern for partial SBO. Palliative consulted for GOC   PERTINENT PM/SXH:   HTN (hypertension)    HLD (hyperlipidemia)    Hypothyroid    Rectal cancer      H/O total hysterectomy with bilateral salpingo-oophorectomy (BSO)    Ileostomy present      FAMILY HISTORY:  No pertinent family history in first degree relatives        ITEMS NOT CHECKED ARE NOT PRESENT    SOCIAL HISTORY:   Significant other/partner[x ] Mk Children[x ]  2 sons Synagogue/Spirituality: Ep[employer]alian  Substance hx:  [ ]   Tobacco hx:  [ ]   Alcohol hx: [ ]   Home Opioid hx:  [x ] I-Stop Reference No: #: [id number]  Living Situation: [x ]Home  [ ]Long term care  [ ]Rehab [ ]Other    ADVANCE DIRECTIVES:    DNR/MOLST  [x ]  Living Will  [ ]   DECISION MAKER(s):  [ ] Health Care Proxy(s)  [x ] Surrogate(s)  [ ] Guardian           Name(s): Phone Number(s): Tyree Irby [phone]    BASELINE (I)ADL(s) (prior to admission):  [place]: [ ]Total  [x ] Moderate [ ]Dependent    Allergies    penicillin G benzathine (Rash; Anaphylaxis; Hives; Short breath)  Seafood (Hives)    Intolerances    MEDICATIONS  (STANDING):  chlorhexidine 4% Liquid 1 Application(s) Topical <User Schedule>  dexAMETHasone  Injectable 4 milliGRAM(s) IV Push two times a day  dextrose 5% + lactated ringers. 1000 milliLiter(s) (100 mL/Hr) IV Continuous <Continuous>  dextrose 5%. 1000 milliLiter(s) (100 mL/Hr) IV Continuous <Continuous>  dextrose 5%. 1000 milliLiter(s) (50 mL/Hr) IV Continuous <Continuous>  dextrose 50% Injectable 12.5 Gram(s) IV Push once  dextrose 50% Injectable 25 Gram(s) IV Push once  dextrose 50% Injectable 25 Gram(s) IV Push once  glucagon  Injectable 1 milliGRAM(s) IntraMuscular once  heparin   Injectable 5000 Unit(s) SubCutaneous every 8 hours  levothyroxine Injectable 37.5 MICROGram(s) IV Push <User Schedule>  meropenem  IVPB      meropenem  IVPB 1000 milliGRAM(s) IV Intermittent every 12 hours  methadone Injectable 2.5 milliGRAM(s) IV Push at bedtime  metoclopramide Injectable 10 milliGRAM(s) IV Push every 8 hours  norepinephrine Infusion 0.05 MICROgram(s)/kG/Min (2.71 mL/Hr) IV Continuous <Continuous>  octreotide  Injectable 100 MICROGram(s) IV Push every 8 hours  vasopressin Infusion 0.04 Unit(s)/Min (6 mL/Hr) IV Continuous <Continuous>    MEDICATIONS  (PRN):  dextrose Oral Gel 15 Gram(s) Oral once PRN Blood Glucose LESS THAN 70 milliGRAM(s)/deciliter  LORazepam   Injectable 0.5 milliGRAM(s) IV Push every 2 hours PRN Anxiety  morphine  - Injectable 2 milliGRAM(s) IV Push every 2 hours PRN dyspnea  morphine  - Injectable 2 milliGRAM(s) IV Push every 2 hours PRN Severe Pain (7 - 10)  morphine  - Injectable 1 milliGRAM(s) IV Push every 2 hours PRN Mild Pain (1 - 3)    PRESENT SYMPTOMS: [ ]Unable to self-report see CPOT, PAINADs, RDOS  Source if other than patient:  [ ]Family   [ ]Team     Pain: [ ]yes [x ]no  QOL impact -   Location -                    Aggravating factors -  Quality -  Radiation -  Timing-  Severity (0-10 scale):  Minimal acceptable level (0-10 scale):       Dyspnea:                           [ ]Mild [ ]Moderate [ ]Severe  Anxiety:                             [ x]Mild [ ]Moderate [ ]Severe  Fatigue:                             [x ]Mild [ ]Moderate [ ]Severe  Nausea:                             [ ]Mild [ ]Moderate [ ]Severe  Loss of appetite:              [ ]Mild [ ]Moderate [ ]Severe  Constipation:                    [ ]Mild [ ]Moderate [ ]Severe    PCSSQ [Palliative Care Spiritual Screening Question]   Severity (0-10):  Score of 4 or > indicate consideration of Chaplaincy referral.  Chaplaincy Referral: [x yes [ ] refused [ x] following    Caregiver [place]? : [ x] yes [ ] no [ ] deferred:  Social work referral [x ] Patient & Family Centered Care Referral [ ]     Anticipatory Grief Present?: [ x] yes [ ] no  [ ] deferred: Palliative Social work referral [ ]  Patient & Family Centered Care Referral [ ]       Other Symptoms:  [ x]All other review of systems negative   [ ] Unable to obtain due to poor mentation    PHYSICAL EXAM:  Vital Signs Last 24 Hrs  T(C): 36.3 (19 May 2023 12:00), Max: 36.8 (19 May 2023 04:00)  T(F): 97.4 (19 May 2023 12:00), Max: 98.2 (19 May 2023 04:00)  HR: 79 (19 May 2023 12:00) (79 - 116)  BP: 125/59 (19 May 2023 11:45) (79/44 - 149/66)  BP(mean): 85 (19 May 2023 11:45) (58 - 95)  RR: 20 (19 May 2023 12:00) (7 - 41)  SpO2: 97% (19 May 2023 12:00) (91% - 98%)    Parameters below as of 18 May 2023 20:00  Patient On (Oxygen Delivery Method): nasal cannula  O2 Flow (L/min): 3   I&O's Summary    18 May 2023 07:01  -  19 May 2023 07:00  --------------------------------------------------------  IN: 6173.5 mL / OUT: 2325 mL / NET: 3848.5 mL    19 May 2023 07:01  -  19 May 2023 14:02  --------------------------------------------------------  IN: 524 mL / OUT: 215 mL / NET: 309 mL        GENERAL:  [x]Alert  [x]Oriented x 3  [ ]Lethargic  [ ]Cachexia  [ ]Unarousable  [x]Verbal  [ ]Non-Verbal  Behavioral:   [ ]Anxiety  [ ]Delirium [ ]Agitation [ ]Other  HEENT:  [x]Normal   [ x]Dry mouth   [ ]ET Tube/Trach  [ ]Oral lesions  PULMONARY:   [x]Clear [ ]Tachypnea  [ ]Audible excessive secretions   [ ]Rhonchi        [ ]Right [ ]Left [ ]Bilateral  [ ]Crackles        [ ]Right [ ]Left [ ]Bilateral  [ ]Wheezing     [ ]Right [ ]Left [ ]Bilateral  [ ]Diminished BS [ ] Right [ ]Left [ ]Bilateral  CARDIOVASCULAR:    [x]Regular [ ]Irregular [ ]Tachy  [ ]Kt [ ]Murmur [ ]Other  GASTROINTESTINAL:  [ ]Soft  [x ]Distended   [x]+BS  [ ]Non tender [x ]Tender  [ ]PEG [ x]OGT/ NGT   Last BM:  Ileostomy   GENITOURINARY:  [ ]Normal [ ]Incontinent   [ ]Oliguria/Anuria   [ ]Vergara Ileoconduit  MUSCULOSKELETAL:   [ ]Normal   [x]Weakness  [ x]Bed/Wheelchair bound [ ]Edema  NEUROLOGIC:   [x]No focal deficits  [ ] Cognitive impairment  [ ] Dysphagia [ ]Dysarthria [ ] Paresis [ ]Other   SKIN:   [x]Normal  [ ]Rash   [ ]Pressure ulcer(s) [ ]y [ ]n present on admission    CRITICAL CARE:  [x ] Shock Present  [x ]Septic [ ]Cardiogenic [ ]Neurologic [ ]Hypovolemic  [x ]  Vasopressors [ ]  Inotropes   [ ]Respiratory failure present [ ]Mechanical ventilation [ ]Non-invasive ventilatory support [ ]High flow    [ ]Acute  [ ]Chronic [ ]Hypoxic  [ ]Hypercarbic [ ]Other  [ ]Other organ failure     LABS:                        8.4    31.34 )-----------( 118      ( 19 May 2023 00:21 )             28.1       142  |  108  |  24<H>  ----------------------------<  140<H>  3.8   |  21<L>  |  1.27    Ca    7.4<L>      19 May 2023 00:21  Phos  4.0       Mg     2.3         TPro  4.1<L>  /  Alb  2.3<L>  /  TBili  0.5  /  DBili  x   /  AST  66<H>  /  ALT  39  /  AlkPhos  76    PT/INR - ( 19 May 2023 00:21 )   PT: 20.4 sec;   INR: 1.76 ratio         PTT - ( 19 May 2023 00:21 )  PTT:83.7 sec    Urinalysis Basic - ( 18 May 2023 00:55 )    Color: Yellow / Appearance: Slightly Turbid / S.013 / pH: x  Gluc: x / Ketone: Negative  / Bili: Negative / Urobili: Negative   Blood: x / Protein: 100 mg/dl / Nitrite: Negative   Leuk Esterase: Negative / RBC: 29 /hpf / WBC 3 /HPF   Sq Epi: x / Non Sq Epi: x / Bacteria: Moderate      RADIOLOGY & ADDITIONAL STUDIES:  < from: Xray Chest 1 View- PORTABLE-Urgent (Xray Chest 1 View- PORTABLE-Urgent .) (23 @ 18:16) >    ACC: [id number] EXAM:  XR CHEST PORTABLE URGENT 1V   ORDERED BY: MAHAMED SMITH     PROCEDURE DATE:  2023          INTERPRETATION:  CLINICAL INDICATION:  NGT Placement    COMPARISON: Chest radiograph dated 2023    TECHNIQUE: Frontal radiograph of the chest.    FINDINGS:    Support devices: Enteric tube terminates in the stomach. Right-sided port   terminates at the SVC/RA junction.    Cardiac/mediastinum/hilum: Heart size is within normal limits.    Lung parenchyma/Pleura: Small right and trace left pleural effusions.   Hazy opacification of the right lower lung. There is no pneumothorax.    Skeleton/soft tissues: No acute osseous abnormalities. Excreted contrast   within the bilateral collecting systems, demonstrating bilateral  hydronephrosis.    IMPRESSION:    Enteric tube terminates in the stomach.    Small bilateral pleural effusions with adjacent lower lung atelectasis.    --- End of Report ---           SAMUEL CARLSON MD; Resident Radiologist  This document has been electronically signed.  VONDA CASTLE MD; Attending Radiologist  This document has been electronically signed. May 19 2023  9:25AM    < end of copied text >    PROTEIN CALORIE MALNUTRITION PRESENT: [ ]mild [ ]moderate [ ]severe [ ]underweight [ ]morbid obesity  https://www.andeal.org/vault/2440/web/files/ONC/Table_Clinical%20Characteristics%20to%20Document%20Malnutrition-White%20JV%20et%20al%2020.pdf    Height (cm): 154.9 (23 @ 18:49), 159 (23 @ 09:17), 156 (23 @ 14:22)  Weight (kg): 57.7 (23 @ 05:00), 53.2 (23 @ 09:13), 62.6 (23 @ 02:34)  BMI (kg/m2): 24 (23 @ 05:00), 22.2 (23 @ 18:49), 21 (23 @ 09:13)    [x ]PPSV2 < or = to 30% [ ]significant weight loss  [ ]poor nutritional intake  [ ]anasarca[ ]Artificial Nutrition      Other REFERRALS:  [ ]Hospice  [ ]Child Life  [ ]Social Work  [ ]Case management [ ]Holistic Therapy     Care Coordination Assessment 201 [C. Provider] (23 @ 11:51)      Palliative Performance Scale:  http://npcrc.org/files/news/palliative_performance_scale_ppsv2.pdf  (Ctrl +  left click to view)  Respiratory Distress Observation Tool:  https://homecareinformation.net/handouts/hen/Respiratory_Distress_Observation_Scale.pdf (Ctrl +  left click to view)  PAINAD Score:  http://geriatrictoolkit.[place].edu/cog/painad.pdf (Ctrl +  left click to view)

## 2023-05-19 NOTE — PROGRESS NOTE ADULT - SUBJECTIVE AND OBJECTIVE BOX
*******************************  Christi DUNLAP  *******************************    HPI:  Patient is an 77 yo F, PMH of recurrent cervical cancer s/p pelvic exenteration with ostomy and ileal conduit, currently on treatment for locally advanced rectal cancer (irinotecan q2 wks), hypothyroidism, admitted 23 with high grade malignant SBO which was managed non-operatively. Patient was admitted to MICU on 23 for hypotension requiring pressors, concern for septic shock.     INTERVAL HPI/OVERNIGHT EVENTS:  Patient has continued to require 1 of Levo. Blood cultures are growing Klebsiella, antibiotics were switched from  Cefepime and Flagyl to Meropenem.     SUBJECTIVE: Patient seen and examined at bedside.     CONSTITUTIONAL: No weakness, fevers or chills  EYES/ENT: No visual changes;  No vertigo or throat pain   NECK: No pain or stiffness  RESPIRATORY: No cough, wheezing, hemoptysis; No shortness of breath  CARDIOVASCULAR: No chest pain or palpitations  GASTROINTESTINAL: No abdominal or epigastric pain. No nausea, vomiting, or hematemesis; No diarrhea or constipation. No melena or hematochezia.  GENITOURINARY: No dysuria, frequency or hematuria  NEUROLOGICAL: No numbness or weakness  SKIN: No itching, rashes    OBJECTIVE:    VITAL SIGNS:  ICU Vital Signs Last 24 Hrs  T(C): 36.4 (19 May 2023 08:00), Max: 36.8 (19 May 2023 04:00)  T(F): 97.5 (19 May 2023 08:00), Max: 98.2 (19 May 2023 04:00)  HR: 89 (19 May 2023 10:30) (84 - 116)  BP: 109/55 (19 May 2023 10:30) (79/44 - 132/58)  BP(mean): 78 (19 May 2023 10:30) (58 - 90)  ABP: --  ABP(mean): --  RR: 17 (19 May 2023 10:30) (0 - 41)  SpO2: 97% (19 May 2023 10:30) (91% - 98%)    O2 Parameters below as of 18 May 2023 20:00  Patient On (Oxygen Delivery Method): nasal cannula  O2 Flow (L/min): 3             @ 07: @ 07:00  --------------------------------------------------------  IN: 6173.5 mL / OUT: 2325 mL / NET: 3848.5 mL     @ 07: @ 11:21  --------------------------------------------------------  IN: 254 mL / OUT: 135 mL / NET: 119 mL      CAPILLARY BLOOD GLUCOSE      POCT Blood Glucose.: 190 mg/dL (19 May 2023 06:00)        PHYSICAL EXAM:  GENERAL: NAD, well-developed  HEAD:  Atraumatic, Normocephalic  EYES: EOMI, PERRLA, conjunctiva and sclera clear  NECK: Supple, No JVD  CHEST/LUNG: Clear to auscultation bilaterally; No wheeze  HEART: Regular rate and rhythm; No murmurs, rubs, or gallops  ABDOMEN: Soft, Nontender, Nondistended; Bowel sounds present  EXTREMITIES:  2+ Peripheral Pulses, No clubbing, cyanosis, or edema  PSYCH: AAOx3  NEUROLOGY: non-focal  SKIN: No rashes or lesions  Lines:      MEDICATIONS:  MEDICATIONS  (STANDING):  chlorhexidine 4% Liquid 1 Application(s) Topical <User Schedule>  dexAMETHasone  Injectable 4 milliGRAM(s) IV Push two times a day  dextrose 5% + lactated ringers. 1000 milliLiter(s) (100 mL/Hr) IV Continuous <Continuous>  dextrose 5%. 1000 milliLiter(s) (100 mL/Hr) IV Continuous <Continuous>  dextrose 5%. 1000 milliLiter(s) (50 mL/Hr) IV Continuous <Continuous>  dextrose 50% Injectable 12.5 Gram(s) IV Push once  dextrose 50% Injectable 25 Gram(s) IV Push once  dextrose 50% Injectable 25 Gram(s) IV Push once  glucagon  Injectable 1 milliGRAM(s) IntraMuscular once  heparin   Injectable 5000 Unit(s) SubCutaneous every 8 hours  levothyroxine Injectable 37.5 MICROGram(s) IV Push <User Schedule>  meropenem  IVPB      meropenem  IVPB 1000 milliGRAM(s) IV Intermittent every 12 hours  methadone Injectable 2.5 milliGRAM(s) IV Push at bedtime  metoclopramide Injectable 10 milliGRAM(s) IV Push every 8 hours  norepinephrine Infusion 0.05 MICROgram(s)/kG/Min (2.71 mL/Hr) IV Continuous <Continuous>  octreotide  Injectable 100 MICROGram(s) IV Push every 8 hours  vasopressin Infusion 0.04 Unit(s)/Min (6 mL/Hr) IV Continuous <Continuous>    MEDICATIONS  (PRN):  dextrose Oral Gel 15 Gram(s) Oral once PRN Blood Glucose LESS THAN 70 milliGRAM(s)/deciliter      ALLERGIES:  Allergies    penicillin G benzathine (Rash; Anaphylaxis; Hives; Short breath)  Seafood (Hives)    Intolerances        LABS:                        8.4    31.34 )-----------( 118      ( 19 May 2023 00:21 )             28.1     05-    142  |  108  |  24<H>  ----------------------------<  140<H>  3.8   |  21<L>  |  1.27    Ca    7.4<L>      19 May 2023 00:21  Phos  4.0       Mg     2.3         TPro  4.1<L>  /  Alb  2.3<L>  /  TBili  0.5  /  DBili  x   /  AST  66<H>  /  ALT  39  /  AlkPhos  76  05-19    PT/INR - ( 19 May 2023 00:21 )   PT: 20.4 sec;   INR: 1.76 ratio         PTT - ( 19 May 2023 00:21 )  PTT:83.7 sec  Urinalysis Basic - ( 18 May 2023 00:55 )    Color: Yellow / Appearance: Slightly Turbid / S.013 / pH: x  Gluc: x / Ketone: Negative  / Bili: Negative / Urobili: Negative   Blood: x / Protein: 100 mg/dl / Nitrite: Negative   Leuk Esterase: Negative / RBC: 29 /hpf / WBC 3 /HPF   Sq Epi: x / Non Sq Epi: x / Bacteria: Moderate        RADIOLOGY & ADDITIONAL TESTS: Reviewed.     *******************************  Christi DUNLAP  *******************************    HPI:  Patient is an 77 yo F, PMH of recurrent cervical cancer s/p pelvic exenteration with ostomy and ileal conduit, currently on treatment for locally advanced rectal cancer (irinotecan q2 wks), hypothyroidism, admitted 23 with high grade malignant SBO which was managed non-operatively. Patient was admitted to MICU on 23 for hypotension requiring pressors, concern for septic shock.     INTERVAL HPI/OVERNIGHT EVENTS:  Patient has continued to require 1 of Levo. Blood cultures are growing Klebsiella, antibiotics were switched from  Cefepime and Flagyl to Meropenem.     SUBJECTIVE: Patient seen and examined at bedside.     CONSTITUTIONAL: No weakness, fevers or chills  EYES/ENT: No visual changes;  No vertigo or throat pain   NECK: No pain or stiffness  RESPIRATORY: No cough, wheezing, hemoptysis; No shortness of breath  CARDIOVASCULAR: No chest pain or palpitations  GASTROINTESTINAL: + Abdominal pain. No nausea, vomiting, or hematemesis; No diarrhea or constipation. No melena or hematochezia.  GENITOURINARY: No dysuria, frequency or hematuria  NEUROLOGICAL: No numbness or weakness  SKIN: No itching, rashes    OBJECTIVE:    VITAL SIGNS:  ICU Vital Signs Last 24 Hrs  T(C): 36.4 (19 May 2023 08:00), Max: 36.8 (19 May 2023 04:00)  T(F): 97.5 (19 May 2023 08:00), Max: 98.2 (19 May 2023 04:00)  HR: 89 (19 May 2023 10:30) (84 - 116)  BP: 109/55 (19 May 2023 10:30) (79/44 - 132/58)  BP(mean): 78 (19 May 2023 10:30) (58 - 90)  ABP: --  ABP(mean): --  RR: 17 (19 May 2023 10:30) (0 - 41)  SpO2: 97% (19 May 2023 10:30) (91% - 98%)    O2 Parameters below as of 18 May 2023 20:00  Patient On (Oxygen Delivery Method): nasal cannula  O2 Flow (L/min): 3             @ 07: @ 07:00  --------------------------------------------------------  IN: 6173.5 mL / OUT: 2325 mL / NET: 3848.5 mL     @ 07: @ 11:21  --------------------------------------------------------  IN: 254 mL / OUT: 135 mL / NET: 119 mL      CAPILLARY BLOOD GLUCOSE      POCT Blood Glucose.: 190 mg/dL (19 May 2023 06:00)        PHYSICAL EXAM:  GENERAL: NAD, laying in bed comfortably during the exam  HEAD:  Atraumatic, Normocephalic  EYES: EOMI, PERRLA, conjunctiva and sclera clear  NECK: Supple, No JVD  CHEST/LUNG: Clear to auscultation bilaterally; No wheeze  HEART: Regular rate and rhythm; No murmurs, rubs, or gallops  ABDOMEN: Soft, Nontender, Nondistended; Bowel sounds hypoactive  EXTREMITIES:  2+ Peripheral Pulses, No clubbing, cyanosis, or edema  PSYCH: AAOx3  NEUROLOGY: non-focal  SKIN: No rashes or lesions  Lines:      MEDICATIONS:  MEDICATIONS  (STANDING):  chlorhexidine 4% Liquid 1 Application(s) Topical <User Schedule>  dexAMETHasone  Injectable 4 milliGRAM(s) IV Push two times a day  dextrose 5% + lactated ringers. 1000 milliLiter(s) (100 mL/Hr) IV Continuous <Continuous>  dextrose 5%. 1000 milliLiter(s) (100 mL/Hr) IV Continuous <Continuous>  dextrose 5%. 1000 milliLiter(s) (50 mL/Hr) IV Continuous <Continuous>  dextrose 50% Injectable 12.5 Gram(s) IV Push once  dextrose 50% Injectable 25 Gram(s) IV Push once  dextrose 50% Injectable 25 Gram(s) IV Push once  glucagon  Injectable 1 milliGRAM(s) IntraMuscular once  heparin   Injectable 5000 Unit(s) SubCutaneous every 8 hours  levothyroxine Injectable 37.5 MICROGram(s) IV Push <User Schedule>  meropenem  IVPB      meropenem  IVPB 1000 milliGRAM(s) IV Intermittent every 12 hours  methadone Injectable 2.5 milliGRAM(s) IV Push at bedtime  metoclopramide Injectable 10 milliGRAM(s) IV Push every 8 hours  norepinephrine Infusion 0.05 MICROgram(s)/kG/Min (2.71 mL/Hr) IV Continuous <Continuous>  octreotide  Injectable 100 MICROGram(s) IV Push every 8 hours  vasopressin Infusion 0.04 Unit(s)/Min (6 mL/Hr) IV Continuous <Continuous>    MEDICATIONS  (PRN):  dextrose Oral Gel 15 Gram(s) Oral once PRN Blood Glucose LESS THAN 70 milliGRAM(s)/deciliter      ALLERGIES:  Allergies    penicillin G benzathine (Rash; Anaphylaxis; Hives; Short breath)  Seafood (Hives)    Intolerances        LABS:                        8.4    31.34 )-----------( 118      ( 19 May 2023 00:21 )             28.1     05-    142  |  108  |  24<H>  ----------------------------<  140<H>  3.8   |  21<L>  |  1.27    Ca    7.4<L>      19 May 2023 00:21  Phos  4.0       Mg     2.3         TPro  4.1<L>  /  Alb  2.3<L>  /  TBili  0.5  /  DBili  x   /  AST  66<H>  /  ALT  39  /  AlkPhos  76  -19    PT/INR - ( 19 May 2023 00:21 )   PT: 20.4 sec;   INR: 1.76 ratio         PTT - ( 19 May 2023 00:21 )  PTT:83.7 sec  Urinalysis Basic - ( 18 May 2023 00:55 )    Color: Yellow / Appearance: Slightly Turbid / S.013 / pH: x  Gluc: x / Ketone: Negative  / Bili: Negative / Urobili: Negative   Blood: x / Protein: 100 mg/dl / Nitrite: Negative   Leuk Esterase: Negative / RBC: 29 /hpf / WBC 3 /HPF   Sq Epi: x / Non Sq Epi: x / Bacteria: Moderate        RADIOLOGY & ADDITIONAL TESTS: Reviewed.

## 2023-05-19 NOTE — CONSULT NOTE ADULT - ASSESSMENT
Patient is an 79 yo F, PMH of recurrent cervical cancer s/p pelvic exenteration with ostomy and ileal conduit, currently on treatment for locally advanced rectal cancer (irinotecan q2 wks), hypothyroidism, admitted 5/13/23 with high grade malignant SBO which was managed non-operatively. Patient was admitted to MICU on 5/18/23 for hypotension requiring pressors, concern for septic shock. Repeat CT demonstrates concern for partial SBO. Palliative consulted for GOC

## 2023-05-19 NOTE — CONSULT NOTE ADULT - CONVERSATION DETAILS
met with patient and her  at bedside [facility] Introduced myself and role of palliative care. Family presented medical update and plans for next steps. Tyree shared that his wife's BP has always been low and he does not understand why everyone is concerned. Provided education on vasopressors, explained his wife is on high dose medications. Shared concern regarding partial obstruction and cancer progression without being able to tolerate treatment due to deconditioned state secondary to the hospitalization. Took this moment to ask Abi since she is able to participate in the conversation how far she wants the medical team to go in order to extend her quantity of time. At this time her son arrived and joined the conversation, quick update provided. At this time Abi shared that she would like to focus on being comfortable, and to be DNR/I. She would like a iced drink and to focus on her comfort. At this time family was appropriately emotional. She continued to explain that this has been exhausting and painful on her body and she feels at peace with the decision to stop continuing medical management and focusing on her symptoms. Her priorities are having something to drink and having time with her family. Emotional support provided. Discussed what she would like the next best steps to be where she shared she would like to await the arrival of her other son and then turn off the vasopressors and allow for a natural death. At this time family in agreement and preparing to transition to a symptom directed approach when family arrives. Discussed potential transfer to PCU if VSS for continued management and symptom directed approach.

## 2023-05-19 NOTE — CHART NOTE - NSCHARTNOTEFT_GEN_A_CORE
: Tanya Castillo    INDICATION: hypotension    PROCEDURE:  [x] LIMITED ECHO  [x ] LIMITED CHEST  [ ] LIMITED RETROPERITONEAL  [x ] LIMITED ABDOMINAL  [ ] LIMITED DVT  [ ] NEEDLE GUIDANCE VASCULAR  [ ] NEEDLE GUIDANCE THORACENTESIS  [ ] NEEDLE GUIDANCE PARACENTESIS  [ ] NEEDLE GUIDANCE PERICARDIOCENTESIS  [ ] OTHER    FINDINGS:  Lungs: A line predominance. Lung sliding b/l. Mod L sided pleural effusion. Mod R sided pleural effusion w/ consolidated lung pattern.  Heart: Normal LVSF. LV > RV. No pericardial effusion. IVC indeterminant, 1.6cm.  Abd: Trace ascites     INTERPRETATION:  Bilateral pleural effusions.  No cardiac limitations.    Images uploaded on [employer] : Tanya Castillo    INDICATION: hypotension    PROCEDURE:  [x] LIMITED ECHO  [x ] LIMITED CHEST  [ ] LIMITED RETROPERITONEAL  [x ] LIMITED ABDOMINAL  [ ] LIMITED DVT  [ ] NEEDLE GUIDANCE VASCULAR  [ ] NEEDLE GUIDANCE THORACENTESIS  [ ] NEEDLE GUIDANCE PARACENTESIS  [ ] NEEDLE GUIDANCE PERICARDIOCENTESIS  [ ] OTHER    FINDINGS:  Lungs: A line predominance. Lung sliding b/l. Mod L sided pleural effusion. Mod R sided pleural effusion w/ consolidated lung pattern.  Heart: Normal LVSF. LV > RV. No pericardial effusion. IVC indeterminant, 1.6cm.  Abd: Trace ascites     INTERPRETATION:  Bilateral pleural effusions.  No cardiac limitations.    Images uploaded on [employer]    Attending Note: Agree with above. I was present through out the scan.

## 2023-05-20 NOTE — PROGRESS NOTE ADULT - SUBJECTIVE AND OBJECTIVE BOX
GAP TEAM PALLIATIVE CARE UNIT PROGRESS NOTE:      [  ] Patient on hospice program.    INDICATION FOR PALLIATIVE CARE UNIT SERVICES/Interval HPI: Patient transferred to PCU overnight for symptom directed care in setting of  rectal cancer, malignant SBO and septic shock. Patient is in the active stages of dying, family aware and at bedside.     OVERNIGHT EVENTS: Patient has used x3 doses of IV morphine for moderate pain, x3 doses of morphine for severe pain, and x1 dose of IV ativan for agitation.     DNR on chart: Yes  Yes      Allergies    penicillin G benzathine (Rash; Anaphylaxis; Hives; Short breath)  Seafood (Hives)    Intolerances    MEDICATIONS  (STANDING):  methadone Injectable 2.5 milliGRAM(s) IV Push at bedtime  morphine  - Injectable 2 milliGRAM(s) IV Push every 6 hours    MEDICATIONS  (PRN):  bisacodyl Suppository 10 milliGRAM(s) Rectal at bedtime PRN Constipation  glycopyrrolate Injectable 0.4 milliGRAM(s) IV Push every 6 hours PRN secretions  LORazepam   Injectable 0.5 milliGRAM(s) IV Push every 1 hour PRN Anxiety  morphine  - Injectable 2 milliGRAM(s) IV Push every 1 hour PRN Severe Pain (7 - 10)  morphine  - Injectable 1 milliGRAM(s) IV Push every 1 hour PRN Mild Pain (1 - 3)  morphine  - Injectable 2 milliGRAM(s) IV Push every 1 hour PRN dyspnea    ITEMS UNCHECKED ARE NOT PRESENT    PRESENT SYMPTOMS: [ x]Unable to self-report see PAINAD, RDOS below  Source if other than patient:  [ ]Family   [ ]Team     Pain: [ ] yes [ ] no  QOL impact -   Location -                    Aggravating factors -  Quality -  Radiation -  Timing-  Severity (0-10 scale):  Minimal acceptable level (0-10 scale):     Dyspnea:                           [ ]Mild [ ]Moderate [ ]Severe  Anxiety:                             [ ]Mild [ ]Moderate [ ]Severe  Fatigue:                             [ ]Mild [ ]Moderate [ ]Severe  Nausea:                             [ ]Mild [ ]Moderate [ ]Severe  Loss of appetite:              [ ]Mild [ ]Moderate [ ]Severe  Constipation:                    [ ]Mild [ ]Moderate [ ]Severe    PCSSQ [Palliative Care Spiritual Screening Question]   Severity (0-10):  Score of 4 or > indicate consideration of Chaplaincy referral.  Chaplaincy Referral: [ ] yes [ ] refused [ ] following [x ] deferred    Caregiver [place]? : [x ] yes [ ] no [ ] deferred:  Social work referral [x ] Patient & Family Centered Care Referral [ ]     Anticipatory Grief present?:  [x ] yes [ ] no [ ] deferred:  Social work referral [ x] Patient & Family Centered Care Referral [ ]  	  Other Symptoms:  [ ]All other review of systems negative     PHYSICAL EXAM:   Vital Signs Last 24 Hrs  T(C): 36.4 (20 May 2023 09:17), Max: 36.4 (19 May 2023 22:00)  T(F): 97.5 (20 May 2023 09:17), Max: 97.5 (19 May 2023 22:00)  HR: 100 (20 May 2023 09:17) (80 - 112)  BP: 59/41 (20 May 2023 09:17) (53/32 - 108/57)  BP(mean): 43 (19 May 2023 20:00) (38 - 79)  RR: 18 (20 May 2023 09:17) (14 - 26)  SpO2: 80% (20 May 2023 09:17) (80% - 99%)    Parameters below as of 20 May 2023 09:17  Patient On (Oxygen Delivery Method): nasal cannula  O2 Flow (L/min): 2   I&O's Summary    19 May 2023 07:01  -  20 May 2023 07:00  --------------------------------------------------------  IN: 1023 mL / OUT: 345 mL / NET: 678 mL      GENERAL: [ ] Cachexia  [ ]Alert  [ ]Oriented x   [ ]Lethargic  [ x]Unarousable  [ ]Verbal  [ ]Non-Verbal  Behavioral:   [ ] Anxiety  [ ] Delirium [ ] Agitation [ ] Other  HEENT:  [ ]Normal   [x ]Dry mouth   [ ]ET Tube/Trach  [ ]Oral lesions  PULMONARY:   [ x]Clear [ ]Tachypnea  [ ]Audible excessive secretions   [ ]Rhonchi        [ ]Right [ ]Left [ ]Bilateral  [ ]Crackles        [ ]Right [ ]Left [ ]Bilateral  [ ]Wheezing     [ ]Right [ ]Left [ ]Bilateral  [ ]Diminished BS [ ]Right [ ]Left [ ]Bilateral    CARDIOVASCULAR:    [ ]Regular [ ]Irregular [ x]Tachy  [ ]Kt [ ]Murmur [ ]Other  GASTROINTESTINAL:  [x ]Soft  [ ]Distended   [ x]+BS  [ ]Non tender [ ]Tender  [ ]Other [ ]PEG [ ]OGT/ NGT   Last BM:  5/18  GENITOURINARY:  [ ]Normal [ x] Incontinent   [ ]Oliguria/Anuria   [ x]Vergara  MUSCULOSKELETAL:   [ ]Normal   [ ]Weakness  [x ]Bed/Wheelchair bound [ ]Edema  NEUROLOGIC: unarousable  [ ]No focal deficits  [ ] Cognitive impairment  [ ] Dysphagia [ ]Dysarthria [ ] Paresis [x ]Other   SKIN:   [x ]Normal  [ ]Rash  [ ]Other  [ ]Pressure ulcer(s)  [ ]y [ ]n  Present on admission      CRITICAL CARE:  [x ] Shock Present  [ ]Septic [ ]Cardiogenic [ ]Neurologic [ ]Hypovolemic  [x ]  Vasopressors (removed) [ ]  Inotropes   [ ] Respiratory failure present [ ] Mechanical Ventilation [ ] Non-invasive ventilatory support [ ] High-Flow  [ ] Acute  [ ] Chronic [ ] Hypoxic  [ ] Hypercarbic [ ] Other  [ ] Other organ failure     LABS:                        8.4    31.34 )-----------( 118      ( 19 May 2023 00:21 )             28.1   05-19    142  |  108  |  24<H>  ----------------------------<  140<H>  3.8   |  21<L>  |  1.27    Ca    7.4<L>      19 May 2023 00:21  Phos  4.0     05-19  Mg     2.3     05-19    TPro  4.1<L>  /  Alb  2.3<L>  /  TBili  0.5  /  DBili  x   /  AST  66<H>  /  ALT  39  /  AlkPhos  76  05-19  PT/INR - ( 19 May 2023 00:21 )   PT: 20.4 sec;   INR: 1.76 ratio         PTT - ( 19 May 2023 00:21 )  PTT:83.7 sec      RADIOLOGY & ADDITIONAL STUDIES:  < from: CT Abdomen and Pelvis w/ IV Cont (05.18.23 @ 12:58) >    ACC: [id number] EXAM:  CT ABDOMEN AND PELVIS IC   ORDERED BY: GRAYSON MENDOZA     PROCEDURE DATE:  05/18/2023          INTERPRETATION:  CLINICAL INFORMATION: Septic shock. Recurrent cervical   cancer status post pelvic exenteration with [place] pouch and colostomy   treated for locally advanced rectal cancer admitted with small bowel   obstruction    COMPARISON: CT abdomen pelvis 5/12/2023    CONTRAST/COMPLICATIONS:  IV Contrast: Omnipaque 350  90 cc administered   10 cc discarded  Oral Contrast: NONE  Complications: None reported at time of study completion    PROCEDURE:  CT of the Abdomen and Pelvis was performed.  Sagittal and coronal reformats were performed.    FINDINGS: Some images are degraded by artifacts from the patient's arms   within the scanning field of view.  Motion artifacts degrade some of the imaging.    LOWER CHEST: Central catheter terminates within the right atrium.  Bilateral lower lobe consolidation with low density material filling   bronchi suspicious for pneumonia or aspiration.  Small bilateral pleural effusions. Mild lower esophageal wall thickening.    LIVER: Periportal edema. Small volume perihepatic ascites  BILE DUCTS: No biliary dilation. Suggestion of bile duct wall thickening   at hepatic hilus throughout extrahepatic ducts.  GALLBLADDER: Decompressed. Mucosal hyperenhancement. There may be mild   wall thickening.  Trace fluid at gallbladder fossa likely represents extension of   perihepatic ascites.  SPLEEN: Within normal limits. Trace perisplenic fluid  PANCREAS: Atrophic. No duct dilation. There may be mild fluid and   inflammation surrounding pancreatic head (3, 53)  ADRENALS: Bilateral nodular thickening  KIDNEYS/URETERS: Multifocal bilateral cortical scarring. Mild to moderate   bilateral hydroureteronephrosis.  Urothelial thickening and enhancement involves both upper collecting   systems and ureters suggesting infectious versus inflammatory process.  Subcentimeter bilateral hypodensities too small for definitive   characterization. Additional small right upper and lower pole cysts as   well as small bilateral central sinus cysts    BLADDER: Pelvic exenteration with right lower quadrant [place] pouch   which contains Vergara catheter with inflated balloon. Air-fluid level   within the [place] pouch may be secondary to indwelling catheter versus   gas-forming infection.  REPRODUCTIVE ORGANS: Similar lower vaginal distention with air-fluid   level inseparable from the anorectal tumor    BOWEL: Wall thickening of angulated and narrowed lower pelvic small bowel   loops with surrounding inflammation probably treatment related change   versus enteritis.  Passage of oral contrast into distal small bowel to the enterocolonic   anastomosis located anteriorly within upper pelvis.  Persistent distention of mid small bowel measuring up to 4 cm within the   pelvis  Mild diffuse colonic wall thickening is suggested.  PERITONEUM: No free air. Presacral edema.  Trace volume abdominal and pelvic ascites. Small bowel mesenteric edema.  VESSELS: Atherosclerotic changes.  RETROPERITONEUM/LYMPH NODES: Pelvic dada dissection  ABDOMINAL WALL: Anasarca  BONES: No significant acute bony abnormality.    IMPRESSION:  Bilateral lower lobe consolidation with low density material filling   bronchi is suspicious for pneumonia versus aspiration. Small pleural   effusions    Bile duct wall thickening from the hepatic hilus through extrahepatic   ducts suggests inflammatory versus infectious cholangitis. There may be   mild fluid and inflammation surrounding pancreatic head which probably   represent extension of small bowel mesenteric edema versus focal acute   pancreatitis. Consider correlation with lipase.    Mild to moderate bilateral hydroureteronephrosis with urothelial   thickening of upper collecting systems and ureters suggests infectious   versus inflammatory pyelitis and ureteritis. Air-fluid level within the   [place] pouch may be secondary to indwelling Vergara catheter versus   gas-forming organism. Recommend urinalysis correlation    Persistent dilation of mid small bowel with distal passage of oral   contrast to the enterocolonic anastomosis suggests partial distal small   bowel obstruction. Likely treatment related changes involving pelvic   small bowel versus enteritis. Mild colonic wall thickening suggests   colitis    Similar lower vaginal distention with air-fluid level inseparable from   the anorectal tumor        --- End of Report ---            KWAME GALLARDO MD; Attending Radiologist  This document has been electronically signed. May 18 2023  2:27PM    < end of copied text >      PROTEIN CALORIE MALNUTRITION: [ ] mild [ ] moderate [ ] severe  [ ] underweight [ ] morbid obesity    https://www.andeal.org/vault/2440/web/files/ONC/Table_Clinical%20Characteristics%20to%20Document%20Malnutrition-White%20JV%20et%20al%202012.pdf    Height (cm): 154.9 (05-12-23 @ 18:49), 159 (04-19-23 @ 09:17), 156 (01-23-23 @ 14:22)  Weight (kg): 57.7 (05-18-23 @ 05:00), 53.2 (05-03-23 @ 09:13), 62.6 (01-24-23 @ 02:34)  BMI (kg/m2): 24 (05-18-23 @ 05:00), 22.2 (05-12-23 @ 18:49), 21 (05-03-23 @ 09:13)    [ x] PPSV2 < or = 30% [ ] significant weight loss [x ] poor nutritional intake [ ] anasarca   Artificial Nutrition [ ]     Other REFERRALS:    [ ] Hospice  [ ]Child Life  [x ]Social Work  [ ]Case management [ ]Holistic Therapy [ ] Physical Therapy [ ] Dietary     Progress Notes - Care Coordination [C. Provider] (05-19-23 @ 14:13)      Palliative Performance Scale:  http://npcrc.org/files/news/palliative_performance_scale_ppsv2.pdf  (Ctrl +  left click to view)  Respiratory Distress Observation Tool:  https://homecareinformation.net/handouts/hen/Respiratory_Distress_Observation_Scale.pdf (Ctrl +  left click to view)  PAINAD Score:  http://geriatrictoolkit.[place].[facility]/cog/painad.pdf (Ctrl +  left click to view)

## 2023-05-20 NOTE — PROGRESS NOTE ADULT - PROBLEM SELECTOR PLAN 1
Based on 24 hours usage, decision made to start ATC morphine  start Morphine 2mg q6h ATC  c/w Morphine 1mg q1h prn for moderate pain  c/w Morphine 2mg q1h prn for severe pain  bowel regimen on opiates  monitor PAINAD score

## 2023-05-20 NOTE — PROGRESS NOTE ADULT - PROBLEM SELECTOR PLAN 5
DNR/DNI  in PCU for symptom management  family at bedside and aware of patient decline, with life expectancy hours to days

## 2023-05-21 NOTE — PROGRESS NOTE ADULT - PROBLEM SELECTOR PLAN 5
continue care in PCU, plan discussed with IDT.  spoke to son Luther and KALEIGH at bedside, spoke about either transitioning to inpatient hospice if stable on current regimen or continued stay In PCU if she continues to decline. continue care in PCU, plan discussed with IDT.  spoke to spouse, son Luther and DIL at bedside, spoke about either transitioning to inpatient hospice if stable on current regimen or continued stay In PCU if she continues to decline. sternal region

## 2023-05-21 NOTE — PROGRESS NOTE ADULT - ASSESSMENT
Patient is an 79 yo F, PMH of recurrent cervical cancer s/p pelvic exenteration with ostomy and ileal conduit, currently on treatment for locally advanced rectal cancer (irinotecan q2 wks), hypothyroidism, admitted 5/13/23 with high grade malignant SBO which was managed non-operatively. Patient was admitted to MICU on 5/18/23 for hypotension requiring pressors, concern for septic shock. Repeat CT demonstrates concern for partial SBO. Palliative consulted for GOC .

## 2023-05-21 NOTE — PROGRESS NOTE ADULT - ATTENDING COMMENTS
Patient in PCU for symptom directed care- rectal cancer with hx of SBO, complicated by respiratory failure and septic shock, s/p extubation in MICU now requiring ATC morphine for symptoms. will d/c methadone and uptitrate morphine as needed. Discussed with family re: possible [facility] based on clinical course, as family resides in [place]. They are familiar with the hospice [facility] from a previous family member who received services there. Last BM 5/18, c/w bowel regimen while on opiates. Vitals stable with improved BP today to 107/68. Emotional support provided.

## 2023-05-21 NOTE — PROGRESS NOTE ADULT - SUBJECTIVE AND OBJECTIVE BOX
GAP TEAM PALLIATIVE CARE UNIT PROGRESS NOTE:      [  ] Patient on hospice program.    INDICATION FOR PALLIATIVE CARE UNIT SERVICES/Interval HPI: Patient transferred to PCU  for symptom directed care in setting of  rectal cancer, malignant SBO and septic shock.     OVERNIGHT EVENTS: 0 prn use from 8am-8am . Patient seen and examined at bedside, she is non verbally responsive, but when I examined her abdomen,  she said "ouch"  DNR on chart: Yes  Yes      Allergies    penicillin G benzathine (Rash; Anaphylaxis; Hives; Short breath)  Seafood (Hives)    Intolerances    MEDICATIONS  (STANDING):  methadone Injectable 2.5 milliGRAM(s) IV Push at bedtime  morphine  - Injectable 2 milliGRAM(s) IV Push every 6 hours    MEDICATIONS  (PRN):  bisacodyl Suppository 10 milliGRAM(s) Rectal at bedtime PRN Constipation  glycopyrrolate Injectable 0.4 milliGRAM(s) IV Push every 6 hours PRN secretions  LORazepam   Injectable 0.5 milliGRAM(s) IV Push every 1 hour PRN Anxiety  morphine  - Injectable 2 milliGRAM(s) IV Push every 1 hour PRN Severe Pain (7 - 10)  morphine  - Injectable 1 milliGRAM(s) IV Push every 1 hour PRN Mild Pain (1 - 3)  morphine  - Injectable 2 milliGRAM(s) IV Push every 1 hour PRN dyspnea    ITEMS UNCHECKED ARE NOT PRESENT    PRESENT SYMPTOMS: [ x]Unable to self-report see PAINAD, RDOS below  Source if other than patient:  [ ]Family   [ ]Team     Pain: [ ] yes [ ] no  QOL impact -   Location -                    Aggravating factors -  Quality -  Radiation -  Timing-  Severity (0-10 scale):  Minimal acceptable level (0-10 scale):     Dyspnea:                           [ ]Mild [ ]Moderate [ ]Severe  Anxiety:                             [ ]Mild [ ]Moderate [ ]Severe  Fatigue:                             [ ]Mild [ ]Moderate [ ]Severe  Nausea:                             [ ]Mild [ ]Moderate [ ]Severe  Loss of appetite:              [ ]Mild [ ]Moderate [ ]Severe  Constipation:                    [ ]Mild [ ]Moderate [ ]Severe    PCSSQ [Palliative Care Spiritual Screening Question]   Severity (0-10):  Score of 4 or > indicate consideration of Chaplaincy referral.  Chaplaincy Referral: [ ] yes [ ] refused [ ] following [x ] deferred    Caregiver [place]? : [x ] yes [ ] no [ ] deferred:  Social work referral [x ] Patient & Family Centered Care Referral [ ]     Anticipatory Grief present?:  [x ] yes [ ] no [ ] deferred:  Social work referral [ x] Patient & Family Centered Care Referral [ ]  	  Other Symptoms:  [x ]All other review of systems negative , unable to assess    PHYSICAL EXAM:   Vital Signs Last 24 Hrs  T(C): 36.6 (21 May 2023 08:47), Max: 36.6 (21 May 2023 08:47)  T(F): 97.9 (21 May 2023 08:47), Max: 97.9 (21 May 2023 08:47)  HR: 97 (21 May 2023 08:47) (97 - 97)  BP: 107/68 (21 May 2023 08:47) (107/68 - 107/68)  BP(mean): --  RR: 18 (21 May 2023 08:47) (18 - 18)  SpO2: 95% (21 May 2023 08:47) (95% - 95%)    Parameters below as of 21 May 2023 08:47  Patient On (Oxygen Delivery Method): nasal cannula      I&O's Summary    20 May 2023 07:01  -  21 May 2023 07:00  --------------------------------------------------------  IN: 0 mL / OUT: 100 mL / NET: -100 mL        GENERAL: [ ] Cachexia  [ ]Alert  [ ]Oriented x   [ ]Lethargic  [ x]Unarousable  [ ]Verbal  [X ]Non-Verbal  Behavioral:   [ ] Anxiety  [ ] Delirium [ ] Agitation [ ] Other  HEENT:  [ ]Normal   [x ]Dry mouth   [ ]ET Tube/Trach  [ ]Oral lesions  PULMONARY:   [ x]Clear [ ]Tachypnea  [ ]Audible excessive secretions   [ ]Rhonchi        [ ]Right [ ]Left [ ]Bilateral  [ ]Crackles        [ ]Right [ ]Left [ ]Bilateral  [ ]Wheezing     [ ]Right [ ]Left [ ]Bilateral  [ ]Diminished BS [ ]Right [ ]Left [ ]Bilateral    CARDIOVASCULAR:    [ ]Regular [ ]Irregular [ x]Tachy  [ ]Kt [ ]Murmur [ ]Other  GASTROINTESTINAL:  [x ]Soft  [ ]Distended   [ x]+BS  [ ]Non tender [ ]Tender  [ ]Other [ ]PEG [ ]OGT/ NGT   Last BM:  5/18  GENITOURINARY:  [ ]Normal [ x] Incontinent   [ ]Oliguria/Anuria   [ x]Vergara  MUSCULOSKELETAL:   [ ]Normal   [ ]Weakness  [x ]Bed/Wheelchair bound [ ]Edema  NEUROLOGIC: unarousable  [ ]No focal deficits  [ ] Cognitive impairment  [ ] Dysphagia [ ]Dysarthria [ ] Paresis [x ]Other   SKIN:   [x ]Normal  [ ]Rash  [ ]Other  [ ]Pressure ulcer(s)  [ ]y [ ]n  Present on admission      CRITICAL CARE:  [x ] Shock Present  [ ]Septic [ ]Cardiogenic [ ]Neurologic [ ]Hypovolemic  [x ]  Vasopressors (removed) [ ]  Inotropes   [ ] Respiratory failure present [ ] Mechanical Ventilation [ ] Non-invasive ventilatory support [ ] High-Flow  [ ] Acute  [ ] Chronic [ ] Hypoxic  [ ] Hypercarbic [ ] Other  [ ] Other organ failure     LABS:                        8.4    31.34 )-----------( 118      ( 19 May 2023 00:21 )             28.1   05-19    142  |  108  |  24<H>  ----------------------------<  140<H>  3.8   |  21<L>  |  1.27    Ca    7.4<L>      19 May 2023 00:21  Phos  4.0     05-19  Mg     2.3     05-19    TPro  4.1<L>  /  Alb  2.3<L>  /  TBili  0.5  /  DBili  x   /  AST  66<H>  /  ALT  39  /  AlkPhos  76  05-19  PT/INR - ( 19 May 2023 00:21 )   PT: 20.4 sec;   INR: 1.76 ratio         PTT - ( 19 May 2023 00:21 )  PTT:83.7 sec      RADIOLOGY & ADDITIONAL STUDIES: NO NEW IMAGING    PROTEIN CALORIE MALNUTRITION: [ ] mild [ ] moderate [ ] severe  [ ] underweight [ ] morbid obesity    https://www.andeal.org/vault/4910/web/files/ONC/Table_Clinical%20Characteristics%20to%20Document%20Malnutrition-White%20JV%20et%20al%023790.pdf    Height (cm): 154.9 (05-12-23 @ 18:49), 159 (04-19-23 @ 09:17), 156 (01-23-23 @ 14:22)  Weight (kg): 57.7 (05-18-23 @ 05:00), 53.2 (05-03-23 @ 09:13), 62.6 (01-24-23 @ 02:34)  BMI (kg/m2): 24 (05-18-23 @ 05:00), 22.2 (05-12-23 @ 18:49), 21 (05-03-23 @ 09:13)    [ x] PPSV2 < or = 30% [ ] significant weight loss [x ] poor nutritional intake [ ] anasarca   Artificial Nutrition [ ]     Other REFERRALS:    [ ] Hospice  [ ]Child Life  [x ]Social Work  [ ]Case management [ ]Holistic Therapy [ ] Physical Therapy [ ] Dietary     Progress Notes - Care Coordination [C. Provider] (05-19-23 @ 14:13)      Palliative Performance Scale:  http://npcrc.org/files/news/palliative_performance_scale_ppsv2.pdf  (Ctrl +  left click to view)  Respiratory Distress Observation Tool:  https://homecareinformation.net/handouts/hen/Respiratory_Distress_Observation_Scale.pdf (Ctrl +  left click to view)  PAINAD Score:  http://geriatrictoolkit.[email or web address].[facility]/cog/painad.pdf (Ctrl +  left click to view)

## 2023-05-21 NOTE — PROGRESS NOTE ADULT - PROBLEM SELECTOR PLAN 1
c/w Morphine 1mg q1h prn for moderate pain( 0 prn use from 8am-8am )  c/w Morphine 2mg q1h prn for severe pain ( 0 prn use from 8am-8am )  c/w morphine 2mg iv q6h ATC.   bowel regimen on opiates  monitor PAINAD score. c/w Morphine 1mg q1h prn for moderate pain( 0 prn use from 8am-8am )  c/w Morphine 2mg q1h prn for severe pain ( 0 prn use from 8am-8am )  c/w morphine 2mg iv q6h ATC and d/c IV methadone  bowel regimen on opiates  monitor PAINAD score.

## 2023-05-23 NOTE — PROVIDER CONTACT NOTE (MEDICATION) - REASON
1X dose of Morphine 3mg IVP
Pt started on Morphine gtt infusing at 0.5mg/hr and have required 3 breakthrough of 2mg IVP. Last PRN recently administered with no effectiveness acquired

## 2023-05-23 NOTE — PROVIDER CONTACT NOTE (MEDICATION) - SITUATION
Pt started on Morphine gtt infusing at 0.5mg/hr and have required 3 breakthrough of 2mg IVP. Last PRN recently administered with no effectiveness acquired. Ativan IVP ATC also administered!

## 2023-05-23 NOTE — PROGRESS NOTE ADULT - ATTENDING COMMENTS
Patient in PCU for symptom directed care- rectal cancer with hx of SBO, complicated by respiratory failure and septic shock, s/p extubation in MICU now requiring ATC morphine for symptoms. will d/c methadone and uptitrate morphine as needed. Discussed with family re: possible transition [facility] based on clinical [facility], as family resides in [place]. They are familiar with the hospice [facility] from a previous family member who received services there. Last BM 5/18, c/w bowel regimen while on opiates. Vitals stable with improved BP today to 107/68. Emotional support provided.

## 2023-05-23 NOTE — PROGRESS NOTE ADULT - PROBLEM SELECTOR PLAN 1
c/w Morphine 1mg q1h prn for moderate pain( 0 prn use from 8am-8am )  c/w Morphine 2mg q1h prn for severe pain ( 0 prn use from 8am-8am )  c/w morphine 2mg iv q6h ATC and d/c IV methadone  bowel regimen on opiates  monitor PAINAD score. c/w Morphine 1mg q1h prn for moderate pain( 0 prn use from 8am-8am )  c/w Morphine 2mg q1h prn for severe pain ( 0 prn use from 8am-8am )  c/w morphine 2mg iv q6h ATC   d/w the patient's  about increasing Morphine ATC to q 4 hours or starting a Morphine infusion at 0.5mg/hour; however, he wanted to hold on that as he believed the patient has some moments of alertness that she enjoys.   bowel regimen on opiates  monitor PAINAD score.

## 2023-05-23 NOTE — PROGRESS NOTE ADULT - NS ATTEST RISK PROBLEM GEN_ALL_CORE FT
1. Number and complexity of problems addressed for this patient:    1.1 Moderate (At least 1)  [ ] 1 or more chronic illnesses with exacerbation, progression, or side effects of treatment  [ ] 2 or more stable chronic illnesses  [ ] 1 undiagnosed new problem with uncertain prognosis  [ ] 1 acute illness with systemic symptoms  [ ] 1 acute complicated injury  1.2 High (At least 1)   [ x] 1 or more chronic illnesses with severe exacerbation, progression, or side effects of treatment  [x ] 1 acute or chronic illnesses or injuries that may pose a threat to life or bodily function    2. Amount and/or Complexity of Data that was Reviewed and Analyzed for this case:       Moderate (1 out of 3)       High (2 out of 3)  2.1. (Any combination of 3 of the following)   [x ] Prior External notes were reviewed  [ ] Each test result was reviewed (see "LABS" and "RADIOLOGY & ADDITIONAL STUDIES" above)  [ ] The following tests were ordered and/or reviewed (Only count 1 point for ordering or reviewing a unique test):  	[ ]CBC  	[ ] Chemistry   	[x ] Imaging   	[ ] Other:   [x ] Assessment requiring an independent historian   		Name of historian and relationship:   2.2  [ x] Personally review and interpretation of  image or testing   2.3  [ ] Discussion of management or test interpretation with external physician/other qualified health care professional\appropriate source (not separately reported)    3. Risk of Complications and/or Morbidity or Mortality of for this Patient’s Management:  3.1 Moderate risk of morbidity from additional diagnostic testing or treatment (At least 1):   [x ] Prescription drug management   [ ] Decision regarding minor surgery, treatment, or procedure with identified patient or procedure risk factors  [ ] Decision regarding elective major surgery, treatment, or procedure without identified patient or procedure risk factors   [ ] Diagnosis or treatment significantly limited by social determinants of health   [ ] Other:   3.2 High risk of morbidity from additional diagnostic testing or treatment (At least 1):   [x ] Drug therapy requiring intensive monitoring for toxicity   [ ] Decision regarding elective major surgery, treatment, or procedure with identified patient or procedure risk factors   [ ] Decision regarding emergency major surgery, treatment, or procedure   [ ] Decision regarding hospitalization or escalation of hospital-level of care  [ ] Decision not to resuscitate, not to intubate, or to de-escalate care because of poor prognosis   [ ] Decision to proceed or not with artificial nutrition   [x ] Parenteral controlled substance  [ ] Other:
safety/toxicity monitoring of intravenous opiates and/or  benzodiazepines in end stage disease process
safety/toxicity monitoring of intravenous opiates and/or  benzodiazepines in end stage disease process

## 2023-05-23 NOTE — PROGRESS NOTE ADULT - SUBJECTIVE AND OBJECTIVE BOX
Late note for 5/22 when the patient was seen and examined at 2:30pm   GAP TEAM PALLIATIVE CARE UNIT PROGRESS NOTE:      [  ] Patient on hospice program.    INDICATION FOR PALLIATIVE CARE UNIT SERVICES/Interval HPI: Patient transferred to PCU  for symptom directed care in setting of  rectal cancer, malignant SBO and septic shock.     OVERNIGHT EVENTS: She used Morphine 2mg IV x 2 and Ativan 0.5mg IV x 1 over 24 hours (10am to 10am) . Patient was seen and examined with her son and  by her bedside. She is non verball, but when examined she appeared to be having abdominal tenderness.   DNR on chart: Yes  Yes      Allergies    penicillin G benzathine (Rash; Anaphylaxis; Hives; Short breath)  Seafood (Hives)    Intolerances    MEDICATIONS  (STANDING):  acyclovir Topical 5% Ointment 1 Application(s) Topical four times a day  chlorhexidine 4% Liquid 1 Application(s) Topical <User Schedule>  LORazepam   Injectable 0.5 milliGRAM(s) IV Push every 8 hours  morphine  Infusion 0.5 mG/Hr (0.5 mL/Hr) IV Continuous <Continuous>    MEDICATIONS  (PRN):  bisacodyl Suppository 10 milliGRAM(s) Rectal at bedtime PRN Constipation  glycopyrrolate Injectable 0.4 milliGRAM(s) IV Push every 6 hours PRN secretions  LORazepam   Injectable 0.5 milliGRAM(s) IV Push every 1 hour PRN Anxiety  morphine  - Injectable 2 milliGRAM(s) IV Push every 1 hour PRN Severe Pain (7 - 10)  morphine  - Injectable 2 milliGRAM(s) IV Push every 1 hour PRN dyspnea  morphine  - Injectable 1 milliGRAM(s) IV Push every 1 hour PRN Mild Pain (1 - 3)      ITEMS UNCHECKED ARE NOT PRESENT    PRESENT SYMPTOMS: [ x]Unable to self-report see PAINAD, RDOS below  Source if other than patient:  [ ]Family   [ ]Team     Pain: [ ] yes [ ] no  QOL impact -   Location -                    Aggravating factors -  Quality -  Radiation -  Timing-  Severity (0-10 scale):  Minimal acceptable level (0-10 scale):     Dyspnea:                           [ ]Mild [ ]Moderate [ ]Severe  Anxiety:                             [ ]Mild [ ]Moderate [ ]Severe  Fatigue:                             [ ]Mild [ ]Moderate [ ]Severe  Nausea:                             [ ]Mild [ ]Moderate [ ]Severe  Loss of appetite:              [ ]Mild [ ]Moderate [ ]Severe  Constipation:                    [ ]Mild [ ]Moderate [ ]Severe    PCSSQ [Palliative Care Spiritual Screening Question]   Severity (0-10):  Score of 4 or > indicate consideration of Chaplaincy referral.  Chaplaincy Referral: [ ] yes [ ] refused [ x] following [ ] deferred  5/19  visited during rounds. Patient in bed and alert;  and son at the bedside.  introduced himself and his role. Patient stated that she was fighting.  and son stated that they are right there with her.  offered affirmation of feelings. They stated that they were ok for now since the  has stopped by and offered prayer and blessing.  left contact cards with them and encouraged them to reach out if needed emotional support.  will remain available for emotional support.  Caregiver [place]? : [x ] yes [ ] no [ ] deferred:  Social work referral [x ] Patient & Family Centered Care Referral [ ]     Anticipatory Grief present?:  [x ] yes [ ] no [ ] deferred:  Social work referral [ x] Patient & Family Centered Care Referral [ ]  	  Other Symptoms:  [x ]All other review of systems negative , unable to assess    PHYSICAL EXAM:   Vital Signs Last 24 Hrs  T(C): 36.7 (23 May 2023 08:18), Max: 36.7 (23 May 2023 08:18)  T(F): 98 (23 May 2023 08:18), Max: 98 (23 May 2023 08:18)  HR: 91 (23 May 2023 08:18) (91 - 91)  BP: 105/67 (23 May 2023 08:18) (105/67 - 105/67)  BP(mean): --  RR: 20 (23 May 2023 08:18) (20 - 20)  SpO2: 91% (23 May 2023 08:18) (91% - 91%)    Parameters below as of 23 May 2023 08:18  Patient On (Oxygen Delivery Method): nasal cannula        GENERAL: [ ] Cachexia  [ ]Alert  [ ]Oriented x   [ ]Lethargic  [ x]Unarousable  [ ]Verbal  [X ]Non-Verbal  Behavioral:   [ ] Anxiety  [ ] Delirium [ ] Agitation [ ] Other  HEENT:  [ ]Normal   [x ]Dry mouth   [ ]ET Tube/Trach  [ ]Oral lesions  PULMONARY:   [ x]Clear [ ]Tachypnea  [ ]Audible excessive secretions   [ ]Rhonchi        [ ]Right [ ]Left [ ]Bilateral  [ ]Crackles        [ ]Right [ ]Left [ ]Bilateral  [ ]Wheezing     [ ]Right [ ]Left [ ]Bilateral  [ ]Diminished BS [ ]Right [ ]Left [ ]Bilateral    CARDIOVASCULAR:    [ ]Regular [ ]Irregular [ x]Tachy  [ ]Kt [ ]Murmur [ ]Other  GASTROINTESTINAL:  [x ]Soft  [ ]Distended   [ x]+BS  [ ]Non tender [ ]Tender  [ ]Other [ ]PEG [ ]OGT/ NGT   Last BM:  5/18  GENITOURINARY:  [ ]Normal [ x] Incontinent   [ ]Oliguria/Anuria   [ x]Vergara  MUSCULOSKELETAL:   [ ]Normal   [ ]Weakness  [x ]Bed/Wheelchair bound [ ]Edema  NEUROLOGIC: unarousable  [ ]No focal deficits  [ ] Cognitive impairment  [ ] Dysphagia [ ]Dysarthria [ ] Paresis [x ]Other: Encephalopathy   SKIN:   [ ]Normal  [ ]Rash  [ ]Other  [ ]Pressure ulcer(s)  [ ]y [ ]n  Present on admission    Right back DTI  Right ear DTI   Right heal stage I   Right lateral fifth toe Stage I   Sacrum DTI   CRITICAL CARE:  [ ] Shock Present  [ ]Septic [ ]Cardiogenic [ ]Neurologic [ ]Hypovolemic  [x ]  Vasopressors (removed) [ ]  Inotropes   [ ] Respiratory failure present [ ] Mechanical Ventilation [ ] Non-invasive ventilatory support [ ] High-Flow  [ ] Acute  [ ] Chronic [ ] Hypoxic  [ ] Hypercarbic [ ] Other  [ ] Other organ failure     LABS:                      No new labs     RADIOLOGY & ADDITIONAL STUDIES:   < from: CT Abdomen and Pelvis w/ IV Cont (05.18.23 @ 12:58) >  IMPRESSION:  Bilateral lower lobe consolidation with low density material filling   bronchi is suspicious for pneumonia versus aspiration. Small pleural   effusions    Bile duct wall thickening from the hepatic hilus through extrahepatic   ducts suggests inflammatory versus infectious cholangitis. There may be   mild fluid and inflammation surrounding pancreatic head which probably   represent extension of small bowel mesenteric edema versus focal acute   pancreatitis. Consider correlation with lipase.    Mild to moderate bilateral hydroureteronephrosis with urothelial   thickening of upper collecting systems and ureters suggests infectious   versus inflammatory pyelitis and ureteritis. Air-fluid level within the   [place] pouch may be secondary to indwelling Vergara catheter versus   gas-forming organism. Recommend urinalysis correlation    Persistent dilation of mid small bowel with distal passage of oral   contrast to the enterocolonic anastomosis suggests partial distal small   bowel obstruction. Likely treatment related changes involving pelvic   small bowel versus enteritis. Mild colonic wall thickening suggests   colitis    Similar lower vaginal distention with air-fluid level inseparable from   the anorectal tumor        --- End of Report ---            KWAME GALLARDO MD; Attending Radiologist  This document has been electronically signed. May 18 2023  2:27PM    < end of copied text >      PROTEIN CALORIE MALNUTRITION: [ ] mild [ ] moderate [ ] severe  [ ] underweight [ ] morbid obesity    https://www.andeal.org/vault/2440/web/files/ONC/Table_Clinical%20Characteristics%20to%20Document%20Malnutrition-White%20JV%20et%20al%202012.pdf    Height (cm): 154.9 (05-12-23 @ 18:49), 159 (04-19-23 @ 09:17), 156 (01-23-23 @ 14:22)  Weight (kg): 57.7 (05-18-23 @ 05:00), 53.2 (05-03-23 @ 09:13), 62.6 (01-24-23 @ 02:34)  BMI (kg/m2): 24 (05-18-23 @ 05:00), 22.2 (05-12-23 @ 18:49), 21 (05-03-23 @ 09:13)    [ x] PPSV2 < or = 30% [ ] significant weight loss [x ] poor nutritional intake [ ] anasarca   Artificial Nutrition [ ]     Other REFERRALS:    [ ] Hospice  [ ]Child Life  [x ]Social Work  [ ]Case management [ ]Holistic Therapy [ ] Physical Therapy [ ] Dietary     Progress Notes - Care Coordination [C. Provider] (05-19-23 @ 14:13)      Palliative Performance Scale:  http://npcrc.org/files/news/palliative_performance_scale_ppsv2.pdf  (Ctrl +  left click to view)  Respiratory Distress Observation Tool:  https://homecareinformation.net/handouts/hen/Respiratory_Distress_Observation_Scale.pdf (Ctrl +  left click to view)  PAINAD Score:  http://geriatrictoolkit.[email or web address]/painad.pdf (Ctrl +  left click to view)   The patient was seen on 5/23 at 14:20  GAP TEAM PALLIATIVE CARE UNIT PROGRESS NOTE:      [  ] Patient on hospice program.    INDICATION FOR PALLIATIVE CARE UNIT SERVICES/Interval HPI: Patient transferred to PCU  for symptom directed care in setting of  rectal cancer, malignant SBO and septic shock/She was seen with her  by her bedside. She was lethargic, audible secretions were noticed. She did not appear on any acute distress.      OVERNIGHT EVENTS: Over 24 hours (9am to 9am), she used Morphine 2mg IV x 2 for pain and 2mg IV x 1 for respiratory distress, and Ativan 0.5mg IV x 3     DNR on chart: Yes  Yes      Allergies    penicillin G benzathine (Rash; Anaphylaxis; Hives; Short breath)  Seafood (Hives)    Intolerances    MEDICATIONS  (STANDING):  acyclovir Topical 5% Ointment 1 Application(s) Topical four times a day  chlorhexidine 4% Liquid 1 Application(s) Topical <User Schedule>  LORazepam   Injectable 0.5 milliGRAM(s) IV Push every 8 hours  morphine  Infusion 0.5 mG/Hr (0.5 mL/Hr) IV Continuous <Continuous>    MEDICATIONS  (PRN):  bisacodyl Suppository 10 milliGRAM(s) Rectal at bedtime PRN Constipation  glycopyrrolate Injectable 0.4 milliGRAM(s) IV Push every 6 hours PRN secretions  LORazepam   Injectable 0.5 milliGRAM(s) IV Push every 1 hour PRN Anxiety  morphine  - Injectable 2 milliGRAM(s) IV Push every 1 hour PRN Severe Pain (7 - 10)  morphine  - Injectable 2 milliGRAM(s) IV Push every 1 hour PRN dyspnea  morphine  - Injectable 1 milliGRAM(s) IV Push every 1 hour PRN Mild Pain (1 - 3)      ITEMS UNCHECKED ARE NOT PRESENT    PRESENT SYMPTOMS: [ x]Unable to self-report see PAINAD, RDOS below  Source if other than patient:  [ ]Family   [ ]Team     Pain: [ ] yes [ ] no  QOL impact -   Location -                    Aggravating factors -  Quality -  Radiation -  Timing-  Severity (0-10 scale):  Minimal acceptable level (0-10 scale):     Dyspnea:                           [ ]Mild [ ]Moderate [ ]Severe  Anxiety:                             [ ]Mild [ ]Moderate [ ]Severe  Fatigue:                             [ ]Mild [ ]Moderate [ ]Severe  Nausea:                             [ ]Mild [ ]Moderate [ ]Severe  Loss of appetite:              [ ]Mild [ ]Moderate [ ]Severe  Constipation:                    [ ]Mild [ ]Moderate [ ]Severe    PCSSQ [Palliative Care Spiritual Screening Question]   Severity (0-10):  Score of 4 or > indicate consideration of Chaplaincy referral.  Chaplaincy Referral: [ ] yes [ ] refused [ x] following [ ] deferred  5/19  visited during rounds. Patient in bed and alert;  and son at the bedside.  introduced himself and his role. Patient stated that she was fighting.  and son stated that they are right there with her.  offered affirmation of feelings. They stated that they were ok for now since the  has stopped by and offered prayer and blessing.  left contact cards with them and encouraged them to reach out if needed emotional support.  will remain available for emotional support.  Caregiver [place]? : [x ] yes [ ] no [ ] deferred:  Social work referral [x ] Patient & Family Centered Care Referral [ ]     Anticipatory Grief present?:  [x ] yes [ ] no [ ] deferred:  Social work referral [ x] Patient & Family Centered Care Referral [ ]  	  Other Symptoms:  [x ]All other review of systems negative , unable to assess    PHYSICAL EXAM:   Vital Signs Last 24 Hrs  T(C): 36.7 (23 May 2023 08:18), Max: 36.7 (23 May 2023 08:18)  T(F): 98 (23 May 2023 08:18), Max: 98 (23 May 2023 08:18)  HR: 91 (23 May 2023 08:18) (91 - 91)  BP: 105/67 (23 May 2023 08:18) (105/67 - 105/67)  BP(mean): --  RR: 20 (23 May 2023 08:18) (20 - 20)  SpO2: 91% (23 May 2023 08:18) (91% - 91%)    Parameters below as of 23 May 2023 08:18  Patient On (Oxygen Delivery Method): nasal cannula        GENERAL: [ ] Cachexia  [ ]Alert  [ ]Oriented x   [ ]Lethargic  [ x]Unarousable  [ ]Verbal  [X ]Non-Verbal  Behavioral:   [ ] Anxiety  [ ] Delirium [ ] Agitation [ ] Other  HEENT:  [ ]Normal   [x ]Dry mouth   [ ]ET Tube/Trach  [ ]Oral lesions  PULMONARY:   [ x]Clear [ ]Tachypnea  [ ]Audible excessive secretions   [ ]Rhonchi        [ ]Right [ ]Left [ ]Bilateral  [ ]Crackles        [ ]Right [ ]Left [ ]Bilateral  [ ]Wheezing     [ ]Right [ ]Left [ ]Bilateral  [ ]Diminished BS [ ]Right [ ]Left [ ]Bilateral    CARDIOVASCULAR:    [ ]Regular [ ]Irregular [ x]Tachy  [ ]Kt [ ]Murmur [ ]Other  GASTROINTESTINAL:  [x ]Soft  [ ]Distended   [ x]+BS  [ ]Non tender [ ]Tender  [ ]Other [ ]PEG [ ]OGT/ NGT   Last BM:  5/18  GENITOURINARY:  [ ]Normal [ x] Incontinent   [ ]Oliguria/Anuria   [ x]Vergara  MUSCULOSKELETAL:   [ ]Normal   [ ]Weakness  [x ]Bed/Wheelchair bound [ ]Edema  NEUROLOGIC: unarousable  [ ]No focal deficits  [ ] Cognitive impairment  [ ] Dysphagia [ ]Dysarthria [ ] Paresis [x ]Other: Encephalopathy   SKIN:   [ ]Normal  [ ]Rash  [ ]Other  [ ]Pressure ulcer(s)  [ ]y [ ]n  Present on admission    Right back DTI  Right ear DTI   Right heal stage I   Right lateral fifth toe Stage I   Sacrum DTI   CRITICAL CARE:  [ ] Shock Present  [ ]Septic [ ]Cardiogenic [ ]Neurologic [ ]Hypovolemic  [x ]  Vasopressors (removed) [ ]  Inotropes   [ ] Respiratory failure present [ ] Mechanical Ventilation [ ] Non-invasive ventilatory support [ ] High-Flow  [ ] Acute  [ ] Chronic [ ] Hypoxic  [ ] Hypercarbic [ ] Other  [ ] Other organ failure     LABS:                      No new labs     RADIOLOGY & ADDITIONAL STUDIES:   < from: CT Abdomen and Pelvis w/ IV Cont (05.18.23 @ 12:58) >  IMPRESSION:  Bilateral lower lobe consolidation with low density material filling   bronchi is suspicious for pneumonia versus aspiration. Small pleural   effusions    Bile duct wall thickening from the hepatic hilus through extrahepatic   ducts suggests inflammatory versus infectious cholangitis. There may be   mild fluid and inflammation surrounding pancreatic head which probably   represent extension of small bowel mesenteric edema versus focal acute   pancreatitis. Consider correlation with lipase.    Mild to moderate bilateral hydroureteronephrosis with urothelial   thickening of upper collecting systems and ureters suggests infectious   versus inflammatory pyelitis and ureteritis. Air-fluid level within the   [place] pouch may be secondary to indwelling Vergara catheter versus   gas-forming organism. Recommend urinalysis correlation    Persistent dilation of mid small bowel with distal passage of oral   contrast to the enterocolonic anastomosis suggests partial distal small   bowel obstruction. Likely treatment related changes involving pelvic   small bowel versus enteritis. Mild colonic wall thickening suggests   colitis    Similar lower vaginal distention with air-fluid level inseparable from   the anorectal tumor        --- End of Report ---            KWAME GALLARDO MD; Attending Radiologist  This document has been electronically signed. May 18 2023  2:27PM    < end of copied text >      PROTEIN CALORIE MALNUTRITION: [ ] mild [ ] moderate [ ] severe  [ ] underweight [ ] morbid obesity    https://www.andeal.org/vault/2440/web/files/ONC/Table_Clinical%20Characteristics%20to%20Document%20Malnutrition-White%20JV%20et%20al%202012.pdf    Height (cm): 154.9 (05-12-23 @ 18:49), 159 (04-19-23 @ 09:17), 156 (01-23-23 @ 14:22)  Weight (kg): 57.7 (05-18-23 @ 05:00), 53.2 (05-03-23 @ 09:13), 62.6 (01-24-23 @ 02:34)  BMI (kg/m2): 24 (05-18-23 @ 05:00), 22.2 (05-12-23 @ 18:49), 21 (05-03-23 @ 09:13)    [ x] PPSV2 < or = 30% [ ] significant weight loss [x ] poor nutritional intake [ ] anasarca   Artificial Nutrition [ ]     Other REFERRALS:    [ ] Hospice  [ ]Child Life  [x ]Social Work  [ ]Case management [ ]Holistic Therapy [ ] Physical Therapy [ ] Dietary     Progress Notes - Care Coordination [C. Provider] (05-19-23 @ 14:13)      Palliative Performance Scale:  http://npcrc.org/files/news/palliative_performance_scale_ppsv2.pdf  (Ctrl +  left click to view)  Respiratory Distress Observation Tool:  https://homecareinformation.net/handouts/hen/Respiratory_Distress_Observation_Scale.pdf (Ctrl +  left click to view)  PAINAD Score:  http://geriatrictoolkit.[email or web address]/painad.pdf (Ctrl +  left click to view)

## 2023-05-23 NOTE — PROGRESS NOTE ADULT - TIME BILLING
Total time spent including the following  [x] Physical chart review and documentation   An extensive review of the physical chart, electronic health record, and documentation was conducted to obtain collateral information including but not limited to:   - Current inpatient records (prior progress notes)    - Inpatient values/results (CT results)   - Social work assessments   - Current or proposed treatment plans   - Pharmacotherapy review (including I-STOP if applicable)  [x]care coordination  [x]discussion with floor staff (patient's RN)  [x]discussion with the patient, surrogate decision maker, or family  [x]Physical Exam and/or review of systems   [x]Formulation of assessment and plan   [x]Evaluating for response to treatment and side effects of opioids or benzodiazepines

## 2023-05-23 NOTE — CHART NOTE - NSCHARTNOTEFT_GEN_A_CORE
notified by nurse that patient was receiving 2mg iv morphine was dyspneic despite it and was requiring more in less than 1 hour. RR 32 to 28 after 2mg morphine prn   plan: increase by 50% to 3mg iv morphine, one time dose given along with Ativan and dyspnea was controlled.         c/w morphine 3mg iv q1h prn  along with 0.5 mg ativan q1h prn for dyspnea.

## 2023-05-23 NOTE — PROGRESS NOTE ADULT - PROBLEM SELECTOR PLAN 2
c/w Morphine 2mg q1h prn for dyspnea. (  0 prn use from 8am -8am) Controlled   c/w Morphine 2mg q1h prn for dyspnea.

## 2023-05-23 NOTE — PROVIDER CONTACT NOTE (MEDICATION) - RECOMMENDATIONS
To increase the Morphine gtt to 1mg/hr or give me A 1X PRN order of Morphine 2mg IVP for Dyspnea since it was too close for patient to be medicated again for dyspnea. Family at bedside supportive & complying with goals of care.

## 2023-05-23 NOTE — PROGRESS NOTE ADULT - PROBLEM SELECTOR PLAN 1
c/w Morphine 1mg q1h prn for moderate pain( 0 prn use from 8am-8am )  c/w Morphine 2mg q1h prn for severe pain ( 0 prn use from 8am-8am )  c/w morphine 2mg iv q6h ATC   d/w the patient's  about increasing Morphine ATC to q 4 hours or starting a Morphine infusion at 0.5mg/hour; however, he wanted to hold on that as he believed the patient has some moments of alertness that she enjoys.   bowel regimen on opiates  monitor PAINAD score. Due to recurrent symptoms and based on PRN needs, will start Morphine infusion at 0.5mg/hr  Will continue Morphine 1-2mg IV q 1 PRN moderate to severe pain   bowel regimen on opiates  monitor PAINAD score.

## 2023-05-23 NOTE — PROGRESS NOTE ADULT - PROBLEM SELECTOR PLAN 5
continue care in PCU for advanced symptoms monitoring and Rx. Plan d/w IDT team and the patient's family.  May consider inpatient hospice is her symptoms continue to be stable.         Edu Meredith MD  Associate Chief Geriatrics and Palliative Care ([place]) [facility]   [place] [facility]   , [employer] [facility]      Please contact me via Teams from Monday through Friday between 9am-5pm. If not answering, please call the palliative care pager [phone]    After 5pm and on weekends, please see the contact information below:    In the event of newly developing, evolving, or worsening symptoms, please contact the Palliative Medicine team via pager (if the patient is at [facility] #[phone] or if the patient is at [facility] #[phone]) The Geriatric and Palliative Medicine service has coverage 24 hours a day/ 7 days a week to provide medical recommendations regarding symptom management needs via telephone no further disease modifying therapy  plan is symptom directed approach

## 2023-05-23 NOTE — PROGRESS NOTE ADULT - SUBJECTIVE AND OBJECTIVE BOX
Late note for 5/22 when the patient was seen and examined at 2:30pm   GAP TEAM PALLIATIVE CARE UNIT PROGRESS NOTE:      [  ] Patient on hospice program.    INDICATION FOR PALLIATIVE CARE UNIT SERVICES/Interval HPI: Patient transferred to PCU  for symptom directed care in setting of  rectal cancer, malignant SBO and septic shock.     OVERNIGHT EVENTS: She used Morphine 2mg IV x 2 and Ativan 0.5mg IV x 1 over 24 hours (10am to 10am) . Patient was seen and examined with her son and  by her bedside. She is non verball, but when examined she appeared to be having abdominal tenderness.   DNR on chart: Yes  Yes      Allergies    penicillin G benzathine (Rash; Anaphylaxis; Hives; Short breath)  Seafood (Hives)    Intolerances    MEDICATIONS  (STANDING):  morphine  - Injectable 2 milliGRAM(s) IV Push every 6 hours    MEDICATIONS  (PRN):  bisacodyl Suppository 10 milliGRAM(s) Rectal at bedtime PRN Constipation  glycopyrrolate Injectable 0.4 milliGRAM(s) IV Push every 6 hours PRN secretions  LORazepam   Injectable 0.5 milliGRAM(s) IV Push every 1 hour PRN Anxiety  morphine  - Injectable 2 milliGRAM(s) IV Push every 1 hour PRN Severe Pain (7 - 10)  morphine  - Injectable 2 milliGRAM(s) IV Push every 1 hour PRN dyspnea  morphine  - Injectable 1 milliGRAM(s) IV Push every 1 hour PRN Mild Pain (1 - 3)      ITEMS UNCHECKED ARE NOT PRESENT    PRESENT SYMPTOMS: [ x]Unable to self-report see PAINAD, RDOS below  Source if other than patient:  [ ]Family   [ ]Team     Pain: [ ] yes [ ] no  QOL impact -   Location -                    Aggravating factors -  Quality -  Radiation -  Timing-  Severity (0-10 scale):  Minimal acceptable level (0-10 scale):     Dyspnea:                           [ ]Mild [ ]Moderate [ ]Severe  Anxiety:                             [ ]Mild [ ]Moderate [ ]Severe  Fatigue:                             [ ]Mild [ ]Moderate [ ]Severe  Nausea:                             [ ]Mild [ ]Moderate [ ]Severe  Loss of appetite:              [ ]Mild [ ]Moderate [ ]Severe  Constipation:                    [ ]Mild [ ]Moderate [ ]Severe    PCSSQ [Palliative Care Spiritual Screening Question]   Severity (0-10):  Score of 4 or > indicate consideration of Chaplaincy referral.  Chaplaincy Referral: [ ] yes [ ] refused [ ] following [x ] deferred  5/19  visited during rounds. Patient in bed and alert;  and son at the bedside.  introduced himself and his role. Patient stated that she was fighting.  and son stated that they are right there with her.  offered affirmation of feelings. They stated that they were ok for now since the  has stopped by and offered prayer and blessing.  left contact cards with them and encouraged them to reach out if needed emotional support.  will remain available for emotional support.  Caregiver [place]? : [x ] yes [ ] no [ ] deferred:  Social work referral [x ] Patient & Family Centered Care Referral [ ]     Anticipatory Grief present?:  [x ] yes [ ] no [ ] deferred:  Social work referral [ x] Patient & Family Centered Care Referral [ ]  	  Other Symptoms:  [x ]All other review of systems negative , unable to assess    PHYSICAL EXAM:   Vital Signs Last 24 Hrs  T(F): 98.3  HR: 100  BP: 104/65  RR: 18  SpO2: 93%        GENERAL: [ ] Cachexia  [ ]Alert  [ ]Oriented x   [ ]Lethargic  [ x]Unarousable  [ ]Verbal  [X ]Non-Verbal  Behavioral:   [ ] Anxiety  [ ] Delirium [ ] Agitation [ ] Other  HEENT:  [ ]Normal   [x ]Dry mouth   [ ]ET Tube/Trach  [ ]Oral lesions  PULMONARY:   [ x]Clear [ ]Tachypnea  [ ]Audible excessive secretions   [ ]Rhonchi        [ ]Right [ ]Left [ ]Bilateral  [ ]Crackles        [ ]Right [ ]Left [ ]Bilateral  [ ]Wheezing     [ ]Right [ ]Left [ ]Bilateral  [ ]Diminished BS [ ]Right [ ]Left [ ]Bilateral    CARDIOVASCULAR:    [ ]Regular [ ]Irregular [ x]Tachy  [ ]Kt [ ]Murmur [ ]Other  GASTROINTESTINAL:  [x ]Soft  [ ]Distended   [ x]+BS  [ ]Non tender [ ]Tender  [ ]Other [ ]PEG [ ]OGT/ NGT   Last BM:  5/18  GENITOURINARY:  [ ]Normal [ x] Incontinent   [ ]Oliguria/Anuria   [ x]Vergara  MUSCULOSKELETAL:   [ ]Normal   [ ]Weakness  [x ]Bed/Wheelchair bound [ ]Edema  NEUROLOGIC: unarousable  [ ]No focal deficits  [ ] Cognitive impairment  [ ] Dysphagia [ ]Dysarthria [ ] Paresis [x ]Other: Encephalopathy   SKIN:   [ ]Normal  [ ]Rash  [ ]Other  [ ]Pressure ulcer(s)  [ ]y [ ]n  Present on admission    Right back DTI  Right ear DTI   Right heal stage I   Right lateral fifth toe Stage I   Sacrum DTI   CRITICAL CARE:  [x ] Shock Present  [ ]Septic [ ]Cardiogenic [ ]Neurologic [ ]Hypovolemic  [x ]  Vasopressors (removed) [ ]  Inotropes   [ ] Respiratory failure present [ ] Mechanical Ventilation [ ] Non-invasive ventilatory support [ ] High-Flow  [ ] Acute  [ ] Chronic [ ] Hypoxic  [ ] Hypercarbic [ ] Other  [ ] Other organ failure     LABS:                        8.4    31.34 )-----------( 118      ( 19 May 2023 00:21 )             28.1   05-19    142  |  108  |  24<H>  ----------------------------<  140<H>  3.8   |  21<L>  |  1.27    Ca    7.4<L>      19 May 2023 00:21  Phos  4.0     05-19  Mg     2.3     05-19    TPro  4.1<L>  /  Alb  2.3<L>  /  TBili  0.5  /  DBili  x   /  AST  66<H>  /  ALT  39  /  AlkPhos  76  05-19  PT/INR - ( 19 May 2023 00:21 )   PT: 20.4 sec;   INR: 1.76 ratio         PTT - ( 19 May 2023 00:21 )  PTT:83.7 sec      RADIOLOGY & ADDITIONAL STUDIES: NO NEW IMAGING    PROTEIN CALORIE MALNUTRITION: [ ] mild [ ] moderate [ ] severe  [ ] underweight [ ] morbid obesity    https://www.andeal.org/vault/2440/web/files/ONC/Table_Clinical%20Characteristics%20to%20Document%20Malnutrition-White%20JV%20et%20al%202012.pdf    Height (cm): 154.9 (05-12-23 @ 18:49), 159 (04-19-23 @ 09:17), 156 (01-23-23 @ 14:22)  Weight (kg): 57.7 (05-18-23 @ 05:00), 53.2 (05-03-23 @ 09:13), 62.6 (01-24-23 @ 02:34)  BMI (kg/m2): 24 (05-18-23 @ 05:00), 22.2 (05-12-23 @ 18:49), 21 (05-03-23 @ 09:13)    [ x] PPSV2 < or = 30% [ ] significant weight loss [x ] poor nutritional intake [ ] anasarca   Artificial Nutrition [ ]     Other REFERRALS:    [ ] Hospice  [ ]Child Life  [x ]Social Work  [ ]Case management [ ]Holistic Therapy [ ] Physical Therapy [ ] Dietary     Progress Notes - Care Coordination [C. Provider] (05-19-23 @ 14:13)      Palliative Performance Scale:  http://npcrc.org/files/news/palliative_performance_scale_ppsv2.pdf  (Ctrl +  left click to view)  Respiratory Distress Observation Tool:  https://homecareinformation.net/handouts/hen/Respiratory_Distress_Observation_Scale.pdf (Ctrl +  left click to view)  PAINAD Score:  http://[email or web address].[facility]/cog/painad.pdf (Ctrl +  left click to view)   Late note for 5/22 when the patient was seen and examined at 2:30pm   GAP TEAM PALLIATIVE CARE UNIT PROGRESS NOTE:      [  ] Patient on hospice program.    INDICATION FOR PALLIATIVE CARE UNIT SERVICES/Interval HPI: Patient transferred to PCU  for symptom directed care in setting of  rectal cancer, malignant SBO and septic shock.     OVERNIGHT EVENTS: She used Morphine 2mg IV x 2 and Ativan 0.5mg IV x 1 over 24 hours (10am to 10am) . Patient was seen and examined with her son and  by her bedside. She is non verball, but when examined she appeared to be having abdominal tenderness.   DNR on chart: Yes  Yes      Allergies    penicillin G benzathine (Rash; Anaphylaxis; Hives; Short breath)  Seafood (Hives)    Intolerances    MEDICATIONS  (STANDING):  morphine  - Injectable 2 milliGRAM(s) IV Push every 6 hours    MEDICATIONS  (PRN):  bisacodyl Suppository 10 milliGRAM(s) Rectal at bedtime PRN Constipation  glycopyrrolate Injectable 0.4 milliGRAM(s) IV Push every 6 hours PRN secretions  LORazepam   Injectable 0.5 milliGRAM(s) IV Push every 1 hour PRN Anxiety  morphine  - Injectable 2 milliGRAM(s) IV Push every 1 hour PRN Severe Pain (7 - 10)  morphine  - Injectable 2 milliGRAM(s) IV Push every 1 hour PRN dyspnea  morphine  - Injectable 1 milliGRAM(s) IV Push every 1 hour PRN Mild Pain (1 - 3)      ITEMS UNCHECKED ARE NOT PRESENT    PRESENT SYMPTOMS: [ x]Unable to self-report see PAINAD, RDOS below  Source if other than patient:  [ ]Family   [ ]Team     Pain: [ ] yes [ ] no  QOL impact -   Location -                    Aggravating factors -  Quality -  Radiation -  Timing-  Severity (0-10 scale):  Minimal acceptable level (0-10 scale):     Dyspnea:                           [ ]Mild [ ]Moderate [ ]Severe  Anxiety:                             [ ]Mild [ ]Moderate [ ]Severe  Fatigue:                             [ ]Mild [ ]Moderate [ ]Severe  Nausea:                             [ ]Mild [ ]Moderate [ ]Severe  Loss of appetite:              [ ]Mild [ ]Moderate [ ]Severe  Constipation:                    [ ]Mild [ ]Moderate [ ]Severe    PCSSQ [Palliative Care Spiritual Screening Question]   Severity (0-10):  Score of 4 or > indicate consideration of Chaplaincy referral.  Chaplaincy Referral: [ ] yes [ ] refused [ ] following [x ] deferred  5/19  visited during rounds. Patient in bed and alert;  and son at the bedside.  introduced himself and his role. Patient stated that she was fighting.  and son stated that they are right there with her.  offered affirmation of feelings. They stated that they were ok for now since the  has stopped by and offered prayer and blessing.  left contact cards with them and encouraged them to reach out if needed emotional support.  will remain available for emotional support.  Caregiver [place]? : [x ] yes [ ] no [ ] deferred:  Social work referral [x ] Patient & Family Centered Care Referral [ ]     Anticipatory Grief present?:  [x ] yes [ ] no [ ] deferred:  Social work referral [ x] Patient & Family Centered Care Referral [ ]  	  Other Symptoms:  [x ]All other review of systems negative , unable to assess    PHYSICAL EXAM:   Vital Signs Last 24 Hrs  T(F): 98.3  HR: 100  BP: 104/65  RR: 18  SpO2: 93%    GENERAL: [ ] Cachexia  [ ]Alert  [ ]Oriented x   [ ]Lethargic  [ x]Unarousable  [ ]Verbal  [X ]Non-Verbal  Behavioral:   [ ] Anxiety  [ ] Delirium [ ] Agitation [ ] Other  HEENT:  [ ]Normal   [x ]Dry mouth   [ ]ET Tube/Trach  [ ]Oral lesions  PULMONARY:   [ x]Clear [ ]Tachypnea  [ ]Audible excessive secretions   [ ]Rhonchi        [ ]Right [ ]Left [ ]Bilateral  [ ]Crackles        [ ]Right [ ]Left [ ]Bilateral  [ ]Wheezing     [ ]Right [ ]Left [ ]Bilateral  [ ]Diminished BS [ ]Right [ ]Left [ ]Bilateral    CARDIOVASCULAR:    [ ]Regular [ ]Irregular [ x]Tachy  [ ]Kt [ ]Murmur [ ]Other  GASTROINTESTINAL:  [x ]Soft  [ ]Distended   [ x]+BS  [ ]Non tender [ ]Tender  [ ]Other [ ]PEG [ ]OGT/ NGT   Last BM:  5/18  GENITOURINARY:  [ ]Normal [ x] Incontinent   [ ]Oliguria/Anuria   [ x]Vergara  MUSCULOSKELETAL:   [ ]Normal   [ ]Weakness  [x ]Bed/Wheelchair bound [ ]Edema  NEUROLOGIC: unarousable  [ ]No focal deficits  [ ] Cognitive impairment  [ ] Dysphagia [ ]Dysarthria [ ] Paresis [x ]Other: Encephalopathy   SKIN:   [ ]Normal  [ ]Rash  [ ]Other  [ ]Pressure ulcer(s)  [ ]y [ ]n  Present on admission    Right back DTI  Right ear DTI   Right heal stage I   Right lateral fifth toe Stage I   Sacrum DTI   CRITICAL CARE:  [ ] Shock Present  [ ]Septic [ ]Cardiogenic [ ]Neurologic [ ]Hypovolemic  [x ]  Vasopressors (removed) [ ]  Inotropes   [ ] Respiratory failure present [ ] Mechanical Ventilation [ ] Non-invasive ventilatory support [ ] High-Flow  [ ] Acute  [ ] Chronic [ ] Hypoxic  [ ] Hypercarbic [ ] Other  [ ] Other organ failure     LABS:                      No new labs     RADIOLOGY & ADDITIONAL STUDIES:   < from: CT Abdomen and Pelvis w/ IV Cont (05.18.23 @ 12:58) >  IMPRESSION:  Bilateral lower lobe consolidation with low density material filling   bronchi is suspicious for pneumonia versus aspiration. Small pleural   effusions    Bile duct wall thickening from the hepatic hilus through extrahepatic   ducts suggests inflammatory versus infectious cholangitis. There may be   mild fluid and inflammation surrounding pancreatic head which probably   represent extension of small bowel mesenteric edema versus focal acute   pancreatitis. Consider correlation with lipase.    Mild to moderate bilateral hydroureteronephrosis with urothelial   thickening of upper collecting systems and ureters suggests infectious   versus inflammatory pyelitis and ureteritis. Air-fluid level within the   [place] pouch may be secondary to indwelling Vergara catheter versus   gas-forming organism. Recommend urinalysis correlation    Persistent dilation of mid small bowel with distal passage of oral   contrast to the enterocolonic anastomosis suggests partial distal small   bowel obstruction. Likely treatment related changes involving pelvic   small bowel versus enteritis. Mild colonic wall thickening suggests   colitis    Similar lower vaginal distention with air-fluid level inseparable from   the anorectal tumor        --- End of Report ---            KWAME GALLARDO MD; Attending Radiologist  This document has been electronically signed. May 18 2023  2:27PM    < end of copied text >      PROTEIN CALORIE MALNUTRITION: [ ] mild [ ] moderate [ ] severe  [ ] underweight [ ] morbid obesity    https://www.andeal.org/vault/2440/web/files/ONC/Table_Clinical%20Characteristics%20to%20Document%20Malnutrition-White%20JV%20et%20al%202012.pdf    Height (cm): 154.9 (05-12-23 @ 18:49), 159 (04-19-23 @ 09:17), 156 (01-23-23 @ 14:22)  Weight (kg): 57.7 (05-18-23 @ 05:00), 53.2 (05-03-23 @ 09:13), 62.6 (01-24-23 @ 02:34)  BMI (kg/m2): 24 (05-18-23 @ 05:00), 22.2 (05-12-23 @ 18:49), 21 (05-03-23 @ 09:13)    [ x] PPSV2 < or = 30% [ ] significant weight loss [x ] poor nutritional intake [ ] anasarca   Artificial Nutrition [ ]     Other REFERRALS:    [ ] Hospice  [ ]Child Life  [x ]Social Work  [ ]Case management [ ]Holistic Therapy [ ] Physical Therapy [ ] Dietary     Progress Notes - Care Coordination [C. Provider] (05-19-23 @ 14:13)      Palliative Performance Scale:  http://npcrc.org/files/news/palliative_performance_scale_ppsv2.pdf  (Ctrl +  left click to view)  Respiratory Distress Observation Tool:  https://homecareinformation.net/handouts/hen/Respiratory_Distress_Observation_Scale.pdf (Ctrl +  left click to view)  PAINAD Score:  http://[email or web address].[facility]/cog/painad.pdf (Ctrl +  left click to view)   Late note for 5/22 when the patient was seen and examined at 2:30pm   GAP TEAM PALLIATIVE CARE UNIT PROGRESS NOTE:      [  ] Patient on hospice program.    INDICATION FOR PALLIATIVE CARE UNIT SERVICES/Interval HPI: Patient transferred to PCU  for symptom directed care in setting of  rectal cancer, malignant SBO and septic shock.     OVERNIGHT EVENTS: She used Morphine 2mg IV x 2 and Ativan 0.5mg IV x 1 over 24 hours (10am to 10am) . Patient was seen and examined with her son and  by her bedside. She is non verball, but when examined she appeared to be having abdominal tenderness.   DNR on chart: Yes  Yes      Allergies    penicillin G benzathine (Rash; Anaphylaxis; Hives; Short breath)  Seafood (Hives)    Intolerances    MEDICATIONS  (STANDING):  morphine  - Injectable 2 milliGRAM(s) IV Push every 6 hours    MEDICATIONS  (PRN):  bisacodyl Suppository 10 milliGRAM(s) Rectal at bedtime PRN Constipation  glycopyrrolate Injectable 0.4 milliGRAM(s) IV Push every 6 hours PRN secretions  LORazepam   Injectable 0.5 milliGRAM(s) IV Push every 1 hour PRN Anxiety  morphine  - Injectable 2 milliGRAM(s) IV Push every 1 hour PRN Severe Pain (7 - 10)  morphine  - Injectable 2 milliGRAM(s) IV Push every 1 hour PRN dyspnea  morphine  - Injectable 1 milliGRAM(s) IV Push every 1 hour PRN Mild Pain (1 - 3)      ITEMS UNCHECKED ARE NOT PRESENT    PRESENT SYMPTOMS: [ x]Unable to self-report see PAINAD, RDOS below  Source if other than patient:  [ ]Family   [ ]Team     Pain: [ ] yes [ ] no  QOL impact -   Location -                    Aggravating factors -  Quality -  Radiation -  Timing-  Severity (0-10 scale):  Minimal acceptable level (0-10 scale):     Dyspnea:                           [ ]Mild [ ]Moderate [ ]Severe  Anxiety:                             [ ]Mild [ ]Moderate [ ]Severe  Fatigue:                             [ ]Mild [ ]Moderate [ ]Severe  Nausea:                             [ ]Mild [ ]Moderate [ ]Severe  Loss of appetite:              [ ]Mild [ ]Moderate [ ]Severe  Constipation:                    [ ]Mild [ ]Moderate [ ]Severe    PCSSQ [Palliative Care Spiritual Screening Question]   Severity (0-10):  Score of 4 or > indicate consideration of Chaplaincy referral.  Chaplaincy Referral: [ ] yes [ ] refused [ x] following [ ] deferred  5/19  visited during rounds. Patient in bed and alert;  and son at the bedside.  introduced himself and his role. Patient stated that she was fighting.  and son stated that they are right there with her.  offered affirmation of feelings. They stated that they were ok for now since the  has stopped by and offered prayer and blessing.  left contact cards with them and encouraged them to reach out if needed emotional support.  will remain available for emotional support.  Caregiver [place]? : [x ] yes [ ] no [ ] deferred:  Social work referral [x ] Patient & Family Centered Care Referral [ ]     Anticipatory Grief present?:  [x ] yes [ ] no [ ] deferred:  Social work referral [ x] Patient & Family Centered Care Referral [ ]  	  Other Symptoms:  [x ]All other review of systems negative , unable to assess    PHYSICAL EXAM:   Vital Signs Last 24 Hrs  T(F): 98.3  HR: 100  BP: 104/65  RR: 18  SpO2: 93%    GENERAL: [ ] Cachexia  [ ]Alert  [ ]Oriented x   [ ]Lethargic  [ x]Unarousable  [ ]Verbal  [X ]Non-Verbal  Behavioral:   [ ] Anxiety  [ ] Delirium [ ] Agitation [ ] Other  HEENT:  [ ]Normal   [x ]Dry mouth   [ ]ET Tube/Trach  [ ]Oral lesions  PULMONARY:   [ x]Clear [ ]Tachypnea  [ ]Audible excessive secretions   [ ]Rhonchi        [ ]Right [ ]Left [ ]Bilateral  [ ]Crackles        [ ]Right [ ]Left [ ]Bilateral  [ ]Wheezing     [ ]Right [ ]Left [ ]Bilateral  [ ]Diminished BS [ ]Right [ ]Left [ ]Bilateral    CARDIOVASCULAR:    [ ]Regular [ ]Irregular [ x]Tachy  [ ]Kt [ ]Murmur [ ]Other  GASTROINTESTINAL:  [x ]Soft  [ ]Distended   [ x]+BS  [ ]Non tender [ ]Tender  [ ]Other [ ]PEG [ ]OGT/ NGT   Last BM:  5/18  GENITOURINARY:  [ ]Normal [ x] Incontinent   [ ]Oliguria/Anuria   [ x]Vergara  MUSCULOSKELETAL:   [ ]Normal   [ ]Weakness  [x ]Bed/Wheelchair bound [ ]Edema  NEUROLOGIC: unarousable  [ ]No focal deficits  [ ] Cognitive impairment  [ ] Dysphagia [ ]Dysarthria [ ] Paresis [x ]Other: Encephalopathy   SKIN:   [ ]Normal  [ ]Rash  [ ]Other  [ ]Pressure ulcer(s)  [ ]y [ ]n  Present on admission    Right back DTI  Right ear DTI   Right heal stage I   Right lateral fifth toe Stage I   Sacrum DTI   CRITICAL CARE:  [ ] Shock Present  [ ]Septic [ ]Cardiogenic [ ]Neurologic [ ]Hypovolemic  [x ]  Vasopressors (removed) [ ]  Inotropes   [ ] Respiratory failure present [ ] Mechanical Ventilation [ ] Non-invasive ventilatory support [ ] High-Flow  [ ] Acute  [ ] Chronic [ ] Hypoxic  [ ] Hypercarbic [ ] Other  [ ] Other organ failure     LABS:                      No new labs     RADIOLOGY & ADDITIONAL STUDIES:   < from: CT Abdomen and Pelvis w/ IV Cont (05.18.23 @ 12:58) >  IMPRESSION:  Bilateral lower lobe consolidation with low density material filling   bronchi is suspicious for pneumonia versus aspiration. Small pleural   effusions    Bile duct wall thickening from the hepatic hilus through extrahepatic   ducts suggests inflammatory versus infectious cholangitis. There may be   mild fluid and inflammation surrounding pancreatic head which probably   represent extension of small bowel mesenteric edema versus focal acute   pancreatitis. Consider correlation with lipase.    Mild to moderate bilateral hydroureteronephrosis with urothelial   thickening of upper collecting systems and ureters suggests infectious   versus inflammatory pyelitis and ureteritis. Air-fluid level within the   [place] pouch may be secondary to indwelling Vergara catheter versus   gas-forming organism. Recommend urinalysis correlation    Persistent dilation of mid small bowel with distal passage of oral   contrast to the enterocolonic anastomosis suggests partial distal small   bowel obstruction. Likely treatment related changes involving pelvic   small bowel versus enteritis. Mild colonic wall thickening suggests   colitis    Similar lower vaginal distention with air-fluid level inseparable from   the anorectal tumor        --- End of Report ---            KWAME GALLARDO MD; Attending Radiologist  This document has been electronically signed. May 18 2023  2:27PM    < end of copied text >      PROTEIN CALORIE MALNUTRITION: [ ] mild [ ] moderate [ ] severe  [ ] underweight [ ] morbid obesity    https://www.andeal.org/vault/2440/web/files/ONC/Table_Clinical%20Characteristics%20to%20Document%20Malnutrition-White%20JV%20et%20al%202012.pdf    Height (cm): 154.9 (05-12-23 @ 18:49), 159 (04-19-23 @ 09:17), 156 (01-23-23 @ 14:22)  Weight (kg): 57.7 (05-18-23 @ 05:00), 53.2 (05-03-23 @ 09:13), 62.6 (01-24-23 @ 02:34)  BMI (kg/m2): 24 (05-18-23 @ 05:00), 22.2 (05-12-23 @ 18:49), 21 (05-03-23 @ 09:13)    [ x] PPSV2 < or = 30% [ ] significant weight loss [x ] poor nutritional intake [ ] anasarca   Artificial Nutrition [ ]     Other REFERRALS:    [ ] Hospice  [ ]Child Life  [x ]Social Work  [ ]Case management [ ]Holistic Therapy [ ] Physical Therapy [ ] Dietary     Progress Notes - Care Coordination [C. Provider] (05-19-23 @ 14:13)      Palliative Performance Scale:  http://npcrc.org/files/news/palliative_performance_scale_ppsv2.pdf  (Ctrl +  left click to view)  Respiratory Distress Observation Tool:  https://homecareinformation.net/handouts/hen/Respiratory_Distress_Observation_Scale.pdf (Ctrl +  left click to view)  PAINAD Score:  http://[email or web address].[facility]/cog/painad.pdf (Ctrl +  left click to view)

## 2023-05-23 NOTE — PROGRESS NOTE ADULT - PROBLEM SELECTOR PLAN 4
no further disease modifying therapy  plan is symptom directed approach PPS 10%  needs help with all ADL.

## 2023-05-23 NOTE — PROGRESS NOTE ADULT - PROBLEM SELECTOR PLAN 3
PPS 10%  needs help with all ADL. and agitation. Likely end of life Delirium   Will add Ativan 0.5mg IV q 8 ATC

## 2023-05-23 NOTE — PROGRESS NOTE ADULT - PROBLEM SELECTOR PLAN 5
continue care in PCU, plan discussed with IDT.  spoke to spouse, son Luther and DIL at bedside, spoke about either transitioning to inpatient hospice if stable on current regimen or continued stay In PCU if she continues to decline. continue care in PCU for advanced symptoms monitoring and Rx. Plan d/w IDT team and the patient's family.  May consider inpatient hospice is her symptoms continue to be stable.         Edu Meredith MD  Associate Chief Geriatrics and Palliative Care ([place])    [place] [facility]   , [employer] at [facility]      Please contact me via Teams from Monday through Friday between 9am-5pm. If not answering, please call the palliative care pager [phone]    After 5pm and on weekends, please see the contact information below:    In the event of newly developing, evolving, or worsening symptoms, please contact the Palliative Medicine team via pager (if the patient is at [facility] #[phone] or if the patient is at [facility] #[phone]) The Geriatric and Palliative Medicine service has coverage 24 hours a day/ 7 days a week to provide medical recommendations regarding symptom management needs via telephone

## 2023-05-24 NOTE — PROGRESS NOTE ADULT - PROVIDER SPECIALTY LIST ADULT
Colorectal Surgery
Surgery
Urology
MICU
MICU
Urology
Colorectal Surgery
Hospitalist
Palliative Care
Hospitalist
Palliative Care
Internal Medicine
Palliative Care
Palliative Care
Hospitalist
Palliative Care
Palliative Care

## 2023-05-24 NOTE — PROGRESS NOTE ADULT - PROBLEM SELECTOR PLAN 6
continue care in PCU for advanced symptoms monitoring and Rx at the EOL. Plan d/w IDT team and the patient's family.         Edu Meredith MD  Associate Chief Geriatrics and Palliative Care (GaP) [facility]   [place] [employer]   , [employer] at [facility]      Please contact me via Teams from Monday through Friday between 9am-5pm. If not answering, please call the palliative care pager [phone]    After 5pm and on weekends, please see the contact information below:    In the event of newly developing, evolving, or worsening symptoms, please contact the Palliative Medicine team via pager (if the patient is at [facility] #[phone] or if the patient is at [facility] #[phone]) The Geriatric and Palliative Medicine service has coverage 24 hours a day/ 7 days a week to provide medical recommendations regarding symptom management needs via telephone
continue care in PCU, plan discussed with IDT
continue care in PCU for advanced symptoms monitoring and Rx at the EOL. Plan d/w IDT team and the patient's family.         Edu Meredith MD  Associate Chief Geriatrics and Palliative Care (GaP) [facility]   [place] [employer] [facility]   , [employer] at [facility]      Please contact me via Teams from Monday through Friday between 9am-5pm. If not answering, please call the palliative care pager [phone]    After 5pm and on weekends, please see the contact information below:    In the event of newly developing, evolving, or worsening symptoms, please contact the Palliative Medicine team via pager (if the patient is at [facility] #[phone] or if the patient is at [facility] #[phone]) The Geriatric and Palliative Medicine service has coverage 24 hours a day/ 7 days a week to provide medical recommendations regarding symptom management needs via telephone

## 2023-05-24 NOTE — PROGRESS NOTE ADULT - TIME BILLING
Total time spent including the following  [x] Physical chart review and documentation   An extensive review of the physical chart, electronic health record, and documentation was conducted to obtain collateral information including but not limited to:   - Current inpatient records (prior progress notes)    - Social work assessments   - Current or proposed treatment plans   - Pharmacotherapy review (including I-STOP if applicable)  [x]care coordination  [x]discussion with floor staff (patient's RN and IDT)  [x]discussion with the patient's surrogate decision maker.   [x]Physical Exam and/or review of systems   [x]Formulation of assessment and plan   [x]Evaluating for response to treatment and side effects of opioids or benzodiazepines

## 2023-05-24 NOTE — PROGRESS NOTE ADULT - PROBLEM SELECTOR PLAN 1
Due to recurrent symptoms and based on PRN needs, will increase Morphine infusion to 1mg/hr  Will also increase Morphine to 2-4mg IV q 1 PRN moderate to severe pain   bowel regimen on opiates  monitor PAINAD score.

## 2023-05-24 NOTE — CHART NOTE - NSCHARTNOTEFT_GEN_A_CORE
called by PCU nurse saying patient is tachypneic ,RR is 28 and is moaning .   prn use from 9am-10 pm today .   4 mg morphine iv x2 for dyspnea   4 mg iv morphine x2 for severe pain.  plan:  increase  morphine drip to 2ml/hr.  start morphine iv 6 mg q1h prn for severe pain  start  morphine iv 6 mg iv q1h prn for dyspnea  start morphine iv 4 mg iv q1h prn for mod pain. called by PCU nurse saying patient is tachypneic ,RR is 28 and is moaning .   prn use from 9am-10 pm today .   4 mg morphine iv x2 for dyspnea   4 mg iv morphine x2 for severe pain.  plan:  increase  morphine drip to 2ml/hr.  start morphine iv 6 mg q1h prn for severe pain  start  morphine iv 6 mg iv q1h prn for dyspnea  start morphine iv 4 mg iv q1h prn for mod pain.    Attending addendum:  agree with above. patient with uncontrolled, refractory dyspnea despite PRN use  increase gtt, increase PRNs. pt actively dying.

## 2023-05-24 NOTE — PROGRESS NOTE ADULT - NUTRITIONAL ASSESSMENT
This patient has been assessed with a concern for Malnutrition and has been determined to have a diagnosis/diagnoses of Severe protein-calorie malnutrition.    This patient is being managed with:   Diet NPO-  Entered: May 18 2023  4:23AM  
This patient has been assessed with a concern for Malnutrition and has been determined to have a diagnosis/diagnoses of Moderate protein-calorie malnutrition.    This patient is being managed with:   Diet NPO-  With Ice Chips/Sips of Water  Entered: May 13 2023  2:24PM  
This patient has been assessed with a concern for Malnutrition and has been determined to have a diagnosis/diagnoses of Severe protein-calorie malnutrition.    This patient is being managed with:   Diet NPO-  Entered: May 18 2023  4:23AM  
This patient has been assessed with a concern for Malnutrition and has been determined to have a diagnosis/diagnoses of Severe protein-calorie malnutrition.    This patient is being managed with:   Diet Regular-  Entered: May 19 2023  1:31PM  
This patient has been assessed with a concern for Malnutrition and has been determined to have a diagnosis/diagnoses of Severe protein-calorie malnutrition.    This patient is being managed with:   Diet Regular-  Entered: May 19 2023  1:31PM  
This patient has been assessed with a concern for Malnutrition and has been determined to have a diagnosis/diagnoses of Moderate protein-calorie malnutrition.    This patient is being managed with:   Diet Full Liquid-  Entered: May 17 2023  9:01AM  
This patient has been assessed with a concern for Malnutrition and has been determined to have a diagnosis/diagnoses of Moderate protein-calorie malnutrition.    This patient is being managed with:   Diet NPO-  With Ice Chips/Sips of Water  Entered: May 13 2023  2:24PM  
This patient has been assessed with a concern for Malnutrition and has been determined to have a diagnosis/diagnoses of Severe protein-calorie malnutrition.    This patient is being managed with:   Diet Regular-  Entered: May 19 2023  1:31PM  

## 2023-05-24 NOTE — PROGRESS NOTE ADULT - SUBJECTIVE AND OBJECTIVE BOX
The patient was seen on 5/23 at 14:20  GAP TEAM PALLIATIVE CARE UNIT PROGRESS NOTE:      [  ] Patient on hospice program.    INDICATION FOR PALLIATIVE CARE UNIT SERVICES/Interval HPI: Patient transferred to PCU  for symptom directed care in setting of  rectal cancer, malignant SBO and septic shock/She was seen with her  by her bedside. She was lethargic, audible secretions were still noticed. She was moaning and appeared to be in pain and also having respiratory distress.      OVERNIGHT EVENTS: Over 24 hours (6am to 6am), she used Morphine 2mg x 4 for severe pain and Morphine 3mg IV x 2 for respiratory distress, as well as Ativan 0.5mg x 1 and Robinul x 1.     DNR on chart: Yes  Yes      Allergies    penicillin G benzathine (Rash; Anaphylaxis; Hives; Short breath)  Seafood (Hives)    Intolerances    MEDICATIONS  (STANDING):  acyclovir Topical 5% Ointment 1 Application(s) Topical four times a day  chlorhexidine 4% Liquid 1 Application(s) Topical <User Schedule>  glycopyrrolate Injectable 0.4 milliGRAM(s) IV Push every 6 hours  LORazepam   Injectable 0.5 milliGRAM(s) IV Push every 6 hours  morphine  Infusion 1 mG/Hr (1 mL/Hr) IV Continuous <Continuous>  sodium chloride 0.9%. 1000 milliLiter(s) (10 mL/Hr) IV Continuous <Continuous>    MEDICATIONS  (PRN):  bisacodyl Suppository 10 milliGRAM(s) Rectal at bedtime PRN Constipation  LORazepam   Injectable 1 milliGRAM(s) IV Push every 1 hour PRN Anxiety  morphine  - Injectable 4 milliGRAM(s) IV Push every 1 hour PRN Severe Pain (7 - 10)  morphine  - Injectable 4 milliGRAM(s) IV Push every 1 hour PRN dyspnea  morphine  - Injectable 2 milliGRAM(s) IV Push every 1 hour PRN Mild Pain (1 - 3)        ITEMS UNCHECKED ARE NOT PRESENT    PRESENT SYMPTOMS: [ x]Unable to self-report see PAINAD, RDOS below  Source if other than patient:  [ ]Family   [ ]Team     Pain: [ ] yes [ ] no  QOL impact -   Location -                    Aggravating factors -  Quality -  Radiation -  Timing-  Severity (0-10 scale):  Minimal acceptable level (0-10 scale):     Dyspnea:                           [ ]Mild [ ]Moderate [ ]Severe  Anxiety:                             [ ]Mild [ ]Moderate [ ]Severe  Fatigue:                             [ ]Mild [ ]Moderate [ ]Severe  Nausea:                             [ ]Mild [ ]Moderate [ ]Severe  Loss of appetite:              [ ]Mild [ ]Moderate [ ]Severe  Constipation:                    [ ]Mild [ ]Moderate [ ]Severe    PCSSQ [Palliative Care Spiritual Screening Question]   Severity (0-10):  Score of 4 or > indicate consideration of Chaplaincy referral.  Chaplaincy Referral: [ ] yes [ ] refused [ x] following [ ] deferred  5/19  visited during rounds. Patient in bed and alert;  and son at the bedside.  introduced himself and his role. Patient stated that she was fighting.  and son stated that they are right there with her.  offered affirmation of feelings. They stated that they were ok for now since the  has stopped by and offered prayer and blessing.  left contact cards with them and encouraged them to reach out if needed emotional support.  will remain available for emotional support.  Caregiver [place]? : [x ] yes [ ] no [ ] deferred:  Social work referral [x ] Patient & Family Centered Care Referral [ ]     Anticipatory Grief present?:  [x ] yes [ ] no [ ] deferred:  Social work referral [ x] Patient & Family Centered Care Referral [ ]  	  Other Symptoms:  [x ]All other review of systems negative , unable to assess    PHYSICAL EXAM:   Vital Signs Last 24 Hrs  T(C): 36.7 (24 May 2023 07:36), Max: 36.7 (24 May 2023 07:36)  T(F): 98 (24 May 2023 07:36), Max: 98 (24 May 2023 07:36)  HR: 109 (24 May 2023 07:36) (109 - 109)  BP: 99/65 (24 May 2023 07:36) (99/65 - 99/65)  BP(mean): --  RR: 20 (24 May 2023 07:36) (20 - 20)  SpO2: 84% (24 May 2023 07:36) (84% - 84%)    Parameters below as of 24 May 2023 07:36  Patient On (Oxygen Delivery Method): nasal cannula    GENERAL: [ ] Cachexia  [ ]Alert  [ ]Oriented x   [ ]Lethargic  [ x]Unarousable  [ ]Verbal  [X ]Non-Verbal  Behavioral:   [ ] Anxiety  [ ] Delirium [ ] Agitation [ ] Other  HEENT:  [ ]Normal   [x ]Dry mouth   [ ]ET Tube/Trach  [ ]Oral lesions  PULMONARY:   [ x]Clear [ ]Tachypnea  [ ]Audible excessive secretions   [ ]Rhonchi        [ ]Right [ ]Left [ ]Bilateral  [ ]Crackles        [ ]Right [ ]Left [ ]Bilateral  [ ]Wheezing     [ ]Right [ ]Left [ ]Bilateral  [ ]Diminished BS [ ]Right [ ]Left [ ]Bilateral    CARDIOVASCULAR:    [ ]Regular [ ]Irregular [ x]Tachy  [ ]Kt [ ]Murmur [ ]Other  GASTROINTESTINAL:  [x ]Soft  [ ]Distended   [ x]+BS  [ ]Non tender [ ]Tender  [ ]Other [ ]PEG [ ]OGT/ NGT   Last BM:  5/18  GENITOURINARY:  [ ]Normal [ x] Incontinent   [ ]Oliguria/Anuria   [ x]Vergara  MUSCULOSKELETAL:   [ ]Normal   [ ]Weakness  [x ]Bed/Wheelchair bound [ ]Edema  NEUROLOGIC: unarousable  [ ]No focal deficits  [ ] Cognitive impairment  [ ] Dysphagia [ ]Dysarthria [ ] Paresis [x ]Other: Encephalopathy   SKIN:   [ ]Normal  [ ]Rash  [ ]Other  [ ]Pressure ulcer(s)  [ ]y [ ]n  Present on admission    Right back DTI  Right ear DTI   Right heal stage I   Right lateral fifth toe Stage I   Sacrum DTI   CRITICAL CARE:  [ ] Shock Present  [ ]Septic [ ]Cardiogenic [ ]Neurologic [ ]Hypovolemic  [x ]  Vasopressors (removed) [ ]  Inotropes   [ ] Respiratory failure present [ ] Mechanical Ventilation [ ] Non-invasive ventilatory support [ ] High-Flow  [ ] Acute  [ ] Chronic [ ] Hypoxic  [ ] Hypercarbic [ ] Other  [ ] Other organ failure     LABS:                      No new labs     RADIOLOGY & ADDITIONAL STUDIES:   < from: CT Abdomen and Pelvis w/ IV Cont (05.18.23 @ 12:58) >  IMPRESSION:  Bilateral lower lobe consolidation with low density material filling   bronchi is suspicious for pneumonia versus aspiration. Small pleural   effusions    Bile duct wall thickening from the hepatic hilus through extrahepatic   ducts suggests inflammatory versus infectious cholangitis. There may be   mild fluid and inflammation surrounding pancreatic head which probably   represent extension of small bowel mesenteric edema versus focal acute   pancreatitis. Consider correlation with lipase.    Mild to moderate bilateral hydroureteronephrosis with urothelial   thickening of upper collecting systems and ureters suggests infectious   versus inflammatory pyelitis and ureteritis. Air-fluid level within the   [place] pouch may be secondary to indwelling Vergara catheter versus   gas-forming organism. Recommend urinalysis correlation    Persistent dilation of mid small bowel with distal passage of oral   contrast to the enterocolonic anastomosis suggests partial distal small   bowel obstruction. Likely treatment related changes involving pelvic   small bowel versus enteritis. Mild colonic wall thickening suggests   colitis    Similar lower vaginal distention with air-fluid level inseparable from   the anorectal tumor        --- End of Report ---            KWAME GALLARDO MD; Attending Radiologist  This document has been electronically signed. May 18 2023  2:27PM    < end of copied text >      PROTEIN CALORIE MALNUTRITION: [ ] mild [ ] moderate [ ] severe  [ ] underweight [ ] morbid obesity    https://www.andeal.org/vault/2440/web/files/ONC/Table_Clinical%20Characteristics%20to%20Document%20Malnutrition-White%20JV%20et%20al%350899.pdf    Height (cm): 154.9 (05-12-23 @ 18:49), 159 (04-19-23 @ 09:17), 156 (01-23-23 @ 14:22)  Weight (kg): 57.7 (05-18-23 @ 05:00), 53.2 (05-03-23 @ 09:13), 62.6 (01-24-23 @ 02:34)  BMI (kg/m2): 24 (05-18-23 @ 05:00), 22.2 (05-12-23 @ 18:49), 21 (05-03-23 @ 09:13)    [ x] PPSV2 < or = 30% [ ] significant weight loss [x ] poor nutritional intake [ ] anasarca   Artificial Nutrition [ ]     Other REFERRALS:    [ ] Hospice  [ ]Child Life  [x ]Social Work  [ ]Case management [ ]Holistic Therapy [ ] Physical Therapy [ ] Dietary     Progress Notes - Care Coordination [C. Provider] (05-19-23 @ 14:13)      Palliative Performance Scale:  http://npcrc.org/files/news/palliative_performance_scale_ppsv2.pdf  (Ctrl +  left click to view)  Respiratory Distress Observation Tool:  https://homecareinformation.net/handouts/hen/Respiratory_Distress_Observation_Scale.pdf (Ctrl +  left click to view)  PAINAD Score:  http://geriatrictoolkit.[facility]/cog/painad.pdf (Ctrl +  left click to view)   The patient was seen on 5/23 at 14:20  GAP TEAM PALLIATIVE CARE UNIT PROGRESS NOTE:      [  ] Patient on hospice program.    INDICATION FOR PALLIATIVE CARE UNIT SERVICES/Interval HPI: Patient transferred to PCU  for symptom directed care in setting of  rectal cancer, malignant SBO and septic shock/She was seen with her  by her bedside. She was lethargic, audible secretions were still noticed. She was moaning and appeared to be in pain and also having respiratory distress.      OVERNIGHT EVENTS: Over 24 hours (6am to 6am), she used Morphine 2mg x 4 for severe pain and Morphine 3mg IV x 2 for respiratory distress, as well as Ativan 0.5mg x 1 and Robinul x 1.     DNR on chart: Yes  Yes      Allergies    penicillin G benzathine (Rash; Anaphylaxis; Hives; Short breath)  Seafood (Hives)    Intolerances    MEDICATIONS  (STANDING):  acyclovir Topical 5% Ointment 1 Application(s) Topical four times a day  chlorhexidine 4% Liquid 1 Application(s) Topical <User Schedule>  glycopyrrolate Injectable 0.4 milliGRAM(s) IV Push every 6 hours  LORazepam   Injectable 0.5 milliGRAM(s) IV Push every 6 hours  morphine  Infusion 1 mG/Hr (1 mL/Hr) IV Continuous <Continuous>  sodium chloride 0.9%. 1000 milliLiter(s) (10 mL/Hr) IV Continuous <Continuous>    MEDICATIONS  (PRN):  bisacodyl Suppository 10 milliGRAM(s) Rectal at bedtime PRN Constipation  LORazepam   Injectable 1 milliGRAM(s) IV Push every 1 hour PRN Anxiety  morphine  - Injectable 4 milliGRAM(s) IV Push every 1 hour PRN Severe Pain (7 - 10)  morphine  - Injectable 4 milliGRAM(s) IV Push every 1 hour PRN dyspnea  morphine  - Injectable 2 milliGRAM(s) IV Push every 1 hour PRN Mild Pain (1 - 3)        ITEMS UNCHECKED ARE NOT PRESENT    PRESENT SYMPTOMS: [ x]Unable to self-report see PAINAD, RDOS below  Source if other than patient:  [ ]Family   [ ]Team     Pain: [ ] yes [ ] no  QOL impact -   Location -                    Aggravating factors -  Quality -  Radiation -  Timing-  Severity (0-10 scale):  Minimal acceptable level (0-10 scale):     Dyspnea:                           [ ]Mild [ ]Moderate [ ]Severe  Anxiety:                             [ ]Mild [ ]Moderate [ ]Severe  Fatigue:                             [ ]Mild [ ]Moderate [ ]Severe  Nausea:                             [ ]Mild [ ]Moderate [ ]Severe  Loss of appetite:              [ ]Mild [ ]Moderate [ ]Severe  Constipation:                    [ ]Mild [ ]Moderate [ ]Severe    PCSSQ [Palliative Care Spiritual Screening Question]   Severity (0-10):  Score of 4 or > indicate consideration of Chaplaincy referral.  Chaplaincy Referral: [ ] yes [ ] refused [ x] following [ ] deferred  5/19  visited during rounds. Patient in bed and alert;  and son at the bedside.  introduced himself and his role. Patient stated that she was fighting.  and son stated that they are right there with her.  offered affirmation of feelings. They stated that they were ok for now since the  has stopped by and offered prayer and blessing.  left contact cards with them and encouraged them to reach out if needed emotional support.  will remain available for emotional support.  Caregiver [place]? : [x ] yes [ ] no [ ] deferred:  Social work referral [x ] Patient & Family Centered Care Referral [ ]     Anticipatory Grief present?:  [x ] yes [ ] no [ ] deferred:  Social work referral [ x] Patient & Family Centered Care Referral [ ]  	  Other Symptoms:  [x ]All other review of systems negative , unable to assess    PHYSICAL EXAM:   Vital Signs Last 24 Hrs  T(C): 36.7 (24 May 2023 07:36), Max: 36.7 (24 May 2023 07:36)  T(F): 98 (24 May 2023 07:36), Max: 98 (24 May 2023 07:36)  HR: 109 (24 May 2023 07:36) (109 - 109)  BP: 99/65 (24 May 2023 07:36) (99/65 - 99/65)  BP(mean): --  RR: 20 (24 May 2023 07:36) (20 - 20)  SpO2: 84% (24 May 2023 07:36) (84% - 84%)    Parameters below as of 24 May 2023 07:36  Patient On (Oxygen Delivery Method): nasal cannula    GENERAL: [ ] Cachexia  [ ]Alert  [ ]Oriented x   [ ]Lethargic  [ x]Unarousable  [ ]Verbal  [X ]Non-Verbal  Behavioral:   [ ] Anxiety  [ ] Delirium [ ] Agitation [ ] Other  HEENT:  [ ]Normal   [x ]Dry mouth   [ ]ET Tube/Trach  [ ]Oral lesions [x] Increased oropharyngeal secretions   PULMONARY:   [ ]Clear [ ]Tachypnea  [ ]Audible excessive secretions   [ ]Rhonchi        [ ]Right [ ]Left [ ]Bilateral  [ ]Crackles        [ ]Right [ ]Left [ ]Bilateral  [ ]Wheezing     [ ]Right [ ]Left [ ]Bilateral  [ ]Diminished BS [ ]Right [ ]Left [ ]Bilateral   [x] Labored breathing    CARDIOVASCULAR:    [ ]Regular [ ]Irregular [ x]Tachy  [ ]Kt [ ]Murmur [ ]Other  GASTROINTESTINAL:  [x ]Soft  [ ]Distended   [ x]+BS  [ ]Non tender [ ]Tender  [ ]Other [ ]PEG [ ]OGT/ NGT   Last BM:  5/18  GENITOURINARY:  [ ]Normal [ x] Incontinent   [ ]Oliguria/Anuria   [ x]Vergara  MUSCULOSKELETAL:   [ ]Normal   [ ]Weakness  [x ]Bed/Wheelchair bound [ ]Edema  NEUROLOGIC: unarousable  [ ]No focal deficits  [ ] Cognitive impairment  [ ] Dysphagia [ ]Dysarthria [ ] Paresis [x ]Other: Encephalopathy   SKIN:   [ ]Normal  [ ]Rash  [ ]Other  [ ]Pressure ulcer(s)  [ ]y [ ]n  Present on admission    Right back DTI  Right ear DTI   Right heal stage I   Right lateral fifth toe Stage I   Sacrum DTI   CRITICAL CARE:  [ ] Shock Present  [ ]Septic [ ]Cardiogenic [ ]Neurologic [ ]Hypovolemic  [x ]  Vasopressors (removed) [ ]  Inotropes   [ ] Respiratory failure present [ ] Mechanical Ventilation [ ] Non-invasive ventilatory support [ ] High-Flow  [ ] Acute  [ ] Chronic [ ] Hypoxic  [ ] Hypercarbic [ ] Other  [ ] Other organ failure     LABS:                      No new labs     RADIOLOGY & ADDITIONAL STUDIES:   < from: CT Abdomen and Pelvis w/ IV Cont (05.18.23 @ 12:58) >  IMPRESSION:  Bilateral lower lobe consolidation with low density material filling   bronchi is suspicious for pneumonia versus aspiration. Small pleural   effusions    Bile duct wall thickening from the hepatic hilus through extrahepatic   ducts suggests inflammatory versus infectious cholangitis. There may be   mild fluid and inflammation surrounding pancreatic head which probably   represent extension of small bowel mesenteric edema versus focal acute   pancreatitis. Consider correlation with lipase.    Mild to moderate bilateral hydroureteronephrosis with urothelial   thickening of upper collecting systems and ureters suggests infectious   versus inflammatory pyelitis and ureteritis. Air-fluid level within the   [place] pouch may be secondary to indwelling Vergara catheter versus   gas-forming organism. Recommend urinalysis correlation    Persistent dilation of mid small bowel with distal passage of oral   contrast to the enterocolonic anastomosis suggests partial distal small   bowel obstruction. Likely treatment related changes involving pelvic   small bowel versus enteritis. Mild colonic wall thickening suggests   colitis    Similar lower vaginal distention with air-fluid level inseparable from   the anorectal tumor        --- End of Report ---            KWAME GALLARDO MD; Attending Radiologist  This document has been electronically signed. May 18 2023  2:27PM    < end of copied text >      PROTEIN CALORIE MALNUTRITION: [ ] mild [ ] moderate [ ] severe  [ ] underweight [ ] morbid obesity    https://www.andeal.org/vault/2440/web/files/ONC/Table_Clinical%20Characteristics%20to%20Document%20Malnutrition-White%20JV%20et%20al%507578.pdf    Height (cm): 154.9 (05-12-23 @ 18:49), 159 (04-19-23 @ 09:17), 156 (01-23-23 @ 14:22)  Weight (kg): 57.7 (05-18-23 @ 05:00), 53.2 (05-03-23 @ 09:13), 62.6 (01-24-23 @ 02:34)  BMI (kg/m2): 24 (05-18-23 @ 05:00), 22.2 (05-12-23 @ 18:49), 21 (05-03-23 @ 09:13)    [ x] PPSV2 < or = 30% [ ] significant weight loss [x ] poor nutritional intake [ ] anasarca   Artificial Nutrition [ ]     Other REFERRALS:    [ ] Hospice  [ ]Child Life  [x ]Social Work  [ ]Case management [ ]Holistic Therapy [ ] Physical Therapy [ ] Dietary     Progress Notes - Care Coordination [C. Provider] (05-19-23 @ 14:13)      Palliative Performance Scale:  http://npcrc.org/files/news/palliative_performance_scale_ppsv2.pdf  (Ctrl +  left click to view)  Respiratory Distress Observation Tool:  https://homecareinformation.net/handouts/hen/Respiratory_Distress_Observation_Scale.pdf (Ctrl +  left click to view)  PAINAD Score:  http://geriatrictoolkit.[facility]/cog/painad.pdf (Ctrl +  left click to view)

## 2023-05-24 NOTE — PROGRESS NOTE ADULT - PROBLEM SELECTOR PLAN 2
Morphine infusion at 1mg/hr and Morphine 4mg IV q 1 PRN   Ativan 1mg IV q 1 PRN for intractable symptoms.   Monitor RDOS scale

## 2023-05-24 NOTE — PROGRESS NOTE ADULT - PROBLEM SELECTOR PLAN 3
and agitation. Likely end of life Delirium   Will increase Ativan to 0.5mg IV q 6 ATC  Ativan 1mg q 1 PRN

## 2023-05-24 NOTE — PROGRESS NOTE ADULT - PROBLEM SELECTOR PROBLEM 1
Pain
SBO (small bowel obstruction)
SBO (small bowel obstruction)
Pain
Pain
SBO (small bowel obstruction)
SBO (small bowel obstruction)
Pain

## 2023-05-25 NOTE — DISCHARGE NOTE FOR THE EXPIRED PATIENT - HOSPITAL COURSE
Patient is an 79 yo F, PMH of recurrent cervical cancer s/p pelvic exenteration with ostomy and ileal conduit, currently on treatment for locally advanced rectal cancer (irinotecan q2 wks), hypothyroidism, admitted 23 with high grade malignant SBO which was managed non-operatively. Patient was admitted to MICU on 23 for hypotension requiring pressors, concern for septic shock. Repeat CT demonstrates concern for partial SBO. Patient was transferred to PCU for advanced symptoms monitoring and Rx at the end of life. She  on 2023.

## 2023-05-25 NOTE — PROVIDER CONTACT NOTE (OTHER) - ASSESSMENT
No response to external stimuli, no spontaneous respirations, no apical heart rate, negative papillary response to light
Upon rechecking vitals after first RRT, pt found to be hypotensive to 65/35. Other VS as charted.

## 2023-05-25 NOTE — DISCHARGE NOTE FOR THE EXPIRED PATIENT - SECONDARY DIAGNOSIS.
SBO (small bowel obstruction) Encounter for palliative care Pain Functional quadriplegia Anxiety Advanced care planning/counseling discussion Acute respiratory distress

## 2023-05-31 ENCOUNTER — APPOINTMENT (OUTPATIENT)
Dept: INFUSION THERAPY | Facility: HOSPITAL | Age: 78
End: 2023-05-31

## 2023-05-31 ENCOUNTER — APPOINTMENT (OUTPATIENT)
Dept: HEMATOLOGY ONCOLOGY | Facility: CLINIC | Age: 78
End: 2023-05-31

## 2023-06-14 ENCOUNTER — APPOINTMENT (OUTPATIENT)
Dept: INFUSION THERAPY | Facility: HOSPITAL | Age: 78
End: 2023-06-14

## 2023-06-14 ENCOUNTER — APPOINTMENT (OUTPATIENT)
Dept: HEMATOLOGY ONCOLOGY | Facility: CLINIC | Age: 78
End: 2023-06-14

## 2023-09-14 NOTE — ED ADULT NURSE REASSESSMENT NOTE - NS ED NURSE REASSESS COMMENT FT1
Received report from night RN. Pt ambulated to bathroom with no assistance, gait noted to be steady, placed on monitor, NSR at this time. Offers no complaints, pending bed assignment. RR even and unlabored
Received report from night RN. Pt ambulated to bathroom with no assistance, gait noted to be steady, placed on monitor, NSR at this time. Offers no complaints, pending bed assignment. RR even and unlabored
pt at baseline mental status, respirations even and unlabored, completing full sentences. pt sitting in stretcher comfortable at this time, no new complaints at this time. pt placed on CM, NSR. stretcher in lowest position, siderail up, family at bedside. pt admitted awaiting bed placement
pt at baseline mental status, respirations even and unlabored, completing full sentences. pt resting in stretcher with eyes closed, easily arousable. pt VS as charted. pt comfortable at this time, no new complaints at this time. stretcher in lowest position, siderails up, pt admitted awaiting bed placement. NSR  on CM
Pt does not appear in acute distress, ambulatory, skin intact. Pt states she does not feel SOB at this moment RR are even and unlabored. Breath sounds are clear bilaterally. denies chest pain, abdominal pain, fever like symptoms, headache, N/V, and dizziness.
report received from AM shift RN. pt A&Ox4, respirations even and unlabored, completing full sentences. pt sitting in stretcher comfortable at this time, no new complaints at this time. rpt trop drawn and sent. medicated as per MD orders. fluids running as per MD orders. VS as charted. stretcher in lowest position, siderail up, family at bedside.
Pt in no acute distress, handoff given to night receiving nurse.
4 = No assist / stand by assistance

## 2023-10-01 PROBLEM — Z92.3 HISTORY OF RADIATION THERAPY: Status: RESOLVED | Noted: 2023-01-01 | Resolved: 2023-10-01

## 2023-12-19 NOTE — ED ADULT NURSE NOTE - PRO INTERPRETER NEED 2
From: Riaz Dumont  To: Lashon Yossi  Sent: 12/14/2023 12:46 PM EST  Subject: Med change     Good afternoon, I was wanting to ask you about switching medications . I have been taking Lexapro 20 mg and it keeps me drowsy. I switched to taking it at night and even have decreased to 10 mg but I don’t see much of a change and am having depression. Could we trial a low dose of Wellbutrin?   
English

## 2024-10-01 NOTE — PROGRESS NOTE ADULT - ASSESSMENT
----- Message from CHIDI Bond sent at 9/28/2024 11:14 AM EDT -----  Please inform pt that she has a UTI and I sent a prescription for Nitrofurantoin 1 pill BID x 7 days  which is a different antibiotic that she previously started prior to her delivery.  Thanks   Patient is an 79 yo F, PMH of recurrent cervical cancer s/p pelvic exenteration with ostomy and ileal conduit, currently on treatment for locally advanced rectal cancer (irinotecan q2 wks), hypothyroidism, admitted 5/13/23 with high grade malignant SBO which was managed non-operatively. Patient was admitted to MICU on 5/18/23 for hypotension requiring pressors, concern for septic shock. Repeat CT demonstrates concern for partial SBO. Palliative consulted for GOC

## 2025-06-09 NOTE — HISTORY OF PRESENT ILLNESS
Subjective   Javier is a 72 year old female who  has a past medical history of Asthma (CMD), Depression, Essential (primary) hypertension, GERD (gastroesophageal reflux disease), Hiatal hernia, and High cholesterol.    Presents with a chief complaint of: Office Visit and Follow-up (Left shoulder pain-patient states has been doing PT, physical therapist believes it's the patients neck and not her shoulder. )    LEFT shoulder vs neck pain  AoC - reports symptoms flared beginning early May  Started gardening but denies any other acute injury or trauma  Reports radiation into shoulder  Endorses with PT - ROM at neck has improved  Reports nearly 100% improved today    Bilateral base of thumb pain consistent with CMC arthritis  bilaterally more prominent on the left  No acute injury or trauma    Social History     Tobacco Use   • Smoking status: Never   • Smokeless tobacco: Never   Vaping Use   • Vaping status: never used   Substance Use Topics   • Alcohol use: Yes     Alcohol/week: 6.0 standard drinks of alcohol     Types: 6 Glasses of wine per week   • Drug use: Never      Patient's medications, allergies, past medical, surgical, social and family histories were reviewed and updated as appropriate.    Review of Systems  Comprehensive ROS negative/noncontributory or as indicated in HPI or above.       Objective   /78 (BP Location: LUE - Left upper extremity, Patient Position: Sitting, Cuff Size: Regular)   Pulse 62   Physical Exam  Vitals and nursing note reviewed.   Constitutional:       General: She is not in acute distress.     Appearance: Normal appearance.   HENT:      Head: Normocephalic and atraumatic.      Neck: Neck supple. No rigidity.   Cardiovascular:      Rate and Rhythm: Normal rate.      Pulses: Normal pulses.   Pulmonary:      Effort: Pulmonary effort is normal. No respiratory distress.   Neurological:      Mental Status: She is alert.     Exam focused on  bilateral hand:  Inspection: no asymmetry,  ecchymosis, erythema or swelling  ROM: Full active ROM in finger flexion, extension, adduction and abduction  Palpation: Tender palpation over the base of the thumb, minimal tenderness with metacarpal squeeze, nontender over the phalanges  Strength:  strength 545, interosseous muscles 5/5, flexors and extensors 5/5  Neurovascular: Neurovascularly intact       Assessment &Plan   -----  Diagnosis Addressed and/or Evaluated this Encounter & Orders Entered & Updated this Encounter (not all orders entered this encounter, some reflect medication list and health maintenance updates)    Assessment & Plan  Primary osteoarthritis of both first carpometacarpal joints  Topical Voltaren  Dexterity exercises  Paraffin wax and manipulation       Neck pain on left side  Suspect arthritic component  Continue PT and home exercise regimen  Obtain baseline plain films  As needed analgesic  Orders:  •  XR CERVICAL SPINE AP LAT FLEX AND EXT; Future      Return in about 4 months (around 10/21/2025) for Medicare Wellness Visit, or sooner pending labs, imaging, and/or symptoms.  -----     The care plan was discussed with the patient and, if present, permitted family members. All questions were answered to the best of my ability. They were advised on signs and symptoms that would warrant calling/returning ahead of scheduled f/up or that would indicate presenting to UC/ED, and they indicated understanding and agreement with the plan at the conclusion of the visit.    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 \par \par In June 2020 Abi noticed a rectal discharge and burning sensation in the rectum. Was evaluated by Dr. Lamar and ultimately saw Dr. Oropeza who ordered CT Pelvis and MRI Pelvis . Found to have a mass in the rectum s/p biopsy confirmed adenocarcinoma. \par \par 7/18/20: MRI Pelvis: large hypovascular mass c/w mucinous adenoca involving remaining distal rectum and vagina, tumor abuts anal sphincter\par 8/5/20: CT pelvis: residual chronic perineal mass, likely cystic with gas component\par 9/23/20: CT CAP: low density, possibly necrotic mass up to 6.2 cm with involvement of the vagina, nonspecific 6 mm LLL nodule is noted\par 10/3/20: PET/CT: hypermetabolic rectal mass, non specific small inguinal LN, nonspecific hypermetabolism within the colostomy insertion site\par 10/7/20: discussed at rectal TB, plan for DILMA with re-evaluation of response and then surgery if residual disease\par 10/26/20: C1 FOLFOX\par 11/9/20: C2 (c/b acute oxali neurotoxicity)\par 11/23/20: C3 (Oxali over 4 hrs) - tolerated well \par 12/7/20: C4 5FU/LV (developed difficulty swallowing, SOB after receiving Oxali, d/c Oxali for today's tx)\par 12/21-2/10/21: C5-8 FOLFOX (dose reduced Oxali 65 mg/m2 over 4 hrs)\par 2/13/21: CT CAP: 0.6 cm LLL nodule, rectal mass slightly decreased in size, small b/l inguinal LN minimally decr in size, pneumatosis is noted involving SB possible fistula \par 2/25-3/23/21: Xeloda/RT \par 4/21/21: CT CAP: mild interval decrease in size of rectal mass, stable pulm nodule \par 5/26-7/7/21:C1-C4 5FU/LV maintenance \par 7/14/21: CTCAP: stable lung nodules + further decrease in size of rectal mass/fluid collection\par 7/21-10/13/21: C5-C11\par 10/7/21: CT CAP revealed stable disease. \par 10/27-12/21/21: C12-C16  \par 1/3/22: CT CAP: stable disease\par 1/5-3/30/22: C17-C23\par 4/8/22: CT CAP: stable disease \par 4/13-7/6/22: C24-C29\par 7/11/22: CT CAP: stable disease\par 7/20-10/12/22: C30-C36\par 10/3/22 CT C/A/P: reviewed images with radiology and there is disease recurrence with increased communication to vagina\par 10/26-11/9/22: C1-C2 FOLFIRI  [de-identified] : adenocarcinoma [FreeTextEntry1] : tx held [de-identified] : Patient was seen at the infusion room during her scheduled treatment appointment. She currently reported fatigue, SOB with exertion x 1 week (denied SOB at rest, chest pain, dizziness). Otherwise she reported no acute complaints. Her vaginal mucinous discharge remains unchanged but stable. She admitted to mildly decreased appetite (makes conscious effort to eat), stable weight and normal bowel movements (no issues with colostomy). She is able to complete her ADLs independently with mild restrictions.

## 2025-07-31 NOTE — PROVIDER CONTACT NOTE (CRITICAL VALUE NOTIFICATION) - SITUATION
7/31/25 - I called pt and left a vm for the 8/19./25 Pet scan at [facility] arrive at 1:15 pm and NPO 6. Plain water only. I left my direct line for a confirmation call back. I also mailed the appt info.  
Blood Gas Lactate- 6.6
sbo on LWS via ngt.